# Patient Record
Sex: MALE | Race: WHITE | NOT HISPANIC OR LATINO | Employment: OTHER | URBAN - METROPOLITAN AREA
[De-identification: names, ages, dates, MRNs, and addresses within clinical notes are randomized per-mention and may not be internally consistent; named-entity substitution may affect disease eponyms.]

---

## 2017-01-11 ENCOUNTER — OFFICE VISIT (OUTPATIENT)
Dept: DIABETES SERVICES | Facility: HOSPITAL | Age: 82
End: 2017-01-11

## 2017-01-11 PROCEDURE — G0109 DIAB MANAGE TRN IND/GROUP: HCPCS | Performed by: DIETITIAN, REGISTERED

## 2019-06-06 ENCOUNTER — APPOINTMENT (OUTPATIENT)
Dept: LAB | Facility: HOSPITAL | Age: 84
End: 2019-06-06

## 2019-06-06 ENCOUNTER — TRANSCRIBE ORDERS (OUTPATIENT)
Dept: LAB | Facility: HOSPITAL | Age: 84
End: 2019-06-06

## 2019-06-06 DIAGNOSIS — L08.0 PYODERMA: Primary | ICD-10-CM

## 2019-06-06 PROCEDURE — 87070 CULTURE OTHR SPECIMN AEROBIC: CPT | Performed by: DERMATOLOGY

## 2019-06-06 PROCEDURE — 87205 SMEAR GRAM STAIN: CPT | Performed by: DERMATOLOGY

## 2019-06-08 LAB
BACTERIA SPEC AEROBE CULT: NORMAL
GRAM STN SPEC: NORMAL

## 2022-05-10 ENCOUNTER — HOSPITAL ENCOUNTER (INPATIENT)
Facility: HOSPITAL | Age: 87
LOS: 8 days | Discharge: HOME | End: 2022-05-18
Attending: EMERGENCY MEDICINE | Admitting: INTERNAL MEDICINE

## 2022-05-10 ENCOUNTER — APPOINTMENT (OUTPATIENT)
Dept: GENERAL RADIOLOGY | Facility: HOSPITAL | Age: 87
End: 2022-05-10

## 2022-05-10 DIAGNOSIS — L03.115 CELLULITIS OF RIGHT FOOT: Primary | ICD-10-CM

## 2022-05-10 DIAGNOSIS — D72.829 LEUKOCYTOSIS, UNSPECIFIED TYPE: ICD-10-CM

## 2022-05-10 DIAGNOSIS — L97.513 FOOT ULCERATION, RIGHT, WITH NECROSIS OF MUSCLE: ICD-10-CM

## 2022-05-10 DIAGNOSIS — M86.071 ACUTE HEMATOGENOUS OSTEOMYELITIS OF RIGHT FOOT: ICD-10-CM

## 2022-05-10 DIAGNOSIS — R79.89 ELEVATED SERUM CREATININE: ICD-10-CM

## 2022-05-10 DIAGNOSIS — A48.0 GAS GANGRENE OF FOOT: ICD-10-CM

## 2022-05-10 PROBLEM — N28.9 RENAL INSUFFICIENCY: Status: ACTIVE | Noted: 2022-05-10

## 2022-05-10 PROBLEM — E11.65 TYPE 2 DIABETES MELLITUS WITH HYPERGLYCEMIA (HCC): Status: ACTIVE | Noted: 2022-05-10

## 2022-05-10 LAB
ALBUMIN SERPL-MCNC: 3 G/DL (ref 3.5–5.2)
ALBUMIN/GLOB SERPL: 0.7 G/DL
ALP SERPL-CCNC: 119 U/L (ref 39–117)
ALT SERPL W P-5'-P-CCNC: 16 U/L (ref 1–41)
ANION GAP SERPL CALCULATED.3IONS-SCNC: 14 MMOL/L (ref 5–15)
AST SERPL-CCNC: 16 U/L (ref 1–40)
BASOPHILS # BLD AUTO: 0.04 10*3/MM3 (ref 0–0.2)
BASOPHILS NFR BLD AUTO: 0.2 % (ref 0–1.5)
BILIRUB SERPL-MCNC: 0.3 MG/DL (ref 0–1.2)
BUN SERPL-MCNC: 29 MG/DL (ref 8–23)
BUN/CREAT SERPL: 20.9 (ref 7–25)
CALCIUM SPEC-SCNC: 9.3 MG/DL (ref 8.6–10.5)
CHLORIDE SERPL-SCNC: 98 MMOL/L (ref 98–107)
CO2 SERPL-SCNC: 24 MMOL/L (ref 22–29)
CREAT SERPL-MCNC: 1.39 MG/DL (ref 0.76–1.27)
CRP SERPL-MCNC: 12.33 MG/DL (ref 0–0.5)
D-LACTATE SERPL-SCNC: 2 MMOL/L (ref 0.5–2)
DEPRECATED RDW RBC AUTO: 45.5 FL (ref 37–54)
EGFRCR SERPLBLD CKD-EPI 2021: 49.1 ML/MIN/1.73
EOSINOPHIL # BLD AUTO: 0.01 10*3/MM3 (ref 0–0.4)
EOSINOPHIL NFR BLD AUTO: 0.1 % (ref 0.3–6.2)
ERYTHROCYTE [DISTWIDTH] IN BLOOD BY AUTOMATED COUNT: 13.8 % (ref 12.3–15.4)
ERYTHROCYTE [SEDIMENTATION RATE] IN BLOOD: 62 MM/HR (ref 0–20)
GLOBULIN UR ELPH-MCNC: 4.2 GM/DL
GLUCOSE BLDC GLUCOMTR-MCNC: 249 MG/DL (ref 70–130)
GLUCOSE SERPL-MCNC: 286 MG/DL (ref 65–99)
HCT VFR BLD AUTO: 31.3 % (ref 37.5–51)
HGB BLD-MCNC: 10 G/DL (ref 13–17.7)
IMM GRANULOCYTES # BLD AUTO: 0.09 10*3/MM3 (ref 0–0.05)
IMM GRANULOCYTES NFR BLD AUTO: 0.5 % (ref 0–0.5)
LYMPHOCYTES # BLD AUTO: 0.81 10*3/MM3 (ref 0.7–3.1)
LYMPHOCYTES NFR BLD AUTO: 4.9 % (ref 19.6–45.3)
MCH RBC QN AUTO: 28.7 PG (ref 26.6–33)
MCHC RBC AUTO-ENTMCNC: 31.9 G/DL (ref 31.5–35.7)
MCV RBC AUTO: 89.9 FL (ref 79–97)
MONOCYTES # BLD AUTO: 0.87 10*3/MM3 (ref 0.1–0.9)
MONOCYTES NFR BLD AUTO: 5.3 % (ref 5–12)
NEUTROPHILS NFR BLD AUTO: 14.67 10*3/MM3 (ref 1.7–7)
NEUTROPHILS NFR BLD AUTO: 89 % (ref 42.7–76)
NRBC BLD AUTO-RTO: 0 /100 WBC (ref 0–0.2)
PLATELET # BLD AUTO: 512 10*3/MM3 (ref 140–450)
PMV BLD AUTO: 9.8 FL (ref 6–12)
POTASSIUM SERPL-SCNC: 4.5 MMOL/L (ref 3.5–5.2)
PROT SERPL-MCNC: 7.2 G/DL (ref 6–8.5)
RBC # BLD AUTO: 3.48 10*6/MM3 (ref 4.14–5.8)
SARS-COV-2 RDRP RESP QL NAA+PROBE: NORMAL
SODIUM SERPL-SCNC: 136 MMOL/L (ref 136–145)
WBC NRBC COR # BLD: 16.49 10*3/MM3 (ref 3.4–10.8)

## 2022-05-10 PROCEDURE — 82962 GLUCOSE BLOOD TEST: CPT

## 2022-05-10 PROCEDURE — 87205 SMEAR GRAM STAIN: CPT | Performed by: INTERNAL MEDICINE

## 2022-05-10 PROCEDURE — 87077 CULTURE AEROBIC IDENTIFY: CPT | Performed by: INTERNAL MEDICINE

## 2022-05-10 PROCEDURE — 83605 ASSAY OF LACTIC ACID: CPT | Performed by: EMERGENCY MEDICINE

## 2022-05-10 PROCEDURE — 73630 X-RAY EXAM OF FOOT: CPT

## 2022-05-10 PROCEDURE — 85025 COMPLETE CBC W/AUTO DIFF WBC: CPT | Performed by: EMERGENCY MEDICINE

## 2022-05-10 PROCEDURE — 63710000001 INSULIN LISPRO (HUMAN) PER 5 UNITS: Performed by: INTERNAL MEDICINE

## 2022-05-10 PROCEDURE — 25010000002 CEFTRIAXONE PER 250 MG: Performed by: EMERGENCY MEDICINE

## 2022-05-10 PROCEDURE — 85652 RBC SED RATE AUTOMATED: CPT | Performed by: INTERNAL MEDICINE

## 2022-05-10 PROCEDURE — 86900 BLOOD TYPING SEROLOGIC ABO: CPT

## 2022-05-10 PROCEDURE — 87040 BLOOD CULTURE FOR BACTERIA: CPT | Performed by: EMERGENCY MEDICINE

## 2022-05-10 PROCEDURE — 86140 C-REACTIVE PROTEIN: CPT | Performed by: INTERNAL MEDICINE

## 2022-05-10 PROCEDURE — 80053 COMPREHEN METABOLIC PANEL: CPT | Performed by: EMERGENCY MEDICINE

## 2022-05-10 PROCEDURE — 87635 SARS-COV-2 COVID-19 AMP PRB: CPT | Performed by: INTERNAL MEDICINE

## 2022-05-10 PROCEDURE — 87186 SC STD MICRODIL/AGAR DIL: CPT | Performed by: INTERNAL MEDICINE

## 2022-05-10 PROCEDURE — 25010000002 PIPERACILLIN SOD-TAZOBACTAM PER 1 G: Performed by: INTERNAL MEDICINE

## 2022-05-10 PROCEDURE — 25010000002 VANCOMYCIN 10 G RECONSTITUTED SOLUTION: Performed by: EMERGENCY MEDICINE

## 2022-05-10 PROCEDURE — 86901 BLOOD TYPING SEROLOGIC RH(D): CPT

## 2022-05-10 PROCEDURE — 99284 EMERGENCY DEPT VISIT MOD MDM: CPT

## 2022-05-10 PROCEDURE — 25010000002 MEROPENEM PER 100 MG: Performed by: EMERGENCY MEDICINE

## 2022-05-10 PROCEDURE — 99223 1ST HOSP IP/OBS HIGH 75: CPT | Performed by: INTERNAL MEDICINE

## 2022-05-10 PROCEDURE — 87070 CULTURE OTHR SPECIMN AEROBIC: CPT | Performed by: INTERNAL MEDICINE

## 2022-05-10 RX ORDER — SODIUM CHLORIDE 0.9 % (FLUSH) 0.9 %
10 SYRINGE (ML) INJECTION AS NEEDED
Status: DISCONTINUED | OUTPATIENT
Start: 2022-05-10 | End: 2022-05-18 | Stop reason: HOSPADM

## 2022-05-10 RX ORDER — DEXTROSE MONOHYDRATE 25 G/50ML
25 INJECTION, SOLUTION INTRAVENOUS
Status: DISCONTINUED | OUTPATIENT
Start: 2022-05-10 | End: 2022-05-18 | Stop reason: HOSPADM

## 2022-05-10 RX ORDER — MAGNESIUM SULFATE HEPTAHYDRATE 40 MG/ML
2 INJECTION, SOLUTION INTRAVENOUS AS NEEDED
Status: DISCONTINUED | OUTPATIENT
Start: 2022-05-10 | End: 2022-05-18 | Stop reason: HOSPADM

## 2022-05-10 RX ORDER — NICOTINE POLACRILEX 4 MG
15 LOZENGE BUCCAL
Status: DISCONTINUED | OUTPATIENT
Start: 2022-05-10 | End: 2022-05-18 | Stop reason: HOSPADM

## 2022-05-10 RX ORDER — ACETAMINOPHEN 160 MG/5ML
650 SOLUTION ORAL EVERY 4 HOURS PRN
Status: DISCONTINUED | OUTPATIENT
Start: 2022-05-10 | End: 2022-05-18 | Stop reason: HOSPADM

## 2022-05-10 RX ORDER — AMITRIPTYLINE HYDROCHLORIDE 25 MG/1
25 TABLET, FILM COATED ORAL NIGHTLY
COMMUNITY
End: 2022-05-18 | Stop reason: HOSPADM

## 2022-05-10 RX ORDER — AMLODIPINE BESYLATE 10 MG/1
10 TABLET ORAL DAILY
COMMUNITY
Start: 2022-01-18

## 2022-05-10 RX ORDER — MAGNESIUM SULFATE HEPTAHYDRATE 40 MG/ML
4 INJECTION, SOLUTION INTRAVENOUS AS NEEDED
Status: DISCONTINUED | OUTPATIENT
Start: 2022-05-10 | End: 2022-05-18 | Stop reason: HOSPADM

## 2022-05-10 RX ORDER — ONDANSETRON 4 MG/1
4 TABLET, FILM COATED ORAL EVERY 6 HOURS PRN
Status: DISCONTINUED | OUTPATIENT
Start: 2022-05-10 | End: 2022-05-18 | Stop reason: HOSPADM

## 2022-05-10 RX ORDER — PRAVASTATIN SODIUM 20 MG
20 TABLET ORAL NIGHTLY
COMMUNITY
Start: 2022-01-24

## 2022-05-10 RX ORDER — POTASSIUM CHLORIDE 7.45 MG/ML
10 INJECTION INTRAVENOUS
Status: DISCONTINUED | OUTPATIENT
Start: 2022-05-10 | End: 2022-05-18 | Stop reason: HOSPADM

## 2022-05-10 RX ORDER — MORPHINE SULFATE 2 MG/ML
2 INJECTION, SOLUTION INTRAMUSCULAR; INTRAVENOUS
Status: ACTIVE | OUTPATIENT
Start: 2022-05-10 | End: 2022-05-17

## 2022-05-10 RX ORDER — HYDROCODONE BITARTRATE AND ACETAMINOPHEN 5; 325 MG/1; MG/1
1 TABLET ORAL EVERY 4 HOURS PRN
Status: ACTIVE | OUTPATIENT
Start: 2022-05-10 | End: 2022-05-17

## 2022-05-10 RX ORDER — ACETAMINOPHEN 650 MG/1
650 SUPPOSITORY RECTAL EVERY 4 HOURS PRN
Status: DISCONTINUED | OUTPATIENT
Start: 2022-05-10 | End: 2022-05-18 | Stop reason: HOSPADM

## 2022-05-10 RX ORDER — PRAVASTATIN SODIUM 20 MG
20 TABLET ORAL NIGHTLY
Status: DISCONTINUED | OUTPATIENT
Start: 2022-05-10 | End: 2022-05-18 | Stop reason: HOSPADM

## 2022-05-10 RX ORDER — SODIUM CHLORIDE 9 MG/ML
100 INJECTION, SOLUTION INTRAVENOUS CONTINUOUS
Status: DISCONTINUED | OUTPATIENT
Start: 2022-05-10 | End: 2022-05-11

## 2022-05-10 RX ORDER — BISACODYL 10 MG
10 SUPPOSITORY, RECTAL RECTAL DAILY PRN
Status: DISCONTINUED | OUTPATIENT
Start: 2022-05-10 | End: 2022-05-15

## 2022-05-10 RX ORDER — POTASSIUM CHLORIDE 750 MG/1
40 CAPSULE, EXTENDED RELEASE ORAL AS NEEDED
Status: DISCONTINUED | OUTPATIENT
Start: 2022-05-10 | End: 2022-05-18 | Stop reason: HOSPADM

## 2022-05-10 RX ORDER — SODIUM CHLORIDE 0.9 % (FLUSH) 0.9 %
10 SYRINGE (ML) INJECTION EVERY 12 HOURS SCHEDULED
Status: DISCONTINUED | OUTPATIENT
Start: 2022-05-10 | End: 2022-05-18 | Stop reason: HOSPADM

## 2022-05-10 RX ORDER — INSULIN LISPRO 100 [IU]/ML
0-9 INJECTION, SOLUTION INTRAVENOUS; SUBCUTANEOUS
Status: DISCONTINUED | OUTPATIENT
Start: 2022-05-10 | End: 2022-05-18 | Stop reason: HOSPADM

## 2022-05-10 RX ORDER — NALOXONE HCL 0.4 MG/ML
0.4 VIAL (ML) INJECTION
Status: DISCONTINUED | OUTPATIENT
Start: 2022-05-10 | End: 2022-05-18 | Stop reason: HOSPADM

## 2022-05-10 RX ORDER — ONDANSETRON 2 MG/ML
4 INJECTION INTRAMUSCULAR; INTRAVENOUS EVERY 6 HOURS PRN
Status: DISCONTINUED | OUTPATIENT
Start: 2022-05-10 | End: 2022-05-18 | Stop reason: HOSPADM

## 2022-05-10 RX ORDER — CHOLECALCIFEROL (VITAMIN D3) 125 MCG
5 CAPSULE ORAL NIGHTLY PRN
Status: DISCONTINUED | OUTPATIENT
Start: 2022-05-10 | End: 2022-05-18 | Stop reason: HOSPADM

## 2022-05-10 RX ORDER — POTASSIUM CHLORIDE 1.5 G/1.77G
40 POWDER, FOR SOLUTION ORAL AS NEEDED
Status: DISCONTINUED | OUTPATIENT
Start: 2022-05-10 | End: 2022-05-18 | Stop reason: HOSPADM

## 2022-05-10 RX ORDER — METFORMIN HYDROCHLORIDE 500 MG/1
1000 TABLET, EXTENDED RELEASE ORAL 2 TIMES DAILY
COMMUNITY
Start: 2022-01-12

## 2022-05-10 RX ORDER — BISACODYL 5 MG/1
5 TABLET, DELAYED RELEASE ORAL DAILY PRN
Status: DISCONTINUED | OUTPATIENT
Start: 2022-05-10 | End: 2022-05-15

## 2022-05-10 RX ORDER — ACETAMINOPHEN 325 MG/1
650 TABLET ORAL EVERY 4 HOURS PRN
Status: DISCONTINUED | OUTPATIENT
Start: 2022-05-10 | End: 2022-05-18 | Stop reason: HOSPADM

## 2022-05-10 RX ORDER — POLYETHYLENE GLYCOL 3350 17 G/17G
17 POWDER, FOR SOLUTION ORAL DAILY PRN
Status: DISCONTINUED | OUTPATIENT
Start: 2022-05-10 | End: 2022-05-15

## 2022-05-10 RX ORDER — AMOXICILLIN 250 MG
2 CAPSULE ORAL 2 TIMES DAILY
Status: DISCONTINUED | OUTPATIENT
Start: 2022-05-10 | End: 2022-05-15

## 2022-05-10 RX ORDER — SODIUM CHLORIDE 9 MG/ML
125 INJECTION, SOLUTION INTRAVENOUS CONTINUOUS
Status: DISCONTINUED | OUTPATIENT
Start: 2022-05-10 | End: 2022-05-10

## 2022-05-10 RX ADMIN — SODIUM CHLORIDE 100 ML/HR: 9 INJECTION, SOLUTION INTRAVENOUS at 16:29

## 2022-05-10 RX ADMIN — SODIUM CHLORIDE 1 G: 900 INJECTION INTRAVENOUS at 13:50

## 2022-05-10 RX ADMIN — VANCOMYCIN HYDROCHLORIDE 1500 MG: 10 INJECTION, POWDER, LYOPHILIZED, FOR SOLUTION INTRAVENOUS at 15:41

## 2022-05-10 RX ADMIN — MEROPENEM 1 G: 1 INJECTION, POWDER, FOR SOLUTION INTRAVENOUS at 14:51

## 2022-05-10 RX ADMIN — SODIUM CHLORIDE 100 ML/HR: 9 INJECTION, SOLUTION INTRAVENOUS at 19:18

## 2022-05-10 RX ADMIN — TAZOBACTAM SODIUM AND PIPERACILLIN SODIUM 3.38 G: 375; 3 INJECTION, SOLUTION INTRAVENOUS at 23:50

## 2022-05-10 RX ADMIN — TAZOBACTAM SODIUM AND PIPERACILLIN SODIUM 3.38 G: 375; 3 INJECTION, SOLUTION INTRAVENOUS at 16:29

## 2022-05-10 RX ADMIN — INSULIN LISPRO 4 UNITS: 100 INJECTION, SOLUTION INTRAVENOUS; SUBCUTANEOUS at 16:29

## 2022-05-10 RX ADMIN — SODIUM CHLORIDE 125 ML/HR: 9 INJECTION, SOLUTION INTRAVENOUS at 13:50

## 2022-05-10 NOTE — PLAN OF CARE
Goal Outcome Evaluation:  Plan of Care Reviewed With: patient        Progress: no change  Outcome Evaluation: VSS. RA. Fall video played. Fall precautions in place. Admitted to floor. Fluids and vanc infusing. Pedal and tibal pulses dopplered. Will continue plan of care

## 2022-05-10 NOTE — CONSULTS
Cardiothoracic Surgery History & Physical           Chief complaint:     HPI:   Mr. Jesse Siu is a 86yo male with HLD, HTN who presented to the ED today for a right foot wound which has been treated for the last 4 weeks by a podiatrist. The patient states he was given a topical cream for the ulceration, but no oral antibiotics. The wound had been worsening and the podiatrist referred the patient to the ED today during the follow up visit.   Patient states that he lives with his adult daughter who helps take care of him. He normally ambulates with a cane and drives a car. He denies any other symptoms, such as fever, chills, N/V/D. He states his foot is somewhat painful. He does not recall the initial trauma to the foot. This is the first time he has experienced an ulceration such as this.         Past Medical History:   Diagnosis Date   • Hyperlipidemia    • Hypertension    • Pre-diabetes      Past Surgical History:   Procedure Laterality Date   • FOOT SURGERY Left     CANCER REMOVAL   • TONSILLECTOMY     • TURP / TRANSURETHRAL INCISION / DRAINAGE PROSTATE       History reviewed. No pertinent family history.  Social History     Tobacco Use   • Smoking status: Never Smoker   • Smokeless tobacco: Never Used   Substance Use Topics   • Alcohol use: Never   • Drug use: Never       Medications Prior to Admission   Medication Sig Dispense Refill Last Dose   • amitriptyline (ELAVIL) 25 MG tablet Take 25 mg by mouth Every Night.   5/9/2022 at Unknown time   • amLODIPine (NORVASC) 10 MG tablet Take 10 mg by mouth Daily.   5/10/2022 at Unknown time   • collagenase 250 UNIT/GM ointment Apply 1 application topically to the appropriate area as directed Daily. Apply to wound   5/10/2022 at Unknown time   • metFORMIN ER (GLUCOPHAGE-XR) 500 MG 24 hr tablet Take 1,000 mg by mouth 2 (Two) Times a Day.   5/10/2022 at Unknown time   • pravastatin (PRAVACHOL) 20 MG tablet Take 20 mg by mouth Every Night.   5/9/2022 at Unknown time      Allergies:  Patient has no known allergies.    Review of Systems:  A comprehensive review of systems was negative except for:     All other systems were reviewed and were negative.    Vital Signs:  Temp:  [98.5 °F (36.9 °C)-99.1 °F (37.3 °C)] 98.5 °F (36.9 °C)  Heart Rate:  [83-87] 83  Resp:  [18] 18  BP: (116-168)/(64-99) 168/99    Physical Exam:  Physical Exam  Vitals and nursing note reviewed.   Constitutional:       Appearance: Normal appearance.   HENT:      Head: Normocephalic and atraumatic.   Eyes:      Pupils: Pupils are equal, round, and reactive to light.   Cardiovascular:      Rate and Rhythm: Normal rate and regular rhythm.      Pulses:           Dorsalis pedis pulses are detected w/ Doppler on the right side and detected w/ Doppler on the left side.        Posterior tibial pulses are detected w/ Doppler on the right side and detected w/ Doppler on the left side.   Pulmonary:      Effort: Pulmonary effort is normal.      Breath sounds: Normal breath sounds.   Abdominal:      General: Abdomen is flat. Bowel sounds are normal.      Palpations: Abdomen is soft.   Feet:      Comments: Black ulceration measuring 4cm to base of right great toe.  Right second toe is mottled  The forefoot exhibits erythema and edema  Skin:     General: Skin is warm and dry.      Capillary Refill: Capillary refill takes less than 2 seconds.   Neurological:      Mental Status: He is alert and oriented to person, place, and time.   Psychiatric:         Mood and Affect: Mood normal.         Behavior: Behavior normal.         Labs:  Results from last 7 days   Lab Units 05/10/22  1307   WBC 10*3/mm3 16.49*   HEMOGLOBIN g/dL 10.0*   HEMATOCRIT % 31.3*   PLATELETS 10*3/mm3 512*     Results from last 7 days   Lab Units 05/10/22  1307   SODIUM mmol/L 136   POTASSIUM mmol/L 4.5   CHLORIDE mmol/L 98   CO2 mmol/L 24.0   BUN mg/dL 29*   CREATININE mg/dL 1.39*   GLUCOSE mg/dL 286*   CALCIUM mg/dL 9.3         Coagulation: No results found  for: INR, APTT  Cardiac markers:     ABGs:       Invalid input(s): PO2    Imaging:   XR FOOT 3+ VW RIGHT-     Date of Exam: 5/10/2022 1:29 PM     Indication: infection, r/o osteo; L03.115-Cellulitis of right lower  limb; 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     Comparison: 11/30/2011     Technique: Three views of the foot were obtained.     FINDINGS: There is soft tissue ulceration along the dorsal medial aspect  of the foot at the level of the first metatarsal-phalangeal joint.   There is a moderate amount of gas within the soft tissues surrounding  the first and second metatarsal phalangeal joints.  There is severe  joint space narrowing involving the first and second metatarsal  phalangeal joints.  There is some bony destruction involving the head of  the first metatarsal subchondral cystic degenerative change or bony  destruction involving the base of the proximal phalanx of the first  digit.  No definite bony destruction seen of the second digit or head of  the second metatarsal.  The other bony structures appear within normal  limits.  Vascular calcifications are seen within the soft tissues.     IMPRESSION:        1.  Moderate amount of gas within the soft tissues surrounding the first  and second metatarsal phalangeal joints.  There appears to be bony  destruction involving the head of the first metatarsal compatible with  osteomyelitis.  There is subchondral cystic degenerative change or  additional bony destruction involving the base of the proximal phalanx  of the first digit.  No definite bony destruction of the second digit or  second metatarsal.    Assessment:   Patient Active Problem List   Diagnosis   • Cellulitis of right foot   • Gas gangrene of foot (HCC)   • Type 2 diabetes mellitus with hyperglycemia (HCC)   • Renal insufficiency       Plan:   IV antibiotics per ID  Will discuss with Dr. Angela as patient will likely need toe amputation for  osteomyelitis of first metatarsal bone, timing TBD        Melissa Rizzo PA-C  05/10/22  16:43 EDT

## 2022-05-10 NOTE — H&P
[facility] Medicine Services  HISTORY AND PHYSICAL    Patient Name: Jesse Siu  : 1935  MRN: [id number]  Primary Care Physician: IRVIN Martinez MD  Date of admission: 5/10/2022      Subjective   Subjective     Chief Complaint: foot wound    HPI:  Jesse Siu is a 87 y.o. male presenting with worsening right foot wound. This has been ongoing for about 1 month and he has been seeing a podiatrist for this. She gave him a medication which he cannot remember the name of that he has been applying to his feet but his wound has continued to worsen. He was seen in follow-up by his podiatrist today who sent him to ED. Has had foul smelling drainage from foot. Denies f/c, significant leg pain.      Review of Systems   Gen- No fevers, chills  CV- No chest pain, palpitations  Resp- No cough, dyspnea  GI- No N/V/D, abd pain    All other systems reviewed and are negative.     Personal History     Past Medical History:   Diagnosis Date   • Hyperlipidemia    • Hypertension    • Pre-diabetes              Past Surgical History:   Procedure Laterality Date   • FOOT SURGERY Left     CANCER REMOVAL   • TONSILLECTOMY     • TURP / TRANSURETHRAL INCISION / DRAINAGE PROSTATE         Family History: Otherwise pertinent FHx was reviewed and unremarkable.     Social History:  reports that he has never smoked. He has never used smokeless tobacco. He reports that he does not drink alcohol and does not use drugs.  Social History     Social History Narrative   • Not on file       Medications:  Available home medication information reviewed.  (Not in a hospital admission)      No Known Allergies    Objective   Objective     Vital Signs:   Temp:  [99.1 °F (37.3 °C)] 99.1 °F (37.3 °C)  Heart Rate:  [87] 87  Resp:  [18] 18  BP: (116)/(64) 116/64       Physical Exam   Constitutional: Awake, alert  Eyes: PERRLA, sclerae anicteric, no conjunctival injection  HENT: NCAT, mucous membranes moist  Neck: Supple, no  thyromegaly, no lymphadenopathy, trachea midline  Respiratory: Clear to auscultation bilaterally, nonlabored respirations   Cardiovascular: RRR, no murmurs, rubs, or gallops, palpable pedal pulses bilaterally  Gastrointestinal: Positive bowel sounds, soft, nontender, nondistended  Musculoskeletal: See right foot below  Psychiatric: Appropriate affect, cooperative  Neurologic: Oriented x 3, strength symmetric in all extremities, Cranial Nerves grossly intact to confrontation, speech clear  Skin: No rashes        File Link        Result Review:  I have personally reviewed the results from the time of this admission to 5/10/2022 14:21 EDT and agree with these findings:  []  Laboratory  []  Microbiology  []  Radiology  []  EKG/Telemetry   []  Cardiology/Vascular   []  Pathology  []  Old records  []  Other:  Most notable findings include:       LAB RESULTS:      Lab 05/10/22  1307   WBC 16.49*   HEMOGLOBIN 10.0*   HEMATOCRIT 31.3*   PLATELETS 512*   NEUTROS ABS 14.67*   IMMATURE GRANS (ABS) 0.09*   LYMPHS ABS 0.81   MONOS ABS 0.87   EOS ABS 0.01   MCV 89.9   LACTATE 2.0         Lab 05/10/22  1307   SODIUM 136   POTASSIUM 4.5   CHLORIDE 98   CO2 24.0   ANION GAP 14.0   BUN 29*   CREATININE 1.39*   EGFR 49.1*   GLUCOSE 286*   CALCIUM 9.3         Lab 05/10/22  1307   TOTAL PROTEIN 7.2   ALBUMIN 3.00*   GLOBULIN 4.2   ALT (SGPT) 16   AST (SGOT) 16   BILIRUBIN 0.3   ALK PHOS 119*                         Microbiology Results (last 10 days)     ** No results found for the last 240 hours. **        Xray foot personally reviewed with gas in tissues. Agree with interpretation.    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  Comparison: 11/30/2011  Technique: Three views of the foot were obtained.  FINDINGS: There  is soft tissue ulceration along the dorsal medial aspect of the foot at the level of the first metatarsal-phalangeal joint. There is a moderate amount of gas within the soft tissues surrounding the first and second metatarsal phalangeal joints.  There is severe joint space narrowing involving the first and second metatarsal phalangeal joints.  There is some bony destruction involving the head of the first metatarsal subchondral cystic degenerative change or bony destruction involving the base of the proximal phalanx of the first digit.  No definite bony destruction seen of the second digit or head of the second metatarsal.  The other bony structures appear within normal limits.  Vascular calcifications are seen within the soft tissues.      Impression:   1.  Moderate amount of gas within the soft tissues surrounding the first and second metatarsal phalangeal joints.  There appears to be bony destruction involving the head of the first metatarsal compatible with osteomyelitis.  There is subchondral cystic degenerative change or additional bony destruction involving the base of the proximal phalanx of the first digit.  No definite bony destruction of the second digit or second metatarsal.  This report was finalized on 5/10/2022 1:48 PM by Olivier Chong MD.            [unfilled]  Assessment & Plan     Active Hospital Problems    Diagnosis  POA   • Cellulitis of right foot [L03.115]  Yes   • Gas gangrene of foot (HCC) [A48.0]  Yes   • Type 2 diabetes mellitus with hyperglycemia (HCC) [E11.65]  Yes   • Renal insufficiency [N28.9]  Yes     88 y/o male presenting with worsening right foot diabetic ulcer with gas gangrene and cellulitis.    Gas gangrene of right foot  DM foot ulcer w/ cellulitis  --Cultures pending. IV vancomycin/zosyn ordered.   --Consult Dr. Angela.  --Consult ID  --PT wound care.  --Pain meds, antiemetics.  --NPO until evaluated by Dr. Angela. If no surgery cc2 diet.  --NWB RLE    Renal  insufficiency  --Mild. Due to above. IVF. Recheck in am.    DM2 w/ hyperglycemia  --SSI    HTN  --Currently normotensive. Hold norvasc, resume as needed.    DVT prophylaxis:  SCDs      CODE STATUS:  Full Code  Code Status and Medical Interventions:   Ordered at: 05/10/22 1410     Code Status (Patient has no pulse and is not breathing):    CPR (Attempt to Resuscitate)     Medical Interventions (Patient has pulse or is breathing):    Full Support         Sil Navarro II, DO  05/10/22

## 2022-05-10 NOTE — ED PROVIDER NOTES
"Subjective   This patient is a very nice 87-year-old gentleman who comes in secondary to an infection on his right foot.  He was evidently sent here by his podiatrist, where he was following up today secondary to a right foot infection he first noticed approximately 4 weeks ago.  Patient tells me that approximate 4 weeks ago, he noticed a \"spot\" on the medial aspect of his right foot.  Secondary to a history of cancer that was initially noted on his left foot years ago, operated on by his podiatrist, he went to see his podiatrist.  He hands me a collagenase topical cream that he tells me he was prescribed by his podiatrist and tells me that he was asked to follow-up in exactly 4 weeks.  He tells me that over the ensuing 1 to 2 weeks, he developed redness, swelling, and ultimate ulceration of the right foot.  He did not follow-up for any of this however, choosing instead to follow-up today at his previously scheduled 4-week appointment with his podiatrist.  Upon seeing the swelling, redness and ulceration, patient was immediately sent here for evaluation.  Patient tells me he has had no shortness of breath, fevers chills or cough.  Denies any nausea vomiting or diarrhea.  He tells me that walking around has been extremely difficult secondary to the pain and swelling.  He tells me he did not seek medical care, as he thought he was going to simply follow-up with his podiatrist.  He denies recent weight loss or any other concerns.  He is pleasant and tells me he sees Dr. Soheila Castillo from a primary care standpoint.  In summary, we have an 87-year-old gentleman here for right foot infection that has been brewing for approximately 3 weeks, and cared for as an outpatient by his podiatrist.  No antibiotics have been provided from what the patient shares here.    Past medical history  Patient tells me he is prediabetic and has a history of hypertension and dyslipidemia    Family history  Patient denies any history of bone " cancer          Review of Systems   Constitutional: Negative.  Negative for chills, fatigue, fever and unexpected weight change.   HENT: Negative for dental problem, ear pain, hearing loss and sinus pressure.    Eyes: Negative for pain and visual disturbance.   Respiratory: Negative for chest tightness and shortness of breath.    Cardiovascular: Negative for chest pain, palpitations and leg swelling.   Gastrointestinal: Negative for blood in stool, diarrhea, nausea and vomiting.   Genitourinary: Negative for difficulty urinating, dysuria, frequency, hematuria and urgency.   Musculoskeletal: Positive for arthralgias, gait problem and joint swelling. Negative for myalgias, neck pain and neck stiffness.        Right foot   Neurological: Negative for seizures, syncope, speech difficulty, light-headedness and headaches.   Psychiatric/Behavioral: Negative for confusion.   All other systems reviewed and are negative.      Past Medical History:   Diagnosis Date   • Hyperlipidemia    • Hypertension    • Pre-diabetes        No Known Allergies    Past Surgical History:   Procedure Laterality Date   • FOOT SURGERY Left     CANCER REMOVAL   • TONSILLECTOMY     • TURP / TRANSURETHRAL INCISION / DRAINAGE PROSTATE         History reviewed. No pertinent family history.    Social History     Socioeconomic History   • Marital status:    Tobacco Use   • Smoking status: Never Smoker   • Smokeless tobacco: Never Used   Substance and Sexual Activity   • Alcohol use: Never   • Drug use: Never   • Sexual activity: Never           Objective   Physical Exam  Vitals and nursing note reviewed.   Constitutional:       General: He is not in acute distress.     Appearance: Normal appearance. He is normal weight. He is not ill-appearing or toxic-appearing.   HENT:      Head: Normocephalic and atraumatic.      Right Ear: External ear normal.      Left Ear: External ear normal.      Nose: Nose normal.      Mouth/Throat:      Mouth: Mucous  membranes are moist.      Pharynx: Oropharynx is clear.   Eyes:      General:         Right eye: No discharge.         Left eye: No discharge.      Extraocular Movements: Extraocular movements intact.      Conjunctiva/sclera: Conjunctivae normal.      Pupils: Pupils are equal, round, and reactive to light.   Cardiovascular:      Rate and Rhythm: Normal rate.      Comments: 1+ pulses bilateral posterior tibial artery.  2+ pulses at radial artery bilaterally.  Pulmonary:      Effort: Pulmonary effort is normal.      Breath sounds: Normal breath sounds.   Abdominal:      General: Abdomen is flat. Bowel sounds are normal. There is no distension.      Palpations: Abdomen is soft. There is no mass.      Tenderness: There is no abdominal tenderness. There is no rebound.      Hernia: No hernia is present.   Musculoskeletal:         General: Swelling, tenderness, deformity and signs of injury present.      Cervical back: Normal range of motion.      Comments: Right foot shows evidence of cellulitis and ulceration throughout.  Ulceration to the MTP joint of the right first toe.  Redness and swelling throughout the toes extending proximally to the distal shin.   Skin:     General: Skin is warm.      Capillary Refill: Capillary refill takes less than 2 seconds. Normal capillary refill in the left foot.  Unable to obtain in the right foot secondary to swelling and erythema.     Findings: Erythema present.   Neurological:      Mental Status: He is alert and oriented to person, place, and time.   Psychiatric:         Mood and Affect: Mood normal.         Behavior: Behavior normal.         Thought Content: Thought content normal.         Procedures           ED Course  ED Course as of 05/10/22 1459   Tue May 10, 2022   1307 I had a great conversation with the patient.  We talked about the need for IV antibiotics and admission for further care and ID consult.  I am concerned about the patient's current clinical exam and talked about  the potential loss of toes secondary to some of the diagnoses we discussed included but not limited to vascular compromise, and osteomyelitis.  I have ordered vancomycin and Rocephin.  We have ordered blood cultures and basic labs.  Patient, surprising enough, is handling his symptoms well and has no active pain at this time.  I told the patient we would take good care of him.  All questions were answered.  The patient's nurses in the room caring for the patient currently and obtaining labs and starting fluids. [DANN]   1309 Final impression and plan following completion of work-up. [DANN]   1353 X-ray has been completed I have reviewed images independently.  Official read has been cut/pasted below for completeness.    IMPRESSION:        1.  Moderate amount of gas within the soft tissues surrounding the first  and second metatarsal phalangeal joints.  There appears to be bony  destruction involving the head of the first metatarsal compatible with  osteomyelitis.  There is subchondral cystic degenerative change or  additional bony destruction involving the base of the proximal phalanx  of the first digit.  No definite bony destruction of the second digit or  second metatarsal.     This report was finalized on 5/10/2022 1:48 PM by Olivier Chong MD. [DANN]   2065 I discussed the case with Dr. Frost, orthopedics, at approximately 2 PM Eastern time.  He agreed with the plan to consult vascular as well but indicated he would review the images and discussed the case with the admitting team.  I let them know that I was bring the patient into the hospitalist.  Dr. Angela was paged at 2 PM as well and I will discussed the case with him.  I have ordered vancomycin and gentamicin for the patient.  Labs are pending including blood cultures.  Lactic acid is okay at 2.0 which is encouraging.  Hospitalist has been consulted and patient will be admitted to the hospitalist with orthopedics and vascular consulted.  I have already shared  with the patient my concern over potential need for evaluation by vascular surgery and the potential losing part of his appendage secondary to the progression of the infection and he is understanding of this. [DANN]   1419 On reexamination, patient is resting comfortably.  I was informed by the pharmacist that we are unable to provide gentamicin at this time.  She suggested meropenem as a replacement and placed the order for us.  I have already discussed the case with the hospitalist.  Patient is aware of the plan and the seriousness of his condition.  He is resting comfortably and has been very appreciative for care.  Current heart rate 77 with oxygen saturations of 95%.  Patient will be admitted to the hospitalist.  Orthopedics consulted.  Vascular surgery has been called.  I plan to discuss the case with Dr. Rivero when he calls back.  Impression will include osteomyelitis of the right foot, cellulitis, leukocytosis. [DANN]      ED Course User Index  [DANN] Ole Hadley MD      Recent Results (from the past 24 hour(s))   Lactic Acid, Plasma    Collection Time: 05/10/22  1:07 PM    Specimen: Blood   Result Value Ref Range    Lactate 2.0 0.5 - 2.0 mmol/L   Comprehensive Metabolic Panel    Collection Time: 05/10/22  1:07 PM    Specimen: Blood   Result Value Ref Range    Glucose 286 (H) 65 - 99 mg/dL    BUN 29 (H) 8 - 23 mg/dL    Creatinine 1.39 (H) 0.76 - 1.27 mg/dL    Sodium 136 136 - 145 mmol/L    Potassium 4.5 3.5 - 5.2 mmol/L    Chloride 98 98 - 107 mmol/L    CO2 24.0 22.0 - 29.0 mmol/L    Calcium 9.3 8.6 - 10.5 mg/dL    Total Protein 7.2 6.0 - 8.5 g/dL    Albumin 3.00 (L) 3.50 - 5.20 g/dL    ALT (SGPT) 16 1 - 41 U/L    AST (SGOT) 16 1 - 40 U/L    Alkaline Phosphatase 119 (H) 39 - 117 U/L    Total Bilirubin 0.3 0.0 - 1.2 mg/dL    Globulin 4.2 gm/dL    A/G Ratio 0.7 g/dL    BUN/Creatinine Ratio 20.9 7.0 - 25.0    Anion Gap 14.0 5.0 - 15.0 mmol/L    eGFR 49.1 (L) >60.0 mL/min/1.73   CBC Auto Differential     Collection Time: 05/10/22  1:07 PM    Specimen: Blood   Result Value Ref Range    WBC 16.49 (H) 3.40 - 10.80 10*3/mm3    RBC 3.48 (L) 4.14 - 5.80 10*6/mm3    Hemoglobin 10.0 (L) 13.0 - 17.7 g/dL    Hematocrit 31.3 (L) 37.5 - 51.0 %    MCV 89.9 79.0 - 97.0 fL    MCH 28.7 26.6 - 33.0 pg    MCHC 31.9 31.5 - 35.7 g/dL    RDW 13.8 12.3 - 15.4 %    RDW-SD 45.5 37.0 - 54.0 fl    MPV 9.8 6.0 - 12.0 fL    Platelets 512 (H) 140 - 450 10*3/mm3    Neutrophil % 89.0 (H) 42.7 - 76.0 %    Lymphocyte % 4.9 (L) 19.6 - 45.3 %    Monocyte % 5.3 5.0 - 12.0 %    Eosinophil % 0.1 (L) 0.3 - 6.2 %    Basophil % 0.2 0.0 - 1.5 %    Immature Grans % 0.5 0.0 - 0.5 %    Neutrophils, Absolute 14.67 (H) 1.70 - 7.00 10*3/mm3    Lymphocytes, Absolute 0.81 0.70 - 3.10 10*3/mm3    Monocytes, Absolute 0.87 0.10 - 0.90 10*3/mm3    Eosinophils, Absolute 0.01 0.00 - 0.40 10*3/mm3    Basophils, Absolute 0.04 0.00 - 0.20 10*3/mm3    Immature Grans, Absolute 0.09 (H) 0.00 - 0.05 10*3/mm3    nRBC 0.0 0.0 - 0.2 /100 WBC   Sedimentation Rate    Collection Time: 05/10/22  1:07 PM    Specimen: Blood   Result Value Ref Range    Sed Rate 62 (H) 0 - 20 mm/hr     Note: In addition to lab results from this visit, the labs listed above may include labs taken at another facility or during a different encounter within the last 24 hours. Please correlate lab times with ED admission and discharge times for further clarification of the services performed during this visit.    XR Foot 3+ View Right   Final Result           1.  Moderate amount of gas within the soft tissues surrounding the first   and second metatarsal phalangeal joints.  There appears to be bony   destruction involving the head of the first metatarsal compatible with   osteomyelitis.  There is subchondral cystic degenerative change or   additional bony destruction involving the base of the proximal phalanx   of the first digit.  No definite bony destruction of the second digit or   second metatarsal.      "  This report was finalized on 5/10/2022 1:48 PM by Olivier Chong MD.            Vitals:    05/10/22 1217   BP: 116/64   Pulse: 87   Resp: 18   Temp: 99.1 °F (37.3 °C)   TempSrc: Oral   SpO2: 97%   Weight: 70.3 kg (155 lb)   Height: 177.8 cm (70\")     Medications   sodium chloride 0.9 % infusion (125 mL/hr Intravenous New Bag 5/10/22 1350)   vancomycin 1500 mg/500 mL 0.9% NS IVPB ([facility]) (has no administration in time range)   meropenem (MERREM) 1 g/100 mL 0.9% NS (mbp) (1 g Intravenous New Bag 5/10/22 1451)   cefTRIAXone (ROCEPHIN) 1 g/100 mL 0.9% NS (MBP) (0 g Intravenous Stopped 5/10/22 1430)     ECG/EMG Results (last 24 hours)     ** No results found for the last 24 hours. **        No orders to display                                                MDM    Final diagnoses:   Cellulitis of right foot   Foot ulceration, right, with necrosis of muscle (HCC)   Leukocytosis, unspecified type   Acute hematogenous osteomyelitis of right foot (HCC)   Elevated serum creatinine       ED Disposition  ED Disposition     ED Disposition   Decision to Admit    Condition   --    Comment   Level of Care: Telemetry [5]   Diagnosis: Cellulitis of right foot [858004]   Admitting Physician: YVETTE AVALOS II [[id number]]   Attending Physician: YVETTE AVALOS II [[id number]]   Certification: I Certify That Inpatient Hospital Services Are Medically Necessary For Greater Than 2 Midnights               No follow-up provider specified.       Medication List      No changes were made to your prescriptions during this visit.          Ole Hadley MD  05/10/22 5350    "

## 2022-05-10 NOTE — CASE MANAGEMENT/SOCIAL WORK
Discharge Planning Assessment  [facility]     Patient Name: Jesse Siu  MRN: [id number]  Today's Date: 5/10/2022    Admit Date: 5/10/2022     Discharge Needs Assessment     Row Name 05/10/22 1443       Living Environment    People in Home child(ashli), adult    Name(s) of People in Home Torie Siu    Current Living Arrangements home    Potentially Unsafe Housing Conditions unable to assess    Primary Care Provided by self    Provides Primary Care For no one    Family Caregiver if Needed child(ashli), adult    Family Caregiver Names Torie Siu    Quality of Family Relationships unable to assess    Able to Return to Prior Arrangements yes       Resource/Environmental Concerns    Resource/Environmental Concerns none    Transportation Concerns none       Transition Planning    Patient/Family Anticipates Transition to home    Patient/Family Anticipated Services at Transition none    Transportation Anticipated family or friend will provide       Discharge Needs Assessment    Readmission Within the Last 30 Days no previous admission in last 30 days    Equipment Currently Used at Home walker, standard;cane, quad    Concerns to be Addressed no discharge needs identified    Anticipated Changes Related to Illness none    Equipment Needed After Discharge other (see comments)  TBD    Outpatient/Agency/Support Group Needs other (see comments)  TBD    Discharge Facility/Level of Care Needs other (see comments)  TBD               Discharge Plan     Row Name 05/10/22 4004       Plan    Plan IDP    Patient/Family in Agreement with Plan yes    Plan Comments MSW met with pt. t bedside. Pt. reports that he lives in [place] with his daughter Torie Siu. Pt. reports that his PCP is Soheila Martinez. Pt.’s pharmacy is [facility]. Pt.’s insurance is Medicare and [facility] SUP. Pt. reports that he is independent with all ADL’s/IADL’s.  Pt. reports that he has a walker and cane. No O2, or HH services currently. Pt. reports that he has  transportation back home when he is medically ready. Pt.’s goal is to return home. CM will continue to follow pt. throughout pt.’s stay.    Final Discharge Disposition Code 30 - still a patient              Continued Care and Services - Admitted Since 5/10/2022    Coordination has not been started for this encounter.          Demographic Summary     Row Name 05/10/22 1438       General Information    Admission Type inpatient    Arrived From home    Referral Source admission list;emergency department    Reason for Consult discharge planning    Preferred Language English               Functional Status     Row Name 05/10/22 1440       Functional Status, IADL    Medications independent    Meal Preparation independent    Housekeeping independent    Laundry independent    Shopping independent       Mental Status Summary    Recent Changes in Mental Status/Cognitive Functioning unable to assess       Employment/    Employment Status retired               Psychosocial    No documentation.                Abuse/Neglect    No documentation.                Legal    No documentation.                Substance Abuse    No documentation.                Patient Forms    No documentation.                   SIRI Ríos

## 2022-05-10 NOTE — PROGRESS NOTES
"Pharmacy Consult-Vancomycin Dosing  Jesse Siu is a  87 y.o. male receiving vancomycin therapy.     Indication:  SSTI/Bone and Joint Infection  Consulting Provider:  Hospitalist  ID Consult: Yes (pending eval)    Goal AUC: 400 - 600 mg/L*hr    Current Antimicrobial Therapy  Anti-Infectives (From admission, onward)      Ordered     Dose/Rate Route Frequency Start Stop    05/10/22 1608  piperacillin-tazobactam (ZOSYN) 3.375 g in iso-osmotic dextrose 50 ml (premix)        Ordering Provider: Sil Navarro II, DO    3.375 g  over 4 Hours Intravenous Every 8 Hours 05/10/22 2100 05/17/22 2055    05/10/22 1608  piperacillin-tazobactam (ZOSYN) 3.375 g in iso-osmotic dextrose 50 ml (premix)        Ordering Provider: Sil Navarro II, DO    3.375 g  over 30 Minutes Intravenous Once 05/10/22 1700      05/10/22 1608  Pharmacy to dose vancomycin        Ordering Provider: Sil Navarro II, DO     Does not apply Continuous PRN 05/10/22 1608 05/17/22 1607    05/10/22 1416  meropenem (MERREM) 1 g/100 mL 0.9% NS (mbp)        Ordering Provider: Ole Hadley MD    1 g  over 30 Minutes Intravenous Once 05/10/22 1418 05/10/22 1521    05/10/22 1309  cefTRIAXone (ROCEPHIN) 1 g/100 mL 0.9% NS (MBP)        Ordering Provider: Ole Hadley MD    1 g  over 30 Minutes Intravenous Once 05/10/22 1311 05/10/22 1430    05/10/22 1307  vancomycin 1500 mg/500 mL 0.9% NS IVPB ([facility])        Ordering Provider: Ole Hadley MD    20 mg/kg × 70.3 kg Intravenous Once 05/10/22 1309 05/10/22 1541            Allergies  Allergies as of 05/10/2022    (No Known Allergies)       Labs    Results from last 7 days   Lab Units 05/10/22  1307   BUN mg/dL 29*   CREATININE mg/dL 1.39*       Results from last 7 days   Lab Units 05/10/22  1307   WBC 10*3/mm3 16.49*       Evaluation of Dosing     Last Dose Received in the ED/Outside Facility: 1500mg IV x 1 given in ED  Is Patient on Dialysis or Renal Replacement: No    Ht - 177.8 cm (70\")  Wt - " 70.3 kg (155 lb)    Estimated Creatinine Clearance: 37.2 mL/min (A) (by C-G formula based on SCr of 1.39 mg/dL (H)).    Intake & Output (last 3 days)         05/07 0701  05/08 0700 05/08 0701  05/09 0700 05/09 0701  05/10 0700 05/10 0701  05/11 0700    IV Piggyback    100    Total Intake(mL/kg)    100 (1.4)    Net    +100                    Microbiology and Radiology  Microbiology Results (last 10 days)       Procedure Component Value - Date/Time    COVID PRE-OP / PRE-PROCEDURE SCREENING ORDER (NO ISOLATION) - Swab, Nasopharynx [[id number]]  (Normal) Collected: 05/10/22 1454    Lab Status: Final result Specimen: Swab from Nasopharynx Updated: 05/10/22 1532    Narrative:      The following orders were created for panel order COVID PRE-OP / PRE-PROCEDURE SCREENING ORDER (NO ISOLATION) - Swab, Nasopharynx.  Procedure                               Abnormality         Status                     ---------                               -----------         ------                     COVID-19, ABBOTT IN-HOUS...[[id number]]  Normal              Final result                 Please view results for these tests on the individual orders.    COVID-19, ABBOTT IN-HOUSE,NASAL Swab (NO TRANSPORT MEDIA) 2 HR TAT - Swab, Nasopharynx [[id number]]  (Normal) Collected: 05/10/22 1454    Lab Status: Final result Specimen: Swab from Nasopharynx Updated: 05/10/22 1532     COVID19 Presumptive Negative    Narrative:      Fact sheet for providers: https://www.fda.gov/media/630710/download     Fact sheet for patients: https://www.fda.gov/media/676056/download    Test performed by PCR.  If inconsistent with clinical signs and symptoms patient should be tested with different authorized molecular test.            Reported Vancomycin Levels                      Assessment/Plan:  Pharmacy to dose vancomycin for SSTI.  I suspect osteomyelitis with dictated history. Goal trough 15-20 mcg/mL.  With history of diabetes and advanced age, renal function  expected to be worse than calculated.  Vancomycin 1500mg IV x 1 given in ED.  Will hold further doses for now.  Random level with AM labs to assess clearance.  Continue current dose of Zosyn.  Pharmacy will continue to follow and adjust dose based on renal function, drug levels, and clinical status.    Thanks,  Bacilio Bower, PharmD, BCPS, BCCCP  05/10/22  16:18 EDT

## 2022-05-11 ENCOUNTER — APPOINTMENT (OUTPATIENT)
Dept: CARDIOLOGY | Facility: HOSPITAL | Age: 87
End: 2022-05-11

## 2022-05-11 PROBLEM — L97.513: Status: ACTIVE | Noted: 2022-05-10

## 2022-05-11 LAB
ABO GROUP BLD: NORMAL
ABO GROUP BLD: NORMAL
ANION GAP SERPL CALCULATED.3IONS-SCNC: 9 MMOL/L (ref 5–15)
BH CV GRAFT BRACHIAL PRESSURE LEFT: 144 MMHG
BH CV GRAFT BRACHIAL PRESSURE RIGHT: 151 MMHG
BH CV LEA LEFT ANT TIBIAL A DISTAL PSV: 52.2 CM/S
BH CV LEA LEFT ANT TIBIAL A MID PSV: 55 CM/S
BH CV LEA LEFT ANT TIBIAL A PROX PSV: 22 CM/S
BH CV LEA LEFT CFA DISTAL PSV: 94 CM/S
BH CV LEA LEFT CFA PROX PSV: 109 CM/S
BH CV LEA LEFT DPA PRESSURE: 218 MMHG
BH CV LEA LEFT PERONEAL  MID PSV: 24 CM/S
BH CV LEA LEFT POPITEAL A  DISTAL PSV: 30.4 CM/S
BH CV LEA LEFT POPITEAL A  MID PSV: 43 CM/S
BH CV LEA LEFT POPITEAL A  PROX PSV: 53.7 CM/S
BH CV LEA LEFT SFA DISTAL PSV: -126.4 CM/S
BH CV LEA LEFT SFA MID PSV: -73.9 CM/S
BH CV LEA LEFT SFA PROX PSV: 90.1 CM/S
BH CV LEA RIGHT ANT TIBIAL A DISTAL PSV: -42.7 CM/S
BH CV LEA RIGHT ANT TIBIAL A MID EDV: -18.9 CM/S
BH CV LEA RIGHT ANT TIBIAL A MID PSV: -62.9 CM/S
BH CV LEA RIGHT ANT TIBIAL A PROX PSV: 276.4 CM/S
BH CV LEA RIGHT CFA DISTAL PSV: 97 CM/S
BH CV LEA RIGHT CFA PROX PSV: 92 CM/S
BH CV LEA RIGHT DFA PROX PSV: 49 CM/S
BH CV LEA RIGHT DPA PRESSURE: 118 MMHG
BH CV LEA RIGHT PERONEAL  MID EDV: 11.8 CM/S
BH CV LEA RIGHT PERONEAL  MID PSV: 41.8 CM/S
BH CV LEA RIGHT POPITEAL A  DISTAL PSV: 88.2 CM/S
BH CV LEA RIGHT POPITEAL A  MID PSV: 73 CM/S
BH CV LEA RIGHT POPITEAL A  PROX PSV: 66.8 CM/S
BH CV LEA RIGHT PTA DISTAL PSV: 47.2 CM/S
BH CV LEA RIGHT PTA MID PSV: 36.2 CM/S
BH CV LEA RIGHT PTA PRESSURE: 48 MMHG
BH CV LEA RIGHT SFA DISTAL PSV: -102.2 CM/S
BH CV LEA RIGHT SFA MID PSV: -165 CM/S
BH CV LEA RIGHT SFA PROX PSV: 93.3 CM/S
BH CV LEA RIGHT TIBEOPERONEAL PSV: 37 CM/S
BH CV LOWER ARTERIAL LEFT ABI RATIO: 1.5
BH CV LOWER ARTERIAL RIGHT ABI RATIO: 0.78
BLD GP AB SCN SERPL QL: NEGATIVE
BUN SERPL-MCNC: 21 MG/DL (ref 8–23)
BUN/CREAT SERPL: 17.5 (ref 7–25)
CALCIUM SPEC-SCNC: 9 MG/DL (ref 8.6–10.5)
CHLORIDE SERPL-SCNC: 102 MMOL/L (ref 98–107)
CO2 SERPL-SCNC: 28 MMOL/L (ref 22–29)
CREAT SERPL-MCNC: 1.2 MG/DL (ref 0.76–1.27)
DEPRECATED RDW RBC AUTO: 43.8 FL (ref 37–54)
EGFRCR SERPLBLD CKD-EPI 2021: 58.5 ML/MIN/1.73
ERYTHROCYTE [DISTWIDTH] IN BLOOD BY AUTOMATED COUNT: 13.6 % (ref 12.3–15.4)
GLUCOSE BLDC GLUCOMTR-MCNC: 167 MG/DL (ref 70–130)
GLUCOSE BLDC GLUCOMTR-MCNC: 177 MG/DL (ref 70–130)
GLUCOSE BLDC GLUCOMTR-MCNC: 178 MG/DL (ref 70–130)
GLUCOSE SERPL-MCNC: 194 MG/DL (ref 65–99)
HCT VFR BLD AUTO: 31.4 % (ref 37.5–51)
HGB BLD-MCNC: 10 G/DL (ref 13–17.7)
LEFT GROIN CFA SYS: 110 CM/SEC
MAXIMAL PREDICTED HEART RATE: 133 BPM
MCH RBC QN AUTO: 27.9 PG (ref 26.6–33)
MCHC RBC AUTO-ENTMCNC: 31.8 G/DL (ref 31.5–35.7)
MCV RBC AUTO: 87.5 FL (ref 79–97)
PLATELET # BLD AUTO: 510 10*3/MM3 (ref 140–450)
PMV BLD AUTO: 9.8 FL (ref 6–12)
POTASSIUM SERPL-SCNC: 4.9 MMOL/L (ref 3.5–5.2)
RBC # BLD AUTO: 3.59 10*6/MM3 (ref 4.14–5.8)
RH BLD: NEGATIVE
RH BLD: NEGATIVE
RIGHT GROIN CFA SYS: 92.9 CM/SEC
SODIUM SERPL-SCNC: 139 MMOL/L (ref 136–145)
STRESS TARGET HR: 113 BPM
T&S EXPIRATION DATE: NORMAL
VANCOMYCIN SERPL-MCNC: 15.8 MCG/ML (ref 5–40)
WBC NRBC COR # BLD: 13.68 10*3/MM3 (ref 3.4–10.8)

## 2022-05-11 PROCEDURE — 99232 SBSQ HOSP IP/OBS MODERATE 35: CPT | Performed by: INTERNAL MEDICINE

## 2022-05-11 PROCEDURE — 63710000001 INSULIN LISPRO (HUMAN) PER 5 UNITS: Performed by: INTERNAL MEDICINE

## 2022-05-11 PROCEDURE — 93925 LOWER EXTREMITY STUDY: CPT

## 2022-05-11 PROCEDURE — 25010000002 PIPERACILLIN SOD-TAZOBACTAM PER 1 G: Performed by: INTERNAL MEDICINE

## 2022-05-11 PROCEDURE — 86901 BLOOD TYPING SEROLOGIC RH(D): CPT | Performed by: THORACIC SURGERY (CARDIOTHORACIC VASCULAR SURGERY)

## 2022-05-11 PROCEDURE — 86850 RBC ANTIBODY SCREEN: CPT | Performed by: THORACIC SURGERY (CARDIOTHORACIC VASCULAR SURGERY)

## 2022-05-11 PROCEDURE — 25010000002 VANCOMYCIN 10 G RECONSTITUTED SOLUTION

## 2022-05-11 PROCEDURE — 85027 COMPLETE CBC AUTOMATED: CPT | Performed by: INTERNAL MEDICINE

## 2022-05-11 PROCEDURE — 86900 BLOOD TYPING SEROLOGIC ABO: CPT | Performed by: THORACIC SURGERY (CARDIOTHORACIC VASCULAR SURGERY)

## 2022-05-11 PROCEDURE — 80202 ASSAY OF VANCOMYCIN: CPT

## 2022-05-11 PROCEDURE — 80048 BASIC METABOLIC PNL TOTAL CA: CPT | Performed by: INTERNAL MEDICINE

## 2022-05-11 PROCEDURE — 93925 LOWER EXTREMITY STUDY: CPT | Performed by: INTERNAL MEDICINE

## 2022-05-11 PROCEDURE — 99231 SBSQ HOSP IP/OBS SF/LOW 25: CPT | Performed by: THORACIC SURGERY (CARDIOTHORACIC VASCULAR SURGERY)

## 2022-05-11 PROCEDURE — 82962 GLUCOSE BLOOD TEST: CPT

## 2022-05-11 PROCEDURE — 86923 COMPATIBILITY TEST ELECTRIC: CPT

## 2022-05-11 RX ADMIN — VANCOMYCIN HYDROCHLORIDE 1250 MG: 10 INJECTION, POWDER, LYOPHILIZED, FOR SOLUTION INTRAVENOUS at 16:59

## 2022-05-11 RX ADMIN — TAZOBACTAM SODIUM AND PIPERACILLIN SODIUM 3.38 G: 375; 3 INJECTION, SOLUTION INTRAVENOUS at 08:37

## 2022-05-11 RX ADMIN — INSULIN LISPRO 2 UNITS: 100 INJECTION, SOLUTION INTRAVENOUS; SUBCUTANEOUS at 08:37

## 2022-05-11 RX ADMIN — Medication 10 ML: at 20:32

## 2022-05-11 RX ADMIN — Medication 10 ML: at 08:43

## 2022-05-11 RX ADMIN — TAZOBACTAM SODIUM AND PIPERACILLIN SODIUM 3.38 G: 375; 3 INJECTION, SOLUTION INTRAVENOUS at 18:46

## 2022-05-11 RX ADMIN — SENNOSIDES AND DOCUSATE SODIUM 2 TABLET: 50; 8.6 TABLET ORAL at 20:32

## 2022-05-11 RX ADMIN — INSULIN LISPRO 2 UNITS: 100 INJECTION, SOLUTION INTRAVENOUS; SUBCUTANEOUS at 16:59

## 2022-05-11 RX ADMIN — INSULIN LISPRO 2 UNITS: 100 INJECTION, SOLUTION INTRAVENOUS; SUBCUTANEOUS at 13:05

## 2022-05-11 RX ADMIN — PRAVASTATIN SODIUM 20 MG: 20 TABLET ORAL at 20:32

## 2022-05-11 NOTE — PROGRESS NOTES
Cardiothoracic Surgery Progress Note      POD #:     LOS: 1 day      Subjective: Patient is awake and alert.  He is aware of the need for the amputation of the great toe and second toe of the right foot planned for tomorrow.    Objective:  Vital Signs  Temp:  [98.2 °F (36.8 °C)-99.1 °F (37.3 °C)] 98.3 °F (36.8 °C)  Heart Rate:  [] 72  Resp:  [18] 18  BP: (116-168)/(64-99) 138/69    Physical Exam:   General Appearance: Oriented x3   Lungs:   Heart:   Skin: Gangrenous areas of the right great toe and second toe painted with Betadine and covered with a dry dressing.   Incision:     Results:  Results from last 7 days   Lab Units 05/11/22  0429   WBC 10*3/mm3 13.68*   HEMOGLOBIN g/dL 10.0*   HEMATOCRIT % 31.4*   PLATELETS 10*3/mm3 510*     Results from last 7 days   Lab Units 05/11/22  0429   SODIUM mmol/L 139   POTASSIUM mmol/L 4.9   CHLORIDE mmol/L 102   CO2 mmol/L 28.0   BUN mg/dL 21   CREATININE mg/dL 1.20   GLUCOSE mg/dL 194*   CALCIUM mg/dL 9.0         Assessment: #1.  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  Will need transmetatarsal amputation of both these toes and most likely due to the extent of the gangrene will need to leave this wound open to heal by secondary intent/wound VAC  2.  Prediabetes according to the patient.  3.  Renal insufficiency  4.  Probable peripheral vascular disease to both lower extremities.      Plan: Amputation of the right great toe right second toe with extensive soft tissue debridement planned for tomorrow.  Patient understands agrees to proceed ahead.  He is aware there is a risk of bleeding, stroke, heart attack, death, possible postop phantom pain, and possible need for amputation a higher level of future due to ischemia and/or infection.  Overall medical manage per hospitalist.  Antibiotics per infectious disease.  We will get a arterial duplex of both lower extremities today.      Lemuel Angela MD - 05/11/22 - 06:08 EDT

## 2022-05-11 NOTE — PROGRESS NOTES
Pharmacy Consult-Vancomycin Dosing  Jesse Siu is a  87 y.o. male receiving vancomycin therapy.     Indication:  SSTI/Bone and Joint Infection  Consulting Provider:  Hospitalist  ID Consult: Yes (pending eval)    Goal Trough: 15-20 mcg/mL    Current Antimicrobial Therapy  Anti-Infectives (From admission, onward)      Ordered     Dose/Rate Route Frequency Start Stop    05/10/22 1614  vancomycin (dosing per levels)        Ordering Provider: Bacilio Bower PharmD     Does not apply Daily 05/11/22 0900 05/18/22 0859    05/10/22 1608  piperacillin-tazobactam (ZOSYN) 3.375 g in iso-osmotic dextrose 50 ml (premix)        Ordering Provider: Sil Navarro II, DO    3.375 g  over 4 Hours Intravenous Every 8 Hours 05/11/22 0000 05/17/22 2359    05/10/22 1608  piperacillin-tazobactam (ZOSYN) 3.375 g in iso-osmotic dextrose 50 ml (premix)        Ordering Provider: Sil Navarro II, DO    3.375 g  over 30 Minutes Intravenous Once 05/10/22 1700 05/10/22 1659    05/10/22 1608  Pharmacy to dose vancomycin        Ordering Provider: Sil Navarro II, DO     Does not apply Continuous PRN 05/10/22 1608 05/17/22 1607    05/10/22 1416  meropenem (MERREM) 1 g/100 mL 0.9% NS (mbp)        Ordering Provider: Ole Hadley MD    1 g  over 30 Minutes Intravenous Once 05/10/22 1418 05/10/22 1521    05/10/22 1309  cefTRIAXone (ROCEPHIN) 1 g/100 mL 0.9% NS (MBP)        Ordering Provider: Ole Hadley MD    1 g  over 30 Minutes Intravenous Once 05/10/22 1311 05/10/22 1430    05/10/22 1307  vancomycin 1500 mg/500 mL 0.9% NS IVPB ([facility])        Ordering Provider: Ole Hadley MD    20 mg/kg × 70.3 kg Intravenous Once 05/10/22 1309 05/10/22 1541            Allergies  Allergies as of 05/10/2022    (No Known Allergies)       Labs    Results from last 7 days   Lab Units 05/11/22  0429 05/10/22  1307   BUN mg/dL 21 29*   CREATININE mg/dL 1.20 1.39*       Results from last 7 days   Lab Units 05/11/22  0429 05/10/22  1307  "  WBC 10*3/mm3 13.68* 16.49*       Evaluation of Dosing     Last Dose Received in the ED/Outside Facility: 1500mg IV x 1 given in ED  Is Patient on Dialysis or Renal Replacement: No    Ht - 177.8 cm (70\")  Wt - 70.3 kg (155 lb)    Estimated Creatinine Clearance: 43.1 mL/min (by C-G formula based on SCr of 1.2 mg/dL).    Intake & Output (last 3 days)         05/08 0701 05/09 0700 05/09 0701  05/10 0700 05/10 0701  05/11 0700 05/11 0701 05/12 0700    P.O.   0     IV Piggyback   100     Total Intake(mL/kg)   100 (1.4)     Urine (mL/kg/hr)   2350     Total Output   2350     Net   -2250                     Microbiology and Radiology  Microbiology Results (last 10 days)       Procedure Component Value - Date/Time    Wound Culture - Wound, Foot [[id number]] Collected: 05/10/22 1455    Lab Status: Preliminary result Specimen: Wound from Foot Updated: 05/10/22 1623     Gram Stain No WBCs seen      Many (4+) Gram positive cocci      Moderate (3+) Gram negative bacilli    COVID PRE-OP / PRE-PROCEDURE SCREENING ORDER (NO ISOLATION) - Swab, Nasopharynx [[id number]]  (Normal) Collected: 05/10/22 1454    Lab Status: Final result Specimen: Swab from Nasopharynx Updated: 05/10/22 1532    Narrative:      The following orders were created for panel order COVID PRE-OP / PRE-PROCEDURE SCREENING ORDER (NO ISOLATION) - Swab, Nasopharynx.  Procedure                               Abnormality         Status                     ---------                               -----------         ------                     COVID-19, [facility]OTT IN-HOUS...[[id number]]  Normal              Final result                 Please view results for these tests on the individual orders.    COVID-19, [facility]OTT IN-HOUSE,NASAL Swab (NO TRANSPORT MEDIA) 2 HR TAT - Swab, Nasopharynx [[id number]]  (Normal) Collected: 05/10/22 1454    Lab Status: Final result Specimen: Swab from Nasopharynx Updated: 05/10/22 1532     COVID19 Presumptive Negative    Narrative:      Fact sheet for " providers: https://www.fda.gov/media/063046/download     Fact sheet for patients: https://www.fda.gov/media/012088/download    Test performed by PCR.  If inconsistent with clinical signs and symptoms patient should be tested with different authorized molecular test.            Reported Vancomycin Levels    Results from last 7 days   Lab Units 05/11/22  0429   VANCOMYCIN RM mcg/mL 15.80                   Assessment/Plan:  Pharmacy to dose vancomycin for SSTI.  I suspect osteomyelitis with dictated history. Goal trough 15-20 mcg/mL.  With history of diabetes and advanced age, renal function expected to be worse than calculated.  Vancomycin 1500mg IV x 1 given in ED on 5/10 @ 1541.  Vancomycin random from 5/11 AM was 15.8 mcg/mL, which is therapeutic.   Give a one time dose of vancomycin 1250 mg IV (~17 mg/kg) on 5/11 @ 1500.  Vancomycin trough ordered for 5/12 @ 1400.   Continue current dose of Zosyn.  Scr is improving with more than adequate urine. WBC remain WNL. Patient remains afebrile. Blood cx and wound cx in process. Amputation of R great toe, R second toe with extensive soft tissue debridement planned tomorrow.   Pharmacy will continue to follow and adjust dose based on renal function, drug levels, and clinical status.    Thanks,    Cici Shoemaker, PharmD  Pharmacy Resident  05/11/22  07:02 EDT

## 2022-05-11 NOTE — CONSULTS
INFECTIOUS DISEASE CONSULT/INITIAL HOSPITAL VISIT    Jesse Siu  1935  [id number]    Date of Consult: 5/11/2022    Admission Date: 5/10/2022      Requesting Provider: Sil Navarro II, DO  Evaluating Physician: Cesar Serrano MD    Reason for Consultation: Right diabetic foot infection/gangrene    History of present illness:    Patient is a 87 y.o. male who is seen today for evaluation of progressively worsening right first and second toe wound infections with gangrene. He initially developed right second toe ulcerations proximally one month ago.  He has been seeing a podiatrist and was started on oral antibiotic.  He has noted substantial worsening of his foot over the last week.  He was seen by his podiatrist on 5/9 who noted gangrene with a foul odor and recommended that he come to the emergency room.  He was found to have right first and second toe gangrene.  He has been seen by Dr. Angela and is currently scheduled for amputation tomorrow.     Past Medical History:   Diagnosis Date   • Hyperlipidemia    • Hypertension    • Pre-diabetes        Past Surgical History:   Procedure Laterality Date   • FOOT SURGERY Left     CANCER REMOVAL   • TONSILLECTOMY     • TURP / TRANSURETHRAL INCISION / DRAINAGE PROSTATE         History reviewed. No pertinent family history.    Social History     Socioeconomic History   • Marital status:    Tobacco Use   • Smoking status: Never Smoker   • Smokeless tobacco: Never Used   Substance and Sexual Activity   • Alcohol use: Never   • Drug use: Never   • Sexual activity: Never       No Known Allergies      Medication:    Current Facility-Administered Medications:   •  acetaminophen (TYLENOL) tablet 650 mg, 650 mg, Oral, Q4H PRN **OR** acetaminophen (TYLENOL) 160 MG/5ML solution 650 mg, 650 mg, Oral, Q4H PRN **OR** acetaminophen (TYLENOL) suppository 650 mg, 650 mg, Rectal, Q4H PRN, Sil Navarro II, DO  •  sennosides-docusate (PERICOLACE) 8.6-50 MG per tablet 2  tablet, 2 tablet, Oral, BID **AND** polyethylene glycol (MIRALAX) packet 17 g, 17 g, Oral, Daily PRN **AND** bisacodyl (DULCOLAX) EC tablet 5 mg, 5 mg, Oral, Daily PRN **AND** bisacodyl (DULCOLAX) suppository 10 mg, 10 mg, Rectal, Daily PRN, Ramon, Sil M II, DO  •  dextrose (D50W) (25 g/50 mL) IV injection 25 g, 25 g, Intravenous, Q15 Min PRN, Ramon, Sil M II, DO  •  dextrose (GLUTOSE) oral gel 15 g, 15 g, Oral, Q15 Min PRN, Ramon, Sil M II, DO  •  glucagon (human recombinant) (GLUCAGEN DIAGNOSTIC) injection 1 mg, 1 mg, Intramuscular, Q15 Min PRN, Ramon, Sil M II, DO  •  HYDROcodone-acetaminophen (NORCO) 5-325 MG per tablet 1 tablet, 1 tablet, Oral, Q4H PRN, Ramon, Sil M II, DO  •  Insulin Lispro (humaLOG) injection 0-9 Units, 0-9 Units, Subcutaneous, TID AC, Ramon, Sil M II, DO, 4 Units at 05/10/22 1629  •  Magnesium Sulfate 2 gram Bolus, followed by 8 gram infusion (total Mg dose 10 grams)- Mg less than or equal to 1mg/dL, 2 g, Intravenous, PRN **OR** Magnesium Sulfate 2 gram / 50mL Infusion (GIVE X 3 BAGS TO EQUAL 6GM TOTAL DOSE) - Mg 1.1 - 1.5 mg/dl, 2 g, Intravenous, PRN **OR** Magnesium Sulfate 4 gram infusion- Mg 1.6-1.9 mg/dL, 4 g, Intravenous, PRN, Ramon, Sil M II, DO  •  melatonin tablet 5 mg, 5 mg, Oral, Nightly PRN, Ramon, Sil M II, DO  •  morphine injection 2 mg, 2 mg, Intravenous, Q2H PRN **AND** naloxone (NARCAN) injection 0.4 mg, 0.4 mg, Intravenous, Q5 Min PRN, Ramon, Sil M II, DO  •  ondansetron (ZOFRAN) tablet 4 mg, 4 mg, Oral, Q6H PRN **OR** ondansetron (ZOFRAN) injection 4 mg, 4 mg, Intravenous, Q6H PRN, Sil Navarro II, DO  •  Pharmacy to dose vancomycin, , Does not apply, Continuous PRN, RamonSil II, DO  •  piperacillin-tazobactam (ZOSYN) 3.375 g in iso-osmotic dextrose 50 ml (premix), 3.375 g, Intravenous, Q8H, Sil Navarro II, DO, 3.375 g at 05/10/22 5840  •  potassium chloride (MICRO-K) CR capsule 40 mEq, 40 mEq, Oral, PRN **OR** potassium  chloride (KLOR-CON) packet 40 mEq, 40 mEq, Oral, PRN **OR** potassium chloride 10 mEq in 100 mL IVPB, 10 mEq, Intravenous, Q1H PRN, Sil Navarro II, DO  •  pravastatin (PRAVACHOL) tablet 20 mg, 20 mg, Oral, Nightly, Sil Navarro II, DO  •  sodium chloride 0.9 % flush 10 mL, 10 mL, Intravenous, Q12H, Sil Navarro II, DO  •  sodium chloride 0.9 % flush 10 mL, 10 mL, Intravenous, PRN, Sil Navarro II, DO  •  sodium chloride 0.9 % infusion, 100 mL/hr, Intravenous, Continuous, Sil Navarro II, DO, Last Rate: 100 mL/hr at 05/10/22 1918, 100 mL/hr at 05/10/22 1918  •  vancomycin (dosing per levels), , Does not apply, Daily, Bacilio Bower, PharmD    Antibiotics:  Anti-Infectives (From admission, onward)    Ordered     Dose/Rate Route Frequency Start Stop    05/10/22 1614  vancomycin (dosing per levels)        Ordering Provider: Bacilio Bower PharmD     Does not apply Daily 05/11/22 0900 05/18/22 0859    05/10/22 1608  piperacillin-tazobactam (ZOSYN) 3.375 g in iso-osmotic dextrose 50 ml (premix)        Ordering Provider: Sil Navarro II, DO    3.375 g  over 4 Hours Intravenous Every 8 Hours 05/11/22 0000 05/17/22 2359    05/10/22 1608  piperacillin-tazobactam (ZOSYN) 3.375 g in iso-osmotic dextrose 50 ml (premix)        Ordering Provider: Sil Navarro II, DO    3.375 g  over 30 Minutes Intravenous Once 05/10/22 1700 05/10/22 1659    05/10/22 1608  Pharmacy to dose vancomycin        Ordering Provider: Sil Navarro II, DO     Does not apply Continuous PRN 05/10/22 1608 05/17/22 1607    05/10/22 1416  meropenem (MERREM) 1 g/100 mL 0.9% NS (mbp)        Ordering Provider: Ole Hadley MD    1 g  over 30 Minutes Intravenous Once 05/10/22 1418 05/10/22 1521    05/10/22 1309  cefTRIAXone (ROCEPHIN) 1 g/100 mL 0.9% NS (MBP)        Ordering Provider: Ole Hadley MD    1 g  over 30 Minutes Intravenous Once 05/10/22 1311 05/10/22 1430    05/10/22 1307  vancomycin 1500 mg/500 mL 0.9%  NS IVPB ([facility])        Ordering Provider: Ole Hadley MD    20 mg/kg × 70.3 kg Intravenous Once 05/10/22 1309 05/10/22 1541            Review of Systems:  Constitutional-- No Fever, chills or sweats.  He complains of malaise and fatigue.  HEENT-- No new vision, hearing or throat complaints.  No epistaxis or oral sores.  Denies odynophagia or dysphagia. No headache, photophobia or neck stiffness.  CV-- No chest pain. He denies heart murmur.  Resp-- No SOB/cough  GI- No nausea, vomiting, or diarrhea.  No hematochezia, melena, or hematemesis.  -- He denies dysuria and urinary frequency.  He has undergone a previous TURP.  Heme- No active bruising or bleeding; no Hx of DVT or PE.  MS-- no swelling or pain in the bones or joints of arms/legs.  No new back pain.  Neuro-- No acute focal weakness or numbness in the arms or legs.  No seizures.  Skin--No rashes or lesions  Endocrine-he has a history of type 2 diabetes mellitus      Physical Exam:   Vital Signs  Temp (24hrs), Av.5 °F (36.9 °C), Min:98.2 °F (36.8 °C), Max:99.1 °F (37.3 °C)    Temp  Min: 98.2 °F (36.8 °C)  Max: 99.1 °F (37.3 °C)  BP  Min: 116/64  Max: 168/99  Pulse  Min: 71  Max: 108  Resp  Min: 18  Max: 18  SpO2  Min: 93 %  Max: 97 %    GENERAL: Awake and alert, in no acute distress.   HEENT: Normocephalic, atraumatic.  PERRL. EOMI. No conjunctival injection. No icterus. Oropharynx clear without evidence of thrush or exudate.   NECK: Supple   HEART: RRR; No murmur, rubs, gallops.   LUNGS: Clear to auscultation bilaterally without wheezing, rales, rhonchi. Normal respiratory effort  ABDOMEN: Soft, nontender, nondistended. No rebound or guarding. NO mass or HSM.  EXT:  He has extensive right second toe gangrene.  The entire toe is blackish purple with a malodorous odor.  The lateral right hallux is also gangrenous.  There is some gangrenous changes of the right first webspace.  :  Without Hernandez catheter.  SKIN: Warm and dry without cutaneous  eruptions on Inspection/palpation.    NEURO: Oriented to PPT. Motor 5/5 strength bilaterally  PSYCHIATRIC: Normal insight and judgement. Cooperative with PE    Laboratory Data    Results from last 7 days   Lab Units 05/11/22  0429 05/10/22  1307   WBC 10*3/mm3 13.68* 16.49*   HEMOGLOBIN g/dL 10.0* 10.0*   HEMATOCRIT % 31.4* 31.3*   PLATELETS 10*3/mm3 510* 512*     Results from last 7 days   Lab Units 05/11/22  0429   SODIUM mmol/L 139   POTASSIUM mmol/L 4.9   CHLORIDE mmol/L 102   CO2 mmol/L 28.0   BUN mg/dL 21   CREATININE mg/dL 1.20   GLUCOSE mg/dL 194*   CALCIUM mg/dL 9.0     Results from last 7 days   Lab Units 05/10/22  1307   ALK PHOS U/L 119*   BILIRUBIN mg/dL 0.3   ALT (SGPT) U/L 16   AST (SGOT) U/L 16     Results from last 7 days   Lab Units 05/10/22  1307   SED RATE mm/hr 62*     Results from last 7 days   Lab Units 05/10/22  1307   CRP mg/dL 12.33*     Results from last 7 days   Lab Units 05/10/22  1307   LACTATE mmol/L 2.0         Results from last 7 days   Lab Units 05/11/22  0429   VANCOMYCIN RM mcg/mL 15.80     Estimated Creatinine Clearance: 43.1 mL/min (by C-G formula based on SCr of 1.2 mg/dL).      Microbiology:  No results found for: ACANTHNAEG, AFBCX, BPERTUSSISCX, BLOODCX  No results found for: BCIDPCR, CXREFLEX, CSFCX, CULTURETIS  No results found for: CULTURES, HSVCX, URCX  No results found for: EYECULTURE, GCCX, HSVCULTURE, LABHSV  No results found for: LEGIONELLA, MRSACX, MUMPSCX, MYCOPLASCX  No results found for: NOCARDIACX, STOOLCX  No results found for: THROATCX, UNSTIMCULT, URINECX, CULTURE, VZVCULTUR  No results found for: VIRALCULTU, WOUNDCX        Radiology:  Imaging Results (Last 72 Hours)     Procedure Component Value Units Date/Time    XR Foot 3+ View Right [[id number]] Collected: 05/10/22 1345     Updated: 05/10/22 1351    Narrative:      XR FOOT 3+ VW RIGHT-     Date of Exam: 5/10/2022 1:29 PM     Indication: infection, r/o osteo; L03.115-Cellulitis of right lower  limb;  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     Comparison: 11/30/2011     Technique: Three views of the foot were obtained.     FINDINGS: There is soft tissue ulceration along the dorsal medial aspect  of the foot at the level of the first metatarsal-phalangeal joint.   There is a moderate amount of gas within the soft tissues surrounding  the first and second metatarsal phalangeal joints.  There is severe  joint space narrowing involving the first and second metatarsal  phalangeal joints.  There is some bony destruction involving the head of  the first metatarsal subchondral cystic degenerative change or bony  destruction involving the base of the proximal phalanx of the first  digit.  No definite bony destruction seen of the second digit or head of  the second metatarsal.  The other bony structures appear within normal  limits.  Vascular calcifications are seen within the soft tissues.       Impression:            1.  Moderate amount of gas within the soft tissues surrounding the first  and second metatarsal phalangeal joints.  There appears to be bony  destruction involving the head of the first metatarsal compatible with  osteomyelitis.  There is subchondral cystic degenerative change or  additional bony destruction involving the base of the proximal phalanx  of the first digit.  No definite bony destruction of the second digit or  second metatarsal.     This report was finalized on 5/10/2022 1:48 PM by Olivier Chong MD.               Impression:   1.  Right first/second toe gangrene/cellulitis-his right first and second toes are not salvageable.  These will need to be amputated.  He is receiving intravenous vancomycin and Zosyn therapy.  I will plan to discontinue vancomycin due to his renal dysfunction.  Blood and wound cultures are pending.  2.  Type 2 diabetes mellitus  3.  Acute kidney injury-this appears to be fairly mild.  His creatinine has fallen from 1.39  down to 1.2 today.  4.  Peripheral vascular disease        PLAN/RECOMMENDATIONS:   Thank you for asking us to see Jesse Siu, I recommend the followin.  Right first and second toe amputationsI scheduled  2.  Continue intravenous Zosyn   3.  Discontinue intravenous vancomycin  4.  Right foot culture-pending  5.  Blood cultures-pending  6.  Vascular assessment per Dr. Julio César Serrano MD  2022  06:22 EDT

## 2022-05-11 NOTE — PROGRESS NOTES
Clinical Nutrition       Patient Name: Jesse Siu  YOB: 1935  MRN: [id number]  Date of Encounter: 22 17:41 EDT  Admission date: 5/10/2022      Reason for Visit   Identified at risk by screening criteria      EMR  Reviewed   Yes  Cellulitis Foot Wound Gangrene DM HTN HLD - For toe amputations     Ht 70 in Wt 155 lbs    Reported/Observed/Food/Nutrition Related - Comments     Pt rpt wt has been stable for long time. Eating fine. Does not want suppl.      Current Nutrition Prescription     NPO Diet NPO Type: Strict NPO  Diet Regular; Cardiac, Consistent Carbohydrate      Average Intake from [facility]% x 3 meals      Actions     Follow treatment progress, Care plan reviewed, Advise alternate selection, Menu provided    Monitor Per Protocol      Mirian Nicholson RD,   Time Spent: 25 min

## 2022-05-11 NOTE — PLAN OF CARE
No complaints of pain this shift. Pt hopeful for surgery on Thursday. Appears to have slept most of night. No requests at this time.    Goal Outcome Evaluation:  Plan of Care Reviewed With: patient        Progress: no change

## 2022-05-11 NOTE — PROGRESS NOTES
[facility]  PROGRESS NOTE    Patient Name: Jesse Siu  : 1935  MRN: [id number]    Date of Admission: 5/10/2022  Primary Care Physician: IRVIN Martinez MD    Subjective   Subjective     CC:  F/u right foot gas gangrene/diabetic foot infection    HPI:  Patient resting in bed. No issues this morning. Understands plan for surgery tomorrow.    ROS:  Gen- No fevers, chills  CV- No chest pain, palpitations  Resp- No cough, dyspnea  GI- No N/V/D, abd pain    Objective   Objective     Vital Signs:   Temp:  [98.2 °F (36.8 °C)-99.1 °F (37.3 °C)] 98.5 °F (36.9 °C)  Heart Rate:  [] 77  Resp:  [18] 18  BP: (116-168)/(64-99) 141/75     Physical Exam:  Constitutional: No acute distress, awake, alert  HENT: NCAT, mucous membranes moist  Respiratory: Clear to auscultation bilaterally, respiratory effort normal   Cardiovascular: RRR, no murmurs, rubs, or gallops  Gastrointestinal: Positive bowel sounds, soft, nontender, nondistended  Musculoskeletal: No bilateral ankle edema, RLE in bandage, fowl smelling drainage.  Psychiatric: Appropriate affect, cooperative  Neurologic: Oriented x 3, strength symmetric in all extremities, Cranial Nerves grossly intact to confrontation, speech clear  Skin: No rashes      Results Reviewed:  LAB RESULTS:      Lab 05/11/22  0429 05/10/22  1307   WBC 13.68* 16.49*   HEMOGLOBIN 10.0* 10.0*   HEMATOCRIT 31.4* 31.3*   PLATELETS 510* 512*   NEUTROS ABS  --  14.67*   IMMATURE GRANS (ABS)  --  0.09*   LYMPHS ABS  --  0.81   MONOS ABS  --  0.87   EOS ABS  --  0.01   MCV 87.5 89.9   SED RATE  --  62*   CRP  --  12.33*   LACTATE  --  2.0         Lab 22  0429 05/10/22  1307   SODIUM 139 136   POTASSIUM 4.9 4.5   CHLORIDE 102 98   CO2 28.0 24.0   ANION GAP 9.0 14.0   BUN 21 29*   CREATININE 1.20 1.39*   EGFR 58.5* 49.1*   GLUCOSE 194* 286*   CALCIUM 9.0 9.3         Lab 05/10/22  1307   TOTAL PROTEIN 7.2   ALBUMIN 3.00*   GLOBULIN 4.2   ALT (SGPT)  16   AST (SGOT) 16   BILIRUBIN 0.3   ALK PHOS 119*                     Brief Urine Lab Results     None          Microbiology Results Abnormal     Procedure Component Value - Date/Time    Wound Culture - Wound, Foot [[id number]] Collected: 05/10/22 1455    Lab Status: Preliminary result Specimen: Wound from Foot Updated: 05/10/22 1624     Gram Stain No WBCs seen      Many (4+) Gram positive cocci      Moderate (3+) Gram negative bacilli    COVID PRE-OP / PRE-PROCEDURE SCREENING ORDER (NO ISOLATION) - Swab, Nasopharynx [[id number]]  (Normal) Collected: 05/10/22 1454    Lab Status: Final result Specimen: Swab from Nasopharynx Updated: 05/10/22 1532    Narrative:      The following orders were created for panel order COVID PRE-OP / PRE-PROCEDURE SCREENING ORDER (NO ISOLATION) - Swab, Nasopharynx.  Procedure                               Abnormality         Status                     ---------                               -----------         ------                     COVID-19, ABBOTT IN-HOUS...[[id number]]  Normal              Final result                 Please view results for these tests on the individual orders.    COVID-19, ABBOTT IN-HOUSE,NASAL Swab (NO TRANSPORT MEDIA) 2 HR TAT - Swab, Nasopharynx [[id number]]  (Normal) Collected: 05/10/22 1454    Lab Status: Final result Specimen: Swab from Nasopharynx Updated: 05/10/22 1532     COVID19 Presumptive Negative    Narrative:      Fact sheet for providers: https://www.fda.gov/media/067273/download     Fact sheet for patients: https://www.fda.gov/media/343061/download    Test performed by PCR.  If inconsistent with clinical signs and symptoms patient should be tested with different authorized molecular test.          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  Comparison: 11/30/2011  Technique: Three views of the foot were obtained.  FINDINGS: There is soft tissue ulceration along the dorsal medial aspect of the foot at the level of the first metatarsal-phalangeal joint. There is a moderate amount of gas within the soft tissues surrounding the first and second metatarsal phalangeal joints.  There is severe joint space narrowing involving the first and second metatarsal phalangeal joints.  There is some bony destruction involving the head of the first metatarsal subchondral cystic degenerative change or bony destruction involving the base of the proximal phalanx of the first digit.  No definite bony destruction seen of the second digit or head of the second metatarsal.  The other bony structures appear within normal limits.  Vascular calcifications are seen within the soft tissues.      Impression:   1.  Moderate amount of gas within the soft tissues surrounding the first and second metatarsal phalangeal joints.  There appears to be bony destruction involving the head of the first metatarsal compatible with osteomyelitis.  There is subchondral cystic degenerative change or additional bony destruction involving the base of the proximal phalanx of the first digit.  No definite bony destruction of the second digit or second metatarsal.  This report was finalized on 5/10/2022 1:48 PM by Olivier Chong MD.            I have reviewed the medications:  Scheduled Meds:insulin lispro, 0-9 Units, Subcutaneous, TID AC  piperacillin-tazobactam, 3.375 g, Intravenous, Q8H  pravastatin, 20 mg, Oral, Nightly  senna-docusate sodium, 2 tablet, Oral, BID  sodium chloride, 10 mL, Intravenous, Q12H  vancomycin (dosing per levels), , Does not apply, Daily  vancomycin, 1,250 mg, Intravenous, Once      Continuous Infusions:Pharmacy to dose vancomycin,   sodium chloride, 100 mL/hr, Last Rate: 100 mL/hr (05/10/22 1918)      PRN Meds:.•  acetaminophen **OR** acetaminophen **OR**  acetaminophen  •  senna-docusate sodium **AND** polyethylene glycol **AND** bisacodyl **AND** bisacodyl  •  dextrose  •  dextrose  •  glucagon (human recombinant)  •  HYDROcodone-acetaminophen  •  magnesium sulfate **OR** magnesium sulfate **OR** magnesium sulfate  •  melatonin  •  Morphine **AND** naloxone  •  ondansetron **OR** ondansetron  •  Pharmacy to dose vancomycin  •  potassium chloride **OR** potassium chloride **OR** potassium chloride  •  sodium chloride    Assessment & Plan   Assessment & Plan     Active Hospital Problems    Diagnosis  POA   • Cellulitis of right foot [L03.115]  Yes   • 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        Brief Hospital Course to date:  Jesse Siu is a 87 y.o. male with PMHX significant for DMII, HTN who presents with worsening diabetic foot ulcer with cellulitis and gangrene,    Gas gangrene of right foot  DM foot ulcer w/ cellulitis  --Cultures pending. IV vancomycin/zosyn ordered, continue for now  --Dr. Angela following, planning for amputation of R great toe and R second toe with extensive soft tissue debridement tomorrow  --ID consulted  --PT wound care.  --Pain meds, antiemetics.  --NWB RLE     Renal insufficiency  --Mild. Due to above. Improved with IVF     DM2 w/ hyperglycemia  --SSI for now, add basal bolus regimen as needed     HTN  --Currently normotensive. Hold norvasc, resume as needed.    DVT prophylaxis:  Mechanical DVT prophylaxis orders are present.       AM-PAC 6 Clicks Score (PT): 24 (05/10/22 2228)    Disposition: I expect the patient to be discharged TBD    CODE STATUS:   Code Status and Medical Interventions:   Ordered at: 05/10/22 1410     Code Status (Patient has no pulse and is not breathing):    CPR  (Attempt to Resuscitate)     Medical Interventions (Patient has pulse or is breathing):    Full Support       Marily Capps,   05/11/22

## 2022-05-12 ENCOUNTER — ANESTHESIA EVENT (OUTPATIENT)
Dept: PERIOP | Facility: HOSPITAL | Age: 87
End: 2022-05-12

## 2022-05-12 ENCOUNTER — ANESTHESIA EVENT CONVERTED (OUTPATIENT)
Dept: ANESTHESIOLOGY | Facility: HOSPITAL | Age: 87
End: 2022-05-12

## 2022-05-12 ENCOUNTER — ANESTHESIA (OUTPATIENT)
Dept: PERIOP | Facility: HOSPITAL | Age: 87
End: 2022-05-12

## 2022-05-12 LAB
ANION GAP SERPL CALCULATED.3IONS-SCNC: 12 MMOL/L (ref 5–15)
BACTERIA SPEC AEROBE CULT: ABNORMAL
BACTERIA SPEC AEROBE CULT: ABNORMAL
BASOPHILS # BLD AUTO: 0.07 10*3/MM3 (ref 0–0.2)
BASOPHILS NFR BLD AUTO: 0.5 % (ref 0–1.5)
BUN SERPL-MCNC: 22 MG/DL (ref 8–23)
BUN/CREAT SERPL: 17.9 (ref 7–25)
CALCIUM SPEC-SCNC: 8.7 MG/DL (ref 8.6–10.5)
CHLORIDE SERPL-SCNC: 99 MMOL/L (ref 98–107)
CO2 SERPL-SCNC: 26 MMOL/L (ref 22–29)
CREAT SERPL-MCNC: 1.23 MG/DL (ref 0.76–1.27)
DEPRECATED RDW RBC AUTO: 43.1 FL (ref 37–54)
EGFRCR SERPLBLD CKD-EPI 2021: 56.8 ML/MIN/1.73
EOSINOPHIL # BLD AUTO: 0.4 10*3/MM3 (ref 0–0.4)
EOSINOPHIL NFR BLD AUTO: 2.9 % (ref 0.3–6.2)
ERYTHROCYTE [DISTWIDTH] IN BLOOD BY AUTOMATED COUNT: 13.7 % (ref 12.3–15.4)
GLUCOSE BLDC GLUCOMTR-MCNC: 155 MG/DL (ref 70–130)
GLUCOSE BLDC GLUCOMTR-MCNC: 173 MG/DL (ref 70–130)
GLUCOSE BLDC GLUCOMTR-MCNC: 215 MG/DL (ref 70–130)
GLUCOSE BLDC GLUCOMTR-MCNC: 97 MG/DL (ref 70–130)
GLUCOSE SERPL-MCNC: 199 MG/DL (ref 65–99)
GRAM STN SPEC: ABNORMAL
HCT VFR BLD AUTO: 29.9 % (ref 37.5–51)
HGB BLD-MCNC: 9.7 G/DL (ref 13–17.7)
IMM GRANULOCYTES # BLD AUTO: 0.1 10*3/MM3 (ref 0–0.05)
IMM GRANULOCYTES NFR BLD AUTO: 0.7 % (ref 0–0.5)
LYMPHOCYTES # BLD AUTO: 1.4 10*3/MM3 (ref 0.7–3.1)
LYMPHOCYTES NFR BLD AUTO: 10 % (ref 19.6–45.3)
MCH RBC QN AUTO: 28 PG (ref 26.6–33)
MCHC RBC AUTO-ENTMCNC: 32.4 G/DL (ref 31.5–35.7)
MCV RBC AUTO: 86.4 FL (ref 79–97)
MONOCYTES # BLD AUTO: 0.79 10*3/MM3 (ref 0.1–0.9)
MONOCYTES NFR BLD AUTO: 5.7 % (ref 5–12)
NEUTROPHILS NFR BLD AUTO: 11.19 10*3/MM3 (ref 1.7–7)
NEUTROPHILS NFR BLD AUTO: 80.2 % (ref 42.7–76)
NRBC BLD AUTO-RTO: 0 /100 WBC (ref 0–0.2)
PLATELET # BLD AUTO: 499 10*3/MM3 (ref 140–450)
PMV BLD AUTO: 10.2 FL (ref 6–12)
POTASSIUM SERPL-SCNC: 4.2 MMOL/L (ref 3.5–5.2)
POTASSIUM SERPL-SCNC: 4.7 MMOL/L (ref 3.5–5.2)
RBC # BLD AUTO: 3.46 10*6/MM3 (ref 4.14–5.8)
SODIUM SERPL-SCNC: 137 MMOL/L (ref 136–145)
WBC NRBC COR # BLD: 13.95 10*3/MM3 (ref 3.4–10.8)

## 2022-05-12 PROCEDURE — 25010000002 PIPERACILLIN SOD-TAZOBACTAM PER 1 G: Performed by: INTERNAL MEDICINE

## 2022-05-12 PROCEDURE — 63710000001 INSULIN LISPRO (HUMAN) PER 5 UNITS: Performed by: STUDENT IN AN ORGANIZED HEALTH CARE EDUCATION/TRAINING PROGRAM

## 2022-05-12 PROCEDURE — 3E0T3BZ INTRODUCTION OF ANESTHETIC AGENT INTO PERIPHERAL NERVES AND PLEXI, PERCUTANEOUS APPROACH: ICD-10-PCS | Performed by: THORACIC SURGERY (CARDIOTHORACIC VASCULAR SURGERY)

## 2022-05-12 PROCEDURE — 25010000002 DEXAMETHASONE SODIUM PHOSPHATE 10 MG/ML SOLUTION: Performed by: NURSE ANESTHETIST, CERTIFIED REGISTERED

## 2022-05-12 PROCEDURE — 80048 BASIC METABOLIC PNL TOTAL CA: CPT | Performed by: INTERNAL MEDICINE

## 2022-05-12 PROCEDURE — 28810 AMPUTATION TOE & METATARSAL: CPT | Performed by: THORACIC SURGERY (CARDIOTHORACIC VASCULAR SURGERY)

## 2022-05-12 PROCEDURE — 87070 CULTURE OTHR SPECIMN AEROBIC: CPT | Performed by: THORACIC SURGERY (CARDIOTHORACIC VASCULAR SURGERY)

## 2022-05-12 PROCEDURE — 87186 SC STD MICRODIL/AGAR DIL: CPT | Performed by: THORACIC SURGERY (CARDIOTHORACIC VASCULAR SURGERY)

## 2022-05-12 PROCEDURE — 82962 GLUCOSE BLOOD TEST: CPT

## 2022-05-12 PROCEDURE — 84132 ASSAY OF SERUM POTASSIUM: CPT | Performed by: ANESTHESIOLOGY

## 2022-05-12 PROCEDURE — 87205 SMEAR GRAM STAIN: CPT | Performed by: THORACIC SURGERY (CARDIOTHORACIC VASCULAR SURGERY)

## 2022-05-12 PROCEDURE — 87116 MYCOBACTERIA CULTURE: CPT | Performed by: THORACIC SURGERY (CARDIOTHORACIC VASCULAR SURGERY)

## 2022-05-12 PROCEDURE — 99232 SBSQ HOSP IP/OBS MODERATE 35: CPT | Performed by: INTERNAL MEDICINE

## 2022-05-12 PROCEDURE — 85025 COMPLETE CBC W/AUTO DIFF WBC: CPT | Performed by: INTERNAL MEDICINE

## 2022-05-12 PROCEDURE — 63710000001 INSULIN LISPRO (HUMAN) PER 5 UNITS: Performed by: INTERNAL MEDICINE

## 2022-05-12 PROCEDURE — 0Y6R0Z0 DETACHMENT AT RIGHT 2ND TOE, COMPLETE, OPEN APPROACH: ICD-10-PCS | Performed by: THORACIC SURGERY (CARDIOTHORACIC VASCULAR SURGERY)

## 2022-05-12 PROCEDURE — 87102 FUNGUS ISOLATION CULTURE: CPT | Performed by: THORACIC SURGERY (CARDIOTHORACIC VASCULAR SURGERY)

## 2022-05-12 PROCEDURE — 87077 CULTURE AEROBIC IDENTIFY: CPT | Performed by: THORACIC SURGERY (CARDIOTHORACIC VASCULAR SURGERY)

## 2022-05-12 PROCEDURE — 87075 CULTR BACTERIA EXCEPT BLOOD: CPT | Performed by: THORACIC SURGERY (CARDIOTHORACIC VASCULAR SURGERY)

## 2022-05-12 PROCEDURE — 99024 POSTOP FOLLOW-UP VISIT: CPT | Performed by: THORACIC SURGERY (CARDIOTHORACIC VASCULAR SURGERY)

## 2022-05-12 PROCEDURE — 87206 SMEAR FLUORESCENT/ACID STAI: CPT | Performed by: THORACIC SURGERY (CARDIOTHORACIC VASCULAR SURGERY)

## 2022-05-12 PROCEDURE — 0Y6P0Z0 DETACHMENT AT RIGHT 1ST TOE, COMPLETE, OPEN APPROACH: ICD-10-PCS | Performed by: THORACIC SURGERY (CARDIOTHORACIC VASCULAR SURGERY)

## 2022-05-12 PROCEDURE — 28810 AMPUTATION TOE & METATARSAL: CPT | Performed by: STUDENT IN AN ORGANIZED HEALTH CARE EDUCATION/TRAINING PROGRAM

## 2022-05-12 PROCEDURE — 25010000002 PROPOFOL 10 MG/ML EMULSION: Performed by: NURSE ANESTHETIST, CERTIFIED REGISTERED

## 2022-05-12 PROCEDURE — 0Y6T0Z0 DETACHMENT AT RIGHT 3RD TOE, COMPLETE, OPEN APPROACH: ICD-10-PCS | Performed by: THORACIC SURGERY (CARDIOTHORACIC VASCULAR SURGERY)

## 2022-05-12 PROCEDURE — 88305 TISSUE EXAM BY PATHOLOGIST: CPT | Performed by: THORACIC SURGERY (CARDIOTHORACIC VASCULAR SURGERY)

## 2022-05-12 PROCEDURE — 87176 TISSUE HOMOGENIZATION CULTR: CPT | Performed by: THORACIC SURGERY (CARDIOTHORACIC VASCULAR SURGERY)

## 2022-05-12 RX ORDER — BISACODYL 10 MG
10 SUPPOSITORY, RECTAL RECTAL DAILY PRN
Status: DISCONTINUED | OUTPATIENT
Start: 2022-05-12 | End: 2022-05-12 | Stop reason: SDUPTHER

## 2022-05-12 RX ORDER — ALBUTEROL SULFATE 2.5 MG/3ML
2.5 SOLUTION RESPIRATORY (INHALATION) ONCE AS NEEDED
Status: DISCONTINUED | OUTPATIENT
Start: 2022-05-12 | End: 2022-05-12

## 2022-05-12 RX ORDER — SODIUM CHLORIDE, SODIUM LACTATE, POTASSIUM CHLORIDE, CALCIUM CHLORIDE 600; 310; 30; 20 MG/100ML; MG/100ML; MG/100ML; MG/100ML
9 INJECTION, SOLUTION INTRAVENOUS CONTINUOUS
Status: DISCONTINUED | OUTPATIENT
Start: 2022-05-12 | End: 2022-05-16

## 2022-05-12 RX ORDER — INSULIN LISPRO 100 [IU]/ML
0-7 INJECTION, SOLUTION INTRAVENOUS; SUBCUTANEOUS
Status: DISCONTINUED | OUTPATIENT
Start: 2022-05-12 | End: 2022-05-12

## 2022-05-12 RX ORDER — BUPIVACAINE HCL/0.9 % NACL/PF 0.125 %
PLASTIC BAG, INJECTION (ML) EPIDURAL AS NEEDED
Status: DISCONTINUED | OUTPATIENT
Start: 2022-05-12 | End: 2022-05-12 | Stop reason: SURG

## 2022-05-12 RX ORDER — SODIUM CHLORIDE 0.9 % (FLUSH) 0.9 %
10 SYRINGE (ML) INJECTION EVERY 12 HOURS SCHEDULED
Status: CANCELLED | OUTPATIENT
Start: 2022-05-12

## 2022-05-12 RX ORDER — SODIUM CHLORIDE 0.9 % (FLUSH) 0.9 %
10 SYRINGE (ML) INJECTION AS NEEDED
Status: CANCELLED | OUTPATIENT
Start: 2022-05-12

## 2022-05-12 RX ORDER — HYDROMORPHONE HYDROCHLORIDE 1 MG/ML
0.5 INJECTION, SOLUTION INTRAMUSCULAR; INTRAVENOUS; SUBCUTANEOUS
Status: DISCONTINUED | OUTPATIENT
Start: 2022-05-12 | End: 2022-05-12 | Stop reason: SDUPTHER

## 2022-05-12 RX ORDER — HYDROMORPHONE HYDROCHLORIDE 1 MG/ML
0.2 INJECTION, SOLUTION INTRAMUSCULAR; INTRAVENOUS; SUBCUTANEOUS
Status: DISCONTINUED | OUTPATIENT
Start: 2022-05-12 | End: 2022-05-12

## 2022-05-12 RX ORDER — ONDANSETRON 4 MG/1
4 TABLET, FILM COATED ORAL EVERY 6 HOURS PRN
Status: DISCONTINUED | OUTPATIENT
Start: 2022-05-12 | End: 2022-05-12 | Stop reason: SDUPTHER

## 2022-05-12 RX ORDER — ONDANSETRON 2 MG/ML
4 INJECTION INTRAMUSCULAR; INTRAVENOUS EVERY 6 HOURS PRN
Status: DISCONTINUED | OUTPATIENT
Start: 2022-05-12 | End: 2022-05-12 | Stop reason: SDUPTHER

## 2022-05-12 RX ORDER — FENTANYL CITRATE 50 UG/ML
50 INJECTION, SOLUTION INTRAMUSCULAR; INTRAVENOUS
Status: DISCONTINUED | OUTPATIENT
Start: 2022-05-12 | End: 2022-05-12

## 2022-05-12 RX ORDER — DOCUSATE SODIUM 100 MG/1
100 CAPSULE, LIQUID FILLED ORAL 2 TIMES DAILY PRN
Status: DISCONTINUED | OUTPATIENT
Start: 2022-05-12 | End: 2022-05-18 | Stop reason: HOSPADM

## 2022-05-12 RX ORDER — ACETAMINOPHEN 325 MG/1
650 TABLET ORAL EVERY 4 HOURS PRN
Status: DISCONTINUED | OUTPATIENT
Start: 2022-05-12 | End: 2022-05-12

## 2022-05-12 RX ORDER — DEXAMETHASONE SODIUM PHOSPHATE 10 MG/ML
INJECTION, SOLUTION INTRAMUSCULAR; INTRAVENOUS
Status: COMPLETED | OUTPATIENT
Start: 2022-05-12 | End: 2022-05-12

## 2022-05-12 RX ORDER — FAMOTIDINE 10 MG/ML
20 INJECTION, SOLUTION INTRAVENOUS ONCE
Status: COMPLETED | OUTPATIENT
Start: 2022-05-12 | End: 2022-05-12

## 2022-05-12 RX ORDER — FENTANYL CITRATE 50 UG/ML
50 INJECTION, SOLUTION INTRAMUSCULAR; INTRAVENOUS
Status: DISCONTINUED | OUTPATIENT
Start: 2022-05-12 | End: 2022-05-12 | Stop reason: SDUPTHER

## 2022-05-12 RX ORDER — LIDOCAINE HYDROCHLORIDE 10 MG/ML
0.5 INJECTION, SOLUTION EPIDURAL; INFILTRATION; INTRACAUDAL; PERINEURAL ONCE AS NEEDED
Status: DISCONTINUED | OUTPATIENT
Start: 2022-05-12 | End: 2022-05-12 | Stop reason: HOSPADM

## 2022-05-12 RX ORDER — HEPARIN SODIUM 5000 [USP'U]/ML
5000 INJECTION, SOLUTION INTRAVENOUS; SUBCUTANEOUS EVERY 12 HOURS SCHEDULED
Status: DISCONTINUED | OUTPATIENT
Start: 2022-05-13 | End: 2022-05-18 | Stop reason: HOSPADM

## 2022-05-12 RX ORDER — LIDOCAINE HYDROCHLORIDE 10 MG/ML
INJECTION, SOLUTION EPIDURAL; INFILTRATION; INTRACAUDAL; PERINEURAL AS NEEDED
Status: DISCONTINUED | OUTPATIENT
Start: 2022-05-12 | End: 2022-05-12 | Stop reason: SURG

## 2022-05-12 RX ORDER — BUPIVACAINE HYDROCHLORIDE 2.5 MG/ML
INJECTION, SOLUTION EPIDURAL; INFILTRATION; INTRACAUDAL
Status: COMPLETED | OUTPATIENT
Start: 2022-05-12 | End: 2022-05-12

## 2022-05-12 RX ORDER — EPHEDRINE SULFATE 50 MG/ML
INJECTION, SOLUTION INTRAVENOUS AS NEEDED
Status: DISCONTINUED | OUTPATIENT
Start: 2022-05-12 | End: 2022-05-12 | Stop reason: SURG

## 2022-05-12 RX ORDER — POLYETHYLENE GLYCOL 3350 17 G/17G
17 POWDER, FOR SOLUTION ORAL DAILY
Status: DISCONTINUED | OUTPATIENT
Start: 2022-05-12 | End: 2022-05-18 | Stop reason: HOSPADM

## 2022-05-12 RX ORDER — FAMOTIDINE 20 MG/1
20 TABLET, FILM COATED ORAL ONCE
Status: CANCELLED | OUTPATIENT
Start: 2022-05-12 | End: 2022-05-12

## 2022-05-12 RX ORDER — ACETAMINOPHEN 650 MG/1
650 SUPPOSITORY RECTAL EVERY 4 HOURS PRN
Status: DISCONTINUED | OUTPATIENT
Start: 2022-05-12 | End: 2022-05-12

## 2022-05-12 RX ORDER — PROPOFOL 10 MG/ML
VIAL (ML) INTRAVENOUS AS NEEDED
Status: DISCONTINUED | OUTPATIENT
Start: 2022-05-12 | End: 2022-05-12 | Stop reason: SURG

## 2022-05-12 RX ORDER — MIDAZOLAM HYDROCHLORIDE 1 MG/ML
0.5 INJECTION INTRAMUSCULAR; INTRAVENOUS
Status: DISCONTINUED | OUTPATIENT
Start: 2022-05-12 | End: 2022-05-12 | Stop reason: HOSPADM

## 2022-05-12 RX ORDER — ONDANSETRON 2 MG/ML
4 INJECTION INTRAMUSCULAR; INTRAVENOUS ONCE AS NEEDED
Status: DISCONTINUED | OUTPATIENT
Start: 2022-05-12 | End: 2022-05-12

## 2022-05-12 RX ADMIN — TAZOBACTAM SODIUM AND PIPERACILLIN SODIUM 3.38 G: 375; 3 INJECTION, SOLUTION INTRAVENOUS at 08:04

## 2022-05-12 RX ADMIN — SENNOSIDES AND DOCUSATE SODIUM 2 TABLET: 50; 8.6 TABLET ORAL at 08:04

## 2022-05-12 RX ADMIN — DEXAMETHASONE SODIUM PHOSPHATE 2 MG: 10 INJECTION, SOLUTION INTRAMUSCULAR; INTRAVENOUS at 14:55

## 2022-05-12 RX ADMIN — PRAVASTATIN SODIUM 20 MG: 20 TABLET ORAL at 20:16

## 2022-05-12 RX ADMIN — SODIUM CHLORIDE, POTASSIUM CHLORIDE, SODIUM LACTATE AND CALCIUM CHLORIDE: 600; 310; 30; 20 INJECTION, SOLUTION INTRAVENOUS at 15:28

## 2022-05-12 RX ADMIN — TAZOBACTAM SODIUM AND PIPERACILLIN SODIUM 3.38 G: 375; 3 INJECTION, SOLUTION INTRAVENOUS at 00:30

## 2022-05-12 RX ADMIN — Medication 10 ML: at 20:16

## 2022-05-12 RX ADMIN — EPHEDRINE SULFATE 10 MG: 50 INJECTION INTRAVENOUS at 15:42

## 2022-05-12 RX ADMIN — PROPOFOL 100 MG: 10 INJECTION, EMULSION INTRAVENOUS at 14:47

## 2022-05-12 RX ADMIN — LIDOCAINE HYDROCHLORIDE 50 MG: 10 INJECTION, SOLUTION EPIDURAL; INFILTRATION; INTRACAUDAL; PERINEURAL at 14:47

## 2022-05-12 RX ADMIN — EPHEDRINE SULFATE 5 MG: 50 INJECTION INTRAVENOUS at 15:32

## 2022-05-12 RX ADMIN — FAMOTIDINE 20 MG: 10 INJECTION, SOLUTION INTRAVENOUS at 14:22

## 2022-05-12 RX ADMIN — SODIUM CHLORIDE, POTASSIUM CHLORIDE, SODIUM LACTATE AND CALCIUM CHLORIDE 9 ML/HR: 600; 310; 30; 20 INJECTION, SOLUTION INTRAVENOUS at 14:22

## 2022-05-12 RX ADMIN — BUPIVACAINE HYDROCHLORIDE 30 ML: 2.5 INJECTION, SOLUTION EPIDURAL; INFILTRATION; INTRACAUDAL; PERINEURAL at 14:55

## 2022-05-12 RX ADMIN — Medication 100 MCG: at 15:50

## 2022-05-12 RX ADMIN — TAZOBACTAM SODIUM AND PIPERACILLIN SODIUM 3.38 G: 375; 3 INJECTION, SOLUTION INTRAVENOUS at 14:58

## 2022-05-12 RX ADMIN — INSULIN LISPRO 4 UNITS: 100 INJECTION, SOLUTION INTRAVENOUS; SUBCUTANEOUS at 08:04

## 2022-05-12 RX ADMIN — INSULIN LISPRO 2 UNITS: 100 INJECTION, SOLUTION INTRAVENOUS; SUBCUTANEOUS at 17:14

## 2022-05-12 NOTE — PROGRESS NOTES
INFECTIOUS DISEASE Progress Note    Jesse Siu  1935  [id number]    Admission Date: 5/10/2022      Requesting Provider: Sil Navarro II, DO  Evaluating Physician: Cesar Serrano MD    Reason for Consultation: Right diabetic foot infection/gangrene    History of present illness:    5/11/22: Patient is a 87 y.o. male who is seen today for evaluation of progressively worsening right first and second toe wound infections with gangrene. He initially developed right second toe ulcerations proximally one month ago.  He has been seeing a podiatrist and was started on oral antibiotic.  He has noted substantial worsening of his foot over the last week.  He was seen by his podiatrist on 5/9 who noted gangrene with a foul odor and recommended that he come to the emergency room.  He was found to have right first and second toe gangrene.  He has been seen by Dr. Angela and is currently scheduled for amputation tomorrow.     5/12/22:  He has been afebrile overnight.His white blood cell count today is 14.  His right foot wound cultures growing light gram-negative bacilli in his Gram stain revealed many gram-positive cocci and moderate gram-negative bacilli. Blood cultures from 5/10 are no growth so far. Duplex lower extremity arterial assessment revealed a 50-75% stenosis in the Right anterior tibial artery, short segment occlusion in the right mid posterior tibial artery, and an HENRIQUE reduced at 0.78.X-rays of his right foot revealed soft tissue gas. Creatinine today is 1.23. He denies increased right foot pain.  He does have a history of peripheral neuropathy.  At the time of surgery today he was found to have extensive necrosis with with necrotizing fasciitis extending to the base of the first, second, and third metatarsals.    Past Medical History:   Diagnosis Date   • Hyperlipidemia    • Hypertension    • Pre-diabetes        Past Surgical History:   Procedure Laterality Date   • FOOT SURGERY Left     CANCER REMOVAL    • TONSILLECTOMY     • TURP / TRANSURETHRAL INCISION / DRAINAGE PROSTATE         History reviewed. No pertinent family history.    Social History     Socioeconomic History   • Marital status:    Tobacco Use   • Smoking status: Never Smoker   • Smokeless tobacco: Never Used   Substance and Sexual Activity   • Alcohol use: Never   • Drug use: Never   • Sexual activity: Never       No Known Allergies      Medication:    Current Facility-Administered Medications:   •  acetaminophen (TYLENOL) tablet 650 mg, 650 mg, Oral, Q4H PRN **OR** acetaminophen (TYLENOL) 160 MG/5ML solution 650 mg, 650 mg, Oral, Q4H PRN **OR** acetaminophen (TYLENOL) suppository 650 mg, 650 mg, Rectal, Q4H PRN, Sil Navarro M II, DO  •  sennosides-docusate (PERICOLACE) 8.6-50 MG per tablet 2 tablet, 2 tablet, Oral, BID, 2 tablet at 05/11/22 2032 **AND** polyethylene glycol (MIRALAX) packet 17 g, 17 g, Oral, Daily PRN **AND** bisacodyl (DULCOLAX) EC tablet 5 mg, 5 mg, Oral, Daily PRN **AND** bisacodyl (DULCOLAX) suppository 10 mg, 10 mg, Rectal, Daily PRN, Sil Navarro M II, DO  •  dextrose (D50W) (25 g/50 mL) IV injection 25 g, 25 g, Intravenous, Q15 Min PRN, Sil Navarro M II, DO  •  dextrose (GLUTOSE) oral gel 15 g, 15 g, Oral, Q15 Min PRN, Sil Navarro M II, DO  •  glucagon (human recombinant) (GLUCAGEN DIAGNOSTIC) injection 1 mg, 1 mg, Intramuscular, Q15 Min PRN, Sil Navarro M II, DO  •  HYDROcodone-acetaminophen (NORCO) 5-325 MG per tablet 1 tablet, 1 tablet, Oral, Q4H PRN, Sil Navarro M II, DO  •  Insulin Lispro (humaLOG) injection 0-9 Units, 0-9 Units, Subcutaneous, TID AC, RamonSil farrell M II, DO, 2 Units at 05/11/22 1659  •  Magnesium Sulfate 2 gram Bolus, followed by 8 gram infusion (total Mg dose 10 grams)- Mg less than or equal to 1mg/dL, 2 g, Intravenous, PRN **OR** Magnesium Sulfate 2 gram / 50mL Infusion (GIVE X 3 BAGS TO EQUAL 6GM TOTAL DOSE) - Mg 1.1 - 1.5 mg/dl, 2 g, Intravenous, PRN **OR** Magnesium Sulfate 4  gram infusion- Mg 1.6-1.9 mg/dL, 4 g, Intravenous, PRN, Ramon, Sil M II, DO  •  melatonin tablet 5 mg, 5 mg, Oral, Nightly PRN, Ramon, Sil M II, DO  •  morphine injection 2 mg, 2 mg, Intravenous, Q2H PRN **AND** naloxone (NARCAN) injection 0.4 mg, 0.4 mg, Intravenous, Q5 Min PRN, Ramon, Sil M II, DO  •  ondansetron (ZOFRAN) tablet 4 mg, 4 mg, Oral, Q6H PRN **OR** ondansetron (ZOFRAN) injection 4 mg, 4 mg, Intravenous, Q6H PRN, Ramon, Sil M II, DO  •  piperacillin-tazobactam (ZOSYN) 3.375 g in iso-osmotic dextrose 50 ml (premix), 3.375 g, Intravenous, Q8H, Ramon, Sil M II, DO, 3.375 g at 05/12/22 0030  •  potassium chloride (MICRO-K) CR capsule 40 mEq, 40 mEq, Oral, PRN **OR** potassium chloride (KLOR-CON) packet 40 mEq, 40 mEq, Oral, PRN **OR** potassium chloride 10 mEq in 100 mL IVPB, 10 mEq, Intravenous, Q1H PRN, Ramon, Sil M II, DO  •  pravastatin (PRAVACHOL) tablet 20 mg, 20 mg, Oral, Nightly, Ramon, Sil M II, DO, 20 mg at 05/11/22 2032  •  sodium chloride 0.9 % flush 10 mL, 10 mL, Intravenous, Q12H, Ramon, Sil M II, DO, 10 mL at 05/11/22 2032  •  sodium chloride 0.9 % flush 10 mL, 10 mL, Intravenous, PRN, Ramon, Sil M II, DO    Antibiotics:  Anti-Infectives (From admission, onward)    Ordered     Dose/Rate Route Frequency Start Stop    05/11/22 0718  vancomycin 1250 mg/250 mL 0.9% NS IVPB ([facility])        Ordering Provider: Cici Shoemaker, PharmD    1,250 mg Intravenous Once 05/11/22 1500 05/11/22 1659    05/10/22 1608  piperacillin-tazobactam (ZOSYN) 3.375 g in iso-osmotic dextrose 50 ml (premix)        Ordering Provider: Sil Navarro II DO    3.375 g  over 4 Hours Intravenous Every 8 Hours 05/11/22 0000 05/17/22 2359    05/10/22 1608  piperacillin-tazobactam (ZOSYN) 3.375 g in iso-osmotic dextrose 50 ml (premix)        Ordering Provider: Sil Navarro II DO    3.375 g  over 30 Minutes Intravenous Once 05/10/22 1700 05/10/22 1659    05/10/22 1416  meropenem (MERREM) 1 g/100  mL 0.9% NS ([facility])        Ordering Provider: Ole Hadley MD    1 g  over 30 Minutes Intravenous Once 05/10/22 1418 05/10/22 1521    05/10/22 1309  cefTRIAXone (ROCEPHIN) 1 g/100 mL 0.9% NS ([facility])        Ordering Provider: Ole Hadley MD    1 g  over 30 Minutes Intravenous Once 05/10/22 1311 05/10/22 1430    05/10/22 1307  vancomycin 1500 mg/500 mL 0.9% NS IVPB ([facility])        Ordering Provider: Ole Hadley MD    20 mg/kg × 70.3 kg Intravenous Once 05/10/22 1309 05/10/22 1541            Review of Systems:  See HPI      Physical Exam:   Vital Signs  Temp (24hrs), Av.1 °F (36.7 °C), Min:97.8 °F (36.6 °C), Max:98.3 °F (36.8 °C)    Temp  Min: 97.8 °F (36.6 °C)  Max: 98.3 °F (36.8 °C)  BP  Min: 123/92  Max: 164/80  Pulse  Min: 66  Max: 82  Resp  Min: 18  Max: 18  SpO2  Min: 94 %  Max: 97 %    GENERAL: Awake and alert, in no acute distress.   HEENT: Normocephalic, atraumatic.  PERRL. EOMI. No conjunctival injection. No icterus. Oropharynx clear without evidence of thrush or exudate.   NECK: Supple   HEART: RRR; No murmur, rubs, gallops.   LUNGS: Clear to auscultation bilaterally without wheezing, rales, rhonchi. Normal respiratory effort  ABDOMEN: Soft, nontender, nondistended. No rebound or guarding. NO mass or HSM.  EXT:  He has extensive right second toe gangrene.  The entire toe is blackish purple with a malodor.   The lateral right hallux is also gangrenous.  There is some gangrenous changes of the right first webspace.  :  Without Hernandez catheter.  SKIN: Warm and dry without cutaneous eruptions on Inspection/palpation.    NEURO: Oriented to PPT. Motor 5/5 strength bilaterally.  He has decreased sensation to light touch in both distal lower extremities.  PSYCHIATRIC: Normal insight and judgement. Cooperative with PE    Laboratory Data    Results from last 7 days   Lab Units 22  0503 22  0429 05/10/22  1307   WBC 10*3/mm3 13.95* 13.68* 16.49*   HEMOGLOBIN g/dL 9.7* 10.0* 10.0*    HEMATOCRIT % 29.9* 31.4* 31.3*   PLATELETS 10*3/mm3 499* 510* 512*     Results from last 7 days   Lab Units 05/12/22  0503   SODIUM mmol/L 137   POTASSIUM mmol/L 4.2   CHLORIDE mmol/L 99   CO2 mmol/L 26.0   BUN mg/dL 22   CREATININE mg/dL 1.23   GLUCOSE mg/dL 199*   CALCIUM mg/dL 8.7     Results from last 7 days   Lab Units 05/10/22  1307   ALK PHOS U/L 119*   BILIRUBIN mg/dL 0.3   ALT (SGPT) U/L 16   AST (SGOT) U/L 16     Results from last 7 days   Lab Units 05/10/22  1307   SED RATE mm/hr 62*     Results from last 7 days   Lab Units 05/10/22  1307   CRP mg/dL 12.33*     Results from last 7 days   Lab Units 05/10/22  1307   LACTATE mmol/L 2.0         Results from last 7 days   Lab Units 05/11/22  0429   VANCOMYCIN RM mcg/mL 15.80     Estimated Creatinine Clearance: 42.1 mL/min (by C-G formula based on SCr of 1.23 mg/dL).      Microbiology:  No results found for: ACANTHNAEG, AFBCX, BPERTUSSISCX, BLOODCX  No results found for: BCIDPCR, CXREFLEX, CSFCX, CULTURETIS  No results found for: CULTURES, HSVCX, URCX  No results found for: EYECULTURE, GCCX, HSVCULTURE, LABHSV  No results found for: LEGIONELLA, MRSACX, MUMPSCX, MYCOPLASCX  No results found for: NOCARDIACX, STOOLCX  No results found for: THROATCX, UNSTIMCULT, URINECX, CULTURE, VZVCULTUR  No results found for: VIRALCULTU, WOUNDCX        Radiology:  Imaging Results (Last 72 Hours)     Procedure Component Value Units Date/Time    XR Foot 3+ View Right [[id number]] Collected: 05/10/22 1345     Updated: 05/10/22 1351    Narrative:      XR FOOT 3+ VW RIGHT-     Date of Exam: 5/10/2022 1:29 PM     Indication: infection, r/o osteo; L03.115-Cellulitis of right lower  limb; 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     Comparison: 11/30/2011     Technique: Three views of the foot were obtained.     FINDINGS: There is soft tissue ulceration along the dorsal medial aspect  of the foot at the level of the first  metatarsal-phalangeal joint.   There is a moderate amount of gas within the soft tissues surrounding  the first and second metatarsal phalangeal joints.  There is severe  joint space narrowing involving the first and second metatarsal  phalangeal joints.  There is some bony destruction involving the head of  the first metatarsal subchondral cystic degenerative change or bony  destruction involving the base of the proximal phalanx of the first  digit.  No definite bony destruction seen of the second digit or head of  the second metatarsal.  The other bony structures appear within normal  limits.  Vascular calcifications are seen within the soft tissues.       Impression:            1.  Moderate amount of gas within the soft tissues surrounding the first  and second metatarsal phalangeal joints.  There appears to be bony  destruction involving the head of the first metatarsal compatible with  osteomyelitis.  There is subchondral cystic degenerative change or  additional bony destruction involving the base of the proximal phalanx  of the first digit.  No definite bony destruction of the second digit or  second metatarsal.     This report was finalized on 5/10/2022 1:48 PM by Olivier Chong MD.               Impression:   1.  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 His foot wound cultures are growing Citrobacter sensitive to Zosyn.  2.  Type 2 diabetes mellitus  3.  Acute kidney injury-Improved  4.  Peripheral vascular disease        PLAN/RECOMMENDATIONS:   1.  Right first, second, and third metatarsal amputations-performed  2.  Continue intravenous Zosyn   3.  Assessment of PVD per Dr. Angela  4.  Right foot culture-pending  5.  Blood cultures-pending    I discussed his situation with Dr. Angela today.       Cesar Serrano MD  5/12/2022  06:56 EDT

## 2022-05-12 NOTE — PROGRESS NOTES
Cardiothoracic Surgery Progress Note      POD #:     LOS: 2 days      Subjective: Awake and alert waiting for his surgery today for amputation of the right great toe and second toe risk of the surgery explained to the patient today: Bleeding, stroke, heart attack, death, possible postop phantom pain, possible need for amputation higher level in the future due to his ischemia and/or infection.  He understands and agrees to proceed ahead    Objective:  Vital Signs  Temp:  [97.8 °F (36.6 °C)-98.3 °F (36.8 °C)] 98 °F (36.7 °C)  Heart Rate:  [66-82] 67  Resp:  [18] 18  BP: (123-164)/(74-92) 123/92    Physical Exam:   General Appearance:    Lungs:   Heart:   Skin:   Incision:     Results:  Results from last 7 days   Lab Units 05/12/22  0503   WBC 10*3/mm3 13.95*   HEMOGLOBIN g/dL 9.7*   HEMATOCRIT % 29.9*   PLATELETS 10*3/mm3 499*     Results from last 7 days   Lab Units 05/12/22  0503   SODIUM mmol/L 137   POTASSIUM mmol/L 4.2   CHLORIDE mmol/L 99   CO2 mmol/L 26.0   BUN mg/dL 22   CREATININE mg/dL 1.23   GLUCOSE mg/dL 199*   CALCIUM mg/dL 8.7         Assessment: #1.  Wet gangrene of the right great toe and right second toe  2.  Renal insufficiency  3 peripheral vascular disease of both lower extremities by examination  4.  Prediabetic according to patient      Plan: Transmetatarsal amputation of right great toe and right second toe.  We will need to leave this wound open to heal by secondary intent/wound VAC therapy due to the extent of the gangrenous process.  He understands.  Overall medical manage per hospitalist.  Antibiotics per infectious disease.      Lemuel Angela MD - 05/12/22 - 06:55 EDT

## 2022-05-12 NOTE — PROGRESS NOTES
[facility] Medicine Services  PROGRESS NOTE    Patient Name: Jesse Siu  : 1935  MRN: [id number]    Date of Admission: 5/10/2022  Primary Care Physician: IRVIN Martinez MD    Subjective   Subjective     CC:  F/u right foot gas gangrene/diabetic foot infection    HPI:  Patient resting in bed. Surgery planned for today. We discussed wound vac and non-weight bearing. He says he lives at home with his daughter who may be able to help him at discharge. He currently has no pain.    ROS:  Gen- No fevers, chills  CV- No chest pain, palpitations  Resp- No cough, dyspnea  GI- No N/V/D, abd pain    Objective   Objective     Vital Signs:   Temp:  [97.8 °F (36.6 °C)-98.3 °F (36.8 °C)] 98 °F (36.7 °C)  Heart Rate:  [66-82] 68  Resp:  [18] 18  BP: (123-164)/(74-92) 147/74     Physical Exam:  Constitutional: No acute distress, awake, alert  HENT: NCAT, mucous membranes moist  Respiratory: Clear to auscultation bilaterally, respiratory effort normal   Cardiovascular: RRR, no murmurs, rubs, or gallops  Gastrointestinal: Positive bowel sounds, soft, nontender, nondistended  Musculoskeletal: No bilateral ankle edema, RLE in bandage, fowl smelling drainage, second toe dark discoloration  Psychiatric: Appropriate affect, cooperative  Neurologic: Oriented x 3, strength symmetric in all extremities, Cranial Nerves grossly intact to confrontation, speech clear  Skin: No rashes      Results Reviewed:  LAB RESULTS:      Lab 22  0503 22  0429 05/10/22  1307   WBC 13.95* 13.68* 16.49*   HEMOGLOBIN 9.7* 10.0* 10.0*   HEMATOCRIT 29.9* 31.4* 31.3*   PLATELETS 499* 510* 512*   NEUTROS ABS 11.19*  --  14.67*   IMMATURE GRANS (ABS) 0.10*  --  0.09*   LYMPHS ABS 1.40  --  0.81   MONOS ABS 0.79  --  0.87   EOS ABS 0.40  --  0.01   MCV 86.4 87.5 89.9   SED RATE  --   --  62*   CRP  --   --  12.33*   LACTATE  --   --  2.0         Lab 22  0503 22  0429 05/10/22  1307   SODIUM 137 139 136    POTASSIUM 4.2 4.9 4.5   CHLORIDE 99 102 98   CO2 26.0 28.0 24.0   ANION GAP 12.0 9.0 14.0   BUN 22 21 29*   CREATININE 1.23 1.20 1.39*   EGFR 56.8* 58.5* 49.1*   GLUCOSE 199* 194* 286*   CALCIUM 8.7 9.0 9.3         Lab 05/10/22  1307   TOTAL PROTEIN 7.2   ALBUMIN 3.00*   GLOBULIN 4.2   ALT (SGPT) 16   AST (SGOT) 16   BILIRUBIN 0.3   ALK PHOS 119*                 Lab 05/11/22  0920   ABO TYPING O   RH TYPING Negative   ANTIBODY SCREEN Negative         Brief Urine Lab Results     None          Microbiology Results Abnormal     Procedure Component Value - Date/Time    Blood Culture - Blood, Arm, Right [[id number]]  (Normal) Collected: 05/10/22 1320    Lab Status: Preliminary result Specimen: Blood from Arm, Right Updated: 05/11/22 1404     Blood Culture No growth at 24 hours    Blood Culture - Blood, Blood, Central Line [[id number]]  (Normal) Collected: 05/10/22 1300    Lab Status: Preliminary result Specimen: Blood, Central Line Updated: 05/11/22 1404     Blood Culture No growth at 24 hours    COVID PRE-OP / PRE-PROCEDURE SCREENING ORDER (NO ISOLATION) - Swab, Nasopharynx [[id number]]  (Normal) Collected: 05/10/22 1454    Lab Status: Final result Specimen: Swab from Nasopharynx Updated: 05/10/22 1532    Narrative:      The following orders were created for panel order COVID PRE-OP / PRE-PROCEDURE SCREENING ORDER (NO ISOLATION) - Swab, Nasopharynx.  Procedure                               Abnormality         Status                     ---------                               -----------         ------                     COVID-19, ABBOTT IN-HOUS...[[id number]]  Normal              Final result                 Please view results for these tests on the individual orders.    COVID-19, ABBOTT IN-HOUSE,NASAL Swab (NO TRANSPORT MEDIA) 2 HR TAT - Swab, Nasopharynx [[id number]]  (Normal) Collected: 05/10/22 1454    Lab Status: Final result Specimen: Swab from Nasopharynx Updated: 05/10/22 1532     COVID19 Presumptive Negative     Narrative:      Fact sheet for providers: https://www.fda.gov/media/525130/download     Fact sheet for patients: https://www.fda.gov/media/026048/download    Test performed by PCR.  If inconsistent with clinical signs and symptoms patient should be tested with different authorized molecular test.          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  Comparison: 11/30/2011  Technique: Three views of the foot were obtained.  FINDINGS: There is soft tissue ulceration along the dorsal medial aspect of the foot at the level of the first metatarsal-phalangeal joint. There is a moderate amount of gas within the soft tissues surrounding the first and second metatarsal phalangeal joints.  There is severe joint space narrowing involving the first and second metatarsal phalangeal joints.  There is some bony destruction involving the head of the first metatarsal subchondral cystic degenerative change or bony destruction involving the base of the proximal phalanx of the first digit.  No definite bony destruction seen of the second digit or head of the second metatarsal.  The other bony structures appear within normal limits.  Vascular calcifications are seen within the soft tissues.      Impression:   1.  Moderate amount of gas within the soft tissues surrounding the first and second metatarsal phalangeal joints.  There appears to be bony destruction involving the head of the first metatarsal compatible with osteomyelitis.  There is subchondral cystic degenerative change or additional bony destruction involving the base of the proximal phalanx of the first digit.  No definite bony destruction of the second digit or second metatarsal.  This report was finalized on 5/10/2022 1:48 PM by Olivier Chong MD.       Duplex Lower Extremity Art / Grafts - Bilateral CAR    Result Date: 5/11/2022  · Monophasic waveforms in the distal right SFA and popliteal arteries. · 50 to 75% stenosis in the right anterior tibial artery · Suspected short segment occlusion in the right mid posterior tibial artery with collateral flow demonstrated. · Collateral filling seen at the level of the right dorsalis pedis artery also. · Right HENRIQUE is moderately reduced at 0.78. · Biphasic flow in the left, SFA, popliteal, anterior tibial artery. · Monophasic flow in the left peroneal artery, no flow detected in the left posterior tibial artery. · Flow was demonstrated in the left dorsalis pedis artery · Left HENRIQUE calculated at 1.5 this suggest significant vessel calcification.            I have reviewed the medications:  Scheduled Meds:insulin lispro, 0-9 Units, Subcutaneous, TID AC  piperacillin-tazobactam, 3.375 g, Intravenous, Q8H  pravastatin, 20 mg, Oral, Nightly  senna-docusate sodium, 2 tablet, Oral, BID  sodium chloride, 10 mL, Intravenous, Q12H      Continuous Infusions:   PRN Meds:.•  acetaminophen **OR** acetaminophen **OR** acetaminophen  •  senna-docusate sodium **AND** polyethylene glycol **AND** bisacodyl **AND** bisacodyl  •  dextrose  •  dextrose  •  glucagon (human recombinant)  •  HYDROcodone-acetaminophen  •  magnesium sulfate **OR** magnesium sulfate **OR** magnesium sulfate  •  melatonin  •  Morphine **AND** naloxone  •  ondansetron **OR** ondansetron  •  potassium chloride **OR** potassium chloride **OR** potassium chloride  •  sodium chloride    Assessment & Plan   Assessment & Plan     Active Hospital Problems    Diagnosis  POA   • Cellulitis of right foot [L03.115]  Yes   • 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        Brief Hospital Course to date:  Jesse Siu is a 87 y.o. male with PMHX significant for DMII, HTN who presents with worsening diabetic foot ulcer with cellulitis and gangrene,    Gas gangrene of right foot  DM foot ulcer w/ cellulitis  --Cultures with GNRs  --Dr. Angela following, planning for amputation of R great toe and R second toe with extensive soft tissue debridement tomorrow  --ID following, currently on Zosyn  --PT wound care to see after surgery, will need wound vac  --Pain meds, antiemetics.  --NWB RLE     Renal insufficiency  --Mild. Due to above. Improved with IVF     DM2 w/ hyperglycemia  --SSI for now, add basal bolus regimen as needed     HTN  --Currently normotensive. Hold norvasc, resume as needed.    DVT prophylaxis:  Mechanical DVT prophylaxis orders are present.       AM-PAC 6 Clicks Score (PT): 21 (05/12/22 0800)    Disposition: I expect the patient to be discharged TBD    CODE STATUS:   Code Status and Medical Interventions:   Ordered at: 05/10/22 1410     Code Status (Patient has no pulse and is not breathing):    CPR (Attempt to Resuscitate)     Medical Interventions (Patient has pulse or is breathing):    Full Support       Marily Capps, DO  05/12/22

## 2022-05-12 NOTE — ANESTHESIA POSTPROCEDURE EVALUATION
Patient: Jesse Siu    Procedure Summary     Date: 05/12/22 Room / Location:  [facility]  /  [facility]    Anesthesia Start: 1441 Anesthesia Stop:     Procedure: RIGHT GREAT AND SECOND TOE AMPUTATION (Right Fingers) Diagnosis:       Cellulitis of right foot      Foot ulceration, right, with necrosis of muscle (HCC)      (Cellulitis of right foot [L03.115])      (Foot ulceration, right, with necrosis of muscle (HCC) [L97.513])    Surgeons: Lemuel Angela MD Provider: Timothy Zaragoza Jr., MD    Anesthesia Type: general with block ASA Status: 3          Anesthesia Type: general with block    Vitals  Vitals Value Taken Time   /77 05/12/22 1615   Temp 98.1 °F (36.7 °C) 05/12/22 1615   Pulse 65 05/12/22 1618   Resp 18 05/12/22 1615   SpO2 93 % 05/12/22 1618   Vitals shown include unvalidated device data.        Post Anesthesia Care and Evaluation    Patient location during evaluation: PACU  Patient participation: complete - patient participated  Level of consciousness: awake  Pain score: 0  Pain management: adequate  Airway patency: patent  Anesthetic complications: No anesthetic complications  PONV Status: none  Cardiovascular status: acceptable and stable  Respiratory status: nasal cannula, unassisted, acceptable and spontaneous ventilation  Hydration status: acceptable

## 2022-05-12 NOTE — OP NOTE
OP NOTE    Patient Name:  Jesse Siu    Date of Surgery: May 12, 2022      Pre-op Diagnosis: Wet gangrene of right great toe and right second toe    Post-op Diagnosis:   Same with involvement of right third toe soft tissues    Surgeon(s):  Lemuel Angela MD    Assistant(s):   Assistant: Hiral Borges PA-C was responsible for performing the following activities: Retraction, Suction, Irrigation and Placing Dressing and their skilled assistance was necessary for the success of this case.    Anesthesia: Right popliteal single shot nerve block performed by anesthesia in the preop area under ultrasonic guidance followed by intravenous induction of general LMA anesthesia    Indications: Patient was seen approximate 1 month ago by podiatrist for some ulceration of his right great toe.  He was prescribed a topical collagenase ointment/medication which had been using since that time.  However, the ulceration worsened over this past month with foul-smelling drainage underneath his right foot over the great toe area as well as drainage between the great toe and second toe.  He was seen 2 days ago by his podiatrist who told him to come to the [facility] emergency department.  He was seen in the emergency department ..  Plain x-ray of the foot revealed destruction of the great toe metatarsal head with some soft tissue gas.  He was admitted and placed on appropriate antibiotic therapy and I was consulted.  I saw the patient that evening of his admission.  He had a malodorous drainage between the great toe and the second toe with a large ulceration of the metatarsal head on the medial surface of the right great toe as well as a penetrating callus/ulceration on the plantar surface between the great toe and the second toe metatarsal head area.  He is prepared for surgical debridement and amputation of at least the great toe and second toe.  I told him there would not be able to close this incision due to the amount of  soft tissue involvement we had to leave it open to heal by secondary intent.  According the risk of bleeding, stroke, heart attack, death, possible postop phantom pain and also possible need for amputation at a higher level in in the future due to the amount of infection and/or ischemia of this amputation site.    Procedure(s): Right great toe second toe and third toe complete  Metatarsal amputation and extensive soft tissue debridement      The patient underwent a single shot right popliteal nerve block under ultrasonic guidance the preop area and then was taken the operating room and underwent intravenous induction of general LMA anesthesia.  Timeout was called to verify the procedure to be done: Right great toe and second toe amputation with excisional debridement.  The right foot and right lower extremity up to the knee was then prepped circumferentially with ChloraPrep solution after line 3-minute drying time was then sterilely draped.  Incision was carried out over the dorsum of the foot between the great toe and second toe metatarsal bones and extended around the ulceration of the great toe metatarsal head medially as well as onto the posterior surface callus ulceration surrounding the metatarsal ulceration between the great toe and the second toe..  This then carried out around between the second and third toe.  This incision then extended deeply were extensive soft tissue necrosis involving the flexor and extensor tendons of the great toe and second toe and even involvement of the third toe..  This necrotizing fasciitis extended up to the base of the metatarsal bones of the great toe second toe and the third toe.  All 3 of these metatarsal bones were disarticulated at the interface between the tarsal bone and the metatarsal bones of these 3 toes.  The flexor and extensor tendons of these 3 toes were dissected back to the most proximal portion of this incision and divided sharply with the Bovie cautery  The  tract and depth of wound.  The articulating cartilaginous surface of the medial middle and lateral cuboid bones were then rongeured back to bleeding cancellous bone.  Hemostasis during this time was controlled with Bovie cautery.  It should be mentioned that all of the digital arteries to these 3 toes resected bled readily.  After assurance of hemostasis the open surgical wound was irrigated with 450 mL of Irrisept orthopedic antibiotic solution.  This large open surgical wound was then packed with Irrisept soaked 4 x 4's followed by ABDs Kerlix wrap and #8 Spandage tube net dressing to hold the Kerlix in place..  A sampling of the deep necrotic tissue was sent for aerobic anaerobic culture and gram stain.  Separately each metatarsal bone was sent for pathological review.  The third toe was sent separately for pathologic review and the combined great toe and second toe was sent for pathological review.    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  The great toe metatarsal head was also necrotic and moth-eaten.  The digital arteries to these 3 toes that were resected were patent and bled readily during the dissection and were controlled with the Bovie cautery.    Complications: No immediate complications occurred      Lemuel Angela MD     Date: 5/12/2022 Time: 16:24 EDT

## 2022-05-12 NOTE — PLAN OF CARE
Goal Outcome Evaluation:  Plan of Care Reviewed With: patient        Progress: improving  Outcome Evaluation: Patient doing well after surgery. Alert and oriented. No pain at this time.

## 2022-05-12 NOTE — ANESTHESIA PREPROCEDURE EVALUATION
Anesthesia Evaluation     Patient summary reviewed and Nursing notes reviewed   no history of anesthetic complications:  NPO Solid Status: > 8 hours  NPO Liquid Status: > 2 hours           Airway   Mallampati: II  TM distance: >3 FB  Neck ROM: full  No difficulty expected  Dental - normal exam     Pulmonary    (-) not a smoker  Cardiovascular     (+) hypertension, hyperlipidemia,   (-) murmur      Neuro/Psych- negative ROS  GI/Hepatic/Renal/Endo    (+)   renal disease, diabetes mellitus,     Musculoskeletal     Abdominal    Substance History - negative use     OB/GYN          Other        ROS/Med Hx Other: hgb 9.7 k 4.2  plt 499                Anesthesia Plan    ASA 3     general with block       Anesthetic plan, all risks, benefits, and alternatives have been provided, discussed and informed consent has been obtained with: patient.        CODE STATUS:    Code Status (Patient has no pulse and is not breathing): CPR (Attempt to Resuscitate)  Medical Interventions (Patient has pulse or is breathing): Full Support

## 2022-05-12 NOTE — ANESTHESIA PROCEDURE NOTES
Airway  Urgency: elective    Date/Time: 5/12/2022 2:48 PM  Airway not difficult    General Information and Staff    Patient location during procedure: OR  CRNA/CAA: Higinio Burris CRNA    Indications and Patient Condition  Indications for airway management: airway protection    Preoxygenated: yes  Mask difficulty assessment: 1 - vent by mask    Final Airway Details  Final airway type: supraglottic airway      Successful airway: I-gel  Size 4    Number of attempts at approach: 1  Assessment: lips, teeth, and gum same as pre-op    Additional Comments  LMA placed without difficulty, ventilation with assist, equal breath sounds and symmetric chest rise and fall

## 2022-05-12 NOTE — ANESTHESIA PROCEDURE NOTES
Peripheral Block      Patient reassessed immediately prior to procedure    Patient location during procedure: pre-op  Reason for block: at surgeon's request and post-op pain management  Performed by  Anesthesiologist: Timothy Zaragoza Jr., MD  Preanesthetic Checklist  Completed: patient identified, IV checked, site marked, risks and benefits discussed, surgical consent, monitors and equipment checked, pre-op evaluation and timeout performed  Prep:  Sterile barriers:cap, gloves, mask, sterile barriers and washed/disinfected hands  Prep: ChloraPrep  Patient monitoring: blood pressure monitoring, continuous pulse oximetry and EKG  Procedure    Sedation: yes  Performed under: general  Guidance:ultrasound guided  Images:still images obtained, printed/placed on chart    Laterality:right  Block Type:popliteal  Injection Technique:single-shot  Needle Type:echogenic  Needle Gauge:20 G  Resistance on Injection: none    Medications Used: dexamethasone sodium phosphate injection, 2 mg  bupivacaine PF (MARCAINE) 0.25 % injection, 30 mL  Med administered at 5/12/2022 2:55 PM      Post Assessment  Injection Assessment: negative aspiration for heme, no paresthesia on injection and incremental injection  Patient Tolerance:comfortable throughout block  Complications:no  Additional Notes  Procedure:                                                         The pt was placed in  lateral position.  The Insertion site was  prepped and Draped in sterile fashion.  The pt was anesthetized with  IV Sedation( see meds).  Skin and cutaneous tissue was infiltrated and anesthetized with 1% Lidocaine 3 mls via a 25g needle.  A BBraun 4 inch 18g echogenic needle was then  inserted approximately 3 cm proximal to the popliteal fossa at the lateral mid biceps femoris and advanced In-plane with Ultrasound guidance.  Normal Saline PSF was utilized for hydrodissection of tissue.  The popliteal artery was visualized and the common peroneal and tibial  bifurcation was located.  LA injection spread was visualized, injection was incremental 1-5ml, injection pressure was normal or little, no intraneural injection, no vascular injection.  .  A BBraun 20g wire stylet catheter was placed via the needle with ultrasound visualization and confirmation with NS fluid bolus. The labeled catheter was then secured at insertion site with exofin tissue adhesive, benzoin, steristrips  and a CHG transparent dressing was placed over. Thank you

## 2022-05-13 LAB
ANION GAP SERPL CALCULATED.3IONS-SCNC: 11 MMOL/L (ref 5–15)
BASOPHILS # BLD AUTO: 0.02 10*3/MM3 (ref 0–0.2)
BASOPHILS NFR BLD AUTO: 0.2 % (ref 0–1.5)
BUN SERPL-MCNC: 22 MG/DL (ref 8–23)
BUN/CREAT SERPL: 17.1 (ref 7–25)
CALCIUM SPEC-SCNC: 8.3 MG/DL (ref 8.6–10.5)
CHLORIDE SERPL-SCNC: 100 MMOL/L (ref 98–107)
CO2 SERPL-SCNC: 25 MMOL/L (ref 22–29)
CREAT SERPL-MCNC: 1.29 MG/DL (ref 0.76–1.27)
DEPRECATED RDW RBC AUTO: 43.6 FL (ref 37–54)
EGFRCR SERPLBLD CKD-EPI 2021: 53.7 ML/MIN/1.73
EOSINOPHIL # BLD AUTO: 0 10*3/MM3 (ref 0–0.4)
EOSINOPHIL NFR BLD AUTO: 0 % (ref 0.3–6.2)
ERYTHROCYTE [DISTWIDTH] IN BLOOD BY AUTOMATED COUNT: 13.8 % (ref 12.3–15.4)
GLUCOSE BLDC GLUCOMTR-MCNC: 147 MG/DL (ref 70–130)
GLUCOSE BLDC GLUCOMTR-MCNC: 202 MG/DL (ref 70–130)
GLUCOSE BLDC GLUCOMTR-MCNC: 300 MG/DL (ref 70–130)
GLUCOSE BLDC GLUCOMTR-MCNC: 434 MG/DL (ref 70–130)
GLUCOSE BLDC GLUCOMTR-MCNC: 446 MG/DL (ref 70–130)
GLUCOSE SERPL-MCNC: 397 MG/DL (ref 65–99)
HBA1C MFR BLD: 8.9 % (ref 4.8–5.6)
HCT VFR BLD AUTO: 28.7 % (ref 37.5–51)
HGB BLD-MCNC: 9.2 G/DL (ref 13–17.7)
IMM GRANULOCYTES # BLD AUTO: 0.08 10*3/MM3 (ref 0–0.05)
IMM GRANULOCYTES NFR BLD AUTO: 0.7 % (ref 0–0.5)
LYMPHOCYTES # BLD AUTO: 0.77 10*3/MM3 (ref 0.7–3.1)
LYMPHOCYTES NFR BLD AUTO: 6.9 % (ref 19.6–45.3)
MCH RBC QN AUTO: 27.6 PG (ref 26.6–33)
MCHC RBC AUTO-ENTMCNC: 32.1 G/DL (ref 31.5–35.7)
MCV RBC AUTO: 86.2 FL (ref 79–97)
MONOCYTES # BLD AUTO: 0.41 10*3/MM3 (ref 0.1–0.9)
MONOCYTES NFR BLD AUTO: 3.7 % (ref 5–12)
NEUTROPHILS NFR BLD AUTO: 88.5 % (ref 42.7–76)
NEUTROPHILS NFR BLD AUTO: 9.93 10*3/MM3 (ref 1.7–7)
NRBC BLD AUTO-RTO: 0 /100 WBC (ref 0–0.2)
PLATELET # BLD AUTO: 499 10*3/MM3 (ref 140–450)
PMV BLD AUTO: 10.1 FL (ref 6–12)
POTASSIUM SERPL-SCNC: 4.2 MMOL/L (ref 3.5–5.2)
RBC # BLD AUTO: 3.33 10*6/MM3 (ref 4.14–5.8)
SODIUM SERPL-SCNC: 136 MMOL/L (ref 136–145)
WBC NRBC COR # BLD: 11.21 10*3/MM3 (ref 3.4–10.8)

## 2022-05-13 PROCEDURE — 25010000002 HEPARIN (PORCINE) PER 1000 UNITS: Performed by: STUDENT IN AN ORGANIZED HEALTH CARE EDUCATION/TRAINING PROGRAM

## 2022-05-13 PROCEDURE — 25010000002 PIPERACILLIN SOD-TAZOBACTAM PER 1 G: Performed by: STUDENT IN AN ORGANIZED HEALTH CARE EDUCATION/TRAINING PROGRAM

## 2022-05-13 PROCEDURE — 82962 GLUCOSE BLOOD TEST: CPT

## 2022-05-13 PROCEDURE — 99232 SBSQ HOSP IP/OBS MODERATE 35: CPT | Performed by: INTERNAL MEDICINE

## 2022-05-13 PROCEDURE — 80048 BASIC METABOLIC PNL TOTAL CA: CPT | Performed by: STUDENT IN AN ORGANIZED HEALTH CARE EDUCATION/TRAINING PROGRAM

## 2022-05-13 PROCEDURE — 83036 HEMOGLOBIN GLYCOSYLATED A1C: CPT | Performed by: INTERNAL MEDICINE

## 2022-05-13 PROCEDURE — 97605 NEG PRS WND THER DME<=50SQCM: CPT

## 2022-05-13 PROCEDURE — 97162 PT EVAL MOD COMPLEX 30 MIN: CPT

## 2022-05-13 PROCEDURE — 97530 THERAPEUTIC ACTIVITIES: CPT

## 2022-05-13 PROCEDURE — 99024 POSTOP FOLLOW-UP VISIT: CPT | Performed by: THORACIC SURGERY (CARDIOTHORACIC VASCULAR SURGERY)

## 2022-05-13 PROCEDURE — 97116 GAIT TRAINING THERAPY: CPT

## 2022-05-13 PROCEDURE — 85025 COMPLETE CBC W/AUTO DIFF WBC: CPT | Performed by: STUDENT IN AN ORGANIZED HEALTH CARE EDUCATION/TRAINING PROGRAM

## 2022-05-13 PROCEDURE — 63710000001 INSULIN LISPRO (HUMAN) PER 5 UNITS: Performed by: STUDENT IN AN ORGANIZED HEALTH CARE EDUCATION/TRAINING PROGRAM

## 2022-05-13 PROCEDURE — 63710000001 INSULIN DETEMIR PER 5 UNITS: Performed by: INTERNAL MEDICINE

## 2022-05-13 PROCEDURE — G0108 DIAB MANAGE TRN  PER INDIV: HCPCS

## 2022-05-13 RX ADMIN — INSULIN DETEMIR 10 UNITS: 100 INJECTION, SOLUTION SUBCUTANEOUS at 10:18

## 2022-05-13 RX ADMIN — TAZOBACTAM SODIUM AND PIPERACILLIN SODIUM 3.38 G: 375; 3 INJECTION, SOLUTION INTRAVENOUS at 00:47

## 2022-05-13 RX ADMIN — HEPARIN SODIUM 5000 UNITS: 5000 INJECTION, SOLUTION INTRAVENOUS; SUBCUTANEOUS at 08:57

## 2022-05-13 RX ADMIN — PRAVASTATIN SODIUM 20 MG: 20 TABLET ORAL at 20:39

## 2022-05-13 RX ADMIN — INSULIN DETEMIR 10 UNITS: 100 INJECTION, SOLUTION SUBCUTANEOUS at 20:47

## 2022-05-13 RX ADMIN — TAZOBACTAM SODIUM AND PIPERACILLIN SODIUM 3.38 G: 375; 3 INJECTION, SOLUTION INTRAVENOUS at 15:47

## 2022-05-13 RX ADMIN — POLYETHYLENE GLYCOL 3350 17 G: 17 POWDER, FOR SOLUTION ORAL at 08:57

## 2022-05-13 RX ADMIN — SENNOSIDES AND DOCUSATE SODIUM 2 TABLET: 50; 8.6 TABLET ORAL at 08:57

## 2022-05-13 RX ADMIN — INSULIN LISPRO 7 UNITS: 100 INJECTION, SOLUTION INTRAVENOUS; SUBCUTANEOUS at 11:35

## 2022-05-13 RX ADMIN — SENNOSIDES AND DOCUSATE SODIUM 2 TABLET: 50; 8.6 TABLET ORAL at 20:40

## 2022-05-13 RX ADMIN — HEPARIN SODIUM 5000 UNITS: 5000 INJECTION, SOLUTION INTRAVENOUS; SUBCUTANEOUS at 20:40

## 2022-05-13 RX ADMIN — TAZOBACTAM SODIUM AND PIPERACILLIN SODIUM 3.38 G: 375; 3 INJECTION, SOLUTION INTRAVENOUS at 07:39

## 2022-05-13 RX ADMIN — INSULIN LISPRO 9 UNITS: 100 INJECTION, SOLUTION INTRAVENOUS; SUBCUTANEOUS at 07:39

## 2022-05-13 RX ADMIN — Medication 10 ML: at 20:41

## 2022-05-13 NOTE — CASE MANAGEMENT/SOCIAL WORK
Continued Stay Note  [facility]     Patient Name: Jesse Siu  MRN: [id number]  Today's Date: 5/13/2022    Admit Date: 5/10/2022     Discharge Plan     Row Name 05/13/22 1241       Plan    Plan ongoing    Patient/Family in Agreement with Plan yes    Plan Comments Mr. Siu is to have wound vac placed today post amputation of toes on right foot.  ID following for IV antibiotics.  CM following for discharge needs.    Final Discharge Disposition Code 30 - still a patient               Discharge Codes    No documentation.               Expected Discharge Date and Time     Expected Discharge Date Expected Discharge Time    May 19, 2022             Ratna Gonzalez RN

## 2022-05-13 NOTE — CONSULTS
"Diabetes Education    Patient Name:  Jesse Siu  YOB: 1935  MRN: [id number]  Admit Date:  5/10/2022        Saw pt at bedside for diabetes education consult; pt gave permission for visit. Pt confirms he takes metformin at home. Pt states he has working glucose meter and adequate testing supplies, typically tests 1-2 times per week, results range 125-140's per pt report, fasting.   Reviewed current A1c 8.9, target goal A1c per ADA guidelines would be less than 8 given his age. Discussed how acute illness and infection can affect blood sugars and discussed importance of glucose control for proper healing and treatment of infection. Recommended pt test blood sugar regularly after discharge and discussed recommended times to test. Provided pt with  kosta \"blood glucose goals\" as well as Op diabetes ed contact info.    Reviewed diabetes self-management. Signs, symptoms, and treatment of hyperglycemia and hypoglycemia were discussed.   Thank you for the consult.     Electronically signed by:  Leyla Lantigua RN, [facility]  05/13/22 14:25 EDT  "

## 2022-05-13 NOTE — PLAN OF CARE
Goal Outcome Evaluation:  Plan of Care Reviewed With: patient, daughter        Progress: improving  Outcome Evaluation: VSS, room air, NRS, A&O x4, when daughter at bedside, supportive of pt, no complaints of pain toniught, resting well, fall precautions in place

## 2022-05-13 NOTE — THERAPY WOUND CARE TREATMENT
Acute Care - Wound/Debridement Initial Evaluation  [facility]     Patient Name: Jesse Siu  : 1935  MRN: [id number]  Today's Date: 2022                Admit Date: 5/10/2022    Visit Dx:    ICD-10-CM ICD-9-CM   1. Cellulitis of right foot  L03.115 682.7   2. Foot ulceration, right, with necrosis of muscle ([facility])  L97.513 707.15     728.89   3. Leukocytosis, unspecified type  D72.829 288.60   4. Acute hematogenous osteomyelitis of right foot ([facility])  M86.071 730.07   5. Elevated serum creatinine  R79.89 790.99       Patient Active Problem List   Diagnosis   • Cellulitis of right foot   • Gas gangrene of foot (HCC)   • Type 2 diabetes mellitus with hyperglycemia (HCC)   • Renal insufficiency   • Foot ulceration, right, with necrosis of muscle ([facility])        Past Medical History:   Diagnosis Date   • Hyperlipidemia    • Hypertension    • Pre-diabetes         Past Surgical History:   Procedure Laterality Date   • FOOT SURGERY Left     CANCER REMOVAL   • TONSILLECTOMY     • TURP / TRANSURETHRAL INCISION / DRAINAGE PROSTATE             Wound 05/10/22 1600 Right foot (Active)   Dressing Appearance dry;intact 22   Closure ISAIAS 22   Base moist;pink;red;subcutaneous;yellow;exposed structure 22   Periwound intact;dry 22   Periwound Temperature warm 22   Periwound Skin Turgor firm 22   Edges irregular 22   Wound Length (cm) 3.5 cm 22   Wound Width (cm) 11 cm 22   Wound Depth (cm) 4.5 cm 22   Wound Surface Area (cm^2) 38.5 cm^2 2230   Wound Volume (cm^3) 173.25 cm^3 22   Drainage Characteristics/Odor serosanguineous 22   Drainage Amount small 22   Care, Wound irrigated with;sterile normal saline;negative pressure wound therapy 22   Dressing Care dressing changed 22   Wound Output (mL) 0 2230       Wound 22 Right anterior foot  Incision (Active)   Dressing Appearance dry;intact 05/13/22 0857   Closure ISAIAS 05/13/22 1012   Dressing Care gauze;multi-layer wrap 05/13/22 1012       NPWT (Negative Pressure Wound Therapy) 05/13/22 0930 R foot wound (Active)   Therapy Setting continuous therapy 05/13/22 0930   Dressing foam, black;foam, white 05/13/22 0930   Pressure Setting 150 mmHg 05/13/22 0930   Sponges Inserted 2 05/13/22 0930   Sponges Removed other (see comments) 05/13/22 0930   Finger sweep complete Yes 05/13/22 0930         WOUND DEBRIDEMENT                     PT Assessment (last 12 hours)     PT Evaluation and Treatment     Row Name 05/13/22 0930          Physical Therapy Time and Intention    Subjective Information no complaints  -     Document Type evaluation;therapy note (daily note);wound care  -     Mode of Treatment individual therapy;physical therapy  -     Row Name 05/13/22 0930          General Information    Patient Profile Reviewed yes  -     Patient Observations alert;agree to therapy;cooperative  -     Pertinent History of Current Functional Problem Pt with open wound to R foot s/p amputation of toes 1-3  -     Risks Reviewed patient:;increased discomfort  -     Benefits Reviewed patient:;improve skin integrity  -     Barriers to Rehab medically complex;previous functional deficit;physical barrier  -     Row Name 05/13/22 0930          Pain    Pretreatment Pain Rating 0/10 - no pain  -     Posttreatment Pain Rating 0/10 - no pain  -     Row Name 05/13/22 0930          Health Promotion    Additional Documentation NPWT (Negative Pressure Wound Therapy) (LDA)  -     Row Name 05/13/22 0930          Wound 05/10/22 1600 Right foot    Wound - Properties Group Placement Date: 05/10/22  -AM Placement Time: 1600  -AM Side: Right  -AM Location: foot  -AM     Dressing Appearance dry;intact  -MF     Base moist;pink;red;subcutaneous;yellow;exposed structure  bone and fascia  -     Periwound intact;dry  -MF      Periwound Temperature warm  -MF     Periwound Skin Turgor firm  -MF     Edges irregular  -MF     Wound Length (cm) 3.5 cm  -MF     Wound Width (cm) 11 cm  -MF     Wound Depth (cm) 4.5 cm  -MF     Wound Surface Area (cm^2) 38.5 cm^2  -MF     Wound Volume (cm^3) 173.25 cm^3  -MF     Drainage Characteristics/Odor serosanguineous  -MF     Drainage Amount small  -MF     Care, Wound irrigated with;sterile normal saline;negative pressure wound therapy  -MF     Dressing Care dressing changed  -MF     Wound Output (mL) 0  -MF     Retired Wound - Properties Group Placement Date: 05/10/22  -AM Placement Time: 1600  -AM Side: Right  -AM Location: foot  -AM     Retired Wound - Properties Group Date first assessed: 05/10/22  -AM Time first assessed: 1600  -AM Side: Right  -AM Location: foot  -AM     Row Name             Wound 05/12/22 Right anterior foot Incision    Wound - Properties Group Placement Date: 05/12/22  -MP Present on Hospital Admission: N  -MP Side: Right  -MP Orientation: anterior  -MP Location: foot  -MP Primary Wound Type: Incision  -MP     Retired Wound - Properties Group Placement Date: 05/12/22  -MP Present on Hospital Admission: N  -MP Side: Right  -MP Orientation: anterior  -MP Location: foot  -MP Primary Wound Type: Incision  -MP     Retired Wound - Properties Group Date first assessed: 05/12/22  - Present on Hospital Admission: N  -MP Side: Right  -MP Location: foot  -MP Primary Wound Type: Incision  -MP     Row Name 05/13/22 0930          NPWT (Negative Pressure Wound Therapy) 05/13/22 0930 R foot wound    NPWT (Negative Pressure Wound Therapy) - Properties Group Placement Date: 05/13/22  - Placement Time: 0930  -MF Location: R foot wound  -MF     Therapy Setting continuous therapy  -MF     Dressing foam, black;foam, white  -MF     Pressure Setting 150 mmHg  -MF     Sponges Inserted 2  1 white 1 black  -MF     Sponges Removed other (see comments)  gauze packing  -MF     Finger sweep complete Yes  -MF      Retired NPWT (Negative Pressure Wound Therapy) - Properties Group Placement Date: 05/13/22  - Placement Time: 0930  - Location: R foot wound  -MF     Retired NPWT (Negative Pressure Wound Therapy) - Properties Group Placement Date: 05/13/22  - Placement Time: 0930  - Location: R foot wound  -MF     Row Name 05/13/22 0930          Coping    Observed Emotional State calm;cooperative  -     Verbalized Emotional State acceptance  -     Trust Relationship/Rapport care explained  -     Row Name 05/13/22 0930          Plan of Care Review    Plan of Care Reviewed With patient  -     Outcome Evaluation Pt presents with open wound to R foot s/p amputation of toes 1-3. bone and fascia noted to wound base, but otherwise very healthy. PT placed wound vac to help manage exudate, increase granulation, and improve skin integrity and healing potential. PT will f/u with dressing change in 2-3 days.  -     Row Name 05/13/22 0930          Positioning and Restraints    Pre-Treatment Position in bed  -     Post Treatment Position bed  -     In Bed supine;call light within reach  -     Row Name 05/13/22 0930          Therapy Assessment/Plan (PT)    Patient/Family Therapy Goals Statement (PT) wound healing  -     PT Diagnosis (PT) R foot wound s/p amputation  -     Rehab Potential (PT) fair, will monitor progress closely  -     Criteria for Skilled Interventions Met (PT) yes;meets criteria;skilled treatment is necessary  -     Row Name 05/13/22 0930          PT Evaluation Complexity    History, PT Evaluation Complexity 3 or more personal factors and/or comorbidities  -     Examination of Body Systems (PT Eval Complexity) total of 3 or more elements  -     Clinical Presentation (PT Evaluation Complexity) evolving  -     Clinical Decision Making (PT Evaluation Complexity) moderate complexity  -     Overall Complexity (PT Evaluation Complexity) moderate complexity  -     Row Name 05/13/22 0930           Therapy Plan Review/Discharge Plan (PT)    Therapy Plan Review (PT) care plan/treatment goals reviewed;evaluation/treatment results reviewed;risks/benefits reviewed;current/potential barriers reviewed;participants voiced agreement with care plan;participants included;patient  -     Row Name 05/13/22 0930          Physical Therapy Goals    Wound Care Goal Selection (PT) wound care, PT goal 1  -     Row Name 05/13/22 0930          Wound Care Goal 1 (PT)    Wound Care Goal 1 (PT) decrease wound size by 25% as evidence of wound healing.  -     Time Frame (Wound Care Goal 1, PT) 10 days  -           User Key  (r) = Recorded By, (t) = Taken By, (c) = Cosigned By    Initials Name Provider Type    Wesley Vallejo, PT Physical Therapist    Yael Nam RN Registered Nurse    Konstantin Browning RN Registered Nurse                  Recommendation and Plan  Planned Therapy Interventions (PT): wound care  Plan of Care Reviewed With: patient           Outcome Evaluation: Pt presents with open wound to R foot s/p amputation of toes 1-3. bone and fascia noted to wound base, but otherwise very healthy. PT placed wound vac to help manage exudate, increase granulation, and improve skin integrity and healing potential. PT will f/u with dressing change in 2-3 days.  Plan of Care Reviewed With: patient            Time Calculation   PT Charges     Row Name 05/13/22 0930             Time Calculation    Start Time 0930  -MF      PT Goal Re-Cert Due Date 05/23/22  -MF              Untimed Charges    PT Eval/Re-eval Minutes 50  -MF      Wound Care 36244 Neg Press (DME) wound TO 50 sqcm  -MF      [id number]-[id number] Pressure wound to 50 sqcm 25  -MF              Total Minutes    Untimed Charges Total Minutes 75  -MF       Total Minutes 75  -MF            User Key  (r) = Recorded By, (t) = Taken By, (c) = Cosigned By    Initials Name Provider Type    Weslye Vallejo, PT Physical Therapist                  Therapy Charges for  Today     Code Description Service Date Service Provider Modifiers Qty    34353711303 HC PT NEG PRESS WOUND TO 50SQCM DME2 5/13/2022 Wesley Morgan, PT GP 1    02560759974 HC PT EVAL MOD COMPLEXITY 4 5/13/2022 Wesley Morgan, PT GP 1            PT G-Codes  AM-PAC 6 Clicks Score (PT): 21       Wesley Morgan, PT  5/13/2022

## 2022-05-13 NOTE — PROGRESS NOTES
Cardiothoracic Surgery Progress Note      POD #: 1-transmetatarsal amputation of the right great toe second toe and third toe and extensive sharp soft tissue debridement     LOS: 3 days      Subjective: Awake and alert    Objective:  Vital Signs vital signs below noted T-max past 24 hours 98.2 °F  Temp:  [97.4 °F (36.3 °C)-98.2 °F (36.8 °C)] 97.4 °F (36.3 °C)  Heart Rate:  [63-85] 63  Resp:  [16-18] 16  BP: (125-162)/(70-82) 137/75    Physical Exam:   General Appearance: Oriented x3   Lungs:   Heart:   Skin:   Incision: Amputation site has dressing applied.  No drainage noted.     Results: Laboratory results below are noted  Results from last 7 days   Lab Units 05/12/22  0503   WBC 10*3/mm3 13.95*   HEMOGLOBIN g/dL 9.7*   HEMATOCRIT % 29.9*   PLATELETS 10*3/mm3 499*     Results from last 7 days   Lab Units 05/12/22  1028 05/12/22  0503   SODIUM mmol/L  --  137   POTASSIUM mmol/L 4.7 4.2   CHLORIDE mmol/L  --  99   CO2 mmol/L  --  26.0   BUN mg/dL  --  22   CREATININE mg/dL  --  1.23   GLUCOSE mg/dL  --  199*   CALCIUM mg/dL  --  8.7         Assessment: #1.  Postop day #1 right great toe second toe and third toe transmetatarsal amputation with extensive soft tissue debridement wound left open to heal by secondary intent/wound VAC therapy.  #2 mild renal insufficiency  3.  Prediabetes    Plan: PT wound care to evaluate for wound VAC to this open surgical amputation/soft tissue debridement site.  Overall medical manage per hospitalist.  Antibiotics per infectious disease.      Lemuel Angela MD - 05/13/22 - 06:32 EDT

## 2022-05-13 NOTE — PLAN OF CARE
Goal Outcome Evaluation:  Plan of Care Reviewed With: patient        Progress: improving  Outcome Evaluation: PT eval for mobility was completed. patient presents S/P right great toe and 2nd and 3rd toe amputation. patient demonstrates impaired transfers and gait he has difficulty maintaining NWB status on the right during transfers and when using a rolling walker. patient went 40 ft with knee scooter with min assist to help with turning and 10 ft with min assist using rolling walker he was only able to take a few steps maintaining NWB status. we will continue to work with both rolling walker and knee scooter to see what works best for patient anticipate patient to need IP rehab at D/C patient wants to go home. patient will also need rolling walker and W/C vs knee scooter pending progress with mobility.

## 2022-05-13 NOTE — PLAN OF CARE
Goal Outcome Evaluation:  Plan of Care Reviewed With: patient           Outcome Evaluation: Pt presents with open wound to R foot s/p amputation of toes 1-3. bone and fascia noted to wound base, but otherwise very healthy. PT placed wound vac to help manage exudate, increase granulation, and improve skin integrity and healing potential. PT will f/u with dressing change in 2-3 days.

## 2022-05-13 NOTE — PROGRESS NOTES
INFECTIOUS DISEASE Progress Note    Jesse Siu  1935  [id number]    Admission Date: 5/10/2022      Requesting Provider: Sil Navarro II, DO  Evaluating Physician: Cesar Serrano MD    Reason for Consultation: Right diabetic foot infection/gangrene    History of present illness:    5/11/22: Patient is a 87 y.o. male who is seen today for evaluation of progressively worsening right first and second toe wound infections with gangrene. He initially developed right second toe ulcerations proximally one month ago.  He has been seeing a podiatrist and was started on oral antibiotic.  He has noted substantial worsening of his foot over the last week.  He was seen by his podiatrist on 5/9 who noted gangrene with a foul odor and recommended that he come to the emergency room.  He was found to have right first and second toe gangrene.  He has been seen by Dr. Angela and is currently scheduled for amputation tomorrow.     5/12/22:  He has been afebrile overnight.His white blood cell count today is 14.  His right foot wound cultures growing light gram-negative bacilli in his Gram stain revealed many gram-positive cocci and moderate gram-negative bacilli. Blood cultures from 5/10 are no growth so far. Duplex lower extremity arterial assessment revealed a 50-75% stenosis in the Right anterior tibial artery, short segment occlusion in the right mid posterior tibial artery, and an HENRIQUE reduced at 0.78.X-rays of his right foot revealed soft tissue gas. Creatinine today is 1.23. He denies increased right foot pain.  He does have a history of peripheral neuropathy.  At the time of surgery today he was found to have extensive necrosis with with necrotizing fasciitis extending to the base of the first, second, and third metatarsals.    5/13/22:  He remains afebrile. Right foot cultures from 5/10 grew Citrobacter which was sensitive to tested antibiotics.  Right foot tissue culture from 5/12 is pending but the Gram stain  revealed many white blood cells, many gram-positive cocci in pairs and clusters, and moderate gram-positive bacilli. Creatinine today is 1.29.  White blood cell count is 11.2.  He denies increased foot pain.  He denies nausea, vomiting, and diarrhea.    Past Medical History:   Diagnosis Date   • Hyperlipidemia    • Hypertension    • Pre-diabetes        Past Surgical History:   Procedure Laterality Date   • FOOT SURGERY Left     CANCER REMOVAL   • TONSILLECTOMY     • TURP / TRANSURETHRAL INCISION / DRAINAGE PROSTATE         History reviewed. No pertinent family history.    Social History     Socioeconomic History   • Marital status:    Tobacco Use   • Smoking status: Never Smoker   • Smokeless tobacco: Never Used   Substance and Sexual Activity   • Alcohol use: Never   • Drug use: Never   • Sexual activity: Never       No Known Allergies      Medication:    Current Facility-Administered Medications:   •  acetaminophen (TYLENOL) tablet 650 mg, 650 mg, Oral, Q4H PRN **OR** acetaminophen (TYLENOL) 160 MG/5ML solution 650 mg, 650 mg, Oral, Q4H PRN **OR** acetaminophen (TYLENOL) suppository 650 mg, 650 mg, Rectal, Q4H PRN, Hiral Borges PA-C  •  sennosides-docusate (PERICOLACE) 8.6-50 MG per tablet 2 tablet, 2 tablet, Oral, BID, 2 tablet at 05/12/22 0804 **AND** polyethylene glycol (MIRALAX) packet 17 g, 17 g, Oral, Daily PRN **AND** bisacodyl (DULCOLAX) EC tablet 5 mg, 5 mg, Oral, Daily PRN **AND** bisacodyl (DULCOLAX) suppository 10 mg, 10 mg, Rectal, Daily PRN, Hiral Borges PA-C  •  dextrose (D50W) (25 g/50 mL) IV injection 25 g, 25 g, Intravenous, Q15 Min PRN, Hiral Borges PA-C  •  dextrose (GLUTOSE) oral gel 15 g, 15 g, Oral, Q15 Min PRN, Hiral Borges PA-C  •  docusate sodium (COLACE) capsule 100 mg, 100 mg, Oral, BID PRN, Hiral Borges PA-C  •  glucagon (human recombinant) (GLUCAGEN DIAGNOSTIC) injection 1 mg, 1 mg, Intramuscular, Q15 Min PRN, Hiral Borges PA-C  •   heparin (porcine) 5000 UNIT/ML injection 5,000 Units, 5,000 Units, Subcutaneous, Q12H, Hiral Borges PA-C  •  HYDROcodone-acetaminophen (NORCO) 5-325 MG per tablet 1 tablet, 1 tablet, Oral, Q4H PRN, Hiral Borges PA-C  •  Insulin Lispro (humaLOG) injection 0-9 Units, 0-9 Units, Subcutaneous, TID AC, Hiral Borges PA-C, 9 Units at 05/13/22 0739  •  lactated ringers infusion, 9 mL/hr, Intravenous, Continuous, Hiral Borges PA-C, Last Rate: 9 mL/hr at 05/12/22 1441, New Bag at 05/12/22 1528  •  Magnesium Sulfate 2 gram Bolus, followed by 8 gram infusion (total Mg dose 10 grams)- Mg less than or equal to 1mg/dL, 2 g, Intravenous, PRN **OR** Magnesium Sulfate 2 gram / 50mL Infusion (GIVE X 3 BAGS TO EQUAL 6GM TOTAL DOSE) - Mg 1.1 - 1.5 mg/dl, 2 g, Intravenous, PRN **OR** Magnesium Sulfate 4 gram infusion- Mg 1.6-1.9 mg/dL, 4 g, Intravenous, PRN, Hiral Borges PA-C  •  melatonin tablet 5 mg, 5 mg, Oral, Nightly PRN, Hiral Borges PA-C  •  morphine injection 2 mg, 2 mg, Intravenous, Q2H PRN **AND** naloxone (NARCAN) injection 0.4 mg, 0.4 mg, Intravenous, Q5 Min PRN, Hiral Borges PA-C  •  ondansetron (ZOFRAN) tablet 4 mg, 4 mg, Oral, Q6H PRN **OR** ondansetron (ZOFRAN) injection 4 mg, 4 mg, Intravenous, Q6H PRN, Hiral Borges PA-C  •  piperacillin-tazobactam (ZOSYN) 3.375 g in iso-osmotic dextrose 50 ml (premix), 3.375 g, Intravenous, Q8H, Orange, Hiral, PA-C, 3.375 g at 05/13/22 0739  •  polyethylene glycol (MIRALAX) packet 17 g, 17 g, Oral, Daily, Hiral Borges PA-C  •  potassium chloride (MICRO-K) CR capsule 40 mEq, 40 mEq, Oral, PRN **OR** potassium chloride (KLOR-CON) packet 40 mEq, 40 mEq, Oral, PRN **OR** potassium chloride 10 mEq in 100 mL IVPB, 10 mEq, Intravenous, Q1H PRN, Hiral Borges PA-C  •  pravastatin (PRAVACHOL) tablet 20 mg, 20 mg, Oral, Nightly, Hiral Borges PA-C, 20 mg at 05/12/22 2016  •  sodium chloride 0.9 % flush 10 mL, 10 mL,  Intravenous, Q12H, Hiral Borges PA-C, 10 mL at 22 2016  •  sodium chloride 0.9 % flush 10 mL, 10 mL, Intravenous, PRN, Hiral Borges PA-C    Antibiotics:  Anti-Infectives (From admission, onward)    Ordered     Dose/Rate Route Frequency Start Stop    22 0718  vancomycin 1250 mg/250 mL 0.9% NS IVPB ([facility])        Ordering Provider: Cici Shoemaker PharmD    1,250 mg Intravenous Once 22 1500 22 1659    05/10/22 1608  piperacillin-tazobactam (ZOSYN) 3.375 g in iso-osmotic dextrose 50 ml (premix)        Ordering Provider: Hiral Borges PA-C    3.375 g  over 4 Hours Intravenous Every 8 Hours 22 0000 22 2359    05/10/22 1608  piperacillin-tazobactam (ZOSYN) 3.375 g in iso-osmotic dextrose 50 ml (premix)        Ordering Provider: Sil Navarro II, DO    3.375 g  over 30 Minutes Intravenous Once 05/10/22 1700 05/10/22 1659    05/10/22 1416  meropenem (MERREM) 1 g/100 mL 0.9% NS ([facility])        Ordering Provider: Ole Hadley MD    1 g  over 30 Minutes Intravenous Once 05/10/22 1418 05/10/22 1521    05/10/22 1309  cefTRIAXone (ROCEPHIN) 1 g/100 mL 0.9% NS ([facility])        Ordering Provider: Ole Hadley MD    1 g  over 30 Minutes Intravenous Once 05/10/22 1311 05/10/22 1430    05/10/22 1307  vancomycin 1500 mg/500 mL 0.9% NS IVPB ([facility])        Ordering Provider: Ole Hadley MD    20 mg/kg × 70.3 kg Intravenous Once 05/10/22 1309 05/10/22 1541            Review of Systems:  See HPI      Physical Exam:   Vital Signs  Temp (24hrs), Av.8 °F (36.6 °C), Min:97.4 °F (36.3 °C), Max:98.2 °F (36.8 °C)    Temp  Min: 97.4 °F (36.3 °C)  Max: 98.2 °F (36.8 °C)  BP  Min: 125/70  Max: 162/80  Pulse  Min: 63  Max: 85  Resp  Min: 16  Max: 18  SpO2  Min: 91 %  Max: 98 %    GENERAL: Awake and alert, in no acute distress.   HEENT: Normocephalic, atraumatic.  PERRL. EOMI. No conjunctival injection. No icterus. Oropharynx clear without evidence of thrush or exudate.   NECK: Supple    HEART: RRR; No murmur, rubs, gallops.   LUNGS: Clear to auscultation bilaterally without wheezing, rales, rhonchi. Normal respiratory effort  ABDOMEN: Soft, nontender, nondistended. No rebound or guarding.   EXT: A wound VAC is present on his right foot.  :  Without Hernandez catheter.  SKIN: Warm and dry without cutaneous eruptions on Inspection/palpation.    NEURO: Oriented to PPT. Motor 5/5 strength bilaterally.  He has decreased sensation to light touch in both distal lower extremities.  PSYCHIATRIC: Normal insight and judgement. Cooperative with PE    Laboratory Data    Results from last 7 days   Lab Units 05/13/22  0626 05/12/22  0503 05/11/22  0429   WBC 10*3/mm3 11.21* 13.95* 13.68*   HEMOGLOBIN g/dL 9.2* 9.7* 10.0*   HEMATOCRIT % 28.7* 29.9* 31.4*   PLATELETS 10*3/mm3 499* 499* 510*     Results from last 7 days   Lab Units 05/12/22  1028 05/12/22  0503   SODIUM mmol/L  --  137   POTASSIUM mmol/L 4.7 4.2   CHLORIDE mmol/L  --  99   CO2 mmol/L  --  26.0   BUN mg/dL  --  22   CREATININE mg/dL  --  1.23   GLUCOSE mg/dL  --  199*   CALCIUM mg/dL  --  8.7     Results from last 7 days   Lab Units 05/10/22  1307   ALK PHOS U/L 119*   BILIRUBIN mg/dL 0.3   ALT (SGPT) U/L 16   AST (SGOT) U/L 16     Results from last 7 days   Lab Units 05/10/22  1307   SED RATE mm/hr 62*     Results from last 7 days   Lab Units 05/10/22  1307   CRP mg/dL 12.33*     Results from last 7 days   Lab Units 05/10/22  1307   LACTATE mmol/L 2.0         Results from last 7 days   Lab Units 05/11/22  0429   VANCOMYCIN RM mcg/mL 15.80     Estimated Creatinine Clearance: 42.1 mL/min (by C-G formula based on SCr of 1.23 mg/dL).      Microbiology:  No results found for: ACANTHNAEG, AFBCX, BPERTUSSISCX, BLOODCX  No results found for: BCIDPCR, CXREFLEX, CSFCX, CULTURETIS  No results found for: CULTURES, HSVCX, URCX  No results found for: EYECULTURE, GCCX, HSVCULTURE, LABHSV  No results found for: LEGIONELLA, MRSACX, MUMPSCX, MYCOPLASCX  No results  found for: NOCARDIACX, STOOLCX  No results found for: THROATCX, UNSTIMCULT, URINECX, CULTURE, VZVCULTUR  No results found for: VIRALCULTU, WOUNDCX        Radiology:  Imaging Results (Last 72 Hours)     Procedure Component Value Units Date/Time    XR Foot 3+ View Right [[id number]] Collected: 05/10/22 1345     Updated: 05/10/22 1351    Narrative:      XR FOOT 3+ VW RIGHT-     Date of Exam: 5/10/2022 1:29 PM     Indication: infection, r/o osteo; L03.115-Cellulitis of right lower  limb; 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     Comparison: 11/30/2011     Technique: Three views of the foot were obtained.     FINDINGS: There is soft tissue ulceration along the dorsal medial aspect  of the foot at the level of the first metatarsal-phalangeal joint.   There is a moderate amount of gas within the soft tissues surrounding  the first and second metatarsal phalangeal joints.  There is severe  joint space narrowing involving the first and second metatarsal  phalangeal joints.  There is some bony destruction involving the head of  the first metatarsal subchondral cystic degenerative change or bony  destruction involving the base of the proximal phalanx of the first  digit.  No definite bony destruction seen of the second digit or head of  the second metatarsal.  The other bony structures appear within normal  limits.  Vascular calcifications are seen within the soft tissues.       Impression:            1.  Moderate amount of gas within the soft tissues surrounding the first  and second metatarsal phalangeal joints.  There appears to be bony  destruction involving the head of the first metatarsal compatible with  osteomyelitis.  There is subchondral cystic degenerative change or  additional bony destruction involving the base of the proximal phalanx  of the first digit.  No definite bony destruction of the second digit or  second metatarsal.     This report was finalized on  5/10/2022 1:48 PM by Olivier Chong MD.               Impression:   1.  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 His foot wound cultures are growing Citrobacter sensitive to Zosyn.  2.  Type 2 diabetes mellitus  3.  Acute kidney injury-Improved  4.  Peripheral vascular disease    PLAN/RECOMMENDATIONS:   1.  Right first, second, and third metatarsal amputations-performed  2.  Continue intravenous Zosyn   3.  Right foot wound VAC    He will likely require a prolonged course of intravenous antibiotic therapy and prolonged wound care.  He remains at risk for loss of his foot and may require a right BKA.  I discussed his complex situation in detail with Dr. Angela today.       Cesar Serrano MD  5/13/2022  07:40 EDT

## 2022-05-13 NOTE — PROGRESS NOTES
[facility]  PROGRESS NOTE    Patient Name: Jesse Siu  : 1935  MRN: [id number]    Date of Admission: 5/10/2022  Primary Care Physician: IRVIN Martinez MD    Subjective   Subjective     CC:  F/u right foot gas gangrene/diabetic foot infection    HPI:  Patient resting in bed. No pain. Doing well post-op.    ROS:  Gen- No fevers, chills  CV- No chest pain, palpitations  Resp- No cough, dyspnea  GI- No N/V/D, abd pain    Objective   Objective     Vital Signs:   Temp:  [97.4 °F (36.3 °C)-98.2 °F (36.8 °C)] 97.4 °F (36.3 °C)  Heart Rate:  [62-85] 62  Resp:  [16-18] 16  BP: (125-162)/(68-82) 139/68     Physical Exam:  Constitutional: No acute distress, awake, alert  HENT: NCAT, mucous membranes moist  Respiratory: Clear to auscultation bilaterally, respiratory effort normal   Cardiovascular: RRR, no murmurs, rubs, or gallops  Gastrointestinal: Positive bowel sounds, soft, nontender, nondistended  Musculoskeletal: No bilateral ankle edema, RLE in post-op bandage  Psychiatric: Appropriate affect, cooperative  Neurologic: Oriented x 3, strength symmetric in all extremities, Cranial Nerves grossly intact to confrontation, speech clear  Skin: No rashes      Results Reviewed:  LAB RESULTS:      Lab 22  0626 22  0503 22  0429 05/10/22  1307   WBC 11.21* 13.95* 13.68* 16.49*   HEMOGLOBIN 9.2* 9.7* 10.0* 10.0*   HEMATOCRIT 28.7* 29.9* 31.4* 31.3*   PLATELETS 499* 499* 510* 512*   NEUTROS ABS 9.93* 11.19*  --  14.67*   IMMATURE GRANS (ABS) 0.08* 0.10*  --  0.09*   LYMPHS ABS 0.77 1.40  --  0.81   MONOS ABS 0.41 0.79  --  0.87   EOS ABS 0.00 0.40  --  0.01   MCV 86.2 86.4 87.5 89.9   SED RATE  --   --   --  62*   CRP  --   --   --  12.33*   LACTATE  --   --   --  2.0         Lab 22  0626 22  1028 22  0503 22  0429 05/10/22  1307   SODIUM 136  --  137 139 136   POTASSIUM 4.2 4.7 4.2 4.9 4.5   CHLORIDE 100  --  99 102 98   CO2 25.0  --   26.0 28.0 24.0   ANION GAP 11.0  --  12.0 9.0 14.0   BUN 22 -- 22 21 29*   CREATININE 1.29*  --  1.23 1.20 1.39*   EGFR 53.7*  --  56.8* 58.5* 49.1*   GLUCOSE 397*  --  199* 194* 286*   CALCIUM 8.3*  --  8.7 9.0 9.3         Lab 05/10/22  1307   TOTAL PROTEIN 7.2   ALBUMIN 3.00*   GLOBULIN 4.2   ALT (SGPT) 16   AST (SGOT) 16   BILIRUBIN 0.3   ALK PHOS 119*                 Lab 05/11/22  0920   ABO TYPING O   RH TYPING Negative   ANTIBODY SCREEN Negative         Brief Urine Lab Results     None          Microbiology Results Abnormal     Procedure Component Value - Date/Time    Tissue / Bone Culture - Tissue, Foot, Right [[id number]] Collected: 05/12/22 1618    Lab Status: Preliminary result Specimen: Tissue from Foot, Right Updated: 05/12/22 2221     Gram Stain Many (4+) WBCs seen      Many (4+) Gram positive cocci in pairs and clusters      Moderate (3+) Gram positive bacilli    Blood Culture - Blood, Arm, Right [[id number]]  (Normal) Collected: 05/10/22 1320    Lab Status: Preliminary result Specimen: Blood from Arm, Right Updated: 05/12/22 1402     Blood Culture No growth at 2 days    Blood Culture - Blood, Blood, Central Line [[id number]]  (Normal) Collected: 05/10/22 1300    Lab Status: Preliminary result Specimen: Blood, Central Line Updated: 05/12/22 1402     Blood Culture No growth at 2 days    COVID PRE-OP / PRE-PROCEDURE SCREENING ORDER (NO ISOLATION) - Swab, Nasopharynx [[id number]]  (Normal) Collected: 05/10/22 1454    Lab Status: Final result Specimen: Swab from Nasopharynx Updated: 05/10/22 1532    Narrative:      The following orders were created for panel order COVID PRE-OP / PRE-PROCEDURE SCREENING ORDER (NO ISOLATION) - Swab, Nasopharynx.  Procedure                               Abnormality         Status                     ---------                               -----------         ------                     COVID-19, ABBOTT IN-HOUS...[[id number]]  Normal              Final result                  Please view results for these tests on the individual orders.    COVID-19, ABBOTT IN-HOUSE,NASAL Swab (NO TRANSPORT MEDIA) 2 HR TAT - Swab, Nasopharynx [[id number]]  (Normal) Collected: 05/10/22 1454    Lab Status: Final result Specimen: Swab from Nasopharynx Updated: 05/10/22 1532     COVID19 Presumptive Negative    Narrative:      Fact sheet for providers: https://www.fda.gov/media/220011/download     Fact sheet for patients: https://www.fda.gov/media/989339/download    Test performed by PCR.  If inconsistent with clinical signs and symptoms patient should be tested with different authorized molecular test.          Duplex Lower Extremity Art / Grafts - Bilateral CAR    Result Date: 5/11/2022  · Monophasic waveforms in the distal right SFA and popliteal arteries. · 50 to 75% stenosis in the right anterior tibial artery · Suspected short segment occlusion in the right mid posterior tibial artery with collateral flow demonstrated. · Collateral filling seen at the level of the right dorsalis pedis artery also. · Right HENRIQUE is moderately reduced at 0.78. · Biphasic flow in the left, SFA, popliteal, anterior tibial artery. · Monophasic flow in the left peroneal artery, no flow detected in the left posterior tibial artery. · Flow was demonstrated in the left dorsalis pedis artery · Left HENRIQUE calculated at 1.5 this suggest significant vessel calcification.      Peripheral Block    Result Date: 5/12/2022  Higinio Burris CRNA     5/12/2022  3:04 PM Peripheral Block Patient reassessed immediately prior to procedure Patient location during procedure: pre-op Reason for block: at surgeon's request and post-op pain management Performed by Anesthesiologist: Timothy Zaragoza Jr., MD Preanesthetic Checklist Completed: patient identified, IV checked, site marked, risks and benefits discussed, surgical consent, monitors and equipment checked, pre-op evaluation and timeout performed Prep: Sterile barriers:cap, gloves, mask, sterile  barriers and washed/disinfected hands Prep: ChloraPrep Patient monitoring: blood pressure monitoring, continuous pulse oximetry and EKG Procedure Sedation: yes Performed under: general Guidance:ultrasound guided Images:still images obtained, printed/placed on chart Laterality:right Block Type:popliteal Injection Technique:single-shot Needle Type:echogenic Needle Gauge:20 G Resistance on Injection: none Medications Used: dexamethasone sodium phosphate injection, 2 mg bupivacaine PF (MARCAINE) 0.25 % injection, 30 mL Med administered at 5/12/2022 2:55 PM Post Assessment Injection Assessment: negative aspiration for heme, no paresthesia on injection and incremental injection Patient Tolerance:comfortable throughout block Complications:no Additional Notes Procedure:                                                     The pt was placed in  lateral position.  The Insertion site was  prepped and Draped in sterile fashion.  The pt was anesthetized with  IV Sedation( see meds).  Skin and cutaneous tissue was infiltrated and anesthetized with 1% Lidocaine 3 mls via a 25g needle.  A BBraun 4 inch 18g echogenic needle was then  inserted approximately 3 cm proximal to the popliteal fossa at the lateral mid biceps femoris and advanced In-plane with Ultrasound guidance.  Normal Saline PSF was utilized for hydrodissection of tissue.  The popliteal artery was visualized and the common peroneal and tibial bifurcation was located.  LA injection spread was visualized, injection was incremental 1-5ml, injection pressure was normal or little, no intraneural injection, no vascular injection.  .  A BBraun 20g wire stylet catheter was placed via the needle with ultrasound visualization and confirmation with NS fluid bolus. The labeled catheter was then secured at insertion site with exofin tissue adhesive, benzoin, steristrips  and a CHG transparent dressing was placed over. Thank you           I have reviewed the medications:  Scheduled  Meds:heparin (porcine), 5,000 Units, Subcutaneous, Q12H  insulin lispro, 0-9 Units, Subcutaneous, TID AC  piperacillin-tazobactam, 3.375 g, Intravenous, Q8H  polyethylene glycol, 17 g, Oral, Daily  pravastatin, 20 mg, Oral, Nightly  senna-docusate sodium, 2 tablet, Oral, BID  sodium chloride, 10 mL, Intravenous, Q12H      Continuous Infusions:lactated ringers, 9 mL/hr, Last Rate: 9 mL/hr (05/12/22 1441)      PRN Meds:.•  acetaminophen **OR** acetaminophen **OR** acetaminophen  •  senna-docusate sodium **AND** polyethylene glycol **AND** bisacodyl **AND** bisacodyl  •  dextrose  •  dextrose  •  docusate sodium  •  glucagon (human recombinant)  •  HYDROcodone-acetaminophen  •  magnesium sulfate **OR** magnesium sulfate **OR** magnesium sulfate  •  melatonin  •  Morphine **AND** naloxone  •  ondansetron **OR** ondansetron  •  potassium chloride **OR** potassium chloride **OR** potassium chloride  •  sodium chloride    Assessment & Plan   Assessment & Plan     Active Hospital Problems    Diagnosis  POA   • Cellulitis of right foot [L03.115]  Yes   • 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        Brief Hospital Course to date:  Jesse Siu is a 87 y.o. male with PMHX significant for DMII, HTN who presents with worsening diabetic foot ulcer with cellulitis and gangrene,    Gas gangrene of right foot  DM foot ulcer w/ cellulitis  --Cultures with Citrobacter koseri  --Dr. Angela following, s/p amputation of R great toe and R second toe with extensive soft tissue debridement 5/12  --ID following, currently on Zosyn  --PT wound care to place wound vac  --Pain meds, antiemetics.  --NWB RLE     Renal insufficiency  --Mild. Due to above. Improved with IVF     DM2 w/  hyperglycemia  --uncontrolled now that patient eating, start levemir 10units BID   --continue mod SSI   --diabetic diet  --check A1c     HTN  --Currently normotensive. Hold norvasc, resume as needed.    DVT prophylaxis:  Medical and mechanical DVT prophylaxis orders are present.       AM-PAC 6 Clicks Score (PT): 21 (05/13/22 6957)    Disposition: I expect the patient to be discharged TBD    CODE STATUS:   Code Status and Medical Interventions:   Ordered at: 05/10/22 1410     Code Status (Patient has no pulse and is not breathing):    CPR (Attempt to Resuscitate)     Medical Interventions (Patient has pulse or is breathing):    Full Support       Marily Capps, DO  05/13/22

## 2022-05-13 NOTE — PLAN OF CARE
Goal Outcome Evaluation:  Plan of Care Reviewed With: patient        Progress: improving  Outcome Evaluation: Alert, oriented x 4, and pleasant. VSS. Wound vac to right foot. No c/o pain. Voiding well per urinal. Will continue to monitor.

## 2022-05-13 NOTE — THERAPY EVALUATION
Patient Name: Jesse Siu  : 1935    MRN: [id number]                              Today's Date: 2022       Admit Date: 5/10/2022    Visit Dx:     ICD-10-CM ICD-9-CM   1. Cellulitis of right foot  L03.115 682.7   2. Foot ulceration, right, with necrosis of muscle (HCC)  L97.513 707.15     728.89   3. Leukocytosis, unspecified type  D72.829 288.60   4. Acute hematogenous osteomyelitis of right foot (HCC)  M86.071 730.07   5. Elevated serum creatinine  R79.89 790.99     Patient Active Problem List   Diagnosis   • Cellulitis of right foot   • Gas gangrene of foot (HCC)   • Type 2 diabetes mellitus with hyperglycemia (HCC)   • Renal insufficiency   • Foot ulceration, right, with necrosis of muscle (HCC)     Past Medical History:   Diagnosis Date   • Hyperlipidemia    • Hypertension    • Pre-diabetes      Past Surgical History:   Procedure Laterality Date   • AMPUTATION DIGIT Right 2022    Procedure: GREAT AND SECOND TOE AMPUTATION RIGHT;  Surgeon: Lemuel Angela MD;  Location: [facility];  Service: Vascular;  Laterality: Right;   • FOOT SURGERY Left     CANCER REMOVAL   • TONSILLECTOMY     • TURP / TRANSURETHRAL INCISION / DRAINAGE PROSTATE        General Information     Row Name 22 145          Physical Therapy Time and Intention    Document Type evaluation  mobility eval  -SUSANA     Mode of Treatment physical therapy  -SUSANA     Row Name 22 145          General Information    Patient Profile Reviewed yes  -SUSANA     Prior Level of Function independent:;bed mobility;ADL's;gait;transfer  -SUSANA     Existing Precautions/Restrictions fall;right;non-weight bearing  -SUSANA     Barriers to Rehab medically complex  -SUSANA     Row Name 22 1451          Living Environment    People in Home child(ashli), adult  -SUSANA     Row Name 22 145          Cognition    Orientation Status (Cognition) oriented x 4  -SUSANA     Row Name 22 145          Safety Issues, Functional Mobility    Safety Issues Affecting  Function (Mobility) safety precautions follow-through/compliance;insight into deficits/self-awareness;awareness of need for assistance;safety precaution awareness  -SUSANA     Impairments Affecting Function (Mobility) balance;endurance/activity tolerance;strength  -SUSANA     Comment, Safety Issues/Impairments (Mobility) patient has difficulty maintaining NWB status  -SUSANA           User Key  (r) = Recorded By, (t) = Taken By, (c) = Cosigned By    Initials Name Provider Type    SUSANA Saumya Huertas PT Physical Therapist               Mobility     Row Name 05/13/22 1452          Bed Mobility    Bed Mobility rolling left;rolling right;scooting/bridging;supine-sit  -SUSANA     Rolling Left [place] (Bed Mobility) modified independence  -SUSANA     Rolling Right [place] (Bed Mobility) modified independence  -SUSANA     Scooting/Bridging [place] (Bed Mobility) modified independence  -SUSANA     Supine-Sit [place] (Bed Mobility) modified independence  -SUSANA     Assistive Device (Bed Mobility) bed rails;head of bed elevated  -SUSANA     Comment, (Bed Mobility) patient takes increased time and effort  -     Row Name 05/13/22 1452          Transfers    Comment, (Transfers) patient transfers on and off the commode. patient has difficulty maintaining NWB on the right foot he requires lots of cues. patient worked both with knee walker and rolling walker  -     Row Name 05/13/22 1452          Bed-Chair Transfer    Bed-Chair [place] (Transfers) minimum assist (75% patient effort)  -     Assistive Device (Bed-Chair Transfers) walker, knee scooter;walker, front-wheeled  -     Row Name 05/13/22 1452          Sit-Stand Transfer    Sit-Stand [place] (Transfers) minimum assist (75% patient effort)  -     Assistive Device (Sit-Stand Transfers) walker, front-wheeled;walker, knee scooter  -     Row Name 05/13/22 1452          Gait/Stairs (Locomotion)    [place] Level (Gait) minimum assist (75% patient effort);2 person  assist  -SUSANA     Assistive Device (Gait) walker, front-wheeled;walker, knee scooter  -SUSANA     Distance in Feet (Gait) 60  -SUSANA     Bilateral Gait Deviations forward flexed posture  -SUSANA     Comment, (Gait/Stairs) 1st attempted knee scooter. patient went 50 ft with min assist to assist with turning patient had fairly good balance but fatigued easily. 2nd tried rolling walker patient was only able to take a few hops maintaining NWB status patient went 10 ft, chair brought up behind.  -SUSANA           User Key  (r) = Recorded By, (t) = Taken By, (c) = Cosigned By    Initials Name Provider Type    Saumya Jasmine PT Physical Therapist               Obj/Interventions     Row Name 05/13/22 1456          Range of Motion Comprehensive    General Range of Motion no range of motion deficits identified  -SUSANA     Row Name 05/13/22 1453          Strength Comprehensive (MMT)    Comment, General Manual Muscle Testing (MMT) Assessment generalized weakness 4/5 patient fatigues easily with NWB status  -SUSANA     Row Name 05/13/22 1456          Balance    Balance Assessment sitting static balance;sitting dynamic balance;standing static balance;standing dynamic balance  -SUSANA     Static Sitting Balance independent  -SUASNA     Dynamic Sitting Balance independent  -SUSANA     Position, Sitting Balance unsupported;sitting edge of bed  -SUSANA     Static Standing Balance minimal assist  -SUSANA     Dynamic Standing Balance minimal assist  -SUSANA     Position/Device Used, Standing Balance supported;walker, front-wheeled;other (see comments)  knee walker  -SUSANA           User Key  (r) = Recorded By, (t) = Taken By, (c) = Cosigned By    Initials Name Provider Type    Saumya Jasmine PT Physical Therapist               Goals/Plan     Row Name 05/13/22 1245          Bed Mobility Goal 1 (PT)    Activity/Assistive Device (Bed Mobility Goal 1, PT) bed mobility activities, all  -SUSANA     [place] Level/Cues Needed (Bed Mobility Goal 1, PT) independent  -SUSANA     Time  Frame (Bed Mobility Goal 1, PT) long term goal (LTG);10 days  -SUSANA     Progress/Outcomes (Bed Mobility Goal 1, PT) goal ongoing  -SUSANA     Row Name 05/13/22 1503          Transfer Goal 1 (PT)    Activity/Assistive Device (Transfer Goal 1, PT) sit-to-stand/stand-to-sit  -SUSANA     [place] Level/Cues Needed (Transfer Goal 1, PT) independent  -SUSANA     Time Frame (Transfer Goal 1, PT) long term goal (LTG);10 days  -SUSANA     Strategies/Barriers (Transfers Goal 1, PT) maintain NWB status  -SUSANA     Progress/Outcome (Transfer Goal 1, PT) goal ongoing  -SUSANA     Row Name 05/13/22 1503          Gait Training Goal 1 (PT)    Activity/Assistive Device (Gait Training Goal 1, PT) gait (walking locomotion)  -SUSANA     [place] Level (Gait Training Goal 1, PT) contact guard required  -SUSANA     Distance (Gait Training Goal 1, PT) 150  -SUSANA     Time Frame (Gait Training Goal 1, PT) long term goal (LTG);10 days  -SUSANA     Strategies/Barriers (Gait Training Goal 1, PT) maintain NWB status  -SUSANA     Progress/Outcome (Gait Training Goal 1, PT) goal ongoing  -SUSANA     Row Name 05/13/22 1508          Therapy Assessment/Plan (PT)    Planned Therapy Interventions (PT) balance training;bed mobility training;gait training;strengthening;transfer training;home exercise program  -SUSANA           User Key  (r) = Recorded By, (t) = Taken By, (c) = Cosigned By    Initials Name Provider Type    Saumya Jasmine, PT Physical Therapist               Clinical Impression     Row Name 05/13/22 1450          Pain    Pretreatment Pain Rating 0/10 - no pain  -SUSANA     Posttreatment Pain Rating 0/10 - no pain  -SUSANA     Row Name 05/13/22 145          Plan of Care Review    Plan of Care Reviewed With patient  -SUSANA     Progress improving  -SUSANA     Outcome Evaluation PT eval for mobility was completed. patient presents S/P right great toe and 2nd and 3rd toe amputation. patient demonstrates impaired transfers and gait he has difficulty maintaining NWB status on the right during  transfers and when using a rolling walker. patient went 40 ft with knee scooter with min assist to help with turning and 10 ft with min assist using rolling walker he was only able to take a few steps maintaining NWB status. we will continue to work with both rolling walker and knee scooter to see what works best for patient anticipate patient to need IP rehab at D/C patient wants to go home. patient will also need rolling walker and W/C vs knee scooter pending progress with mobility.  -SUSANA     Row Name 05/13/22 1457          Therapy Assessment/Plan (PT)    Patient/Family Therapy Goals Statement (PT) go home  -SUSANA     Rehab Potential (PT) fair, will monitor progress closely  -SUSANA     Criteria for Skilled Interventions Met (PT) yes;skilled treatment is necessary  -SUSANA     Therapy Frequency (PT) daily  -SUSANA     Row Name 05/13/22 1457          Positioning and Restraints    Pre-Treatment Position in bed  -SUSANA     Post Treatment Position bathroom  -SUSANA     Bathroom notified nsg;sitting;call light within reach;encouraged to call for assist  -SUSANA           User Key  (r) = Recorded By, (t) = Taken By, (c) = Cosigned By    Initials Name Provider Type    SUSANA Saumya Huertas, PT Physical Therapist               Outcome Measures     Row Name 05/13/22 1504 05/13/22 0857       How much help from another person do you currently need...    Turning from your back to your side while in flat bed without using bedrails? 4  -SUSANA 4  -SC    Moving from lying on back to sitting on the side of a flat bed without bedrails? 4  -SUSANA 4  -SC    Moving to and from a bed to a chair (including a wheelchair)? 3  -SUSANA 3  -SC    Standing up from a chair using your arms (e.g., wheelchair, bedside chair)? 3  -SUSANA 4  -SC    Climbing 3-5 steps with a railing? 1  -SUSANA 3  -SC    To walk in hospital room? 3  -SUSANA 3  -SC    AM-PAC 6 Clicks Score (PT) 18  -SUSANA 21  -SC    Highest level of mobility 6 --> Walked 10 steps or more  -SUSANA 6 --> Walked 10 steps or more  -SC           User Key  (r) = Recorded By, (t) = Taken By, (c) = Cosigned By    Initials Name Provider Type    Saumya Jasmine, PT Physical Therapist    Romina Wolfe RN Registered Nurse                             Physical Therapy Education                 Title: PT OT SLP Therapies (In Progress)     Topic: Physical Therapy (In Progress)     Point: Mobility training (In Progress)     Learning Progress Summary           Patient Acceptance, E, NR by  at 5/13/2022 1330    Comment: patient needs continued education on NWB status                   Point: Home exercise program (In Progress)     Learning Progress Summary           Patient Acceptance, E, NR by SUSANA at 5/13/2022 1330    Comment: patient needs continued education on NWB status                   Point: Body mechanics (In Progress)     Learning Progress Summary           Patient Acceptance, E, NR by  at 5/13/2022 1330    Comment: patient needs continued education on NWB status                   Point: Precautions (In Progress)     Learning Progress Summary           Patient Acceptance, E, NR by SUSANA at 5/13/2022 1330    Comment: patient needs continued education on NWB status                               User Key     Initials Effective Dates Name Provider Type Discipline    SUSANA 06/16/21 -  Saumya Huertas PT Physical Therapist PT              PT Recommendation and Plan  Planned Therapy Interventions (PT): balance training, bed mobility training, gait training, strengthening, transfer training, home exercise program  Plan of Care Reviewed With: patient  Progress: improving  Outcome Evaluation: PT eval for mobility was completed. patient presents S/P right great toe and 2nd and 3rd toe amputation. patient demonstrates impaired transfers and gait he has difficulty maintaining NWB status on the right during transfers and when using a rolling walker. patient went 40 ft with knee scooter with min assist to help with turning and 10 ft with min assist using  rolling walker he was only able to take a few steps maintaining NWB status. we will continue to work with both rolling walker and knee scooter to see what works best for patient anticipate patient to need IP rehab at D/C patient wants to go home. patient will also need rolling walker and W/C vs knee scooter pending progress with mobility.     Time Calculation:    PT Charges     Row Name 05/13/22 1506 05/13/22 0930          Time Calculation    Start Time 1330  -SUSANA 0930  -MF     PT Received On 05/13/22  -SUSANA --     PT Goal Re-Cert Due Date 05/23/22  -SUSANA 05/23/22  -            Time Calculation- PT    Total Timed Code Minutes- PT 48 minute(s)  -SUSANA --            Timed Charges    61439 - Gait Training Minutes  30  -SUSANA --     66302 - PT Therapeutic Activity Minutes 18  -SUSANA --            Untimed Charges    PT Eval/Re-eval Minutes -- 50  -MF     Wound Care -- 56493 Neg Press (DME) wound TO 50 sqcm  -MF     84050-Zjp Pressure wound to 50 sqcm -- 25  -MF            Total Minutes    Timed Charges Total Minutes 48  -SUSANA --     Untimed Charges Total Minutes -- 75  -MF      Total Minutes 48  -SUSANA 75  -MF           User Key  (r) = Recorded By, (t) = Taken By, (c) = Cosigned By    Initials Name Provider Type    Saumya Jasmine, PT Physical Therapist    MF Wesley Morgan, PT Physical Therapist              Therapy Charges for Today     Code Description Service Date Service Provider Modifiers Qty    50032026628 HC GAIT TRAINING EA 15 MIN 5/13/2022 Saumya Huertas, PT GP 2    90513237671 HC PT THERAPEUTIC ACT EA 15 MIN 5/13/2022 Saumya Huertas, PT GP 1    85608776905 HC PT THER SUPP EA 15 MIN 5/13/2022 Saumya Huertas, PT GP 3          PT G-Codes  AM-PAC 6 Clicks Score (PT): 18    Saumya Huertas, PT  5/13/2022

## 2022-05-14 LAB
ANION GAP SERPL CALCULATED.3IONS-SCNC: 7 MMOL/L (ref 5–15)
BACTERIA SPEC AEROBE CULT: ABNORMAL
BACTERIA SPEC AEROBE CULT: ABNORMAL
BASOPHILS # BLD AUTO: 0.06 10*3/MM3 (ref 0–0.2)
BASOPHILS NFR BLD AUTO: 0.5 % (ref 0–1.5)
BUN SERPL-MCNC: 22 MG/DL (ref 8–23)
BUN/CREAT SERPL: 18.8 (ref 7–25)
CALCIUM SPEC-SCNC: 8.6 MG/DL (ref 8.6–10.5)
CHLORIDE SERPL-SCNC: 105 MMOL/L (ref 98–107)
CO2 SERPL-SCNC: 29 MMOL/L (ref 22–29)
CREAT SERPL-MCNC: 1.17 MG/DL (ref 0.76–1.27)
DEPRECATED RDW RBC AUTO: 44.3 FL (ref 37–54)
EGFRCR SERPLBLD CKD-EPI 2021: 60.3 ML/MIN/1.73
EOSINOPHIL # BLD AUTO: 0.31 10*3/MM3 (ref 0–0.4)
EOSINOPHIL NFR BLD AUTO: 2.5 % (ref 0.3–6.2)
ERYTHROCYTE [DISTWIDTH] IN BLOOD BY AUTOMATED COUNT: 13.6 % (ref 12.3–15.4)
GLUCOSE BLDC GLUCOMTR-MCNC: 154 MG/DL (ref 70–130)
GLUCOSE BLDC GLUCOMTR-MCNC: 186 MG/DL (ref 70–130)
GLUCOSE BLDC GLUCOMTR-MCNC: 199 MG/DL (ref 70–130)
GLUCOSE BLDC GLUCOMTR-MCNC: 91 MG/DL (ref 70–130)
GLUCOSE SERPL-MCNC: 166 MG/DL (ref 65–99)
GRAM STN SPEC: ABNORMAL
HCT VFR BLD AUTO: 29.1 % (ref 37.5–51)
HGB BLD-MCNC: 9.5 G/DL (ref 13–17.7)
IMM GRANULOCYTES # BLD AUTO: 0.14 10*3/MM3 (ref 0–0.05)
IMM GRANULOCYTES NFR BLD AUTO: 1.1 % (ref 0–0.5)
LYMPHOCYTES # BLD AUTO: 1.43 10*3/MM3 (ref 0.7–3.1)
LYMPHOCYTES NFR BLD AUTO: 11.6 % (ref 19.6–45.3)
MCH RBC QN AUTO: 28.9 PG (ref 26.6–33)
MCHC RBC AUTO-ENTMCNC: 32.6 G/DL (ref 31.5–35.7)
MCV RBC AUTO: 88.4 FL (ref 79–97)
MONOCYTES # BLD AUTO: 0.58 10*3/MM3 (ref 0.1–0.9)
MONOCYTES NFR BLD AUTO: 4.7 % (ref 5–12)
NEUTROPHILS NFR BLD AUTO: 79.6 % (ref 42.7–76)
NEUTROPHILS NFR BLD AUTO: 9.84 10*3/MM3 (ref 1.7–7)
NRBC BLD AUTO-RTO: 0 /100 WBC (ref 0–0.2)
PLATELET # BLD AUTO: 472 10*3/MM3 (ref 140–450)
PMV BLD AUTO: 9.6 FL (ref 6–12)
POTASSIUM SERPL-SCNC: 4.3 MMOL/L (ref 3.5–5.2)
RBC # BLD AUTO: 3.29 10*6/MM3 (ref 4.14–5.8)
SODIUM SERPL-SCNC: 141 MMOL/L (ref 136–145)
WBC NRBC COR # BLD: 12.36 10*3/MM3 (ref 3.4–10.8)

## 2022-05-14 PROCEDURE — 63710000001 INSULIN DETEMIR PER 5 UNITS: Performed by: INTERNAL MEDICINE

## 2022-05-14 PROCEDURE — 97605 NEG PRS WND THER DME<=50SQCM: CPT

## 2022-05-14 PROCEDURE — 63710000001 INSULIN LISPRO (HUMAN) PER 5 UNITS: Performed by: STUDENT IN AN ORGANIZED HEALTH CARE EDUCATION/TRAINING PROGRAM

## 2022-05-14 PROCEDURE — 99024 POSTOP FOLLOW-UP VISIT: CPT | Performed by: THORACIC SURGERY (CARDIOTHORACIC VASCULAR SURGERY)

## 2022-05-14 PROCEDURE — 25010000002 PIPERACILLIN SOD-TAZOBACTAM PER 1 G: Performed by: STUDENT IN AN ORGANIZED HEALTH CARE EDUCATION/TRAINING PROGRAM

## 2022-05-14 PROCEDURE — 85025 COMPLETE CBC W/AUTO DIFF WBC: CPT | Performed by: INTERNAL MEDICINE

## 2022-05-14 PROCEDURE — 80048 BASIC METABOLIC PNL TOTAL CA: CPT | Performed by: INTERNAL MEDICINE

## 2022-05-14 PROCEDURE — 99232 SBSQ HOSP IP/OBS MODERATE 35: CPT | Performed by: INTERNAL MEDICINE

## 2022-05-14 PROCEDURE — 25010000002 HEPARIN (PORCINE) PER 1000 UNITS: Performed by: STUDENT IN AN ORGANIZED HEALTH CARE EDUCATION/TRAINING PROGRAM

## 2022-05-14 PROCEDURE — 82962 GLUCOSE BLOOD TEST: CPT

## 2022-05-14 RX ORDER — AMLODIPINE BESYLATE 10 MG/1
10 TABLET ORAL
Status: DISCONTINUED | OUTPATIENT
Start: 2022-05-14 | End: 2022-05-18 | Stop reason: HOSPADM

## 2022-05-14 RX ADMIN — Medication 10 ML: at 08:45

## 2022-05-14 RX ADMIN — SENNOSIDES AND DOCUSATE SODIUM 2 TABLET: 50; 8.6 TABLET ORAL at 20:58

## 2022-05-14 RX ADMIN — INSULIN LISPRO 2 UNITS: 100 INJECTION, SOLUTION INTRAVENOUS; SUBCUTANEOUS at 17:13

## 2022-05-14 RX ADMIN — AMLODIPINE BESYLATE 10 MG: 10 TABLET ORAL at 18:36

## 2022-05-14 RX ADMIN — INSULIN DETEMIR 10 UNITS: 100 INJECTION, SOLUTION SUBCUTANEOUS at 08:47

## 2022-05-14 RX ADMIN — INSULIN DETEMIR 5 UNITS: 100 INJECTION, SOLUTION SUBCUTANEOUS at 21:01

## 2022-05-14 RX ADMIN — TAZOBACTAM SODIUM AND PIPERACILLIN SODIUM 3.38 G: 375; 3 INJECTION, SOLUTION INTRAVENOUS at 08:47

## 2022-05-14 RX ADMIN — HEPARIN SODIUM 5000 UNITS: 5000 INJECTION, SOLUTION INTRAVENOUS; SUBCUTANEOUS at 20:58

## 2022-05-14 RX ADMIN — ACETAMINOPHEN 650 MG: 325 TABLET ORAL at 06:28

## 2022-05-14 RX ADMIN — PRAVASTATIN SODIUM 20 MG: 20 TABLET ORAL at 20:58

## 2022-05-14 RX ADMIN — TAZOBACTAM SODIUM AND PIPERACILLIN SODIUM 3.38 G: 375; 3 INJECTION, SOLUTION INTRAVENOUS at 00:20

## 2022-05-14 RX ADMIN — HEPARIN SODIUM 5000 UNITS: 5000 INJECTION, SOLUTION INTRAVENOUS; SUBCUTANEOUS at 08:48

## 2022-05-14 RX ADMIN — ACETAMINOPHEN 650 MG: 325 TABLET ORAL at 20:58

## 2022-05-14 RX ADMIN — Medication 10 ML: at 20:59

## 2022-05-14 RX ADMIN — TAZOBACTAM SODIUM AND PIPERACILLIN SODIUM 3.38 G: 375; 3 INJECTION, SOLUTION INTRAVENOUS at 17:13

## 2022-05-14 RX ADMIN — TAZOBACTAM SODIUM AND PIPERACILLIN SODIUM 3.38 G: 375; 3 INJECTION, SOLUTION INTRAVENOUS at 23:29

## 2022-05-14 NOTE — PROGRESS NOTES
INFECTIOUS DISEASE Progress Note    Jesse Siu  1935  [id number]    Admission Date: 5/10/2022      Requesting Provider: Sil Navarro II, DO  Evaluating Physician: JOHN Person    Reason for Consultation: Right diabetic foot infection/gangrene    History of present illness:    5/11/22: Patient is a 87 y.o. male who is seen today for evaluation of progressively worsening right first and second toe wound infections with gangrene. He initially developed right second toe ulcerations proximally one month ago.  He has been seeing a podiatrist and was started on oral antibiotic.  He has noted substantial worsening of his foot over the last week.  He was seen by his podiatrist on 5/9 who noted gangrene with a foul odor and recommended that he come to the emergency room.  He was found to have right first and second toe gangrene.  He has been seen by Dr. Angela and is currently scheduled for amputation tomorrow.     5/12/22:  He has been afebrile overnight.His white blood cell count today is 14.  His right foot wound cultures growing light gram-negative bacilli in his Gram stain revealed many gram-positive cocci and moderate gram-negative bacilli. Blood cultures from 5/10 are no growth so far. Duplex lower extremity arterial assessment revealed a 50-75% stenosis in the Right anterior tibial artery, short segment occlusion in the right mid posterior tibial artery, and an HENRIQUE reduced at 0.78.X-rays of his right foot revealed soft tissue gas. Creatinine today is 1.23. He denies increased right foot pain.  He does have a history of peripheral neuropathy.  At the time of surgery today he was found to have extensive necrosis with with necrotizing fasciitis extending to the base of the first, second, and third metatarsals.    5/13/22:  He remains afebrile. Right foot cultures from 5/10 grew Citrobacter which was sensitive to tested antibiotics.  Right foot tissue culture from 5/12 is pending but the Gram stain  revealed many white blood cells, many gram-positive cocci in pairs and clusters, and moderate gram-positive bacilli. Creatinine today is 1.29.  White blood cell count is 11.2.  He denies increased foot pain.  He denies nausea, vomiting, and diarrhea.    5/14/22:   Wound vac in place, photo of 5/10 noted.  He remains afebrile. Wound culture of 5/12 also with Citrobacter. He denies any fever, chills, n/v/d.     Past Medical History:   Diagnosis Date   • Hyperlipidemia    • Hypertension    • Pre-diabetes        Past Surgical History:   Procedure Laterality Date   • AMPUTATION DIGIT Right 5/12/2022    Procedure: GREAT AND SECOND TOE AMPUTATION RIGHT;  Surgeon: Lemuel Angela MD;  Location: [facility];  Service: Vascular;  Laterality: Right;   • FOOT SURGERY Left     CANCER REMOVAL   • TONSILLECTOMY     • TURP / TRANSURETHRAL INCISION / DRAINAGE PROSTATE         History reviewed. No pertinent family history.    Social History     Socioeconomic History   • Marital status:    Tobacco Use   • Smoking status: Never Smoker   • Smokeless tobacco: Never Used   Substance and Sexual Activity   • Alcohol use: Never   • Drug use: Never   • Sexual activity: Never       No Known Allergies      Medication:    Current Facility-Administered Medications:   •  acetaminophen (TYLENOL) tablet 650 mg, 650 mg, Oral, Q4H PRN, 650 mg at 05/14/22 0628 **OR** acetaminophen (TYLENOL) 160 MG/5ML solution 650 mg, 650 mg, Oral, Q4H PRN **OR** acetaminophen (TYLENOL) suppository 650 mg, 650 mg, Rectal, Q4H PRN, Hiral Borges PA-C  •  sennosides-docusate (PERICOLACE) 8.6-50 MG per tablet 2 tablet, 2 tablet, Oral, BID, 2 tablet at 05/13/22 2040 **AND** polyethylene glycol (MIRALAX) packet 17 g, 17 g, Oral, Daily PRN **AND** bisacodyl (DULCOLAX) EC tablet 5 mg, 5 mg, Oral, Daily PRN **AND** bisacodyl (DULCOLAX) suppository 10 mg, 10 mg, Rectal, Daily PRN, Hiral Borges PA-C  •  dextrose (D50W) (25 g/50 mL) IV injection 25 g, 25 g,  Intravenous, Q15 Min PRN, Hiral Borges PA-C  •  dextrose (GLUTOSE) oral gel 15 g, 15 g, Oral, Q15 Min PRN, Hiral Borges PA-C  •  docusate sodium (COLACE) capsule 100 mg, 100 mg, Oral, BID PRN, Hiral Borges PA-C  •  glucagon (human recombinant) (GLUCAGEN DIAGNOSTIC) injection 1 mg, 1 mg, Intramuscular, Q15 Min PRN, Hiral Borges PA-C  •  heparin (porcine) 5000 UNIT/ML injection 5,000 Units, 5,000 Units, Subcutaneous, Q12H, Hiral Borges PA-C, 5,000 Units at 05/13/22 2040  •  HYDROcodone-acetaminophen (NORCO) 5-325 MG per tablet 1 tablet, 1 tablet, Oral, Q4H PRN, Hiral Borges PA-C  •  insulin detemir (LEVEMIR) injection 10 Units, 10 Units, Subcutaneous, Q12H, Marily Capps, , 10 Units at 05/13/22 2047  •  Insulin Lispro (humaLOG) injection 0-9 Units, 0-9 Units, Subcutaneous, TID AC, Hrial Borges PA-C, 7 Units at 05/13/22 1135  •  lactated ringers infusion, 9 mL/hr, Intravenous, Continuous, Hiral Borges PA-C, Last Rate: 9 mL/hr at 05/12/22 1441, New Bag at 05/12/22 1528  •  Magnesium Sulfate 2 gram Bolus, followed by 8 gram infusion (total Mg dose 10 grams)- Mg less than or equal to 1mg/dL, 2 g, Intravenous, PRN **OR** Magnesium Sulfate 2 gram / 50mL Infusion (GIVE X 3 BAGS TO EQUAL 6GM TOTAL DOSE) - Mg 1.1 - 1.5 mg/dl, 2 g, Intravenous, PRN **OR** Magnesium Sulfate 4 gram infusion- Mg 1.6-1.9 mg/dL, 4 g, Intravenous, PRN, Hiral Borges PA-C  •  melatonin tablet 5 mg, 5 mg, Oral, Nightly PRN, Hiral Borges PA-C  •  morphine injection 2 mg, 2 mg, Intravenous, Q2H PRN **AND** naloxone (NARCAN) injection 0.4 mg, 0.4 mg, Intravenous, Q5 Min PRN, Hiral Borges PA-C  •  ondansetron (ZOFRAN) tablet 4 mg, 4 mg, Oral, Q6H PRN **OR** ondansetron (ZOFRAN) injection 4 mg, 4 mg, Intravenous, Q6H PRN, Hiral Borges PA-C  •  piperacillin-tazobactam (ZOSYN) 3.375 g in iso-osmotic dextrose 50 ml (premix), 3.375 g, Intravenous, Q8H, Orange, Hiral,  PA-C, 3.375 g at 05/14/22 0020  •  polyethylene glycol (MIRALAX) packet 17 g, 17 g, Oral, Daily, Hiral BorgesREYNA, 17 g at 05/13/22 0857  •  potassium chloride (MICRO-K) CR capsule 40 mEq, 40 mEq, Oral, PRN **OR** potassium chloride (KLOR-CON) packet 40 mEq, 40 mEq, Oral, PRN **OR** potassium chloride 10 mEq in 100 mL IVPB, 10 mEq, Intravenous, Q1H PRN, JonyHiral PA-C  •  pravastatin (PRAVACHOL) tablet 20 mg, 20 mg, Oral, Nightly, OrangeHiral PA-C, 20 mg at 05/13/22 2039  •  sodium chloride 0.9 % flush 10 mL, 10 mL, Intravenous, Q12H, Hiral Borges PA-C, 10 mL at 05/13/22 2041  •  sodium chloride 0.9 % flush 10 mL, 10 mL, Intravenous, PRN, JonyHiral PA-C    Antibiotics:  Anti-Infectives (From admission, onward)    Ordered     Dose/Rate Route Frequency Start Stop    05/11/22 0718  vancomycin 1250 mg/250 mL 0.9% NS IVPB ([facility])        Ordering Provider: Cici Shoemaker PharmD    1,250 mg Intravenous Once 05/11/22 1500 05/11/22 1659    05/10/22 1608  piperacillin-tazobactam (ZOSYN) 3.375 g in iso-osmotic dextrose 50 ml (premix)        Ordering Provider: Hiral Borges PA-C    3.375 g  over 4 Hours Intravenous Every 8 Hours 05/11/22 0000 05/17/22 2359    05/10/22 1608  piperacillin-tazobactam (ZOSYN) 3.375 g in iso-osmotic dextrose 50 ml (premix)        Ordering Provider: Sil Navarro II, DO    3.375 g  over 30 Minutes Intravenous Once 05/10/22 1700 05/10/22 1659    05/10/22 1416  meropenem (MERREM) 1 g/100 mL 0.9% NS ([facility])        Ordering Provider: Ole Hadley MD    1 g  over 30 Minutes Intravenous Once 05/10/22 1418 05/10/22 1521    05/10/22 1309  cefTRIAXone (ROCEPHIN) 1 g/100 mL 0.9% NS ([facility])        Ordering Provider: Ole Hadley MD    1 g  over 30 Minutes Intravenous Once 05/10/22 1311 05/10/22 1430    05/10/22 1307  vancomycin 1500 mg/500 mL 0.9% NS IVPB ([facility])        Ordering Provider: Ole Hadley MD    20 mg/kg × 70.3 kg Intravenous Once 05/10/22  1309 05/10/22 1541            Review of Systems:  See HPI      Physical Exam:   Vital Signs  Temp (24hrs), Av.8 °F (36.6 °C), Min:97.6 °F (36.4 °C), Max:97.9 °F (36.6 °C)    Temp  Min: 97.6 °F (36.4 °C)  Max: 97.9 °F (36.6 °C)  BP  Min: 131/65  Max: 178/98  Pulse  Min: 66  Max: 89  Resp  Min: 16  Max: 16  SpO2  Min: 93 %  Max: 98 %    GENERAL: Awake and alert, in no acute distress.   HEENT: Normocephalic, atraumatic.  PERRL. EOMI. No conjunctival injection. No icterus. Oropharynx clear without evidence of thrush or exudate.   NECK: Supple   HEART: RRR; No murmur, rubs, gallops.   LUNGS: Clear to auscultation bilaterally without wheezing, rales, rhonchi. Normal respiratory effort  ABDOMEN: Soft, nontender, nondistended. No rebound or guarding.   EXT: A wound VAC is present on his right foot.  :  Without Hernandez catheter.  SKIN: Warm and dry without cutaneous eruptions on Inspection/palpation.    NEURO: Oriented to PPT. Motor 5/5 strength bilaterally.  He has decreased sensation to light touch in both distal lower extremities.  PSYCHIATRIC: Normal insight and judgement. Cooperative with PE    Laboratory Data    Results from last 7 days   Lab Units 22  0626 22  0503 22  0429   WBC 10*3/mm3 11.21* 13.95* 13.68*   HEMOGLOBIN g/dL 9.2* 9.7* 10.0*   HEMATOCRIT % 28.7* 29.9* 31.4*   PLATELETS 10*3/mm3 499* 499* 510*     Results from last 7 days   Lab Units 22  0626   SODIUM mmol/L 136   POTASSIUM mmol/L 4.2   CHLORIDE mmol/L 100   CO2 mmol/L 25.0   BUN mg/dL 22   CREATININE mg/dL 1.29*   GLUCOSE mg/dL 397*   CALCIUM mg/dL 8.3*     Results from last 7 days   Lab Units 05/10/22  1307   ALK PHOS U/L 119*   BILIRUBIN mg/dL 0.3   ALT (SGPT) U/L 16   AST (SGOT) U/L 16     Results from last 7 days   Lab Units 05/10/22  1307   SED RATE mm/hr 62*     Results from last 7 days   Lab Units 05/10/22  1307   CRP mg/dL 12.33*     Results from last 7 days   Lab Units 05/10/22  1307   LACTATE mmol/L 2.0          Results from last 7 days   Lab Units 05/11/22  0429   VANCOMYCIN RM mcg/mL 15.80     Estimated Creatinine Clearance: 40.1 mL/min (A) (by C-G formula based on SCr of 1.29 mg/dL (H)).      Microbiology:  No results found for: ACANTHNAEG, AFBCX, BPERTUSSISCX, BLOODCX  No results found for: BCIDPCR, CXREFLEX, CSFCX, CULTURETIS  No results found for: CULTURES, HSVCX, URCX  No results found for: EYECULTURE, GCCX, HSVCULTURE, LABHSV  No results found for: LEGIONELLA, MRSACX, MUMPSCX, MYCOPLASCX  No results found for: NOCARDIACX, STOOLCX  No results found for: THROATCX, UNSTIMCULT, URINECX, CULTURE, VZVCULTUR  No results found for: VIRALCULTU, WOUNDCX        Radiology:  Imaging Results (Last 72 Hours)     ** No results found for the last 72 hours. **            Impression:   1.  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 His foot wound cultures are growing Citrobacter sensitive to Zosyn.  2.  Type 2 diabetes mellitus  3.  Acute kidney injury-Improved  4.  Peripheral vascular disease    PLAN/RECOMMENDATIONS:   1.  Right first, second, and third metatarsal amputations-performed  2.  Continue intravenous Zosyn   3.  Right foot wound VAC    He will likely require a prolonged course of intravenous antibiotic therapy and prolonged wound care.  He remains at risk for loss of his foot and may require a right BKA.         Larissa Ruff, JOHN  5/14/2022  08:04 EDT

## 2022-05-14 NOTE — PROGRESS NOTES
Cardiothoracic Surgery Progress Note      POD #: 2-transmetatarsal amputation right great toe second toe and third toe     LOS: 4 days      Subjective: Alert and awake.    Objective:  Vital Signs vital signs below noted T-max past 24 hours 97.9 °F  Temp:  [97.4 °F (36.3 °C)-97.9 °F (36.6 °C)] 97.6 °F (36.4 °C)  Heart Rate:  [62-89] 78  Resp:  [16] 16  BP: (131-178)/(65-98) 131/65    Physical Exam:   General Appearance: Oriented x3   Lungs:   Heart:   Skin:   Incision: Amputation site has wound VAC in place and dry dressing.     Results:  Results from last 7 days   Lab Units 05/13/22  0626   WBC 10*3/mm3 11.21*   HEMOGLOBIN g/dL 9.2*   HEMATOCRIT % 28.7*   PLATELETS 10*3/mm3 499*     Results from last 7 days   Lab Units 05/13/22  0626   SODIUM mmol/L 136   POTASSIUM mmol/L 4.2   CHLORIDE mmol/L 100   CO2 mmol/L 25.0   BUN mg/dL 22   CREATININE mg/dL 1.29*   GLUCOSE mg/dL 397*   CALCIUM mg/dL 8.3*         Assessment: #1.  Postop day 2 right great toe second toe and third toe total metatarsal amputation and extensive soft tissue debridement wound VAC now in place  2.  Mild renal insufficiency  3 prediabetes.      Plan: Overall medical manage per hospitalist.  Antibiotics per infectious disease.  Wound VAC management per PT wound care.      Lemuel Angela MD - 05/14/22 - 06:40 EDT

## 2022-05-14 NOTE — PLAN OF CARE
Goal Outcome Evaluation:  Plan of Care Reviewed With: patient        Progress: improving   Pt is alert and oriented x4. Vss on room air. Sinus Arrhythmia on tele. Pt has slept off and on throughout the shift. No c/o of pain. Walking boot and wound vac to right foot. Up with an assist x1 and walker. Pt repositions independently in bed. Waffle mattress in place. Urinal provided and voiding spontaneously. Large BM this shift. Will continue to monitor.

## 2022-05-14 NOTE — PLAN OF CARE
Goal Outcome Evaluation:  Plan of Care Reviewed With: patient        Progress: no change  Outcome Evaluation: Wound vac intact with no complaints or changes from the pt. Anticipate next change in 1-2 days.

## 2022-05-14 NOTE — PROGRESS NOTES
[facility] Medicine Services  PROGRESS NOTE    Patient Name: Jesse Siu  : 1935  MRN: [id number]    Date of Admission: 5/10/2022  Primary Care Physician: IRVIN Martinez MD    Subjective   Subjective     CC:  Cellulitis of left foot    HPI:  Patient is doing well this morning. Pain is controlled with tylenol.     ROS:  General: denies fevers or chills  CV: denies chest pain  Resp: denies shortness of breath  Abd: denies abd pain, nausea      Objective   Objective     Vital Signs:   Temp:  [97.6 °F (36.4 °C)-97.9 °F (36.6 °C)] 97.6 °F (36.4 °C)  Heart Rate:  [66-89] 72  Resp:  [16] 16  BP: (131-178)/(65-98) 153/95     Physical Exam:  Constitutional: No acute distress, awake, alert  Respiratory: Clear to auscultation bilaterally, respiratory effort normal   Cardiovascular: RRR, no murmurs, rubs, or gallops  Gastrointestinal: Positive bowel sounds, soft, nontender, nondistended  Musculoskeletal: right foot in dressings  Psychiatric: Appropriate affect, cooperative  Neurologic: Oriented x 3, no focal deficits      Results Reviewed:  LAB RESULTS:      Lab 22  0626 22  0503 22  0429 05/10/22  1307   WBC 11.21* 13.95* 13.68* 16.49*   HEMOGLOBIN 9.2* 9.7* 10.0* 10.0*   HEMATOCRIT 28.7* 29.9* 31.4* 31.3*   PLATELETS 499* 499* 510* 512*   NEUTROS ABS 9.93* 11.19*  --  14.67*   IMMATURE GRANS (ABS) 0.08* 0.10*  --  0.09*   LYMPHS ABS 0.77 1.40  --  0.81   MONOS ABS 0.41 0.79  --  0.87   EOS ABS 0.00 0.40  --  0.01   MCV 86.2 86.4 87.5 89.9   SED RATE  --   --   --  62*   CRP  --   --   --  12.33*   LACTATE  --   --   --  2.0         Lab 22  0626 22  1028 22  0503 22  0429 05/10/22  1307   SODIUM 136  --  137 139 136   POTASSIUM 4.2 4.7 4.2 4.9 4.5   CHLORIDE 100  --  99 102 98   CO2 25.0  --  26.0 28.0 24.0   ANION GAP 11.0  --  12.0 9.0 14.0   BUN 22  --  22 21 29*   CREATININE 1.29*  --  1.23 1.20 1.39*   EGFR 53.7*  --  56.8* 58.5* 49.1*    GLUCOSE 397*  --  199* 194* 286*   CALCIUM 8.3*  --  8.7 9.0 9.3   HEMOGLOBIN A1C 8.90*  --   --   --   --          Lab 05/10/22  1307   TOTAL PROTEIN 7.2   ALBUMIN 3.00*   GLOBULIN 4.2   ALT (SGPT) 16   AST (SGOT) 16   BILIRUBIN 0.3   ALK PHOS 119*                 Lab 05/11/22  0920   ABO TYPING O   RH TYPING Negative   ANTIBODY SCREEN Negative         Brief Urine Lab Results     None          Microbiology Results Abnormal     Procedure Component Value - Date/Time    Blood Culture - Blood, Arm, Right [[id number]]  (Normal) Collected: 05/10/22 1320    Lab Status: Preliminary result Specimen: Blood from Arm, Right Updated: 05/13/22 1403     Blood Culture No growth at 3 days    Blood Culture - Blood, Blood, Central Line [[id number]]  (Normal) Collected: 05/10/22 1300    Lab Status: Preliminary result Specimen: Blood, Central Line Updated: 05/13/22 1403     Blood Culture No growth at 3 days    AFB Culture - Tissue, Foot, Right [[id number]] Collected: 05/12/22 1618    Lab Status: Preliminary result Specimen: Tissue from Foot, Right Updated: 05/13/22 1212     AFB Stain No acid fast bacilli seen on concentrated smear    COVID PRE-OP / PRE-PROCEDURE SCREENING ORDER (NO ISOLATION) - Swab, Nasopharynx [[id number]]  (Normal) Collected: 05/10/22 1454    Lab Status: Final result Specimen: Swab from Nasopharynx Updated: 05/10/22 1532    Narrative:      The following orders were created for panel order COVID PRE-OP / PRE-PROCEDURE SCREENING ORDER (NO ISOLATION) - Swab, Nasopharynx.  Procedure                               Abnormality         Status                     ---------                               -----------         ------                     COVID-19, ABBOTT IN-HOUS...[[id number]]  Normal              Final result                 Please view results for these tests on the individual orders.    COVID-19, ABBOTT IN-HOUSE,NASAL Swab (NO TRANSPORT MEDIA) 2 HR TAT - Swab, Nasopharynx [[id number]]  (Normal) Collected: 05/10/22  1454    Lab Status: Final result Specimen: Swab from Nasopharynx Updated: 05/10/22 1532     COVID19 Presumptive Negative    Narrative:      Fact sheet for providers: https://www.fda.gov/media/749188/download     Fact sheet for patients: https://www.fda.gov/media/871597/download    Test performed by PCR.  If inconsistent with clinical signs and symptoms patient should be tested with different authorized molecular test.          Peripheral Block    Result Date: 5/12/2022  Higinio Burris CRNA     5/12/2022  3:04 PM Peripheral Block Patient reassessed immediately prior to procedure Patient location during procedure: pre-op Reason for block: at surgeon's request and post-op pain management Performed by Anesthesiologist: Timothy Zaragoza Jr., MD Preanesthetic Checklist Completed: patient identified, IV checked, site marked, risks and benefits discussed, surgical consent, monitors and equipment checked, pre-op evaluation and timeout performed Prep: Sterile barriers:cap, gloves, mask, sterile barriers and washed/disinfected hands Prep: ChloraPrep Patient monitoring: blood pressure monitoring, continuous pulse oximetry and EKG Procedure Sedation: yes Performed under: general Guidance:ultrasound guided Images:still images obtained, printed/placed on chart Laterality:right Block Type:popliteal Injection Technique:single-shot Needle Type:echogenic Needle Gauge:20 G Resistance on Injection: none Medications Used: dexamethasone sodium phosphate injection, 2 mg bupivacaine PF (MARCAINE) 0.25 % injection, 30 mL Med administered at 5/12/2022 2:55 PM Post Assessment Injection Assessment: negative aspiration for heme, no paresthesia on injection and incremental injection Patient Tolerance:comfortable throughout block Complications:no Additional Notes Procedure:                                                     The pt was placed in  lateral position.  The Insertion site was  prepped and Draped in sterile fashion.  The pt was  anesthetized with  IV Sedation( see meds).  Skin and cutaneous tissue was infiltrated and anesthetized with 1% Lidocaine 3 mls via a 25g needle.  A BBraun 4 inch 18g echogenic needle was then  inserted approximately 3 cm proximal to the popliteal fossa at the lateral mid biceps femoris and advanced In-plane with Ultrasound guidance.  Normal Saline PSF was utilized for hydrodissection of tissue.  The popliteal artery was visualized and the common peroneal and tibial bifurcation was located.  LA injection spread was visualized, injection was incremental 1-5ml, injection pressure was normal or little, no intraneural injection, no vascular injection.  .  A BBraun 20g wire stylet catheter was placed via the needle with ultrasound visualization and confirmation with NS fluid bolus. The labeled catheter was then secured at insertion site with exofin tissue adhesive, benzoin, steristrips  and a CHG transparent dressing was placed over. Thank you           I have reviewed the medications:  Scheduled Meds:heparin (porcine), 5,000 Units, Subcutaneous, Q12H  insulin detemir, 10 Units, Subcutaneous, Q12H  insulin lispro, 0-9 Units, Subcutaneous, TID AC  piperacillin-tazobactam, 3.375 g, Intravenous, Q8H  polyethylene glycol, 17 g, Oral, Daily  pravastatin, 20 mg, Oral, Nightly  senna-docusate sodium, 2 tablet, Oral, BID  sodium chloride, 10 mL, Intravenous, Q12H      Continuous Infusions:lactated ringers, 9 mL/hr, Last Rate: 9 mL/hr (05/12/22 1441)      PRN Meds:.•  acetaminophen **OR** acetaminophen **OR** acetaminophen  •  senna-docusate sodium **AND** polyethylene glycol **AND** bisacodyl **AND** bisacodyl  •  dextrose  •  dextrose  •  docusate sodium  •  glucagon (human recombinant)  •  HYDROcodone-acetaminophen  •  magnesium sulfate **OR** magnesium sulfate **OR** magnesium sulfate  •  melatonin  •  Morphine **AND** naloxone  •  ondansetron **OR** ondansetron  •  potassium chloride **OR** potassium chloride **OR** potassium  chloride  •  sodium chloride    Assessment & Plan   Assessment & Plan     Active Hospital Problems    Diagnosis  POA   • Cellulitis of right foot [L03.115]  Yes   • 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        Brief Hospital Course to date:  Jesse Siu is a 87 y.o. 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 Angela following, s/p amputation of R great toe and R second toe with extensive soft tissue debridement 5/12  -Continue wound VAC  -Continue Zosyn per ID  -The following  --NWB RLE     Renal insufficiency  -Improved with IVF  -Monitor     DM2 w/ hyperglycemia  -Continue sliding scale insulin  -Continue Levemir 5 units twice daily at lower dose as sugars this morning lower than I would like for hospitalization  -Continue diabetic diet     HTN  --Currently normotensive. Hold norvasc, resume as needed.       DVT prophylaxis:  Medical and mechanical DVT prophylaxis orders are present.       AM-PAC 6 Clicks Score (PT): 18 (05/13/22 8765)    Disposition: I expect the patient to be discharged TBD.  PT OT recommended inpatient rehab, patient wishes to go home.    CODE STATUS:   Code Status and Medical Interventions:   Ordered at: 05/10/22 1410     Code Status (Patient has no pulse and is not breathing):    CPR (Attempt to Resuscitate)     Medical Interventions (Patient has pulse or is breathing):    Full Support     I have prepared this progress note with copied portions of the prior day's progress note of my own authorship to preserve accuracy and  maintain consistency of documentation. I have reviewed these portions and edited them for correctness. I verify that the above documentation accurately and truly represents the evaluation and management performed on today's date.       Linda Leslie MD  05/14/22

## 2022-05-14 NOTE — THERAPY WOUND CARE TREATMENT
Acute Care - Wound/Debridement Treatment Note  [facility]     Patient Name: Jesse Siu  : 1935  MRN: [id number]  Today's Date: 2022                Admit Date: 5/10/2022    Visit Dx:    ICD-10-CM ICD-9-CM   1. Cellulitis of right foot  L03.115 682.7   2. Foot ulceration, right, with necrosis of muscle (HCC)  L97.513 707.15     728.89   3. Leukocytosis, unspecified type  D72.829 288.60   4. Acute hematogenous osteomyelitis of right foot (HCC)  M86.071 730.07   5. Elevated serum creatinine  R79.89 790.99       Patient Active Problem List   Diagnosis   • Cellulitis of right foot   • Gas gangrene of foot (HCC)   • Type 2 diabetes mellitus with hyperglycemia (HCC)   • Renal insufficiency   • Foot ulceration, right, with necrosis of muscle (HCC)        Past Medical History:   Diagnosis Date   • Hyperlipidemia    • Hypertension    • Pre-diabetes         Past Surgical History:   Procedure Laterality Date   • AMPUTATION DIGIT Right 2022    Procedure: GREAT AND SECOND TOE AMPUTATION RIGHT;  Surgeon: Lemuel Angela MD;  Location: [facility];  Service: Vascular;  Laterality: Right;   • FOOT SURGERY Left     CANCER REMOVAL   • TONSILLECTOMY     • TURP / TRANSURETHRAL INCISION / DRAINAGE PROSTATE             Wound 05/10/22 1600 Right foot (Active)   Dressing Appearance dry;intact 22   Closure ISAIAS 22   Base dressing in place, unable to visualize 22 0935   Care, Wound negative pressure wound therapy 22   Wound Output (mL) 50 22 0935       Wound 22 Right anterior foot Incision (Active)   Dressing Appearance dry;intact;no drainage 22   Closure ISAIAS 22   Base dressing in place, unable to visualize 22   Dressing Care gauze;multi-layer wrap 22       NPWT (Negative Pressure Wound Therapy) 22 0930 R foot wound (Active)   Therapy Setting continuous therapy 22 0935   Dressing foam, black 22 0628   Pressure  Setting 150 mmHg 05/14/22 0935         WOUND DEBRIDEMENT                     PT Assessment (last 12 hours)     PT Evaluation and Treatment     Row Name 05/14/22 0935          Physical Therapy Time and Intention    Subjective Information no complaints  -     Document Type wound care;therapy note (daily note)  -     Mode of Treatment physical therapy;individual therapy  -     Row Name 05/14/22 0935          General Information    Patient Observations alert;agree to therapy  sleepy  -     Row Name 05/14/22 0935          Pain    Pretreatment Pain Rating 0/10 - no pain  -     Posttreatment Pain Rating 0/10 - no pain  -     Row Name 05/14/22 0935          Cognition    Orientation Status (Cognition) oriented x 4  -     Row Name 05/14/22 0935          Wound 05/10/22 1600 Right foot    Wound - Properties Group Placement Date: 05/10/22  -AM Placement Time: 1600  -AM Side: Right  -AM Location: foot  -AM     Dressing Appearance dry;intact  -     Base dressing in place, unable to visualize  -     Wound Output (mL) 50  -MC     Retired Wound - Properties Group Placement Date: 05/10/22  -AM Placement Time: 1600  -AM Side: Right  -AM Location: foot  -AM     Retired Wound - Properties Group Date first assessed: 05/10/22  -AM Time first assessed: 1600  -AM Side: Right  -AM Location: foot  -AM     Row Name             Wound 05/12/22 Right anterior foot Incision    Wound - Properties Group Placement Date: 05/12/22  -MP Present on Hospital Admission: N  -MP Side: Right  -MP Orientation: anterior  -MP Location: foot  -MP Primary Wound Type: Incision  -MP     Retired Wound - Properties Group Placement Date: 05/12/22  -MP Present on Hospital Admission: N  -MP Side: Right  -MP Orientation: anterior  -MP Location: foot  -MP Primary Wound Type: Incision  -MP     Retired Wound - Properties Group Date first assessed: 05/12/22  -MP Present on Hospital Admission: N  -MP Side: Right  -MP Location: foot  -MP Primary Wound Type:  Incision  -     Row Name 05/14/22 0935          NPWT (Negative Pressure Wound Therapy) 05/13/22 0930 R foot wound    NPWT (Negative Pressure Wound Therapy) - Properties Group Placement Date: 05/13/22 - Placement Time: 0930 -MF Location: R foot wound  -MF     Therapy Setting continuous therapy  -     Pressure Setting 150 mmHg  -     Retired NPWT (Negative Pressure Wound Therapy) - Properties Group Placement Date: 05/13/22  - Placement Time: 0930 -MF Location: R foot wound  -MF     Retired NPWT (Negative Pressure Wound Therapy) - [facility] Group Placement Date: 05/13/22  - Placement Time: 0930 -MF Location: R foot wound  -MF     Row Name 05/14/22 0935          Coping    Observed Emotional State calm;cooperative  -     Verbalized Emotional State acceptance  -     Row Name 05/14/22 0935          Plan of Care Review    Plan of Care Reviewed With patient  -     Progress no change  -     Outcome Evaluation Wound vac intact with no complaints or changes from the pt. Anticipate next change in 1-2 days.  -     Row Name 05/14/22 0935          Positioning and Restraints    Pre-Treatment Position in bed  -     Post Treatment Position bed  -     In Bed supine;call light within reach;encouraged to call for assist  -           User Key  (r) = Recorded By, (t) = Taken By, (c) = Cosigned By    Initials Name Provider Type    Wesley Vallejo, PT Physical Therapist    Sarah Beth Jordan, PT Physical Therapist    Yael Nam, RN Registered Nurse    Konstantin Browning RN Registered Nurse              Physical Therapy Education                 Title: PT OT SLP Therapies (In Progress)     Topic: Physical Therapy (In Progress)     Point: Mobility training (In Progress)     Learning Progress Summary           Patient Acceptance, E, NR by SUSANA at 5/13/2022 1330    Comment: patient needs continued education on NWB status                   Point: Home exercise program (In Progress)     Learning  Progress Summary           Patient Acceptance, E, NR by SUSANA at 5/13/2022 1330    Comment: patient needs continued education on NWB status                   Point: Body mechanics (In Progress)     Learning Progress Summary           Patient Acceptance, E, NR by SUSANA at 5/13/2022 1330    Comment: patient needs continued education on NWB status                   Point: Precautions (In Progress)     Learning Progress Summary           Patient Acceptance, E, NR by SUSANA at 5/13/2022 1330    Comment: patient needs continued education on NWB status                               User Key     Initials Effective Dates Name Provider Type Discipline     06/16/21 -  Saumya Huertas PT Physical Therapist PT                Recommendation and Plan  Anticipated Equipment Needs at Discharge (PT): front wheeled walker, rolling knee walker, wheelchair (pending patient preference knee scooter vs rolling walker and W/C)  Anticipated Discharge Disposition (PT): inpatient rehabilitation facility  Planned Therapy Interventions (PT): balance training, bed mobility training, gait training, strengthening, transfer training, home exercise program  Therapy Frequency (PT): daily  Plan of Care Reviewed With: patient   Progress: no change       Progress: no change  Outcome Evaluation: Wound vac intact with no complaints or changes from the pt. Anticipate next change in 1-2 days.  Plan of Care Reviewed With: patient            Time Calculation   PT Charges     Row Name 05/14/22 1007             Time Calculation    Start Time 0935  -MC      PT Goal Re-Cert Due Date 05/23/22  -              Untimed Charges    71001-Pxd Pressure wound to 50 sqcm 10  -MC              Total Minutes    Untimed Charges Total Minutes 10  -MC       Total Minutes 10  -MC            User Key  (r) = Recorded By, (t) = Taken By, (c) = Cosigned By    Initials Name Provider Type    Sarah Beth Jordan, PT Physical Therapist                  Therapy Charges for Today     Code  Description Service Date Service Provider Modifiers Qty    09162436696 HC PT NEG PRESS WOUND TO 50SQCM DME1 5/14/2022 Sarah Beth Bailey, PT  1            PT G-Codes  AM-PAC 6 Clicks Score (PT): 18       Sarah Beth Bailey, PT  5/14/2022

## 2022-05-15 LAB
BACTERIA SPEC AEROBE CULT: NORMAL
BACTERIA SPEC AEROBE CULT: NORMAL
BH BB BLOOD EXPIRATION DATE: NORMAL
BH BB BLOOD TYPE BARCODE: 9500
BH BB DISPENSE STATUS: NORMAL
BH BB PRODUCT CODE: NORMAL
BH BB UNIT NUMBER: NORMAL
CROSSMATCH INTERPRETATION: NORMAL
GLUCOSE BLDC GLUCOMTR-MCNC: 196 MG/DL (ref 70–130)
GLUCOSE BLDC GLUCOMTR-MCNC: 203 MG/DL (ref 70–130)
GLUCOSE BLDC GLUCOMTR-MCNC: 95 MG/DL (ref 70–130)
UNIT  ABO: NORMAL
UNIT  RH: NORMAL

## 2022-05-15 PROCEDURE — 63710000001 INSULIN DETEMIR PER 5 UNITS: Performed by: INTERNAL MEDICINE

## 2022-05-15 PROCEDURE — 97605 NEG PRS WND THER DME<=50SQCM: CPT

## 2022-05-15 PROCEDURE — 63710000001 INSULIN LISPRO (HUMAN) PER 5 UNITS: Performed by: STUDENT IN AN ORGANIZED HEALTH CARE EDUCATION/TRAINING PROGRAM

## 2022-05-15 PROCEDURE — 25010000002 HEPARIN (PORCINE) PER 1000 UNITS: Performed by: STUDENT IN AN ORGANIZED HEALTH CARE EDUCATION/TRAINING PROGRAM

## 2022-05-15 PROCEDURE — 82962 GLUCOSE BLOOD TEST: CPT

## 2022-05-15 PROCEDURE — 25010000002 PIPERACILLIN SOD-TAZOBACTAM PER 1 G: Performed by: STUDENT IN AN ORGANIZED HEALTH CARE EDUCATION/TRAINING PROGRAM

## 2022-05-15 PROCEDURE — 99232 SBSQ HOSP IP/OBS MODERATE 35: CPT | Performed by: INTERNAL MEDICINE

## 2022-05-15 PROCEDURE — 99024 POSTOP FOLLOW-UP VISIT: CPT | Performed by: THORACIC SURGERY (CARDIOTHORACIC VASCULAR SURGERY)

## 2022-05-15 RX ORDER — BISACODYL 5 MG/1
5 TABLET, DELAYED RELEASE ORAL DAILY PRN
Status: DISCONTINUED | OUTPATIENT
Start: 2022-05-15 | End: 2022-05-18 | Stop reason: HOSPADM

## 2022-05-15 RX ORDER — BISACODYL 10 MG
10 SUPPOSITORY, RECTAL RECTAL DAILY PRN
Status: DISCONTINUED | OUTPATIENT
Start: 2022-05-15 | End: 2022-05-18 | Stop reason: HOSPADM

## 2022-05-15 RX ORDER — POLYETHYLENE GLYCOL 3350 17 G/17G
17 POWDER, FOR SOLUTION ORAL DAILY PRN
Status: DISCONTINUED | OUTPATIENT
Start: 2022-05-15 | End: 2022-05-18 | Stop reason: HOSPADM

## 2022-05-15 RX ORDER — LOPERAMIDE HYDROCHLORIDE 2 MG/1
2 CAPSULE ORAL 4 TIMES DAILY PRN
Status: DISCONTINUED | OUTPATIENT
Start: 2022-05-15 | End: 2022-05-18 | Stop reason: HOSPADM

## 2022-05-15 RX ORDER — AMOXICILLIN 250 MG
2 CAPSULE ORAL 2 TIMES DAILY PRN
Status: DISCONTINUED | OUTPATIENT
Start: 2022-05-15 | End: 2022-05-18 | Stop reason: HOSPADM

## 2022-05-15 RX ADMIN — PRAVASTATIN SODIUM 20 MG: 20 TABLET ORAL at 20:54

## 2022-05-15 RX ADMIN — Medication 10 ML: at 09:28

## 2022-05-15 RX ADMIN — TAZOBACTAM SODIUM AND PIPERACILLIN SODIUM 3.38 G: 375; 3 INJECTION, SOLUTION INTRAVENOUS at 07:57

## 2022-05-15 RX ADMIN — INSULIN LISPRO 2 UNITS: 100 INJECTION, SOLUTION INTRAVENOUS; SUBCUTANEOUS at 11:29

## 2022-05-15 RX ADMIN — TAZOBACTAM SODIUM AND PIPERACILLIN SODIUM 3.38 G: 375; 3 INJECTION, SOLUTION INTRAVENOUS at 16:28

## 2022-05-15 RX ADMIN — HEPARIN SODIUM 5000 UNITS: 5000 INJECTION, SOLUTION INTRAVENOUS; SUBCUTANEOUS at 20:54

## 2022-05-15 RX ADMIN — INSULIN DETEMIR 5 UNITS: 100 INJECTION, SOLUTION SUBCUTANEOUS at 08:02

## 2022-05-15 RX ADMIN — LOPERAMIDE HYDROCHLORIDE 2 MG: 2 CAPSULE ORAL at 17:56

## 2022-05-15 RX ADMIN — Medication 10 ML: at 20:54

## 2022-05-15 RX ADMIN — HEPARIN SODIUM 5000 UNITS: 5000 INJECTION, SOLUTION INTRAVENOUS; SUBCUTANEOUS at 08:03

## 2022-05-15 RX ADMIN — AMLODIPINE BESYLATE 10 MG: 10 TABLET ORAL at 08:03

## 2022-05-15 RX ADMIN — INSULIN LISPRO 4 UNITS: 100 INJECTION, SOLUTION INTRAVENOUS; SUBCUTANEOUS at 16:29

## 2022-05-15 RX ADMIN — INSULIN DETEMIR 5 UNITS: 100 INJECTION, SOLUTION SUBCUTANEOUS at 20:54

## 2022-05-15 NOTE — PROGRESS NOTES
INFECTIOUS DISEASE Progress Note    Jesse Siu  1935  [id number]    Admission Date: 5/10/2022      Requesting Provider: Sil Navarro II, DO  Evaluating Physician: JOHN Person    Reason for Consultation: Right diabetic foot infection/gangrene    History of present illness:    5/11/22: Patient is a 87 y.o. male who is seen today for evaluation of progressively worsening right first and second toe wound infections with gangrene. He initially developed right second toe ulcerations proximally one month ago.  He has been seeing a podiatrist and was started on oral antibiotic.  He has noted substantial worsening of his foot over the last week.  He was seen by his podiatrist on 5/9 who noted gangrene with a foul odor and recommended that he come to the emergency room.  He was found to have right first and second toe gangrene.  He has been seen by Dr. Angela and is currently scheduled for amputation tomorrow.     5/12/22:  He has been afebrile overnight.His white blood cell count today is 14.  His right foot wound cultures growing light gram-negative bacilli in his Gram stain revealed many gram-positive cocci and moderate gram-negative bacilli. Blood cultures from 5/10 are no growth so far. Duplex lower extremity arterial assessment revealed a 50-75% stenosis in the Right anterior tibial artery, short segment occlusion in the right mid posterior tibial artery, and an HENRIQUE reduced at 0.78.X-rays of his right foot revealed soft tissue gas. Creatinine today is 1.23. He denies increased right foot pain.  He does have a history of peripheral neuropathy.  At the time of surgery today he was found to have extensive necrosis with with necrotizing fasciitis extending to the base of the first, second, and third metatarsals.    5/13/22:  He remains afebrile. Right foot cultures from 5/10 grew Citrobacter which was sensitive to tested antibiotics.  Right foot tissue culture from 5/12 is pending but the Gram stain  revealed many white blood cells, many gram-positive cocci in pairs and clusters, and moderate gram-positive bacilli. Creatinine today is 1.29.  White blood cell count is 11.2.  He denies increased foot pain.  He denies nausea, vomiting, and diarrhea.    5/14/22:   Wound vac in place, photo of 5/10 noted.  He remains afebrile. Wound culture of 5/12 also with Citrobacter. He denies any fever, chills, n/v/d.     515/22:  He remains afebrile.  Wound vac in place.  No n/v/d.      Past Medical History:   Diagnosis Date   • Hyperlipidemia    • Hypertension    • Pre-diabetes        Past Surgical History:   Procedure Laterality Date   • AMPUTATION DIGIT Right 5/12/2022    Procedure: GREAT AND SECOND TOE AMPUTATION RIGHT;  Surgeon: Lemuel Angela MD;  Location: [facility];  Service: Vascular;  Laterality: Right;   • FOOT SURGERY Left     CANCER REMOVAL   • TONSILLECTOMY     • TURP / TRANSURETHRAL INCISION / DRAINAGE PROSTATE         History reviewed. No pertinent family history.    Social History     Socioeconomic History   • Marital status:    Tobacco Use   • Smoking status: Never Smoker   • Smokeless tobacco: Never Used   Substance and Sexual Activity   • Alcohol use: Never   • Drug use: Never   • Sexual activity: Never       No Known Allergies      Medication:    Current Facility-Administered Medications:   •  acetaminophen (TYLENOL) tablet 650 mg, 650 mg, Oral, Q4H PRN, 650 mg at 05/14/22 2058 **OR** acetaminophen (TYLENOL) 160 MG/5ML solution 650 mg, 650 mg, Oral, Q4H PRN **OR** acetaminophen (TYLENOL) suppository 650 mg, 650 mg, Rectal, Q4H PRN, Hiral Borges PA-C  •  amLODIPine (NORVASC) tablet 10 mg, 10 mg, Oral, Q24H, Linda Leslie MD, 10 mg at 05/14/22 1836  •  sennosides-docusate (PERICOLACE) 8.6-50 MG per tablet 2 tablet, 2 tablet, Oral, BID, 2 tablet at 05/14/22 2058 **AND** polyethylene glycol (MIRALAX) packet 17 g, 17 g, Oral, Daily PRN **AND** bisacodyl (DULCOLAX) EC tablet 5 mg, 5 mg, Oral,  Daily PRN **AND** bisacodyl (DULCOLAX) suppository 10 mg, 10 mg, Rectal, Daily PRN, Hiral Borges PA-C  •  dextrose (D50W) (25 g/50 mL) IV injection 25 g, 25 g, Intravenous, Q15 Min PRN, Hiral Borges PA-C  •  dextrose (GLUTOSE) oral gel 15 g, 15 g, Oral, Q15 Min PRN, Hiral Borges PA-C  •  docusate sodium (COLACE) capsule 100 mg, 100 mg, Oral, BID PRN, Hiral Borges PA-C  •  glucagon (human recombinant) (GLUCAGEN DIAGNOSTIC) injection 1 mg, 1 mg, Intramuscular, Q15 Min PRN, Hiral Borges PA-C  •  heparin (porcine) 5000 UNIT/ML injection 5,000 Units, 5,000 Units, Subcutaneous, Q12H, Hiral Borges PA-C, 5,000 Units at 05/14/22 2058  •  HYDROcodone-acetaminophen (NORCO) 5-325 MG per tablet 1 tablet, 1 tablet, Oral, Q4H PRN, Hiral Borges PA-C  •  insulin detemir (LEVEMIR) injection 5 Units, 5 Units, Subcutaneous, Q12H, Linda Leslie MD, 5 Units at 05/14/22 2101  •  Insulin Lispro (humaLOG) injection 0-9 Units, 0-9 Units, Subcutaneous, TID AC, Hiral Borges PA-C, 2 Units at 05/14/22 1713  •  lactated ringers infusion, 9 mL/hr, Intravenous, Continuous, Hiral Borges PA-C, Last Rate: 9 mL/hr at 05/12/22 1441, New Bag at 05/12/22 1528  •  Magnesium Sulfate 2 gram Bolus, followed by 8 gram infusion (total Mg dose 10 grams)- Mg less than or equal to 1mg/dL, 2 g, Intravenous, PRN **OR** Magnesium Sulfate 2 gram / 50mL Infusion (GIVE X 3 BAGS TO EQUAL 6GM TOTAL DOSE) - Mg 1.1 - 1.5 mg/dl, 2 g, Intravenous, PRN **OR** Magnesium Sulfate 4 gram infusion- Mg 1.6-1.9 mg/dL, 4 g, Intravenous, PRN, Hiral Borges PA-C  •  melatonin tablet 5 mg, 5 mg, Oral, Nightly PRN, Hiral Borges PA-C  •  morphine injection 2 mg, 2 mg, Intravenous, Q2H PRN **AND** naloxone (NARCAN) injection 0.4 mg, 0.4 mg, Intravenous, Q5 Min PRN, Hiral Borges PA-C  •  ondansetron (ZOFRAN) tablet 4 mg, 4 mg, Oral, Q6H PRN **OR** ondansetron (ZOFRAN) injection 4 mg, 4 mg, Intravenous,  Q6H PRN, Hiral Borges PA-C  •  piperacillin-tazobactam (ZOSYN) 3.375 g in iso-osmotic dextrose 50 ml (premix), 3.375 g, Intravenous, Q8H, OrangeHiral PA-C, 3.375 g at 05/14/22 2329  •  polyethylene glycol (MIRALAX) packet 17 g, 17 g, Oral, Daily, Hiral Borges PA-C, 17 g at 05/13/22 0857  •  potassium chloride (MICRO-K) CR capsule 40 mEq, 40 mEq, Oral, PRN **OR** potassium chloride (KLOR-CON) packet 40 mEq, 40 mEq, Oral, PRN **OR** potassium chloride 10 mEq in 100 mL IVPB, 10 mEq, Intravenous, Q1H PRN, Hiral Borges PA-C  •  pravastatin (PRAVACHOL) tablet 20 mg, 20 mg, Oral, Nightly, Hiral Borges PA-C, 20 mg at 05/14/22 2058  •  sodium chloride 0.9 % flush 10 mL, 10 mL, Intravenous, Q12H, Hiral Borges PA-C, 10 mL at 05/14/22 2059  •  sodium chloride 0.9 % flush 10 mL, 10 mL, Intravenous, PRN, Hiral Borges PA-C    Antibiotics:  Anti-Infectives (From admission, onward)    Ordered     Dose/Rate Route Frequency Start Stop    05/11/22 0718  vancomycin 1250 mg/250 mL 0.9% NS IVPB ([facility])        Ordering Provider: Cici Shoemaker, PharmD    1,250 mg Intravenous Once 05/11/22 1500 05/11/22 1659    05/10/22 1608  piperacillin-tazobactam (ZOSYN) 3.375 g in iso-osmotic dextrose 50 ml (premix)        Ordering Provider: Hiral Borges PA-C    3.375 g  over 4 Hours Intravenous Every 8 Hours 05/11/22 0000 05/17/22 2359    05/10/22 1608  piperacillin-tazobactam (ZOSYN) 3.375 g in iso-osmotic dextrose 50 ml (premix)        Ordering Provider: Ramon, Sil M II, DO    3.375 g  over 30 Minutes Intravenous Once 05/10/22 1700 05/10/22 1659    05/10/22 1416  meropenem (MERREM) 1 g/100 mL 0.9% NS ([facility]p)        Ordering Provider: Ole Hadley MD    1 g  over 30 Minutes Intravenous Once 05/10/22 1418 05/10/22 1521    05/10/22 1309  cefTRIAXone (ROCEPHIN) 1 g/100 mL 0.9% NS ([facility])        Ordering Provider: Ole Hadley MD    1 g  over 30 Minutes Intravenous Once 05/10/22 1311 05/10/22  1430    05/10/22 1307  vancomycin 1500 mg/500 mL 0.9% NS IVPB ([facility])        Ordering Provider: Ole Hadley MD    20 mg/kg × 70.3 kg Intravenous Once 05/10/22 1309 05/10/22 1541            Review of Systems:  See HPI      Physical Exam:   Vital Signs  Temp (24hrs), Av.7 °F (36.5 °C), Min:97.6 °F (36.4 °C), Max:97.8 °F (36.6 °C)    Temp  Min: 97.6 °F (36.4 °C)  Max: 97.8 °F (36.6 °C)  BP  Min: 152/73  Max: 186/89  Pulse  Min: 67  Max: 73  Resp  Min: 16  Max: 16  SpO2  Min: 97 %  Max: 99 %    GENERAL: Awake and alert, in no acute distress.   HEENT: Normocephalic, atraumatic.  PERRL. EOMI. No conjunctival injection. No icterus. Oropharynx clear without evidence of thrush or exudate.   NECK: Supple   HEART: RRR; No murmur, rubs, gallops.   LUNGS: Clear to auscultation bilaterally without wheezing, rales, rhonchi. Normal respiratory effort  ABDOMEN: Soft, nontender, nondistended. No rebound or guarding.   EXT: A wound VAC is present on his right foot.  :  Without Hernandez catheter.  SKIN: Warm and dry without cutaneous eruptions on Inspection/palpation.    NEURO: Oriented to PPT. Motor 5/5 strength bilaterally.  He has decreased sensation to light touch in both distal lower extremities.  PSYCHIATRIC: Normal insight and judgement. Cooperative with PE    Laboratory Data    Results from last 7 days   Lab Units 22  1056 22  0626 22  0503   WBC 10*3/mm3 12.36* 11.21* 13.95*   HEMOGLOBIN g/dL 9.5* 9.2* 9.7*   HEMATOCRIT % 29.1* 28.7* 29.9*   PLATELETS 10*3/mm3 472* 499* 499*     Results from last 7 days   Lab Units 22  1057   SODIUM mmol/L 141   POTASSIUM mmol/L 4.3   CHLORIDE mmol/L 105   CO2 mmol/L 29.0   BUN mg/dL 22   CREATININE mg/dL 1.17   GLUCOSE mg/dL 166*   CALCIUM mg/dL 8.6     Results from last 7 days   Lab Units 05/10/22  1307   ALK PHOS U/L 119*   BILIRUBIN mg/dL 0.3   ALT (SGPT) U/L 16   AST (SGOT) U/L 16     Results from last 7 days   Lab Units 05/10/22  1307   SED RATE mm/hr 62*      Results from last 7 days   Lab Units 05/10/22  1307   CRP mg/dL 12.33*     Results from last 7 days   Lab Units 05/10/22  1307   LACTATE mmol/L 2.0         Results from last 7 days   Lab Units 05/11/22  0429   VANCOMYCIN RM mcg/mL 15.80     Estimated Creatinine Clearance: 44.2 mL/min (by C-G formula based on SCr of 1.17 mg/dL).      Microbiology:  No results found for: ACANTHNAEG, AFBCX, BPERTUSSISCX, BLOODCX  No results found for: BCIDPCR, CXREFLEX, CSFCX, CULTURETIS  No results found for: CULTURES, HSVCX, URCX  No results found for: EYECULTURE, GCCX, HSVCULTURE, LABHSV  No results found for: LEGIONELLA, MRSACX, MUMPSCX, MYCOPLASCX  No results found for: NOCARDIACX, STOOLCX  No results found for: THROATCX, UNSTIMCULT, URINECX, CULTURE, VZVCULTUR  No results found for: VIRALCULTU, WOUNDCX        Radiology:  Imaging Results (Last 72 Hours)     ** No results found for the last 72 hours. **            Impression:   1.  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 His foot wound cultures are growing Citrobacter sensitive to Zosyn.  2.  Type 2 diabetes mellitus  3.  Acute kidney injury-Improved  4.  Peripheral vascular disease    PLAN/RECOMMENDATIONS:   1.  Right first, second, and third metatarsal amputations-performed  2.  Continue intravenous Zosyn   3.  Right foot wound VAC    He will likely require a prolonged course of intravenous antibiotic therapy and prolonged wound care.  He remains at risk for loss of his foot and may require a right BKA.     Dr. Serrano to resume care in am  Larissa Ruff, APRN  5/15/2022  06:34 EDT

## 2022-05-15 NOTE — PROGRESS NOTES
[facility] Medicine Services  PROGRESS NOTE    Patient Name: Jesse Siu  : 1935  MRN: [id number]    Date of Admission: 5/10/2022  Primary Care Physician: IRVIN Martinez MD    Subjective   Subjective     CC:  Gangrene of right foot    HPI:  Patient is doing well this morning. Denies any pain. Ate breakfast. Having BMs    ROS:  General: denies fevers or chills  CV: denies chest pain  Resp: denies shortness of breath  Abd: denies abd pain, nausea      Objective   Objective     Vital Signs:   Temp:  [97.6 °F (36.4 °C)-97.8 °F (36.6 °C)] 97.6 °F (36.4 °C)  Heart Rate:  [67-73] 69  Resp:  [16] 16  BP: (152-186)/() 152/73     Physical Exam:  Constitutional: No acute distress, sleeping and easily awoken.   Respiratory: Clear to auscultation bilaterally, respiratory effort normal on RA  Cardiovascular: RRR, no murmurs, rubs, or gallops  Gastrointestinal: Positive bowel sounds, soft, nontender, nondistended  Musculoskeletal: right foot in dressings.  Psychiatric: Appropriate affect, cooperative  Neurologic: Oriented x 3, no focal deficits      Results Reviewed:  LAB RESULTS:      Lab 22  1056 22  0626 22  0503 22  0429 05/10/22  1307   WBC 12.36* 11.21* 13.95* 13.68* 16.49*   HEMOGLOBIN 9.5* 9.2* 9.7* 10.0* 10.0*   HEMATOCRIT 29.1* 28.7* 29.9* 31.4* 31.3*   PLATELETS 472* 499* 499* 510* 512*   NEUTROS ABS 9.84* 9.93* 11.19*  --  14.67*   IMMATURE GRANS (ABS) 0.14* 0.08* 0.10*  --  0.09*   LYMPHS ABS 1.43 0.77 1.40  --  0.81   MONOS ABS 0.58 0.41 0.79  --  0.87   EOS ABS 0.31 0.00 0.40  --  0.01   MCV 88.4 86.2 86.4 87.5 89.9   SED RATE  --   --   --   --  62*   CRP  --   --   --   --  12.33*   LACTATE  --   --   --   --  2.0         Lab 22  1057 22  0626 22  1028 22  0503 22  0429 05/10/22  1307   SODIUM 141 136  --  137 139 136   POTASSIUM 4.3 4.2 4.7 4.2 4.9 4.5   CHLORIDE 105 100  --  99 102 98   CO2 29.0 25.0  --  26.0 28.0  24.0   ANION GAP 7.0 11.0  --  12.0 9.0 14.0   BUN 22 22 -- 22 21 29*   CREATININE 1.17 1.29*  --  1.23 1.20 1.39*   EGFR 60.3 53.7*  --  56.8* 58.5* 49.1*   GLUCOSE 166* 397*  --  199* 194* 286*   CALCIUM 8.6 8.3*  --  8.7 9.0 9.3   HEMOGLOBIN A1C  --  8.90*  --   --   --   --          Lab 05/10/22  1307   TOTAL PROTEIN 7.2   ALBUMIN 3.00*   GLOBULIN 4.2   ALT (SGPT) 16   AST (SGOT) 16   BILIRUBIN 0.3   ALK PHOS 119*                 Lab 05/11/22  0920   ABO TYPING O   RH TYPING Negative   ANTIBODY SCREEN Negative         Brief Urine Lab Results     None          Microbiology Results Abnormal     Procedure Component Value - Date/Time    Blood Culture - Blood, Arm, Right [[id number]]  (Normal) Collected: 05/10/22 1320    Lab Status: Preliminary result Specimen: Blood from Arm, Right Updated: 05/14/22 1403     Blood Culture No growth at 4 days    Blood Culture - Blood, Blood, Central Line [[id number]]  (Normal) Collected: 05/10/22 1300    Lab Status: Preliminary result Specimen: Blood, Central Line Updated: 05/14/22 1403     Blood Culture No growth at 4 days    AFB Culture - Tissue, Foot, Right [[id number]] Collected: 05/12/22 1618    Lab Status: Preliminary result Specimen: Tissue from Foot, Right Updated: 05/13/22 1212     AFB Stain No acid fast bacilli seen on concentrated smear    COVID PRE-OP / PRE-PROCEDURE SCREENING ORDER (NO ISOLATION) - Swab, Nasopharynx [[id number]]  (Normal) Collected: 05/10/22 1454    Lab Status: Final result Specimen: Swab from Nasopharynx Updated: 05/10/22 1532    Narrative:      The following orders were created for panel order COVID PRE-OP / PRE-PROCEDURE SCREENING ORDER (NO ISOLATION) - Swab, Nasopharynx.  Procedure                               Abnormality         Status                     ---------                               -----------         ------                     COVID-19, ABBOTT IN-HOUS...[[id number]]  Normal              Final result                 Please view results  for these tests on the individual orders.    COVID-19, ABBOTT IN-HOUSE,NASAL Swab (NO TRANSPORT MEDIA) 2 HR TAT - Swab, Nasopharynx [[id number]]  (Normal) Collected: 05/10/22 1454    Lab Status: Final result Specimen: Swab from Nasopharynx Updated: 05/10/22 1532     COVID19 Presumptive Negative    Narrative:      Fact sheet for providers: https://www.fda.gov/media/695036/download     Fact sheet for patients: https://www.fda.gov/media/644921/download    Test performed by PCR.  If inconsistent with clinical signs and symptoms patient should be tested with different authorized molecular test.          No radiology results from the last 24 hrs        I have reviewed the medications:  Scheduled Meds:amLODIPine, 10 mg, Oral, Q24H  heparin (porcine), 5,000 Units, Subcutaneous, Q12H  insulin detemir, 5 Units, Subcutaneous, Q12H  insulin lispro, 0-9 Units, Subcutaneous, TID AC  piperacillin-tazobactam, 3.375 g, Intravenous, Q8H  polyethylene glycol, 17 g, Oral, Daily  pravastatin, 20 mg, Oral, Nightly  senna-docusate sodium, 2 tablet, Oral, BID  sodium chloride, 10 mL, Intravenous, Q12H      Continuous Infusions:lactated ringers, 9 mL/hr, Last Rate: 9 mL/hr (05/12/22 1441)      PRN Meds:.•  acetaminophen **OR** acetaminophen **OR** acetaminophen  •  senna-docusate sodium **AND** polyethylene glycol **AND** bisacodyl **AND** bisacodyl  •  dextrose  •  dextrose  •  docusate sodium  •  glucagon (human recombinant)  •  HYDROcodone-acetaminophen  •  magnesium sulfate **OR** magnesium sulfate **OR** magnesium sulfate  •  melatonin  •  Morphine **AND** naloxone  •  ondansetron **OR** ondansetron  •  potassium chloride **OR** potassium chloride **OR** potassium chloride  •  sodium chloride    Assessment & Plan   Assessment & Plan     Active Hospital Problems    Diagnosis  POA   • Cellulitis of right foot [L03.115]  Yes   • 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        Jesse Siu is a 87 y.o. 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 Angela following, s/p amputation of R great toe and R second toe with extensive soft tissue debridement 5/12  -Continue wound VAC  -Continue Zosyn per ID  -The following  --NWB RLE  - no significant changes to plan.     Renal insufficiency  -Improved with IVF  -Monitor     DM2 w/ hyperglycemia  -Continue sliding scale insulin  -Continue Levemir 5 units twice daily at lower dose as sugars this morning lower than I would like for hospitalization  -Continue diabetic diet     HTN  --Hypertensive last night  - continue norvasic          DVT prophylaxis:  Medical and mechanical DVT prophylaxis orders are present.       AM-PAC 6 Clicks Score (PT): 18 (05/14/22 0846)    Disposition: I expect the patient to be discharged to rehab next week when okay with CTS .    CODE STATUS:   Code Status and Medical Interventions:   Ordered at: 05/10/22 1410     Code Status (Patient has no pulse and is not breathing):    CPR (Attempt to Resuscitate)     Medical Interventions (Patient has pulse or is breathing):    Full Support     This patient's problems and plans were partially entered by my partner and updated as appropriate by me 05/15/22. Today is my first day evaluating this patient's active medical problems. I Personally reviewed chart and adjusted note to reflect daily changes in management/clinical condition      Linda Leslie MD  05/15/22

## 2022-05-15 NOTE — PLAN OF CARE
Goal Outcome Evaluation:  Plan of Care Reviewed With: patient        Progress: improving   Pt aox4, vss, dressing CDI, up x 1 assist to br, nwb on right leg, adequate I/O, no c/o pain, will continue to monitor..

## 2022-05-15 NOTE — PROGRESS NOTES
Cardiothoracic Surgery Progress Note      POD #: 3-transmetatarsal amputation right great toe right second toe and right third toe with extensive soft tissue debridement wound left open to heal by wound VAC therapy     LOS: 5 days      Subjective: Awake and alert    Objective:  Vital Signs vital signs below noted vital signs below noted T-max past 24 hours 97.8 °F  Temp:  [97.6 °F (36.4 °C)-97.8 °F (36.6 °C)] 97.6 °F (36.4 °C)  Heart Rate:  [67-73] 69  Resp:  [16] 16  BP: (152-186)/() 152/73    Physical Exam:   General Appearance: Oriented x3   Lungs:   Heart:   Skin:   Incision: Amputation/debrided site covered with wound VAC and dry dressing.  No drainage noted.     Results:  Results from last 7 days   Lab Units 05/14/22  1056   WBC 10*3/mm3 12.36*   HEMOGLOBIN g/dL 9.5*   HEMATOCRIT % 29.1*   PLATELETS 10*3/mm3 472*     Results from last 7 days   Lab Units 05/14/22  1057   SODIUM mmol/L 141   POTASSIUM mmol/L 4.3   CHLORIDE mmol/L 105   CO2 mmol/L 29.0   BUN mg/dL 22   CREATININE mg/dL 1.17   GLUCOSE mg/dL 166*   CALCIUM mg/dL 8.6         Assessment: #1.  Postop day 3 right great toe right second toe right third toe metatarsal amputation with extensive soft tissue debridement wound VAC in place  2.   renal insufficiency  3.  Prediabetic      Plan: Continue wound VAC management PT wound care.  Antibiotics per infectious disease.  Overall medical manage per hospitalist.      Lemuel Angela MD - 05/15/22 - 06:09 EDT

## 2022-05-15 NOTE — PLAN OF CARE
Patient alert and oriented x4. VSS. Wound vac CDI. He has had No complaint of pain. Transfers to [facility] with assist x1, GB and Walker NWB. He was able to sleep between care rounds.

## 2022-05-15 NOTE — PLAN OF CARE
Goal Outcome Evaluation:  Plan of Care Reviewed With: patient        Progress: no change  Outcome Evaluation: Wound vac intact with no apparent issues or complaints from the patient. Anticipate change tomorrow.

## 2022-05-15 NOTE — THERAPY WOUND CARE TREATMENT
Acute Care - Wound/Debridement Treatment Note  [facility]     Patient Name: Jesse Siu  : 1935  MRN: [id number]  Today's Date: 5/15/2022                Admit Date: 5/10/2022    Visit Dx:    ICD-10-CM ICD-9-CM   1. Cellulitis of right foot  L03.115 682.7   2. Foot ulceration, right, with necrosis of muscle (HCC)  L97.513 707.15     728.89   3. Leukocytosis, unspecified type  D72.829 288.60   4. Acute hematogenous osteomyelitis of right foot (HCC)  M86.071 730.07   5. Elevated serum creatinine  R79.89 790.99       Patient Active Problem List   Diagnosis   • Cellulitis of right foot   • Gas gangrene of foot (HCC)   • Type 2 diabetes mellitus with hyperglycemia (HCC)   • Renal insufficiency   • Foot ulceration, right, with necrosis of muscle (HCC)        Past Medical History:   Diagnosis Date   • Hyperlipidemia    • Hypertension    • Pre-diabetes         Past Surgical History:   Procedure Laterality Date   • AMPUTATION DIGIT Right 2022    Procedure: GREAT AND SECOND TOE AMPUTATION RIGHT;  Surgeon: Lemuel Angela MD;  Location: [facility];  Service: Vascular;  Laterality: Right;   • FOOT SURGERY Left     CANCER REMOVAL   • TONSILLECTOMY     • TURP / TRANSURETHRAL INCISION / DRAINAGE PROSTATE             Wound 05/10/22 1600 Right foot (Active)   Dressing Appearance dry;intact 05/15/22 1152   Closure ISAIAS 05/15/22 0800   Base dressing in place, unable to visualize 05/15/22 1152   Drainage Amount none 22 1940   Care, Wound negative pressure wound therapy 05/15/22 0800   Wound Output (mL) 75 05/15/22 1152       Wound 22 Right anterior foot Incision (Active)   Dressing Appearance dry;intact 05/15/22 0800   Closure ISAIAS 05/15/22 0800   Base dressing in place, unable to visualize 05/15/22 0800   Drainage Amount none 05/15/22 0800   Dressing Care multi-layer wrap 22 1400       NPWT (Negative Pressure Wound Therapy) 22 0930 R foot wound (Active)   Therapy Setting continuous therapy 05/15/22  1152   Dressing foam, black 05/15/22 0228   Pressure Setting 150 mmHg 05/15/22 1152         WOUND DEBRIDEMENT                     PT Assessment (last 12 hours)     PT Evaluation and Treatment     Row Name 05/15/22 1152          Physical Therapy Time and Intention    Subjective Information no complaints  -     Document Type wound care;therapy note (daily note)  -     Mode of Treatment physical therapy;individual therapy  -     Row Name 05/15/22 1152          General Information    Patient Observations cooperative;agree to therapy  sleepy again today  -     Row Name 05/15/22 1152          Pain    Pretreatment Pain Rating 0/10 - no pain  -     Posttreatment Pain Rating 0/10 - no pain  -     Row Name 05/15/22 1152          Cognition    Orientation Status (Cognition) oriented x 3  -     Row Name 05/15/22 1152          Wound 05/10/22 1600 Right foot    Wound - Properties Group Placement Date: 05/10/22  -AM Placement Time: 1600  -AM Side: Right  -AM Location: foot  -AM     Dressing Appearance dry;intact  -MC     Base dressing in place, unable to visualize  -     Wound Output (mL) 75  -MC     Retired Wound - Properties Group Placement Date: 05/10/22  -AM Placement Time: 1600  -AM Side: Right  -AM Location: foot  -AM     Retired Wound - Properties Group Date first assessed: 05/10/22  -AM Time first assessed: 1600  -AM Side: Right  -AM Location: foot  -AM     Row Name             Wound 05/12/22 Right anterior foot Incision    Wound - Properties Group Placement Date: 05/12/22  -MP Present on Hospital Admission: N  -MP Side: Right  -MP Orientation: anterior  -MP Location: foot  -MP Primary Wound Type: Incision  -MP     Retired Wound - Properties Group Placement Date: 05/12/22  -MP Present on Hospital Admission: N  -MP Side: Right  -MP Orientation: anterior  -MP Location: foot  -MP Primary Wound Type: Incision  -MP     Retired Wound - Properties Group Date first assessed: 05/12/22  -MP Present on Hospital  Admission: N  -MP Side: Right  -MP Location: foot  -MP Primary Wound Type: Incision  -MP     Row Name 05/15/22 1152          NPWT (Negative Pressure Wound Therapy) 05/13/22 0930 R foot wound    NPWT (Negative Pressure Wound Therapy) - Properties Group Placement Date: 05/13/22  - Placement Time: 0930 -MF Location: R foot wound  -MF     Therapy Setting continuous therapy  -     Pressure Setting 150 mmHg  -     Retired NPWT (Negative Pressure Wound Therapy) - Properties Group Placement Date: 05/13/22  - Placement Time: 0930 -MF Location: R foot wound  -MF     Retired NPWT (Negative Pressure Wound Therapy) - Properties Group Placement Date: 05/13/22  - Placement Time: 0930 -MF Location: R foot wound  -MF     Row Name 05/15/22 1152          Coping    Observed Emotional State calm;cooperative  -     Verbalized Emotional State acceptance  -     Row Name 05/15/22 1152          Plan of Care Review    Plan of Care Reviewed With patient  -     Progress no change  -     Outcome Evaluation Wound vac intact with no apparent issues or complaints from the patient. Anticipate change tomorrow.  -     Row Name 05/15/22 1152          Positioning and Restraints    Pre-Treatment Position in bed  -     Post Treatment Position bed  -     In Bed supine;call light within reach;encouraged to call for assist  -           User Key  (r) = Recorded By, (t) = Taken By, (c) = Cosigned By    Initials Name Provider Type    Wesley Vallejo, PT Physical Therapist    Sarah Beth Jordan, PT Physical Therapist    Yael Nam RN Registered Nurse    Konstantin Browning RN Registered Nurse              Physical Therapy Education                 Title: PT OT SLP Therapies (In Progress)     Topic: Physical Therapy (In Progress)     Point: Mobility training (In Progress)     Learning Progress Summary           Patient Acceptance, E, NR by SUSANA at 5/13/2022 1330    Comment: patient needs continued education on NWB status                    Point: Home exercise program (In Progress)     Learning Progress Summary           Patient Acceptance, E, NR by SUSAAN at 5/13/2022 1330    Comment: patient needs continued education on NWB status                   Point: Body mechanics (In Progress)     Learning Progress Summary           Patient Acceptance, E, NR by SUSANA at 5/13/2022 1330    Comment: patient needs continued education on NWB status                   Point: Precautions (In Progress)     Learning Progress Summary           Patient Acceptance, E, NR by SUSANA at 5/13/2022 1330    Comment: patient needs continued education on NWB status                               User Key     Initials Effective Dates Name Provider Type Discipline     06/16/21 -  Saumya Huertas, PT Physical Therapist PT                Recommendation and Plan  Anticipated Equipment Needs at Discharge (PT): front wheeled walker, rolling knee walker, wheelchair (pending patient preference knee scooter vs rolling walker and W/C)  Anticipated Discharge Disposition (PT): inpatient rehabilitation facility  Planned Therapy Interventions (PT): balance training, bed mobility training, gait training, strengthening, transfer training, home exercise program  Therapy Frequency (PT): daily  Plan of Care Reviewed With: patient   Progress: no change       Progress: no change  Outcome Evaluation: Wound vac intact with no apparent issues or complaints from the patient. Anticipate change tomorrow.  Plan of Care Reviewed With: patient            Time Calculation   PT Charges     Row Name 05/15/22 1156             Time Calculation    Start Time 1105  -MC      PT Goal Re-Cert Due Date 05/23/22  -MC              Untimed Charges    82934-Hyg Pressure wound to 50 sqcm 10  -MC              Total Minutes    Untimed Charges Total Minutes 10  -MC       Total Minutes 10  -MC            User Key  (r) = Recorded By, (t) = Taken By, (c) = Cosigned By    Initials Name Provider Type    Sarah Beth Jordan  NORRIS, PT Physical Therapist                  Therapy Charges for Today     Code Description Service Date Service Provider Modifiers Qty    40956788040 HC PT NEG PRESS WOUND TO 50SQCM DME1 5/14/2022 Sarah Beth Bailey, PT  1    [id number] HC PT NEG PRESS WOUND TO 50SQCM DME1 5/15/2022 Sarah Beth Bailey, PT  1            PT G-Codes  AM-PAC 6 Clicks Score (PT): 18       Sarah Beth Bailey, PT  5/15/2022

## 2022-05-16 LAB
CYTO UR: NORMAL
GLUCOSE BLDC GLUCOMTR-MCNC: 130 MG/DL (ref 70–130)
GLUCOSE BLDC GLUCOMTR-MCNC: 214 MG/DL (ref 70–130)
GLUCOSE BLDC GLUCOMTR-MCNC: 249 MG/DL (ref 70–130)
LAB AP CASE REPORT: NORMAL
LAB AP CLINICAL INFORMATION: NORMAL
LAB AP DIAGNOSIS COMMENT: NORMAL
PATH REPORT.FINAL DX SPEC: NORMAL
PATH REPORT.GROSS SPEC: NORMAL

## 2022-05-16 PROCEDURE — 25010000002 PIPERACILLIN SOD-TAZOBACTAM PER 1 G: Performed by: STUDENT IN AN ORGANIZED HEALTH CARE EDUCATION/TRAINING PROGRAM

## 2022-05-16 PROCEDURE — 97530 THERAPEUTIC ACTIVITIES: CPT

## 2022-05-16 PROCEDURE — 99231 SBSQ HOSP IP/OBS SF/LOW 25: CPT | Performed by: INTERNAL MEDICINE

## 2022-05-16 PROCEDURE — 63710000001 INSULIN LISPRO (HUMAN) PER 5 UNITS: Performed by: STUDENT IN AN ORGANIZED HEALTH CARE EDUCATION/TRAINING PROGRAM

## 2022-05-16 PROCEDURE — 97605 NEG PRS WND THER DME<=50SQCM: CPT

## 2022-05-16 PROCEDURE — 25010000002 ERTAPENEM PER 500 MG: Performed by: INTERNAL MEDICINE

## 2022-05-16 PROCEDURE — 63710000001 INSULIN DETEMIR PER 5 UNITS: Performed by: INTERNAL MEDICINE

## 2022-05-16 PROCEDURE — 97597 DBRDMT OPN WND 1ST 20 CM/<: CPT

## 2022-05-16 PROCEDURE — 82962 GLUCOSE BLOOD TEST: CPT

## 2022-05-16 PROCEDURE — 25010000002 HEPARIN (PORCINE) PER 1000 UNITS: Performed by: STUDENT IN AN ORGANIZED HEALTH CARE EDUCATION/TRAINING PROGRAM

## 2022-05-16 PROCEDURE — 25010000002 PIPERACILLIN SOD-TAZOBACTAM PER 1 G: Performed by: NURSE PRACTITIONER

## 2022-05-16 PROCEDURE — 99024 POSTOP FOLLOW-UP VISIT: CPT | Performed by: THORACIC SURGERY (CARDIOTHORACIC VASCULAR SURGERY)

## 2022-05-16 RX ORDER — CLONIDINE HYDROCHLORIDE 0.1 MG/1
0.1 TABLET ORAL EVERY 6 HOURS PRN
Status: DISCONTINUED | OUTPATIENT
Start: 2022-05-16 | End: 2022-05-18 | Stop reason: HOSPADM

## 2022-05-16 RX ADMIN — INSULIN LISPRO 4 UNITS: 100 INJECTION, SOLUTION INTRAVENOUS; SUBCUTANEOUS at 16:31

## 2022-05-16 RX ADMIN — HEPARIN SODIUM 5000 UNITS: 5000 INJECTION, SOLUTION INTRAVENOUS; SUBCUTANEOUS at 08:43

## 2022-05-16 RX ADMIN — Medication 10 ML: at 20:44

## 2022-05-16 RX ADMIN — INSULIN LISPRO 4 UNITS: 100 INJECTION, SOLUTION INTRAVENOUS; SUBCUTANEOUS at 12:25

## 2022-05-16 RX ADMIN — TAZOBACTAM SODIUM AND PIPERACILLIN SODIUM 3.38 G: 375; 3 INJECTION, SOLUTION INTRAVENOUS at 08:59

## 2022-05-16 RX ADMIN — INSULIN DETEMIR 5 UNITS: 100 INJECTION, SOLUTION SUBCUTANEOUS at 08:44

## 2022-05-16 RX ADMIN — INSULIN DETEMIR 5 UNITS: 100 INJECTION, SOLUTION SUBCUTANEOUS at 20:45

## 2022-05-16 RX ADMIN — PRAVASTATIN SODIUM 20 MG: 20 TABLET ORAL at 20:44

## 2022-05-16 RX ADMIN — TAZOBACTAM SODIUM AND PIPERACILLIN SODIUM 3.38 G: 375; 3 INJECTION, SOLUTION INTRAVENOUS at 00:20

## 2022-05-16 RX ADMIN — Medication 10 ML: at 08:55

## 2022-05-16 RX ADMIN — HEPARIN SODIUM 5000 UNITS: 5000 INJECTION, SOLUTION INTRAVENOUS; SUBCUTANEOUS at 20:44

## 2022-05-16 RX ADMIN — AMLODIPINE BESYLATE 10 MG: 10 TABLET ORAL at 08:43

## 2022-05-16 RX ADMIN — TAZOBACTAM SODIUM AND PIPERACILLIN SODIUM 3.38 G: 375; 3 INJECTION, SOLUTION INTRAVENOUS at 15:25

## 2022-05-16 RX ADMIN — ERTAPENEM 1 G: 1 INJECTION, POWDER, LYOPHILIZED, FOR SOLUTION INTRAMUSCULAR; INTRAVENOUS at 17:19

## 2022-05-16 NOTE — PLAN OF CARE
Goal Outcome Evaluation:  Plan of Care Reviewed With: patient           Outcome Evaluation: R toe wound vac changed today with mild ischemia noted to wound edges to proximal portion of wound.  PT was able to lightly debride several areas to help decrease bioburden and improve healing potential.  Overall, wound base appears healthy with no issues noted. PT to complete home wound vac paperwork for potential d/c home.

## 2022-05-16 NOTE — PROGRESS NOTES
[facility] Medicine Services  PROGRESS NOTE    Patient Name: Jesse Siu  : 1935  MRN: [id number]    Date of Admission: 5/10/2022  Primary Care Physician: IRVIN Martinez MD    Subjective   Subjective     CC:  gangrene    HPI:  No acute events overnight.    ROS:  Unable to obtain as patient sleeping    Objective   Objective     Vital Signs:   Temp:  [97.8 °F (36.6 °C)-98 °F (36.7 °C)] 97.8 °F (36.6 °C)  Heart Rate:  [63-89] 74  Resp:  [16-18] 16  BP: (129-190)/(77-99) 158/82     Physical Exam:  Constitutional: No acute distress, awake, alert  Respiratory: Clear to auscultation bilaterally, respiratory effort normal   Cardiovascular: RRR, no murmurs, rubs, or gallops  Gastrointestinal: Positive bowel sounds, soft, nontender, nondistended  Musculoskeletal: right foot in dressings  Skin: No rashes    Results Reviewed:  LAB RESULTS:      Lab 22  1056 22  0626 22  0503 22  0429 05/10/22  1307   WBC 12.36* 11.21* 13.95* 13.68* 16.49*   HEMOGLOBIN 9.5* 9.2* 9.7* 10.0* 10.0*   HEMATOCRIT 29.1* 28.7* 29.9* 31.4* 31.3*   PLATELETS 472* 499* 499* 510* 512*   NEUTROS ABS 9.84* 9.93* 11.19*  --  14.67*   IMMATURE GRANS (ABS) 0.14* 0.08* 0.10*  --  0.09*   LYMPHS ABS 1.43 0.77 1.40  --  0.81   MONOS ABS 0.58 0.41 0.79  --  0.87   EOS ABS 0.31 0.00 0.40  --  0.01   MCV 88.4 86.2 86.4 87.5 89.9   SED RATE  --   --   --   --  62*   CRP  --   --   --   --  12.33*   LACTATE  --   --   --   --  2.0         Lab 22  1057 22  0626 22  1028 22  0503 22  0429 05/10/22  1307   SODIUM 141 136  --  137 139 136   POTASSIUM 4.3 4.2 4.7 4.2 4.9 4.5   CHLORIDE 105 100  --  99 102 98   CO2 29.0 25.0  --  26.0 28.0 24.0   ANION GAP 7.0 11.0  --  12.0 9.0 14.0   BUN 22 22  --  22 21 29*   CREATININE 1.17 1.29*  --  1.23 1.20 1.39*   EGFR 60.3 53.7*  --  56.8* 58.5* 49.1*   GLUCOSE 166* 397*  --  199* 194* 286*   CALCIUM 8.6 8.3*  --  8.7 9.0 9.3   HEMOGLOBIN  A1C  --  8.90*  --   --   --   --          Lab 05/10/22  1307   TOTAL PROTEIN 7.2   ALBUMIN 3.00*   GLOBULIN 4.2   ALT (SGPT) 16   AST (SGOT) 16   BILIRUBIN 0.3   ALK PHOS 119*                 Lab 05/11/22  0920   ABO TYPING O   RH TYPING Negative   ANTIBODY SCREEN Negative         Brief Urine Lab Results     None          Microbiology Results Abnormal     Procedure Component Value - Date/Time    Blood Culture - Blood, Blood, Central Line [[id number]]  (Normal) Collected: 05/10/22 1300    Lab Status: Final result Specimen: Blood, Central Line Updated: 05/15/22 1403     Blood Culture No growth at 5 days    Blood Culture - Blood, Arm, Right [[id number]]  (Normal) Collected: 05/10/22 1320    Lab Status: Final result Specimen: Blood from Arm, Right Updated: 05/15/22 1403     Blood Culture No growth at 5 days    Anaerobic Culture - Tissue, Foot, Right [[id number]]  (Normal) Collected: 05/12/22 1618    Lab Status: Preliminary result Specimen: Tissue from Foot, Right Updated: 05/15/22 0939     Anaerobic Culture No anaerobes isolated at 3 days    AFB Culture - Tissue, Foot, Right [[id number]] Collected: 05/12/22 1618    Lab Status: Preliminary result Specimen: Tissue from Foot, Right Updated: 05/13/22 1212     AFB Stain No acid fast bacilli seen on concentrated smear    COVID PRE-OP / PRE-PROCEDURE SCREENING ORDER (NO ISOLATION) - Swab, Nasopharynx [[id number]]  (Normal) Collected: 05/10/22 1454    Lab Status: Final result Specimen: Swab from Nasopharynx Updated: 05/10/22 1532    Narrative:      The following orders were created for panel order COVID PRE-OP / PRE-PROCEDURE SCREENING ORDER (NO ISOLATION) - Swab, Nasopharynx.  Procedure                               Abnormality         Status                     ---------                               -----------         ------                     COVID-19, ABBOTT IN-HOUS...[[id number]]  Normal              Final result                 Please view results for these tests on the  individual orders.    COVID-19, ABBOTT IN-HOUSE,NASAL Swab (NO TRANSPORT MEDIA) 2 HR TAT - Swab, Nasopharynx [[id number]]  (Normal) Collected: 05/10/22 1454    Lab Status: Final result Specimen: Swab from Nasopharynx Updated: 05/10/22 1532     COVID19 Presumptive Negative    Narrative:      Fact sheet for providers: https://www.fda.gov/media/725631/download     Fact sheet for patients: https://www.fda.gov/media/185578/download    Test performed by PCR.  If inconsistent with clinical signs and symptoms patient should be tested with different authorized molecular test.          No radiology results from the last 24 hrs        I have reviewed the medications:  Scheduled Meds:amLODIPine, 10 mg, Oral, Q24H  heparin (porcine), 5,000 Units, Subcutaneous, Q12H  insulin detemir, 5 Units, Subcutaneous, Q12H  insulin lispro, 0-9 Units, Subcutaneous, TID AC  piperacillin-tazobactam, 3.375 g, Intravenous, Q8H  polyethylene glycol, 17 g, Oral, Daily  pravastatin, 20 mg, Oral, Nightly  sodium chloride, 10 mL, Intravenous, Q12H      Continuous Infusions:   PRN Meds:.•  acetaminophen **OR** acetaminophen **OR** acetaminophen  •  senna-docusate sodium **AND** polyethylene glycol **AND** bisacodyl **AND** bisacodyl  •  cloNIDine  •  dextrose  •  dextrose  •  docusate sodium  •  glucagon (human recombinant)  •  HYDROcodone-acetaminophen  •  loperamide  •  magnesium sulfate **OR** magnesium sulfate **OR** magnesium sulfate  •  melatonin  •  Morphine **AND** naloxone  •  ondansetron **OR** ondansetron  •  potassium chloride **OR** potassium chloride **OR** potassium chloride  •  sodium chloride    Assessment & Plan   Assessment & Plan     Active Hospital Problems    Diagnosis  POA   • Cellulitis of right foot [L03.115]  Yes   • 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        Jesse Siu is a 87 y.o. 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 Angela following, s/p amputation of R great toe and R second toe with extensive soft tissue debridement 5/12  -Continue wound VAC  -Continue Zosyn per ID  -Dr. Rivero following.  -NWB RLE  -no significant changes to plan.     Renal insufficiency  -Improved with IVF  -Monitor     DM2 w/ hyperglycemia  -Continue sliding scale insulin  -Continue Levemir 5 units twice daily at lower dose as sugars this morning lower than I would like for hospitalization  -Continue diabetic diet     HTN  --Hypertensive last night  - continue norvasic       DVT prophylaxis:  Medical and mechanical DVT prophylaxis orders are present.       AM-PAC 6 Clicks Score (PT): 24 (05/16/22 0800)    Disposition: I expect the patient to be discharged to rehab when bed available, medically ready.     CODE STATUS:   Code Status and Medical Interventions:   Ordered at: 05/10/22 1410     Code Status (Patient has no pulse and is not breathing):    CPR (Attempt to Resuscitate)     Medical Interventions (Patient has pulse or is breathing):    Full Support     I have prepared this progress note with copied portions of the prior day's progress note of my own authorship to preserve accuracy and maintain consistency of documentation. I have reviewed these portions and edited them for correctness. I verify that the above documentation accurately and truly represents the evaluation and management performed on today's date.       Linda Leslie MD  05/16/22

## 2022-05-16 NOTE — PROGRESS NOTES
INFECTIOUS DISEASE Progress Note    Jesse Siu  1935  [id number]    Admission Date: 5/10/2022      Requesting Provider: Sil Navarro II, DO  Evaluating Physician: Cesar Serrano MD    Reason for Consultation: Right diabetic foot infection/gangrene    History of present illness:    5/11/22: Patient is a 87 y.o. male who is seen today for evaluation of progressively worsening right first and second toe wound infections with gangrene. He initially developed right second toe ulcerations proximally one month ago.  He has been seeing a podiatrist and was started on oral antibiotic.  He has noted substantial worsening of his foot over the last week.  He was seen by his podiatrist on 5/9 who noted gangrene with a foul odor and recommended that he come to the emergency room.  He was found to have right first and second toe gangrene.  He has been seen by Dr. Angela and is currently scheduled for amputation tomorrow.     5/12/22:  He has been afebrile overnight.His white blood cell count today is 14.  His right foot wound cultures growing light gram-negative bacilli in his Gram stain revealed many gram-positive cocci and moderate gram-negative bacilli. Blood cultures from 5/10 are no growth so far. Duplex lower extremity arterial assessment revealed a 50-75% stenosis in the Right anterior tibial artery, short segment occlusion in the right mid posterior tibial artery, and an HENRIQUE reduced at 0.78.X-rays of his right foot revealed soft tissue gas. Creatinine today is 1.23. He denies increased right foot pain.  He does have a history of peripheral neuropathy.  At the time of surgery today he was found to have extensive necrosis with with necrotizing fasciitis extending to the base of the first, second, and third metatarsals.    5/13/22:  He remains afebrile. Right foot cultures from 5/10 grew Citrobacter which was sensitive to tested antibiotics.  Right foot tissue culture from 5/12 is pending but the Gram stain  revealed many white blood cells, many gram-positive cocci in pairs and clusters, and moderate gram-positive bacilli. Creatinine today is 1.29.  White blood cell count is 11.2.  He denies increased foot pain.  He denies nausea, vomiting, and diarrhea.    5/14/22:   Wound vac in place, photo of 5/10 noted.  He remains afebrile. Wound culture of 5/12 also with Citrobacter. He denies any fever, chills, n/v/d.     5/15/22:  He remains afebrile.  Wound vac in place.  No n/v/d.      5/16/22: He has remained afebrile. Right foot culture grew Citrobacter sensitive to all tested antibiotics.  He denies nausea, vomiting, and diarrhea.  His wound VAC is scheduled to be changed later today.  He does not want to go to a subacute nursing facility for rehab, wound care, and intravenous antibiotic therapy.    Past Medical History:   Diagnosis Date   • Hyperlipidemia    • Hypertension    • Pre-diabetes        Past Surgical History:   Procedure Laterality Date   • AMPUTATION DIGIT Right 5/12/2022    Procedure: GREAT AND SECOND TOE AMPUTATION RIGHT;  Surgeon: Lemuel Angela MD;  Location: [facility];  Service: Vascular;  Laterality: Right;   • FOOT SURGERY Left     CANCER REMOVAL   • TONSILLECTOMY     • TURP / TRANSURETHRAL INCISION / DRAINAGE PROSTATE         History reviewed. No pertinent family history.    Social History     Socioeconomic History   • Marital status:    Tobacco Use   • Smoking status: Never Smoker   • Smokeless tobacco: Never Used   Substance and Sexual Activity   • Alcohol use: Never   • Drug use: Never   • Sexual activity: Never       No Known Allergies      Medication:    Current Facility-Administered Medications:   •  acetaminophen (TYLENOL) tablet 650 mg, 650 mg, Oral, Q4H PRN, 650 mg at 05/14/22 2058 **OR** acetaminophen (TYLENOL) 160 MG/5ML solution 650 mg, 650 mg, Oral, Q4H PRN **OR** acetaminophen (TYLENOL) suppository 650 mg, 650 mg, Rectal, Q4H PRN, Hiral Borges PA-C  •  amLODIPine (NORVASC)  tablet 10 mg, 10 mg, Oral, Q24H, Linda Leslie MD, 10 mg at 05/15/22 0803  •  sennosides-docusate (PERICOLACE) 8.6-50 MG per tablet 2 tablet, 2 tablet, Oral, BID PRN **AND** polyethylene glycol (MIRALAX) packet 17 g, 17 g, Oral, Daily PRN **AND** bisacodyl (DULCOLAX) EC tablet 5 mg, 5 mg, Oral, Daily PRN **AND** bisacodyl (DULCOLAX) suppository 10 mg, 10 mg, Rectal, Daily PRN, Linda Leslie MD  •  dextrose (D50W) (25 g/50 mL) IV injection 25 g, 25 g, Intravenous, Q15 Min PRN, Hiral Borges PA-C  •  dextrose (GLUTOSE) oral gel 15 g, 15 g, Oral, Q15 Min PRN, Hiral Borges PA-C  •  docusate sodium (COLACE) capsule 100 mg, 100 mg, Oral, BID PRN, Hiral Borges PA-C  •  glucagon (human recombinant) (GLUCAGEN DIAGNOSTIC) injection 1 mg, 1 mg, Intramuscular, Q15 Min PRN, Hiral Borges PA-C  •  heparin (porcine) 5000 UNIT/ML injection 5,000 Units, 5,000 Units, Subcutaneous, Q12H, Hiral Borges PA-C, 5,000 Units at 05/15/22 2054  •  HYDROcodone-acetaminophen (NORCO) 5-325 MG per tablet 1 tablet, 1 tablet, Oral, Q4H PRN, Hiral Borges PA-C  •  insulin detemir (LEVEMIR) injection 5 Units, 5 Units, Subcutaneous, Q12H, Linda Leslie MD, 5 Units at 05/15/22 2054  •  Insulin Lispro (humaLOG) injection 0-9 Units, 0-9 Units, Subcutaneous, TID AC, Hiral Borges PA-C, 4 Units at 05/15/22 1629  •  loperamide (IMODIUM) capsule 2 mg, 2 mg, Oral, 4x Daily PRN, Linda Leslie MD, 2 mg at 05/15/22 1756  •  Magnesium Sulfate 2 gram Bolus, followed by 8 gram infusion (total Mg dose 10 grams)- Mg less than or equal to 1mg/dL, 2 g, Intravenous, PRN **OR** Magnesium Sulfate 2 gram / 50mL Infusion (GIVE X 3 BAGS TO EQUAL 6GM TOTAL DOSE) - Mg 1.1 - 1.5 mg/dl, 2 g, Intravenous, PRN **OR** Magnesium Sulfate 4 gram infusion- Mg 1.6-1.9 mg/dL, 4 g, Intravenous, PRN, Hiral Borges PA-C  •  melatonin tablet 5 mg, 5 mg, Oral, Nightly PRN, Hiral Borges PA-C  •  morphine injection 2  mg, 2 mg, Intravenous, Q2H PRN **AND** naloxone (NARCAN) injection 0.4 mg, 0.4 mg, Intravenous, Q5 Min PRN, Hiral Borges PA-C  •  ondansetron (ZOFRAN) tablet 4 mg, 4 mg, Oral, Q6H PRN **OR** ondansetron (ZOFRAN) injection 4 mg, 4 mg, Intravenous, Q6H PRN, Hiral Borges PA-C  •  piperacillin-tazobactam (ZOSYN) 3.375 g in iso-osmotic dextrose 50 ml (premix), 3.375 g, Intravenous, Q8H, Larissa Ruff APRN, 3.375 g at 05/16/22 0020  •  polyethylene glycol (MIRALAX) packet 17 g, 17 g, Oral, Daily, Hiral Borges PA-C, 17 g at 05/13/22 0857  •  potassium chloride (MICRO-K) CR capsule 40 mEq, 40 mEq, Oral, PRN **OR** potassium chloride (KLOR-CON) packet 40 mEq, 40 mEq, Oral, PRN **OR** potassium chloride 10 mEq in 100 mL IVPB, 10 mEq, Intravenous, Q1H PRN, Hiral Borges PA-C  •  pravastatin (PRAVACHOL) tablet 20 mg, 20 mg, Oral, Nightly, Hiral Borges PA-C, 20 mg at 05/15/22 2054  •  sodium chloride 0.9 % flush 10 mL, 10 mL, Intravenous, Q12H, Hiral Borges PA-C, 10 mL at 05/15/22 2054  •  sodium chloride 0.9 % flush 10 mL, 10 mL, Intravenous, PRN, Hiral Borges PA-C    Antibiotics:  Anti-Infectives (From admission, onward)    Ordered     Dose/Rate Route Frequency Start Stop    05/11/22 0718  vancomycin 1250 mg/250 mL 0.9% NS IVPB ([facility])        Ordering Provider: Cici Shoemaker, PharmD    1,250 mg Intravenous Once 05/11/22 1500 05/11/22 1659    05/10/22 1608  piperacillin-tazobactam (ZOSYN) 3.375 g in iso-osmotic dextrose 50 ml (premix)        Ordering Provider: Larissa Ruff APRN    3.375 g  over 4 Hours Intravenous Every 8 Hours 05/11/22 0000 05/24/22 2359    05/10/22 1608  piperacillin-tazobactam (ZOSYN) 3.375 g in iso-osmotic dextrose 50 ml (premix)        Ordering Provider: Sil Navarro II,     3.375 g  over 30 Minutes Intravenous Once 05/10/22 1700 05/10/22 1659    05/10/22 1416  meropenem (MERREM) 1 g/100 mL 0.9% NS ([facility]p)        Ordering Provider: Ole Hadley  MD ERI    1 g  over 30 Minutes Intravenous Once 05/10/22 1418 05/10/22 1521    05/10/22 1309  cefTRIAXone (ROCEPHIN) 1 g/100 mL 0.9% NS ([facility])        Ordering Provider: Ole Hadley MD    1 g  over 30 Minutes Intravenous Once 05/10/22 1311 05/10/22 1430    05/10/22 1307  vancomycin 1500 mg/500 mL 0.9% NS IVPB ([facility])        Ordering Provider: Ole Hadley MD    20 mg/kg × 70.3 kg Intravenous Once 05/10/22 1309 05/10/22 1541            Review of Systems:  See HPI      Physical Exam:   Vital Signs  Temp (24hrs), Av °F (36.7 °C), Min:97.8 °F (36.6 °C), Max:98.1 °F (36.7 °C)    Temp  Min: 97.8 °F (36.6 °C)  Max: 98.1 °F (36.7 °C)  BP  Min: 129/77  Max: 165/82  Pulse  Min: 63  Max: 89  Resp  Min: 16  Max: 18  SpO2  Min: 96 %  Max: 98 %    GENERAL: Awake and alert, in no acute distress.   HEENT: Normocephalic, atraumatic.  PERRL. EOMI. No conjunctival injection. No icterus. Oropharynx clear without evidence of thrush or exudate.   NECK: Supple   HEART: RRR; No murmur, rubs, gallops.   LUNGS: Clear to auscultation bilaterally without wheezing, rales, rhonchi. Normal respiratory effort  ABDOMEN: Soft, nontender, nondistended. No rebound or guarding.   EXT: The right foot wound photos reveal a 14 x 4 cm x 3 cm deep wound with decreased necrotic tissue and increased granulation tissue.  There is no purulence.  :  Without Hernandez catheter.  SKIN: Warm and dry without cutaneous eruptions on Inspection/palpation.    NEURO: Oriented to PPT. Motor 5/5 strength bilaterally.  He has decreased sensation to light touch in both distal lower extremities.  PSYCHIATRIC: Normal insight and judgement. Cooperative with PE    Laboratory Data    Results from last 7 days   Lab Units 22  1056 22  0626 22  0503   WBC 10*3/mm3 12.36* 11.21* 13.95*   HEMOGLOBIN g/dL 9.5* 9.2* 9.7*   HEMATOCRIT % 29.1* 28.7* 29.9*   PLATELETS 10*3/mm3 472* 499* 499*     Results from last 7 days   Lab Units 22  1057   SODIUM mmol/L  141   POTASSIUM mmol/L 4.3   CHLORIDE mmol/L 105   CO2 mmol/L 29.0   BUN mg/dL 22   CREATININE mg/dL 1.17   GLUCOSE mg/dL 166*   CALCIUM mg/dL 8.6     Results from last 7 days   Lab Units 05/10/22  1307   ALK PHOS U/L 119*   BILIRUBIN mg/dL 0.3   ALT (SGPT) U/L 16   AST (SGOT) U/L 16     Results from last 7 days   Lab Units 05/10/22  1307   SED RATE mm/hr 62*     Results from last 7 days   Lab Units 05/10/22  1307   CRP mg/dL 12.33*     Results from last 7 days   Lab Units 05/10/22  1307   LACTATE mmol/L 2.0         Results from last 7 days   Lab Units 05/11/22  0429   VANCOMYCIN RM mcg/mL 15.80     Estimated Creatinine Clearance: 44.2 mL/min (by C-G formula based on SCr of 1.17 mg/dL).      Microbiology:  No results found for: ACANTHNAEG, AFBCX, BPERTUSSISCX, BLOODCX  No results found for: BCIDPCR, CXREFLEX, CSFCX, CULTURETIS  No results found for: CULTURES, HSVCX, URCX  No results found for: EYECULTURE, GCCX, HSVCULTURE, LABHSV  No results found for: LEGIONELLA, MRSACX, MUMPSCX, MYCOPLASCX  No results found for: NOCARDIACX, STOOLCX  No results found for: THROATCX, UNSTIMCULT, URINECX, CULTURE, VZVCULTUR  No results found for: VIRALCULTU, WOUNDCX        Radiology:  Imaging Results (Last 72 Hours)     ** No results found for the last 72 hours. **            Impression:   1.  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  His foot wound is growing Citrobacter but this may have primarily been an anaerobic infection.  I will plan to switch his intravenous antibiotic therapy from Zosyn to Invanz.  2.  Type 2 diabetes mellitus  3.  Acute kidney injury-Improved  4.  Peripheral vascular disease    PLAN/RECOMMENDATIONS:   1.  Right first, second, and third metatarsal amputations-performed  2.  Discontinue Zosyn  3.  Invanz 1 g IV daily  4.  Continue wound care with a wound VAC    He will likely require a prolonged course of  intravenous antibiotic therapy and prolonged wound care.  He remains at risk for loss of his foot and may require a right BKA.  I discussed his disposition with case management and the patient himself.  We will tentatively plan for him to have his wound VAC changed 3 times weekly at [facility] PT wound care and infuse daily in our office with Invanz.  I discussed his situation with Dr. Angela today.  I discussed his situation with Wesley Prado in PT wound care today.  I spent over 35 minutes on his care today.       Cesar Serrano MD  5/16/2022  08:07 EDT

## 2022-05-16 NOTE — PLAN OF CARE
Goal Outcome Evaluation:  Plan of Care Reviewed With: patient        Progress: improving        Pt VSS. RA. A&O x4. No c/o of pain. Sinus arrhythmia on tele. Wound vac in place and dressing to right foot is cdi. Up with one assist to bsc and had 2-3 soft loose bm. Voiding spontaneously without difficulty and urinal within reach.

## 2022-05-16 NOTE — CASE MANAGEMENT/SOCIAL WORK
"Discharge Planning Assessment  [facility]     Patient Name: Jesse Siu  MRN: [id number]  Today's Date: 5/16/2022    Admit Date: 5/10/2022     Discharge Needs Assessment    No documentation.                Discharge Plan     Row Name 05/16/22 1502       Plan    Plan Home with family/ friend's assistance    Patient/Family in Agreement with Plan yes    Plan Comments Followed up with Mr. Siu at the bedside, for discharge planning.    Mr. Siu has been evaluated by PT today and \"PATIENT HAS 3 OPTION FOR MOBILITY: WALKER, KNEE SCOOTER AND W/C. HE DEMONSTRATED SAFE MOBILITY WITH W/C TODAY. I WOULD NOT RECOMMEND ROLLING WALKER AS PRIMARY MOBILITY. KNEE SCOOTER STILL AN OPTION IF AGREEABLE TO PURCHASE ONE.\"     Mr. Siu will be on a prolonged course of IV abx.  Discussed DC planning and Mr. Siu states that he would prefer to go home with his daughter's assistance and go to the [facility] office for daily infusions.  He states that he can get rides to the [facility] office from his family and friends.  CM has arranged for a wheelchair to be delivered to the hospital room tomorrow.  C  M will continue to follow.    Final Discharge Disposition Code 01 - home or self-care              Continued Care and Services - Admitted Since 5/10/2022     Durable Medical Equipment Coordination complete.    Service Provider Request Status Selected Services Address Phone Fax Patient Preferred    [facility]   Selected Durable Medical Equipment [address] [place] [address] [id number] [phone] --          Dialysis/Infusion     Service Provider Request Status Selected Services Address Phone Fax Patient Preferred    [facility]  Pending - No Request Sent N/A [address], [facility] [address] [phone] --              Expected Discharge Date and Time     Expected Discharge Date Expected Discharge Time    May 19, 2022                 Veronique Godfrey RN    "

## 2022-05-16 NOTE — THERAPY TREATMENT NOTE
Patient Name: Jesse Siu  : 1935    MRN: [id number]                              Today's Date: 2022       Admit Date: 5/10/2022    Visit Dx:     ICD-10-CM ICD-9-CM   1. Cellulitis of right foot  L03.115 682.7   2. Foot ulceration, right, with necrosis of muscle (HCC)  L97.513 707.15     728.89   3. Leukocytosis, unspecified type  D72.829 288.60   4. Acute hematogenous osteomyelitis of right foot (HCC)  M86.071 730.07   5. Elevated serum creatinine  R79.89 790.99     Patient Active Problem List   Diagnosis   • Cellulitis of right foot   • Gas gangrene of foot (HCC)   • Type 2 diabetes mellitus with hyperglycemia (HCC)   • Renal insufficiency   • Foot ulceration, right, with necrosis of muscle (HCC)     Past Medical History:   Diagnosis Date   • Hyperlipidemia    • Hypertension    • Pre-diabetes      Past Surgical History:   Procedure Laterality Date   • AMPUTATION DIGIT Right 2022    Procedure: GREAT AND SECOND TOE AMPUTATION RIGHT;  Surgeon: Lemuel Angela MD;  [facility]  Service: Vascular;  Laterality: Right;   • FOOT SURGERY Left     CANCER REMOVAL   • TONSILLECTOMY     • TURP / TRANSURETHRAL INCISION / DRAINAGE PROSTATE        General Information     Row Name 22 1155          Physical Therapy Time and Intention    Document Type therapy note (daily note)  -SC     Mode of Treatment physical therapy;individual therapy  -SC     Row Name 22 1155          General Information    Patient Profile Reviewed yes  -SC     Existing Precautions/Restrictions fall;right;non-weight bearing  -SC     Row Name 22 1155          Cognition    Orientation Status (Cognition) oriented x 3  -SC     Row Name 22 1155          Safety Issues, Functional Mobility    Impairments Affecting Function (Mobility) balance;endurance/activity tolerance;strength  -SC     Comment, Safety Issues/Impairments (Mobility) alert, following commands  -SC           User Key  (r) = Recorded By, (t) = Taken By,  (c) = Cosigned By    Initials Name Provider Type    SC Destini Hooper, PT Physical Therapist               Mobility     Row Name 05/16/22 1155          Bed Mobility    Bed Mobility scooting/bridging;supine-sit;sit-supine  -SC     Scooting/Bridging [place] (Bed Mobility) independent  -SC     Supine-Sit [place] (Bed Mobility) modified independence  -SC     Sit-Supine [place] (Bed Mobility) modified independence  -SC     Assistive Device (Bed Mobility) bed rails  -SC     Row Name 05/16/22 1155          Transfers    Comment, (Transfers) Worked on w/c transfrers and transfers up from w/c with walker  -SC     Row Name 05/16/22 1155          Bed-Chair Transfer    Bed-Chair [place] (Transfers) minimum assist (75% patient effort)  -SC     Assistive Device (Bed-Chair Transfers) wheelchair  -SC     Comment, (Bed-Chair Transfer) Taught to lock brakes prior to sit pivoting to w/c. Cues to keep wt off leg. Demonstrated good technique  -SC     Row Name 05/16/22 1155          Sit-Stand Transfer    Sit-Stand [place] (Transfers) minimum assist (75% patient effort);verbal cues  -SC     Assistive Device (Sit-Stand Transfers) walker, front-wheeled;walker, knee scooter  -SC     Comment, (Sit-Stand Transfer) STS from w/c with walker. Cues for hand placement. Multiple cues to keep leg off floor.  -SC     Row Name 05/16/22 1157          Gait/Stairs (Locomotion)    [place] Level (Gait) 1 person assist;moderate assist (50% patient effort)  -SC     Assistive Device (Gait) walker, front-wheeled;walker, knee scooter  -SC     Distance in Feet (Gait) 5  -SC     Bilateral Gait Deviations forward flexed posture  -SC     Comment, (Gait/Stairs) Worked on walking with rolling walker . Required assist to stabilizing walker. Cues for upright posture and to keep leg offf floor. Patient unsteady with rolling walker. ALSO WORKED ON W/C PROPULSION X15O FEET . CUES FOR SEQUENCING UE ON WHEELS. WORKED ON TURNS AND LOCKING BREAKS.  DEMONSTRATED GOOD TECHNIQUE  -SC           User Key  (r) = Recorded By, (t) = Taken By, (c) = Cosigned By    Initials Name Provider Type    SC Destini Hooper PT Physical Therapist               Obj/Interventions     Row Name 05/16/22 1159          Balance    Dynamic Standing Balance 2-person assist;moderate assist  -SC     Position/Device Used, Standing Balance supported  -SC           User Key  (r) = Recorded By, (t) = Taken By, (c) = Cosigned By    Initials Name Provider Type    SC Destini Hooper, PT Physical Therapist               Goals/Plan    No documentation.                Clinical Impression     Row Name 05/16/22 1159          Pain    Additional Documentation Pain Scale: FACES Pre/Post-Treatment (Group)  -[facility] Name 05/16/22 1159          Pain Scale: FACES Pre/Post-Treatment    Pain: FACES Scale, Pretreatment 2-->hurts little bit  -SC     Posttreatment Pain Rating 2-->hurts little bit  -SC     Pain Location - Side/Orientation Right  -SC     Pain Location lower  -SC     Pain Location - extremity  -SC     Row Name 05/16/22 1152          Plan of Care Review    Plan of Care Reviewed With patient  -SC     Progress improving  -SC     Outcome Evaluation PATIENT HAS 3 OPTION FOR MOBILITY: WALKER, KNEE SCOOTER AND W/C. HE DEMONSTRATED SAFE MOBILITY WITH W/C TODAY. I WOULD NOT RECOMMEND ROLLING WALKER AS PRIMARY MOBILITY. KNEE SCOOTER STILL AN OPTION IF AGREEABLE TO PURCHASE ONE.  -[facility] Name 05/16/22 1157          Therapy Assessment/Plan (PT)    Patient/Family Therapy Goals Statement (PT) GO HOME  -SC     Rehab Potential (PT) fair, will monitor progress closely  -SC     Criteria for Skilled Interventions Met (PT) yes;skilled treatment is necessary  -SC     Therapy Frequency (PT) daily  -SC     Row Name 05/16/22 1155          Positioning and Restraints    Pre-Treatment Position in bed  -SC     Post Treatment Position bed  -SC     In Bed notified nsg;supine;side lying left;encouraged to call for  assist;exit alarm on;with family/caregiver  -SC           User Key  (r) = Recorded By, (t) = Taken By, (c) = Cosigned By    Initials Name Provider Type    Destini Gutierrez PT Physical Therapist               Outcome Measures     Row Name 05/16/22 1202 05/16/22 0800       How much help from another person do you currently need...    Turning from your back to your side while in flat bed without using bedrails? 4  -SC 4  -SR    Moving from lying on back to sitting on the side of a flat bed without bedrails? 4  -SC 4  -SR    Moving to and from a bed to a chair (including a wheelchair)? 3  -SC 3  -SR    Standing up from a chair using your arms (e.g., wheelchair, bedside chair)? 3  -SC 3  -SR    Climbing 3-5 steps with a railing? 1  -SC 1  -SR    To walk in hospital room? 2  -SC 2  -SR    AM-PAC 6 Clicks Score (PT) 17  -SC 17  -SR    Highest level of mobility 5 --> Static standing  -SC 5 --> Static standing  -SR    Row Name 05/16/22 1202          Functional Assessment    Outcome Measure Options AM-PAC 6 Clicks Basic Mobility (PT)  -SC           User Key  (r) = Recorded By, (t) = Taken By, (c) = Cosigned By    Initials Name Provider Type    Destini Gutierrez PT Physical Therapist    Adamaris Whitley RN Registered Nurse                             Physical Therapy Education                 Title: PT OT SLP Therapies (Done)     Topic: Physical Therapy (Done)     Point: Mobility training (Done)     Learning Progress Summary           Patient ASAEL Gandhi D,RAUL, CAMACHO MARIE,NR by SC at 5/16/2022 1204    Comment: REVIEWED W/C PROPULSION  REVIEWED NWB    Acceptance, E, NR by SUSANA at 5/13/2022 1330    Comment: patient needs continued education on NWB status                   Point: Home exercise program (Done)     Learning Progress Summary           Patient ASAEL Gandhi D,RAUL, FRANK,CAMACHO,NR by SC at 5/16/2022 1204    Comment: REVIEWED W/C PROPULSION  REVIEWED NWB    Acceptance, E, NR by SUSANA at 5/13/2022 1330    Comment: patient needs continued  education on NWB status                   Point: Body mechanics (Done)     Learning Progress Summary           Patient Eager, E,D,TB, VU,DU,NR by SC at 5/16/2022 1204    Comment: REVIEWED W/C PROPULSION  REVIEWED NWB    Acceptance, E, NR by SUSANA at 5/13/2022 1330    Comment: patient needs continued education on NWB status                   Point: Precautions (Done)     Learning Progress Summary           Patient Eager, E,D,TB, VU,DU,NR by SC at 5/16/2022 1204    Comment: REVIEWED W/C PROPULSION  REVIEWED NWB    Acceptance, E, NR by SUSANA at 5/13/2022 1330    Comment: patient needs continued education on NWB status                               User Key     Initials Effective Dates Name Provider Type Discipline    SC 06/16/21 -  Destini Hooper, PT Physical Therapist PT    SUSANA 06/16/21 -  Saumya Huertas PT Physical Therapist PT              PT Recommendation and Plan     Plan of Care Reviewed With: patient  Progress: improving  Outcome Evaluation: PATIENT HAS 3 OPTION FOR MOBILITY: WALKER, KNEE SCOOTER AND W/C. HE DEMONSTRATED SAFE MOBILITY WITH W/C TODAY. I WOULD NOT RECOMMEND ROLLING WALKER AS PRIMARY MOBILITY. KNEE SCOOTER STILL AN OPTION IF AGREEABLE TO PURCHASE ONE.     Time Calculation:    PT Charges     Row Name 05/16/22 1120             Time Calculation    Start Time 1120  -SC      PT Received On 05/16/22  -SC      PT Goal Re-Cert Due Date 05/23/22  -SC              Time Calculation- PT    Total Timed Code Minutes-  minute(s)  -SC              Timed Charges    03546 - Gait Training Minutes  3  -SC      29964 - PT Therapeutic Activity Minutes 20  -SC              Total Minutes    Timed Charges Total Minutes 23  -SC       Total Minutes 23  -SC            User Key  (r) = Recorded By, (t) = Taken By, (c) = Cosigned By    Initials Name Provider Type    SC Destini Hooper, PT Physical Therapist              Therapy Charges for Today     Code Description Service Date Service Provider Modifiers Qty     27297623313  PT THER SUPP EA 15 MIN 5/16/2022 Destini Hooper, PT GP 2    41594446737 HC PT THERAPEUTIC ACT EA 15 MIN 5/16/2022 Destini Hooper, PT GP 2          PT G-Codes  Outcome Measure Options: AM-PAC 6 Clicks Basic Mobility (PT)  AM-PAC 6 Clicks Score (PT): 17    Destini Hooper, PT  5/16/2022

## 2022-05-16 NOTE — PLAN OF CARE
Goal Outcome Evaluation:  Plan of Care Reviewed With: patient        Progress: improving   Pt is alert and oriented x4. Vss on room air. Sinus Arrhythmia on tele. Pt has slept off and on throughout the shift. No c/o of pain. Dressing to right foot is CDI. Wound vac in place. Up with an assist x1 to bedside commode. Small BM this shift. Urinal provided and voiding spontaneously. Waffle mattress in place and repositions independently. Will continue to monitor.

## 2022-05-16 NOTE — THERAPY WOUND CARE TREATMENT
Acute Care - Wound/Debridement Treatment Note  [facility]     Patient Name: Jesse Siu  : 1935  MRN: [id number]  Today's Date: 2022                Admit Date: 5/10/2022    Visit Dx:    ICD-10-CM ICD-9-CM   1. Cellulitis of right foot  L03.115 682.7   2. Foot ulceration, right, with necrosis of muscle (HCC)  L97.513 707.15     728.89   3. Leukocytosis, unspecified type  D72.829 288.60   4. Acute hematogenous osteomyelitis of right foot (HCC)  M86.071 730.07   5. Elevated serum creatinine  R79.89 790.99       Patient Active Problem List   Diagnosis   • Cellulitis of right foot   • Gas gangrene of foot (HCC)   • Type 2 diabetes mellitus with hyperglycemia (HCC)   • Renal insufficiency   • Foot ulceration, right, with necrosis of muscle (HCC)        Past Medical History:   Diagnosis Date   • Hyperlipidemia    • Hypertension    • Pre-diabetes         Past Surgical History:   Procedure Laterality Date   • AMPUTATION DIGIT Right 2022    Procedure: GREAT AND SECOND TOE AMPUTATION RIGHT;  Surgeon: Lemuel Angela MD;  Location: [facility];  Service: Vascular;  Laterality: Right;   • FOOT SURGERY Left     CANCER REMOVAL   • TONSILLECTOMY     • TURP / TRANSURETHRAL INCISION / DRAINAGE PROSTATE             Wound 05/10/22 1600 Right foot (Active)   Dressing Appearance dry;intact 22 1400   Closure ISAIAS 22 0800   Base moist;pink;red;slough;yellow;subcutaneous;exposed structure 22 1400   Periwound intact;dry 22 1400   Periwound Temperature warm 22 1400   Periwound Skin Turgor firm 22 1400   Edges irregular 22 1400   Drainage Characteristics/Odor serous;serosanguineous 22 1400   Drainage Amount small 22 1400   Care, Wound irrigated with;sterile normal saline;debrided;negative pressure wound therapy 22 1400   Dressing Care dressing changed 22 1400       Wound 22 Right anterior foot Incision (Active)   Dressing Appearance dry;intact;no drainage  05/16/22 1200   Closure ISAIAS 05/16/22 0800   Base dressing in place, unable to visualize 05/16/22 0800   Drainage Amount none 05/15/22 2054   Dressing Care gauze;other (see comments) 05/15/22 2054       NPWT (Negative Pressure Wound Therapy) 05/13/22 0930 R foot wound (Active)   Therapy Setting continuous therapy 05/16/22 1400   Dressing foam, black;foam, white 05/16/22 1400   Pressure Setting 150 mmHg 05/16/22 1400   Sponges Inserted 2 05/16/22 1400   Sponges Removed 2 05/16/22 1400   Finger sweep complete Yes 05/16/22 1400         WOUND DEBRIDEMENT  Total area of Debridement: ~10cm2  Debridement Site 1  Location- Site 1: R foot and periwound area  Selective Debridement- Site 1: Wound Surface <20cmsq  Instruments- Site 1: tweezers, scissors  Excised Tissue Description- Site 1: moderate, slough, other (comment) (nonviable periwound skin)  Bleeding- Site 1: none               PT Assessment (last 12 hours)     PT Evaluation and Treatment     Row Name 05/16/22 1400          Physical Therapy Time and Intention    Subjective Information complains of;weakness;fatigue  -MF     Document Type therapy note (daily note);wound care  -MF     Mode of Treatment individual therapy;physical therapy  -     Row Name 05/16/22 1400          Pain Scale: FACES Pre/Post-Treatment    Pain: FACES Scale, Pretreatment 2-->hurts little bit  -MF     Posttreatment Pain Rating 0-->no hurt  -MF     Pain Location - Side/Orientation Right  -MF     Pain Location lower  -MF     Pain Location - extremity  -MF     Row Name 05/16/22 1400          Wound 05/10/22 1600 Right foot    Wound - Properties Group Placement Date: 05/10/22  -AM Placement Time: 1600  -AM Side: Right  -AM Location: foot  -AM     Dressing Appearance dry;intact  -MF     Base moist;pink;red;slough;yellow;subcutaneous;exposed structure  bone and fascia to wound base.  -MF     Periwound intact;dry  -MF     Periwound Temperature warm  -MF     Periwound Skin Turgor firm  -MF     Edges  irregular  -MF     Drainage Characteristics/Odor serous;serosanguineous  -MF     Drainage Amount small  -MF     Care, Wound irrigated with;sterile normal saline;debrided;negative pressure wound therapy  -MF     Dressing Care dressing changed  -MF     Retired Wound - Properties Group Placement Date: 05/10/22  -AM Placement Time: 1600  -AM Side: Right  -AM Location: foot  -AM     Retired Wound - Properties Group Date first assessed: 05/10/22  -AM Time first assessed: 1600  -AM Side: Right  -AM Location: foot  -AM     Row Name             Wound 05/12/22 Right anterior foot Incision    Wound - Properties Group Placement Date: 05/12/22  -MP Present on Hospital Admission: N  -MP Side: Right  -MP Orientation: anterior  -MP Location: foot  -MP Primary Wound Type: Incision  -MP     Retired Wound - Properties Group Placement Date: 05/12/22  -MP Present on Hospital Admission: N  -MP Side: Right  -MP Orientation: anterior  -MP Location: foot  -MP Primary Wound Type: Incision  -MP     Retired Wound - Properties Group Date first assessed: 05/12/22  -MP Present on Hospital Admission: N  -MP Side: Right  -MP Location: foot  -MP Primary Wound Type: Incision  -MP     Row Name 05/16/22 1400          NPWT (Negative Pressure Wound Therapy) 05/13/22 0930 R foot wound    NPWT (Negative Pressure Wound Therapy) - Properties Group Placement Date: 05/13/22  -MF Placement Time: 0930 -MF Location: R foot wound  -MF     Therapy Setting continuous therapy  -MF     Dressing foam, black;foam, white  -MF     Pressure Setting 150 mmHg  -MF     Sponges Inserted 2  1 white 1 black  -MF     Sponges Removed 2  1 black 1 white  -MF     Finger sweep complete Yes  -MF     Retired NPWT (Negative Pressure Wound Therapy) - Properties Group Placement Date: 05/13/22  -MF Placement Time: 0930  -MF Location: R foot wound  -MF     Retired NPWT (Negative Pressure Wound Therapy) - Properties Group Placement Date: 05/13/22  -MF Placement Time: 0930  -MF Location: R  foot wound  -     Row Name 05/16/22 1400          Coping    Observed Emotional State calm;cooperative  -     Verbalized Emotional State acceptance  -MF     Trust Relationship/Rapport care explained  -     Row Name 05/16/22 1400          Plan of Care Review    Plan of Care Reviewed With patient  -MF     Outcome Evaluation R toe wound vac changed today with mild ischemia noted to wound edges to proximal portion of wound.  PT was able to lightly debride several areas to help decrease bioburden and improve healing potential.  Overall, wound base appears healthy with no issues noted. PT to complete home wound vac paperwork for potential d/c home.  -     Row Name 05/16/22 1400          Positioning and Restraints    Pre-Treatment Position in bed  -     Post Treatment Position bed  -MF     In Bed supine;call light within reach  -           User Key  (r) = Recorded By, (t) = Taken By, (c) = Cosigned By    Initials Name Provider Type    MF Wesley Morgan, PT Physical Therapist    Yael Nam RN Registered Nurse    Konstantin Browning RN Registered Nurse              Physical Therapy Education                 Title: PT OT SLP Therapies (Done)     Topic: Physical Therapy (Done)     Point: Mobility training (Done)     Learning Progress Summary           Patient Eager, E,D,TB, VU,DU,NR by SC at 5/16/2022 1204    Comment: REVIEWED W/C PROPULSION  REVIEWED NWB    Acceptance, E, NR by  at 5/13/2022 1330    Comment: patient needs continued education on NWB status                   Point: Home exercise program (Done)     Learning Progress Summary           Patient Eager, E,D,TB, VU,DU,NR by SC at 5/16/2022 1204    Comment: REVIEWED W/C PROPULSION  REVIEWED NWB    Acceptance, E, NR by SUSANA at 5/13/2022 1330    Comment: patient needs continued education on NWB status                   Point: Body mechanics (Done)     Learning Progress Summary           Patient Eager, E,D,TB, VU,DU,NR by SC at 5/16/2022 1204     Comment: REVIEWED W/C PROPULSION  REVIEWED NWB    Acceptance, E, NR by  at 5/13/2022 1330    Comment: patient needs continued education on NWB status                   Point: Precautions (Done)     Learning Progress Summary           Patient Eager, E,D,TB, VU,DU,NR by SC at 5/16/2022 1204    Comment: REVIEWED W/C PROPULSION  REVIEWED NWB    Acceptance, E, NR by  at 5/13/2022 1330    Comment: patient needs continued education on NWB status                               User Key     Initials Effective Dates Name Provider Type Discipline    SC 06/16/21 -  Destini Hooper, PT Physical Therapist PT     06/16/21 -  Saumya Huertas PT Physical Therapist PT                Recommendation and Plan  Anticipated Equipment Needs at Discharge (PT): front wheeled walker, rolling knee walker, wheelchair  Anticipated Discharge Disposition (PT): inpatient rehabilitation facility  Planned Therapy Interventions (PT): balance training, bed mobility training, gait training, strengthening, transfer training, home exercise program  Therapy Frequency (PT): daily  Plan of Care Reviewed With: patient           Outcome Evaluation: R toe wound vac changed today with mild ischemia noted to wound edges to proximal portion of wound.  PT was able to lightly debride several areas to help decrease bioburden and improve healing potential.  Overall, wound base appears healthy with no issues noted. PT to complete home wound vac paperwork for potential d/c home.  Plan of Care Reviewed With: patient            Time Calculation   PT Charges     Row Name 05/16/22 1400 05/16/22 1120          Time Calculation    Start Time 1400  - 1120  -SC     PT Received On -- 05/16/22  -SC     PT Goal Re-Cert Due Date 05/23/22  - 05/23/22  -SC            Time Calculation- PT    Total Timed Code Minutes- PT -- 223 minute(s)  -SC            Timed Charges    06528 - Gait Training Minutes  -- 3  -SC     62824 - PT Therapeutic Activity Minutes -- 20  -SC             Untimed Charges    55999-Iqc Pressure wound to 50 sqcm 40  -MF --            Total Minutes    Timed Charges Total Minutes -- 23  -SC     Untimed Charges Total Minutes 40  -MF --      Total Minutes 40  -MF 23  -SC           User Key  (r) = Recorded By, (t) = Taken By, (c) = Cosigned By    Initials Name Provider Type    SC Destini Hooper, PT Physical Therapist    Wesley Vallejo, PT Physical Therapist                  Therapy Charges for Today     Code Description Service Date Service Provider Modifiers Qty    70736858113 HC JOSE DEBRIDE OPEN WOUND UP TO 20CM 5/16/2022 Wesley Morgan, PT GP 1    [id number] HC PT NEG PRESS WOUND TO 50SQCM DME3 5/16/2022 Wesley Morgan, PT GP 1            PT G-Codes  Outcome Measure Options: AM-PAC 6 Clicks Basic Mobility (PT)  AM-PAC 6 Clicks Score (PT): 17       Wesley Morgan, PT  5/16/2022

## 2022-05-16 NOTE — PROGRESS NOTES
Cardiothoracic Surgery Progress Note      POD #: 4-transmetatarsal amputation of the right second toe third toe and great toe.  With extensive soft tissue debridement.  Wound VAC in place.     LOS: 6 days      Subjective: Asleep this morning resting company did not awaken him vital signs normal on monitor    Objective:  Vital Signs vital signs below noted T-max past 24 hours 98.8 °F  Temp:  [97.8 °F (36.6 °C)-98.8 °F (37.1 °C)] 97.8 °F (36.6 °C)  Heart Rate:  [63-89] 63  Resp:  [16-18] 16  BP: (129-165)/(77-99) 129/77    Physical Exam:   General Appearance: Asleep this morning resting quietly   Lungs:   Heart:   Skin:   Incision: The amputation site/extensive soft tissue debridement size is covered with wound VAC and dry dressing.  No drainage noted     Results:  Results from last 7 days   Lab Units 05/14/22  1056   WBC 10*3/mm3 12.36*   HEMOGLOBIN g/dL 9.5*   HEMATOCRIT % 29.1*   PLATELETS 10*3/mm3 472*     Results from last 7 days   Lab Units 05/14/22  1057   SODIUM mmol/L 141   POTASSIUM mmol/L 4.3   CHLORIDE mmol/L 105   CO2 mmol/L 29.0   BUN mg/dL 22   CREATININE mg/dL 1.17   GLUCOSE mg/dL 166*   CALCIUM mg/dL 8.6         Assessment: #1.  Postop day 4 right great toe right second toe right third toe metatarsal amputation extensive soft tissue debridement-wound VAC in place  2.  Renal insufficiency mild  3 prediabetic      Plan: Continue wound VAC per PT wound care.  Antibiotics infectious disease.  Overall medical manage per hospitalist.      Lemuel Angela MD - 05/16/22 - 06:00 EDT

## 2022-05-16 NOTE — PLAN OF CARE
Goal Outcome Evaluation:  Plan of Care Reviewed With: patient        Progress: improving  Outcome Evaluation: PATIENT HAS 3 OPTION FOR MOBILITY: WALKER, KNEE SCOOTER AND W/C. HE DEMONSTRATED SAFE MOBILITY WITH W/C TODAY. I WOULD NOT RECOMMEND ROLLING WALKER AS PRIMARY MOBILITY. KNEE SCOOTER STILL AN OPTION IF AGREEABLE TO PURCHASE ONE.

## 2022-05-17 LAB
BACTERIA SPEC ANAEROBE CULT: NORMAL
GLUCOSE BLDC GLUCOMTR-MCNC: 116 MG/DL (ref 70–130)
GLUCOSE BLDC GLUCOMTR-MCNC: 127 MG/DL (ref 70–130)
GLUCOSE BLDC GLUCOMTR-MCNC: 273 MG/DL (ref 70–130)

## 2022-05-17 PROCEDURE — 63710000001 INSULIN DETEMIR PER 5 UNITS: Performed by: NURSE PRACTITIONER

## 2022-05-17 PROCEDURE — 97530 THERAPEUTIC ACTIVITIES: CPT

## 2022-05-17 PROCEDURE — 25010000002 ERTAPENEM PER 500 MG: Performed by: INTERNAL MEDICINE

## 2022-05-17 PROCEDURE — 97116 GAIT TRAINING THERAPY: CPT

## 2022-05-17 PROCEDURE — C1751 CATH, INF, PER/CENT/MIDLINE: HCPCS

## 2022-05-17 PROCEDURE — 25010000002 HEPARIN (PORCINE) PER 1000 UNITS: Performed by: STUDENT IN AN ORGANIZED HEALTH CARE EDUCATION/TRAINING PROGRAM

## 2022-05-17 PROCEDURE — 82962 GLUCOSE BLOOD TEST: CPT

## 2022-05-17 PROCEDURE — 02HV33Z INSERTION OF INFUSION DEVICE INTO SUPERIOR VENA CAVA, PERCUTANEOUS APPROACH: ICD-10-PCS | Performed by: INTERNAL MEDICINE

## 2022-05-17 PROCEDURE — 99232 SBSQ HOSP IP/OBS MODERATE 35: CPT | Performed by: NURSE PRACTITIONER

## 2022-05-17 PROCEDURE — C1894 INTRO/SHEATH, NON-LASER: HCPCS

## 2022-05-17 PROCEDURE — 63710000001 INSULIN LISPRO (HUMAN) PER 5 UNITS: Performed by: STUDENT IN AN ORGANIZED HEALTH CARE EDUCATION/TRAINING PROGRAM

## 2022-05-17 PROCEDURE — 63710000001 INSULIN DETEMIR PER 5 UNITS: Performed by: INTERNAL MEDICINE

## 2022-05-17 PROCEDURE — 99024 POSTOP FOLLOW-UP VISIT: CPT | Performed by: THORACIC SURGERY (CARDIOTHORACIC VASCULAR SURGERY)

## 2022-05-17 PROCEDURE — 97605 NEG PRS WND THER DME<=50SQCM: CPT

## 2022-05-17 RX ORDER — SODIUM CHLORIDE 0.9 % (FLUSH) 0.9 %
10 SYRINGE (ML) INJECTION AS NEEDED
Status: DISCONTINUED | OUTPATIENT
Start: 2022-05-17 | End: 2022-05-18 | Stop reason: HOSPADM

## 2022-05-17 RX ORDER — SODIUM CHLORIDE 0.9 % (FLUSH) 0.9 %
10 SYRINGE (ML) INJECTION EVERY 12 HOURS SCHEDULED
Status: DISCONTINUED | OUTPATIENT
Start: 2022-05-17 | End: 2022-05-18 | Stop reason: HOSPADM

## 2022-05-17 RX ADMIN — HEPARIN SODIUM 5000 UNITS: 5000 INJECTION, SOLUTION INTRAVENOUS; SUBCUTANEOUS at 08:38

## 2022-05-17 RX ADMIN — INSULIN DETEMIR 5 UNITS: 100 INJECTION, SOLUTION SUBCUTANEOUS at 12:09

## 2022-05-17 RX ADMIN — INSULIN DETEMIR 5 UNITS: 100 INJECTION, SOLUTION SUBCUTANEOUS at 08:37

## 2022-05-17 RX ADMIN — ERTAPENEM 1 G: 1 INJECTION, POWDER, LYOPHILIZED, FOR SOLUTION INTRAMUSCULAR; INTRAVENOUS at 18:38

## 2022-05-17 RX ADMIN — Medication 10 ML: at 18:42

## 2022-05-17 RX ADMIN — Medication 10 ML: at 21:24

## 2022-05-17 RX ADMIN — INSULIN LISPRO 6 UNITS: 100 INJECTION, SOLUTION INTRAVENOUS; SUBCUTANEOUS at 12:09

## 2022-05-17 RX ADMIN — Medication 10 ML: at 08:38

## 2022-05-17 RX ADMIN — AMLODIPINE BESYLATE 10 MG: 10 TABLET ORAL at 08:38

## 2022-05-17 RX ADMIN — PRAVASTATIN SODIUM 20 MG: 20 TABLET ORAL at 21:24

## 2022-05-17 RX ADMIN — HEPARIN SODIUM 5000 UNITS: 5000 INJECTION, SOLUTION INTRAVENOUS; SUBCUTANEOUS at 21:24

## 2022-05-17 RX ADMIN — CLONIDINE HYDROCHLORIDE 0.1 MG: 0.1 TABLET ORAL at 21:28

## 2022-05-17 NOTE — PLAN OF CARE
Goal Outcome Evaluation:  Plan of Care Reviewed With: patient           Outcome Evaluation: wound vac intact with no issues noted. PT to change dressing in 1-2 days.

## 2022-05-17 NOTE — CASE MANAGEMENT/SOCIAL WORK
Discharge Planning Assessment  [facility]     Patient Name: Jesse Siu  MRN: [id number]  Today's Date: 5/17/2022    Admit Date: 5/10/2022     Discharge Needs Assessment    No documentation.                Discharge Plan     Row Name 05/17/22 1515       Plan    Plan Home with family/ friend's assistance, Daily IV infusions at [facility] office and Wound care at [facility], DME from [employer]    Patient/Family in Agreement with Plan yes    Plan Comments Met with Mr. Siu and his daughter, Torie, at the bedside and his [facility], [facility] for [facility] planning.    Mr. Siu's DC plan is to return home.  He will be going to the [facility] office for daily IV infusions and to [facility] for wound care.  A PICC line has been inserted.   First Wound care appointment is this coming Friday, 5/20, and is noted on AVS.  Spoke with Wesley LÓPEZ and he will be changing over the wound vac tomorrow.  Wesley states he will get the signature for the home wound vac from from Dr. Serrano tomorrow.    [employer] delivered a wheelchair, rolling walker and bedside commode to the patient 's room today.  Torie worked with [facility] today on transferring her Father to the wheelchair.  Mr. Siu will not qualify for home health as he is not homebound.    Mr. Siu, Torie and Geena are all agreeable to DC plan. Mr. Siu will have transportation from his family and from friends to his appointments.    CM will continue to follow.    Final Discharge Disposition Code 01 - home or self-care              Continued Care and Services - Admitted Since 5/10/2022     Durable Medical Equipment Coordination complete.    Service Provider Request Status Selected Services Address Phone Fax Patient Preferred    [facility]   Selected Durable Medical Equipment [address] [address] [phone] --          Dialysis/Infusion Coordination complete.    Service Provider Request Status Selected  Services Address Phone Fax Patient Preferred    [facility]   Selected Infusion and IV Therapy [address], [facility] [address] [phone] --              Expected Discharge Date and Time     Expected Discharge Date Expected Discharge Time    May 19, 2022                           Veronique Godfrey RN

## 2022-05-17 NOTE — PLAN OF CARE
Goal Outcome Evaluation:  Plan of Care Reviewed With: patient        Progress: improving  Outcome Evaluation: Pt performed bed mobility with supervision, transferred bed <> wheelchair with RW and min A. Required reminders to maintain NWB. Propelled w/c 150 feet with supervision and spent time educating on wheelchair setup and takedown and safety at home.

## 2022-05-17 NOTE — PROGRESS NOTES
[facility]  PROGRESS NOTE    Patient Name: Jesse Siu  : 1935  MRN: [id number]    Date of Admission: 5/10/2022  Primary Care Physician: IRVIN Martinez MD    Subjective   Subjective     CC:  F/u gangrene    HPI:  Patient resting in bed. He says his pain is well-controlled with Tylenol. He is feeling better and has no complaints. We talked about the importance of good BG control in setting of infection.    ROS:  Gen- No fevers, chills  CV- No chest pain, palpitations  Resp- No cough, dyspnea  GI- No N/V/D, abd pain    Objective   Objective     Vital Signs:   Temp:  [97.7 °F (36.5 °C)-98.1 °F (36.7 °C)] 98 °F (36.7 °C)  Heart Rate:  [67-75] 68  Resp:  [16-18] 18  BP: (153-186)/(78-92) 153/89     Physical Exam:  Constitutional: No acute distress, awake, alert  HENT: NCAT, mucous membranes moist  Respiratory: Clear to auscultation bilaterally, respiratory effort normal, room air  Cardiovascular: RRR, no murmurs, rubs, or gallops  Gastrointestinal: Positive bowel sounds, soft, nontender, nondistended  Musculoskeletal: No LLE edema, + LLE pedal pulse, RLE wrapped, wound vac in place, + popliteal pulse  Psychiatric: Appropriate affect, cooperative  Neurologic: Oriented x 3, strength symmetric in all extremities, Cranial Nerves grossly intact to confrontation, speech clear  Skin: No rashes    Results Reviewed:  LAB RESULTS:      Lab 22  1056 22  0626 22  0503 22  0429 05/10/22  1307   WBC 12.36* 11.21* 13.95* 13.68* 16.49*   HEMOGLOBIN 9.5* 9.2* 9.7* 10.0* 10.0*   HEMATOCRIT 29.1* 28.7* 29.9* 31.4* 31.3*   PLATELETS 472* 499* 499* 510* 512*   NEUTROS ABS 9.84* 9.93* 11.19*  --  14.67*   IMMATURE GRANS (ABS) 0.14* 0.08* 0.10*  --  0.09*   LYMPHS ABS 1.43 0.77 1.40  --  0.81   MONOS ABS 0.58 0.41 0.79  --  0.87   EOS ABS 0.31 0.00 0.40  --  0.01   MCV 88.4 86.2 86.4 87.5 89.9   SED RATE  --   --   --   --  62*   CRP  --   --   --   --  12.33*    LACTATE  --   --   --   --  2.0         Lab 05/14/22  1057 05/13/22  0626 05/12/22  1028 05/12/22  0503 05/11/22  0429 05/10/22  1307   SODIUM 141 136  --  137 139 136   POTASSIUM 4.3 4.2 4.7 4.2 4.9 4.5   CHLORIDE 105 100  --  99 102 98   CO2 29.0 25.0  --  26.0 28.0 24.0   ANION GAP 7.0 11.0  --  12.0 9.0 14.0   BUN 22 22  --  22 21 29*   CREATININE 1.17 1.29*  --  1.23 1.20 1.39*   EGFR 60.3 53.7*  --  56.8* 58.5* 49.1*   GLUCOSE 166* 397*  --  199* 194* 286*   CALCIUM 8.6 8.3*  --  8.7 9.0 9.3   HEMOGLOBIN A1C  --  8.90*  --   --   --   --          Lab 05/10/22  1307   TOTAL PROTEIN 7.2   ALBUMIN 3.00*   GLOBULIN 4.2   ALT (SGPT) 16   AST (SGOT) 16   BILIRUBIN 0.3   ALK PHOS 119*                 Lab 05/11/22  0920   ABO TYPING O   RH TYPING Negative   ANTIBODY SCREEN Negative         Brief Urine Lab Results     None          Microbiology Results Abnormal     Procedure Component Value - Date/Time    Anaerobic Culture - Tissue, Foot, Right [[id number]] Collected: 05/12/22 1618    Lab Status: Final result Specimen: Tissue from Foot, Right Updated: 05/17/22 0823     Anaerobic Culture No anaerobes isolated at 5 days    Blood Culture - Blood, Blood, Central Line [[id number]]  (Normal) Collected: 05/10/22 1300    Lab Status: Final result Specimen: Blood, Central Line Updated: 05/15/22 1403     Blood Culture No growth at 5 days    Blood Culture - Blood, Arm, Right [[id number]]  (Normal) Collected: 05/10/22 1320    Lab Status: Final result Specimen: Blood from Arm, Right Updated: 05/15/22 1403     Blood Culture No growth at 5 days    AFB Culture - Tissue, Foot, Right [[id number]] Collected: 05/12/22 1618    Lab Status: Preliminary result Specimen: Tissue from Foot, Right Updated: 05/13/22 1212     AFB Stain No acid fast bacilli seen on concentrated smear    COVID PRE-OP / PRE-PROCEDURE SCREENING ORDER (NO ISOLATION) - Swab, Nasopharynx [[id number]]  (Normal) Collected: 05/10/22 3116    Lab Status: Final result Specimen:  Swab from Nasopharynx Updated: 05/10/22 1532    Narrative:      The following orders were created for panel order COVID PRE-OP / PRE-PROCEDURE SCREENING ORDER (NO ISOLATION) - Swab, Nasopharynx.  Procedure                               Abnormality         Status                     ---------                               -----------         ------                     COVID-19, ABBOTT IN-HOUS...[[id number]]  Normal              Final result                 Please view results for these tests on the individual orders.    COVID-19, ABBOTT IN-HOUSE,NASAL Swab (NO TRANSPORT MEDIA) 2 HR TAT - Swab, Nasopharynx [[id number]]  (Normal) Collected: 05/10/22 0987    Lab Status: Final result Specimen: Swab from Nasopharynx Updated: 05/10/22 1532     COVID19 Presumptive Negative    Narrative:      Fact sheet for providers: https://www.fda.gov/media/102540/download     Fact sheet for patients: https://www.fda.gov/media/140890/download    Test performed by PCR.  If inconsistent with clinical signs and symptoms patient should be tested with different authorized molecular test.          No radiology results from the last 24 hrs    I have reviewed the medications:  Scheduled Meds:amLODIPine, 10 mg, Oral, Q24H  ertapenem, 1 g, Intravenous, Q24H  heparin (porcine), 5,000 Units, Subcutaneous, Q12H  insulin detemir, 5 Units, Subcutaneous, Q12H  insulin lispro, 0-9 Units, Subcutaneous, TID AC  polyethylene glycol, 17 g, Oral, Daily  pravastatin, 20 mg, Oral, Nightly  sodium chloride, 10 mL, Intravenous, Q12H      Continuous Infusions:   PRN Meds:.•  acetaminophen **OR** acetaminophen **OR** acetaminophen  •  senna-docusate sodium **AND** polyethylene glycol **AND** bisacodyl **AND** bisacodyl  •  cloNIDine  •  dextrose  •  dextrose  •  docusate sodium  •  glucagon (human recombinant)  •  HYDROcodone-acetaminophen  •  loperamide  •  magnesium sulfate **OR** magnesium sulfate **OR** magnesium sulfate  •  melatonin  •  Morphine **AND**  naloxone  •  ondansetron **OR** ondansetron  •  potassium chloride **OR** potassium chloride **OR** potassium chloride  •  sodium chloride    Assessment & Plan   Assessment & Plan     Active Hospital Problems    Diagnosis  POA   • Cellulitis of right foot [L03.115]  Yes   • 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        Brief Hospital Course to date:  Jesse Siu is a 87 y.o. male with PMHX significant for DMII, HTN who presents with worsening diabetic foot ulcer with cellulitis and gangrene,     This patient's problems and plans were partially entered by my partner and updated as appropriate by me 05/17/22.    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 Angela following, s/p amputation of R great toe and R second toe with extensive soft tissue debridement 5/12  -Continue wound VAC. Will do 3x/week wound vac changes at [facility] as outpatient  -Zosyn changed to IV Invanz per ID. DC plan is daily infusions of Invanz at [facility] office. Will likely require prolonged course.  -NWB RLE. PT rec is WC or knee scooter for mobility while recovering  -AM CBC     Renal insufficiency  -Improved with IVF  -Monitor  -AM BMP     DM2 w/ hyperglycemia  -A1c 8.9 on 5/13/22  -Continue sliding scale insulin  -Change Levemir to 10 units daily, titrate up as needed  -Continue diabetic diet     HTN  --Hypertensive last night  - continue norvasic    DVT prophylaxis:  Medical and mechanical DVT prophylaxis orders are present.       AM-PAC 6 Clicks Score (PT): 17 (05/16/22 1800)    Disposition: I expect the patient to be discharged 1-2 days, pending okay with ID, Dr. Angela.    CODE STATUS:   Code Status  and Medical Interventions:   Ordered at: 05/10/22 1410     Code Status (Patient has no pulse and is not breathing):    CPR (Attempt to Resuscitate)     Medical Interventions (Patient has pulse or is breathing):    Full Support       Sarah Beth Caban, APRN  05/17/22

## 2022-05-17 NOTE — THERAPY WOUND CARE TREATMENT
Acute Care - Wound/Debridement Treatment Note  [facility]     Patient Name: Jesse Siu  : 1935  MRN: [id number]  Today's Date: 2022                Admit Date: 5/10/2022    Visit Dx:    ICD-10-CM ICD-9-CM   1. Cellulitis of right foot  L03.115 682.7   2. Foot ulceration, right, with necrosis of muscle (HCC)  L97.513 707.15     728.89   3. Leukocytosis, unspecified type  D72.829 288.60   4. Acute hematogenous osteomyelitis of right foot (HCC)  M86.071 730.07   5. Elevated serum creatinine  R79.89 790.99       Patient Active Problem List   Diagnosis   • Cellulitis of right foot   • Gas gangrene of foot (HCC)   • Type 2 diabetes mellitus with hyperglycemia (HCC)   • Renal insufficiency   • Foot ulceration, right, with necrosis of muscle (HCC)        Past Medical History:   Diagnosis Date   • Hyperlipidemia    • Hypertension    • Pre-diabetes         Past Surgical History:   Procedure Laterality Date   • AMPUTATION DIGIT Right 2022    Procedure: GREAT AND SECOND TOE AMPUTATION RIGHT;  Surgeon: Lemuel Angela MD;  Location: [facility];  Service: Vascular;  Laterality: Right;   • FOOT SURGERY Left     CANCER REMOVAL   • TONSILLECTOMY     • TURP / TRANSURETHRAL INCISION / DRAINAGE PROSTATE             Wound 05/10/22 1600 Right foot (Active)   Dressing Appearance intact;moist drainage 22 0900   Closure ISAIAS 22   Base dressing in place, unable to visualize 22 0900   Periwound intact;dry 22 1400   Periwound Temperature warm 22 1400   Periwound Skin Turgor firm 22 1400   Edges irregular 22 1400   Drainage Characteristics/Odor serous;serosanguineous 22 1400   Drainage Amount none 22   Care, Wound negative pressure wound therapy 22   Dressing Care dressing changed 22 1400   Wound Output (mL) 75 22 0900       Wound 22 Right anterior foot Incision (Active)   Dressing Appearance dry;intact;no drainage 22    Closure ISAIAS 05/16/22 2045   Base dressing in place, unable to visualize 05/16/22 2045   Drainage Amount none 05/16/22 2045   Dressing Care gauze 05/16/22 2045       NPWT (Negative Pressure Wound Therapy) 05/13/22 0930 R foot wound (Active)   Therapy Setting continuous therapy 05/17/22 0900   Dressing foam, black 05/17/22 0630   Pressure Setting 150 mmHg 05/17/22 0900   Sponges Inserted 2 05/16/22 1400   Sponges Removed 2 05/16/22 1400   Finger sweep complete Yes 05/16/22 1400         WOUND DEBRIDEMENT  Total area of Debridement: ~10cm2  Debridement Site 1  Location- Site 1: R foot and periwound area  Selective Debridement- Site 1: Wound Surface <20cmsq  Instruments- Site 1: tweezers, scissors  Excised Tissue Description- Site 1: moderate, slough, other (comment) (nonviable periwound skin)  Bleeding- Site 1: none               PT Assessment (last 12 hours)     PT Evaluation and Treatment     Row Name 05/17/22 0900          Physical Therapy Time and Intention    Subjective Information complains of;weakness;fatigue  -MF     Document Type therapy note (daily note);wound care  -MF     Mode of Treatment individual therapy;physical therapy  -MF     Row Name 05/17/22 0900          Wound 05/10/22 1600 Right foot    Wound - Properties Group Placement Date: 05/10/22  -AM Placement Time: 1600  -AM Side: Right  -AM Location: foot  -AM     Dressing Appearance intact;moist drainage  -MF     Base dressing in place, unable to visualize  -MF     Wound Output (mL) 75  -MF     Retired Wound - Properties Group Placement Date: 05/10/22  -AM Placement Time: 1600  -AM Side: Right  -AM Location: foot  -AM     Retired Wound - Properties Group Date first assessed: 05/10/22  -AM Time first assessed: 1600  -AM Side: Right  -AM Location: foot  -AM     Row Name             Wound 05/12/22 Right anterior foot Incision    Wound - Properties Group Placement Date: 05/12/22  -MP Present on Hospital Admission: N  -MP Side: Right  -MP Orientation:  anterior  -MP Location: foot  -MP Primary Wound Type: Incision  -MP     Retired Wound - Properties Group Placement Date: 05/12/22  -MP Present on Hospital Admission: N  -MP Side: Right  -MP Orientation: anterior  -MP Location: foot  -MP Primary Wound Type: Incision  -MP     Retired Wound - Properties Group Date first assessed: 05/12/22  -MP Present on Hospital Admission: N  -MP Side: Right  -MP Location: foot  -MP Primary Wound Type: Incision  -MP     Row Name 05/17/22 0900          NPWT (Negative Pressure Wound Therapy) 05/13/22 0930 R foot wound    NPWT (Negative Pressure Wound Therapy) - Properties Group Placement Date: 05/13/22  - Placement Time: 0930 -MF Location: R foot wound  -MF     Therapy Setting continuous therapy  -     Pressure Setting 150 mmHg  -MF     Retired NPWT (Negative Pressure Wound Therapy) - Properties Group Placement Date: 05/13/22  - Placement Time: 0930  -MF Location: R foot wound  -MF     Retired NPWT (Negative Pressure Wound Therapy) - Properties Group Placement Date: 05/13/22  - Placement Time: 0930  -MF Location: R foot wound  -MF     Row Name 05/17/22 0900          Coping    Observed Emotional State calm;cooperative  -     Verbalized Emotional State acceptance  -     Trust Relationship/Rapport care explained  -MF     Row Name 05/17/22 0900          Plan of Care Review    Plan of Care Reviewed With patient  -MF     Outcome Evaluation wound vac intact with no issues noted. PT to change dressing in 1-2 days.  -MF     Row Name 05/17/22 0900          Positioning and Restraints    Pre-Treatment Position in bed  -     Post Treatment Position bed  -MF     In Bed supine;call light within reach  -           User Key  (r) = Recorded By, (t) = Taken By, (c) = Cosigned By    Initials Name Provider Type    Wesley Vallejo, PT Physical Therapist    Yael Nam RN Registered Nurse    Konstantin Browning RN Registered Nurse              Physical Therapy Education                  Title: PT OT SLP Therapies (Done)     Topic: Physical Therapy (Done)     Point: Mobility training (Done)     Learning Progress Summary           Patient Eager, E,D,TB, VU,DU,NR by SC at 5/16/2022 1204    Comment: REVIEWED W/C PROPULSION  REVIEWED NWB    Acceptance, E, NR by  at 5/13/2022 1330    Comment: patient needs continued education on NWB status                   Point: Home exercise program (Done)     Learning Progress Summary           Patient Eager, E,D,TB, VU,DU,NR by SC at 5/16/2022 1204    Comment: REVIEWED W/C PROPULSION  REVIEWED NWB    Acceptance, E, NR by SUSANA at 5/13/2022 1330    Comment: patient needs continued education on NWB status                   Point: Body mechanics (Done)     Learning Progress Summary           Patient Eager, E,D,TB, VU,DU,NR by SC at 5/16/2022 1204    Comment: REVIEWED W/C PROPULSION  REVIEWED NWB    Acceptance, E, NR by SUSANA at 5/13/2022 1330    Comment: patient needs continued education on NWB status                   Point: Precautions (Done)     Learning Progress Summary           Patient Eager, E,D,TB, VU,DU,NR by SC at 5/16/2022 1204    Comment: REVIEWED W/C PROPULSION  REVIEWED NWB    Acceptance, E, NR by  at 5/13/2022 1330    Comment: patient needs continued education on NWB status                               User Key     Initials Effective Dates Name Provider Type Discipline    SC 06/16/21 -  Destini Hooper, PT Physical Therapist PT     06/16/21 -  Saumya Huertas, PT Physical Therapist PT                Recommendation and Plan  Anticipated Equipment Needs at Discharge (PT): front wheeled walker, rolling knee walker, wheelchair  Anticipated Discharge Disposition (PT): inpatient rehabilitation facility  Planned Therapy Interventions (PT): balance training, bed mobility training, gait training, strengthening, transfer training, home exercise program  Therapy Frequency (PT): daily  Plan of Care Reviewed With: patient           Outcome Evaluation: wound  vac intact with no issues noted. PT to change dressing in 1-2 days.  Plan of Care Reviewed With: patient            Time Calculation   PT Charges     Row Name 05/17/22 0900             Time Calculation    Start Time 0900  -MF      PT Goal Re-Cert Due Date 05/23/22  -MF              Untimed Charges    [id number] Pressure wound to 50 sqcm 10  -MF              Total Minutes    Untimed Charges Total Minutes 10  -MF       Total Minutes 10  -MF            User Key  (r) = Recorded By, (t) = Taken By, (c) = Cosigned By    Initials Name Provider Type     Wesley Morgan, PT Physical Therapist                  Therapy Charges for Today     Code Description Service Date Service Provider Modifiers Qty    43711063743 HC JOSE DEBRIDE OPEN WOUND UP TO 20CM 5/16/2022 Wesley Morgan, PT GP 1    [id number]  PT NEG PRESS WOUND TO 50SQCM DME3 5/16/2022 Wesley Morgan, PT GP 1    [id number]  PT THER SUPP EA 15 MIN 5/16/2022 Wesley Morgan, PT GP 2    [id number]  PT NEG PRESS WOUND TO 50SQCM DME1 5/17/2022 Wesley Morgan, PT  1            PT G-Codes  Outcome Measure Options: AM-PAC 6 Clicks Basic Mobility (PT)  AM-PAC 6 Clicks Score (PT): 17       Wesley Morgan, PT  5/17/2022

## 2022-05-17 NOTE — THERAPY TREATMENT NOTE
Patient Name: Jesse Siu  : 1935    MRN: [id number]                              Today's Date: 2022       Admit Date: 5/10/2022    Visit Dx:     ICD-10-CM ICD-9-CM   1. Cellulitis of right foot  L03.115 682.7   2. Foot ulceration, right, with necrosis of muscle ([facility])  L97.513 707.15     728.89   3. Leukocytosis, unspecified type  D72.829 288.60   4. Acute hematogenous osteomyelitis of right foot ([facility])  M86.071 730.07   5. Elevated serum creatinine  R79.89 790.99   6. Gas gangrene of foot ([facility])  A48.0 040.0     Patient Active Problem List   Diagnosis   • Cellulitis of right foot   • Gas gangrene of foot (HCC)   • Type 2 diabetes mellitus with hyperglycemia (HCC)   • Renal insufficiency   • Foot ulceration, right, with necrosis of muscle (HCC)     Past Medical History:   Diagnosis Date   • Hyperlipidemia    • Hypertension    • Pre-diabetes      Past Surgical History:   Procedure Laterality Date   • AMPUTATION DIGIT Right 2022    Procedure: GREAT AND SECOND TOE AMPUTATION RIGHT;  Surgeon: Lemuel Angela MD;  [facility];  Service: Vascular;  Laterality: Right;   • FOOT SURGERY Left     CANCER REMOVAL   • TONSILLECTOMY     • TURP / TRANSURETHRAL INCISION / DRAINAGE PROSTATE        General Information     [facility] Name 22 1539          Physical Therapy Time and Intention    Document Type therapy note (daily note)  -     Mode of Treatment physical therapy  -     Row Name 22 1539          General Information    Patient Profile Reviewed yes  -     Existing Precautions/Restrictions fall;right;non-weight bearing  -     Row Name 22 1539          Cognition    Orientation Status (Cognition) oriented x 3  -     Row Name 22 1539          Safety Issues, Functional Mobility    Safety Issues Affecting Function (Mobility) safety precaution awareness;safety precautions follow-through/compliance;sequencing abilities  -     Impairments Affecting Function (Mobility)  balance;endurance/activity tolerance;strength  -           User Key  (r) = Recorded By, (t) = Taken By, (c) = Cosigned By    Initials Name Provider Type    Ryan Mercado, RENE Physical Therapist               Mobility     Row Name 05/17/22 1539          Bed Mobility    Bed Mobility scooting/bridging;supine-sit;sit-supine  -     Scooting/Bridging [place] (Bed Mobility) supervision;verbal cues  -     Supine-Sit [place] (Bed Mobility) supervision;verbal cues  -     Sit-Supine [place] (Bed Mobility) supervision;verbal cues  -     Assistive Device (Bed Mobility) bed rails  -     Comment, (Bed Mobility) cues for safe sequencing  -[facility] Name 05/17/22 1539          Transfers    Comment, (Transfers) Education on w/c setup for transfers and proper safety transferring with RW with cues for sequencing  -JH     Row Name 05/17/22 1539          Bed-Chair Transfer    Bed-Chair [place] (Transfers) minimum assist (75% patient effort);verbal cues;nonverbal cues (demo/gesture)  -     Assistive Device (Bed-Chair Transfers) wheelchair;walker, front-wheeled  -     Comment, (Bed-Chair Transfer) reinforced education on locking brakes for transfers  -[facility] Name 05/17/22 1539          Sit-Stand Transfer    Sit-Stand [place] (Transfers) minimum assist (75% patient effort);verbal cues  -     Assistive Device (Sit-Stand Transfers) walker, front-wheeled  -JH     Row Name 05/17/22 1539          Gait/Stairs (Locomotion)    Comment, (Gait/Stairs) Pt propelled wheelchair 150 feet with supervision with cues on sequencing, arm propulsion, and turning. supervision and cues.  -[facility] Name 05/17/22 1539          Mobility    Extremity Weight-bearing Status right lower extremity  -     Right Lower Extremity (Weight-bearing Status) non weight-bearing (NWB)  -           User Key  (r) = Recorded By, (t) = Taken By, (c) = Cosigned By    Initials Name Provider Type    Ryan Mercado, RENE Physical  Therapist               Obj/Interventions     [facility] Name 05/17/22 1542          Balance    Balance Assessment standing static balance;standing dynamic balance  -     Static Standing Balance minimal assist;verbal cues  -     Dynamic Standing Balance minimal assist;verbal cues  -     Position/Device Used, Standing Balance supported;walker, rolling  -     Balance Interventions sitting;standing;sit to stand;supported;static;dynamic;minimal challenge  -           User Key  (r) = Recorded By, (t) = Taken By, (c) = Cosigned By    Initials Name Provider Type     Ryan Lock, PT Physical Therapist               Goals/Plan    No documentation.                Clinical Impression     [facility] Name 05/17/22 1543          Pain    Pretreatment Pain Rating 0/10 - no pain  -     Posttreatment Pain Rating 0/10 - no pain  -[facility] Name 05/17/22 1543          Plan of Care Review    Plan of Care Reviewed With patient  -     Progress improving  -     Outcome Evaluation Pt performed bed mobility with supervision, transferred bed <> wheelchair with RW and min A. Required reminders to maintain NWB. Propelled w/c 150 feet with supervision and spent time educating on wheelchair setup and takedown and safety at home.  -[facility] Name 05/17/22 1543          Therapy Assessment/Plan (PT)    Rehab Potential (PT) fair, will monitor progress closely  -     Criteria for Skilled Interventions Met (PT) yes;skilled treatment is necessary  -     Therapy Frequency (PT) daily  -[facility] Name 05/17/22 1543          Vital Signs    Pre Patient Position Supine  -     Intra Patient Position Standing  -     Post Patient Position Supine  -[facility] Name 05/17/22 1543          Positioning and Restraints    Pre-Treatment Position in bed  -     Post Treatment Position bed  -     In Bed notified nsg;supine;call light within reach;encouraged to call for assist;exit alarm on;with family/caregiver  -           User Key  (r) = Recorded  By, (t) = Taken By, (c) = Cosigned By    Initials Name Provider Type    Ryan Mercado, PT Physical Therapist               Outcome Measures     Row Name 05/17/22 1545 05/17/22 0840       How much help from another person do you currently need...    Turning from your back to your side while in flat bed without using bedrails? 4  -JH 4  -MW    Moving from lying on back to sitting on the side of a flat bed without bedrails? 4  - 4  -MW    Moving to and from a bed to a chair (including a wheelchair)? 3  - 3  -MW    Standing up from a chair using your arms (e.g., wheelchair, bedside chair)? 3  - 3  -MW    Climbing 3-5 steps with a railing? 1  - 1  -MW    To walk in hospital room? 2  - 2  -MW    AM-PAC 6 Clicks Score (PT) 17  - 17  -MW    Highest level of mobility 5 --> Static standing  - 5 --> Static standing  -MW    Row Name 05/17/22 1545          Functional Assessment    Outcome Measure Options AM-PAC 6 Clicks Basic Mobility (PT)  -           User Key  (r) = Recorded By, (t) = Taken By, (c) = Cosigned By    Initials Name Provider Type    Fanny Mc, RN Registered Nurse    Ryan Mercado, PT Physical Therapist                             Physical Therapy Education                 Title: PT OT SLP Therapies (Done)     Topic: Physical Therapy (Done)     Point: Mobility training (Done)     Learning Progress Summary           Patient Acceptance, E,TB, VU by  at 5/17/2022 1542    Comment: reviewed safety with mobility, safety within hoime, wheelchair use, wheelchair setup and takedown    Eager, E,D,TB, VU,DU,NR by SC at 5/16/2022 1204    Comment: REVIEWED W/C PROPULSION  REVIEWED NWB    Acceptance, E, NR by SUSANA at 5/13/2022 1330    Comment: patient needs continued education on NWB status   Family Acceptance, E,TB, VU by  at 5/17/2022 1542    Comment: reviewed safety with mobility, safety within hoime, wheelchair use, wheelchair setup and takedown                   Point: Home exercise program  (Done)     Learning Progress Summary           Patient Acceptance, E,TB, VU by  at 5/17/2022 1542    Comment: reviewed safety with mobility, safety within hoime, wheelchair use, wheelchair setup and takedown    Eager, E,D,TB, VU,DU,NR by SC at 5/16/2022 1204    Comment: REVIEWED W/C PROPULSION  REVIEWED NWB    Acceptance, E, NR by  at 5/13/2022 1330    Comment: patient needs continued education on NWB status   Family Acceptance, E,TB, VU by  at 5/17/2022 1542    Comment: reviewed safety with mobility, safety within hoime, wheelchair use, wheelchair setup and takedown                   Point: Body mechanics (Done)     Learning Progress Summary           Patient Acceptance, E,TB, VU by  at 5/17/2022 1542    Comment: reviewed safety with mobility, safety within hoime, wheelchair use, wheelchair setup and takedown    Eager, E,D,TB, VU,DU,NR by SC at 5/16/2022 1204    Comment: REVIEWED W/C PROPULSION  REVIEWED NWB    Acceptance, E, NR by SUSANA at 5/13/2022 1330    Comment: patient needs continued education on NWB status   Family Acceptance, E,TB, VU by  at 5/17/2022 1542    Comment: reviewed safety with mobility, safety within hoime, wheelchair use, wheelchair setup and takedown                   Point: Precautions (Done)     Learning Progress Summary           Patient Acceptance, E,TB, VU by  at 5/17/2022 1542    Comment: reviewed safety with mobility, safety within hoime, wheelchair use, wheelchair setup and takedown    Eager, E,D,TB, VU,DU,NR by SC at 5/16/2022 1204    Comment: REVIEWED W/C PROPULSION  REVIEWED NWB    Acceptance, E, NR by  at 5/13/2022 1330    Comment: patient needs continued education on NWB status   Family Acceptance, E,TB, VU by  at 5/17/2022 1542    Comment: reviewed safety with mobility, safety within hoime, wheelchair use, wheelchair setup and takedown                               User Key     Initials Effective Dates Name Provider Type Discipline    SC 06/16/21 -  Destini Hooper  PT Physical Therapist PT     06/16/21 -  Saumya Huertas PT Physical Therapist PT     09/22/20 -  Ryan Lock PT Physical Therapist PT              PT Recommendation and Plan     Plan of Care Reviewed With: patient  Progress: improving  Outcome Evaluation: Pt performed bed mobility with supervision, transferred bed <> wheelchair with RW and min A. Required reminders to maintain NWB. Propelled w/c 150 feet with supervision and spent time educating on wheelchair setup and takedown and safety at home.     Time Calculation:    PT Charges     Row Name 05/17/22 1545 05/17/22 0900          Time Calculation    Start Time 1451  - 0900  -     PT Received On 05/17/22  - --     PT Goal Re-Cert Due Date 05/23/22  - 05/23/22  -            Time Calculation- PT    Total Timed Code Minutes- PT 40 minute(s)  - --            Timed Charges    34884 - Gait Training Minutes  15  -JH --     89528 - PT Therapeutic Activity Minutes 25  -JH --            Untimed Charges    83047-Zyh Pressure wound to 50 sqcm -- 10  -MF            Total Minutes    Timed Charges Total Minutes 40  -JH --     Untimed Charges Total Minutes -- 10  -MF      Total Minutes 40  -JH 10  -MF           User Key  (r) = Recorded By, (t) = Taken By, (c) = Cosigned By    Initials Name Provider Type    Wesley Vallejo, PT Physical Therapist     Ryan Lock, PT Physical Therapist              Therapy Charges for Today     Code Description Service Date Service Provider Modifiers Qty    72904285541 HC GAIT TRAINING EA 15 MIN 5/17/2022 Ryan Lock, PT GP 1    56088506504 HC PT THERAPEUTIC ACT EA 15 MIN 5/17/2022 Ryan Lock, PT GP 2          PT G-Codes  Outcome Measure Options: AM-PAC 6 Clicks Basic Mobility (PT)  AM-PAC 6 Clicks Score (PT): 17    Ryan Lock PT  5/17/2022

## 2022-05-17 NOTE — PLAN OF CARE
Goal Outcome Evaluation:  Plan of Care Reviewed With: patient        Progress: improving   Pt is alert and oriented x4. Vss on room air. Sinus Arrhythmia on tele. Pt has slept the majority of the shift. No c/o of pain. Dressing to right foot is CDI. Wound vac in place. Up with an assist x1 to bedside commode. Small BM this shift. Urinal provided and voiding spontaneously. Will continue to monitor.

## 2022-05-17 NOTE — PROGRESS NOTES
Cardiothoracic Surgery Progress Note      POD #: 5-transmetatarsal amputation right second toe third toe great toe along with extensive soft tissue debridement-wound VAC in place for closure by secondary intent     LOS: 7 days      Subjective: Asleep this morning I did not awaken him.  Photographs were taken by PT of the wound yesterday  Objective:  Vital Signs vital signs below noted T-max past 24-hour 90.1 °F  Temp:  [97.7 °F (36.5 °C)-98.1 °F (36.7 °C)] 98 °F (36.7 °C)  Heart Rate:  [67-75] 71  Resp:  [16-18] 18  BP: (153-190)/(78-92) 153/89    Physical Exam:   General Appearance: Asleep resting comfortably   Lungs:   Heart:   Skin:   Incision: Amputation site has wound VAC in place and dry dressing with no drainage noted.  Margins of the wound around the wound VAC appear to be viable.  Photograph done yesterday: Wound edges appear to be viable almost all the necrotic tissue is now been removed.  Granulation tissue appears to be starting.     Results:  Results from last 7 days   Lab Units 05/14/22  1056   WBC 10*3/mm3 12.36*   HEMOGLOBIN g/dL 9.5*   HEMATOCRIT % 29.1*   PLATELETS 10*3/mm3 472*     Results from last 7 days   Lab Units 05/14/22  1057   SODIUM mmol/L 141   POTASSIUM mmol/L 4.3   CHLORIDE mmol/L 105   CO2 mmol/L 29.0   BUN mg/dL 22   CREATININE mg/dL 1.17   GLUCOSE mg/dL 166*   CALCIUM mg/dL 8.6     Wound culture and preop culture shows Citrobacter    Assessment: #1.  Postop day 5 right great toe right second and right third toe metatarsal amputations with extensive soft tissue debridement wound VAC in place.  Photograph shows excellent granulation tissue beginning and almost all the wound edges around this open amputation site are viable  #2.  Mild renal insufficiency  3 prediabetes    Plan:  Overall medical manage per hospitalist.  Antibiotics per infectious disease.  Wound VAC management PT wound care.  Lemuel Angela MD - 05/17/22 - 08:00 EDT

## 2022-05-17 NOTE — PROGRESS NOTES
INFECTIOUS DISEASE Progress Note    Jesse Siu  1935  [id number]    Admission Date: 5/10/2022      Requesting Provider: Sil Navarro II, DO  Evaluating Physician: Cesar Serrano MD    Reason for Consultation: Right diabetic foot infection/gangrene    History of present illness:    5/11/22: Patient is a 87 y.o. male who is seen today for evaluation of progressively worsening right first and second toe wound infections with gangrene. He initially developed right second toe ulcerations proximally one month ago.  He has been seeing a podiatrist and was started on oral antibiotic.  He has noted substantial worsening of his foot over the last week.  He was seen by his podiatrist on 5/9 who noted gangrene with a foul odor and recommended that he come to the emergency room.  He was found to have right first and second toe gangrene.  He has been seen by Dr. Angela and is currently scheduled for amputation tomorrow.     5/12/22:  He has been afebrile overnight.His white blood cell count today is 14.  His right foot wound cultures growing light gram-negative bacilli in his Gram stain revealed many gram-positive cocci and moderate gram-negative bacilli. Blood cultures from 5/10 are no growth so far. Duplex lower extremity arterial assessment revealed a 50-75% stenosis in the Right anterior tibial artery, short segment occlusion in the right mid posterior tibial artery, and an HENRIQUE reduced at 0.78.X-rays of his right foot revealed soft tissue gas. Creatinine today is 1.23. He denies increased right foot pain.  He does have a history of peripheral neuropathy.  At the time of surgery today he was found to have extensive necrosis with with necrotizing fasciitis extending to the base of the first, second, and third metatarsals.    5/13/22:  He remains afebrile. Right foot cultures from 5/10 grew Citrobacter which was sensitive to tested antibiotics.  Right foot tissue culture from 5/12 is pending but the Gram stain  revealed many white blood cells, many gram-positive cocci in pairs and clusters, and moderate gram-positive bacilli. Creatinine today is 1.29.  White blood cell count is 11.2.  He denies increased foot pain.  He denies nausea, vomiting, and diarrhea.    5/14/22:   Wound vac in place, photo of 5/10 noted.  He remains afebrile. Wound culture of 5/12 also with Citrobacter. He denies any fever, chills, n/v/d.     5/15/22:  He remains afebrile.  Wound vac in place.  No n/v/d.      5/16/22: He has remained afebrile. Right foot culture grew Citrobacter sensitive to all tested antibiotics.  He denies nausea, vomiting, and diarrhea.  His wound VAC is scheduled to be changed later today.  He does not want to go to a subacute nursing facility for rehab, wound care, and intravenous antibiotic therapy.    5/17/22: He has remained afebrile.  He denies increased right foot pain.  He denies nausea, vomiting, and diarrhea.    Past Medical History:   Diagnosis Date   • Hyperlipidemia    • Hypertension    • Pre-diabetes        Past Surgical History:   Procedure Laterality Date   • AMPUTATION DIGIT Right 5/12/2022    Procedure: GREAT AND SECOND TOE AMPUTATION RIGHT;  Surgeon: Lemuel Angela MD;  Location: [facility];  Service: Vascular;  Laterality: Right;   • FOOT SURGERY Left     CANCER REMOVAL   • TONSILLECTOMY     • TURP / TRANSURETHRAL INCISION / DRAINAGE PROSTATE         History reviewed. No pertinent family history.    Social History     Socioeconomic History   • Marital status:    Tobacco Use   • Smoking status: Never Smoker   • Smokeless tobacco: Never Used   Substance and Sexual Activity   • Alcohol use: Never   • Drug use: Never   • Sexual activity: Never       No Known Allergies      Medication:    Current Facility-Administered Medications:   •  acetaminophen (TYLENOL) tablet 650 mg, 650 mg, Oral, Q4H PRN, 650 mg at 05/14/22 2058 **OR** acetaminophen (TYLENOL) 160 MG/5ML solution 650 mg, 650 mg, Oral, Q4H PRN **OR**  acetaminophen (TYLENOL) suppository 650 mg, 650 mg, Rectal, Q4H PRN, Hiral Borges PA-C  •  amLODIPine (NORVASC) tablet 10 mg, 10 mg, Oral, Q24H, Linda Leslie MD, 10 mg at 05/16/22 0843  •  sennosides-docusate (PERICOLACE) 8.6-50 MG per tablet 2 tablet, 2 tablet, Oral, BID PRN **AND** polyethylene glycol (MIRALAX) packet 17 g, 17 g, Oral, Daily PRN **AND** bisacodyl (DULCOLAX) EC tablet 5 mg, 5 mg, Oral, Daily PRN **AND** bisacodyl (DULCOLAX) suppository 10 mg, 10 mg, Rectal, Daily PRN, Linda Leslie MD  •  cloNIDine (CATAPRES) tablet 0.1 mg, 0.1 mg, Oral, Q6H PRN, Linda Leslie MD  •  dextrose (D50W) (25 g/50 mL) IV injection 25 g, 25 g, Intravenous, Q15 Min PRN, Hiral Borges PA-C  •  dextrose (GLUTOSE) oral gel 15 g, 15 g, Oral, Q15 Min PRN, Hiral Borges PA-C  •  docusate sodium (COLACE) capsule 100 mg, 100 mg, Oral, BID PRN, Hiral Borges PA-C  •  ertapenem (INVanz) 1 g/100 mL 0.9% NS VTB (mbp), 1 g, Intravenous, Q24H, Cesar Serrano MD, 1 g at 05/16/22 1719  •  glucagon (human recombinant) (GLUCAGEN DIAGNOSTIC) injection 1 mg, 1 mg, Intramuscular, Q15 Min PRN, Hiral Borges PA-C  •  heparin (porcine) 5000 UNIT/ML injection 5,000 Units, 5,000 Units, Subcutaneous, Q12H, Hiral Borges PA-C, 5,000 Units at 05/16/22 2044  •  HYDROcodone-acetaminophen (NORCO) 5-325 MG per tablet 1 tablet, 1 tablet, Oral, Q4H PRN, Hiral Borges PA-C  •  insulin detemir (LEVEMIR) injection 5 Units, 5 Units, Subcutaneous, Q12H, Linda Leslie MD, 5 Units at 05/16/22 2045  •  Insulin Lispro (humaLOG) injection 0-9 Units, 0-9 Units, Subcutaneous, TID AC, Hiral Borges PA-C, 4 Units at 05/16/22 1631  •  loperamide (IMODIUM) capsule 2 mg, 2 mg, Oral, 4x Daily PRN, Linda Leslie MD, 2 mg at 05/15/22 1756  •  Magnesium Sulfate 2 gram Bolus, followed by 8 gram infusion (total Mg dose 10 grams)- Mg less than or equal to 1mg/dL, 2 g, Intravenous, PRN **OR** Magnesium  Sulfate 2 gram / 50mL Infusion (GIVE X 3 BAGS TO EQUAL 6GM TOTAL DOSE) - Mg 1.1 - 1.5 mg/dl, 2 g, Intravenous, PRN **OR** Magnesium Sulfate 4 gram infusion- Mg 1.6-1.9 mg/dL, 4 g, Intravenous, PRN, Hiral Borges PA-C  •  melatonin tablet 5 mg, 5 mg, Oral, Nightly PRN, Hiral Borges PA-C  •  morphine injection 2 mg, 2 mg, Intravenous, Q2H PRN **AND** naloxone (NARCAN) injection 0.4 mg, 0.4 mg, Intravenous, Q5 Min PRN, Hiral Borges PA-C  •  ondansetron (ZOFRAN) tablet 4 mg, 4 mg, Oral, Q6H PRN **OR** ondansetron (ZOFRAN) injection 4 mg, 4 mg, Intravenous, Q6H PRN, Hiral Borges PA-C  •  polyethylene glycol (MIRALAX) packet 17 g, 17 g, Oral, Daily, Hiral Borges PA-C, 17 g at 05/13/22 0857  •  potassium chloride (MICRO-K) CR capsule 40 mEq, 40 mEq, Oral, PRN **OR** potassium chloride (KLOR-CON) packet 40 mEq, 40 mEq, Oral, PRN **OR** potassium chloride 10 mEq in 100 mL IVPB, 10 mEq, Intravenous, Q1H PRN, Hiral Borges PA-C  •  pravastatin (PRAVACHOL) tablet 20 mg, 20 mg, Oral, Nightly, Hiral Borges PA-C, 20 mg at 05/16/22 2044  •  sodium chloride 0.9 % flush 10 mL, 10 mL, Intravenous, Q12H, Hiral Borges PA-C, 10 mL at 05/16/22 2044  •  sodium chloride 0.9 % flush 10 mL, 10 mL, Intravenous, PRN, Hiral Borges PA-C    Antibiotics:  Anti-Infectives (From admission, onward)    Ordered     Dose/Rate Route Frequency Start Stop    05/16/22 1618  ertapenem (INVanz) 1 g/100 mL 0.9% NS VTB (mbp)        Ordering Provider: Cesar Serrano MD    1 g  over 30 Minutes Intravenous Every 24 Hours 05/16/22 1800 06/21/22 1759    05/11/22 0718  vancomycin 1250 mg/250 mL 0.9% NS IVPB ([facility])        Ordering Provider: Cici Shoemaker, KaranD    1,250 mg Intravenous Once 05/11/22 1500 05/11/22 1659    05/10/22 1608  piperacillin-tazobactam (ZOSYN) 3.375 g in iso-osmotic dextrose 50 ml (premix)        Ordering Provider: Sil Navarro II, DO    3.375 g  over 30 Minutes Intravenous Once  05/10/22 1700 05/10/22 1659    05/10/22 1416  meropenem (MERREM) 1 g/100 mL 0.9% NS (mbp)        Ordering Provider: Ole Hadley MD    1 g  over 30 Minutes Intravenous Once 05/10/22 1418 05/10/22 1521    05/10/22 1309  cefTRIAXone (ROCEPHIN) 1 g/100 mL 0.9% NS ([facility])        Ordering Provider: Ole Hadley MD    1 g  over 30 Minutes Intravenous Once 05/10/22 1311 05/10/22 1430    05/10/22 1307  vancomycin 1500 mg/500 mL 0.9% NS IVPB ([facility])        Ordering Provider: Ole Hadlye MD    20 mg/kg × 70.3 kg Intravenous Once 05/10/22 1309 05/10/22 1541            Review of Systems:  See HPI      Physical Exam:   Vital Signs  Temp (24hrs), Av °F (36.7 °C), Min:97.7 °F (36.5 °C), Max:98.1 °F (36.7 °C)    Temp  Min: 97.7 °F (36.5 °C)  Max: 98.1 °F (36.7 °C)  BP  Min: 153/89  Max: 190/88  Pulse  Min: 67  Max: 75  Resp  Min: 16  Max: 18  SpO2  Min: 91 %  Max: 97 %    GENERAL: Awake and alert, in no acute distress.   HEENT: Normocephalic, atraumatic.  PERRL. EOMI. No conjunctival injection. No icterus. Oropharynx clear without evidence of thrush or exudate.   NECK: Supple   HEART: RRR; No murmur, rubs, gallops.   LUNGS: Clear to auscultation bilaterally without wheezing, rales, rhonchi. Normal respiratory effort  ABDOMEN: Soft, nontender, nondistended. No rebound or guarding.   EXT: The right foot wound photos reveal a 14 x 4 cm x 3 cm deep wound with decreased necrotic tissue and increased granulation tissue.  There is no purulence.  :  Without Hernandez catheter.  SKIN: Warm and dry without cutaneous eruptions on Inspection/palpation.    NEURO: Oriented to PPT. Motor 5/5 strength bilaterally.  He has decreased sensation to light touch in both distal lower extremities.  PSYCHIATRIC: Normal insight and judgement. Cooperative with PE    Laboratory Data    Results from last 7 days   Lab Units 22  1056 22  0626 22  0503   WBC 10*3/mm3 12.36* 11.21* 13.95*   HEMOGLOBIN g/dL 9.5* 9.2* 9.7*    HEMATOCRIT % 29.1* 28.7* 29.9*   PLATELETS 10*3/mm3 472* 499* 499*     Results from last 7 days   Lab Units 05/14/22  1057   SODIUM mmol/L 141   POTASSIUM mmol/L 4.3   CHLORIDE mmol/L 105   CO2 mmol/L 29.0   BUN mg/dL 22   CREATININE mg/dL 1.17   GLUCOSE mg/dL 166*   CALCIUM mg/dL 8.6     Results from last 7 days   Lab Units 05/10/22  1307   ALK PHOS U/L 119*   BILIRUBIN mg/dL 0.3   ALT (SGPT) U/L 16   AST (SGOT) U/L 16     Results from last 7 days   Lab Units 05/10/22  1307   SED RATE mm/hr 62*     Results from last 7 days   Lab Units 05/10/22  1307   CRP mg/dL 12.33*     Results from last 7 days   Lab Units 05/10/22  1307   LACTATE mmol/L 2.0         Results from last 7 days   Lab Units 05/11/22  0429   VANCOMYCIN RM mcg/mL 15.80     Estimated Creatinine Clearance: 44.2 mL/min (by C-G formula based on SCr of 1.17 mg/dL).      Microbiology:  No results found for: ACANTHNAEG, AFBCX, BPERTUSSISCX, BLOODCX  No results found for: BCIDPCR, CXREFLEX, CSFCX, CULTURETIS  No results found for: CULTURES, HSVCX, URCX  No results found for: EYECULTURE, GCCX, HSVCULTURE, LABHSV  No results found for: LEGIONELLA, MRSACX, MUMPSCX, MYCOPLASCX  No results found for: NOCARDIACX, STOOLCX  No results found for: THROATCX, UNSTIMCULT, URINECX, CULTURE, VZVCULTUR  No results found for: VIRALCULTU, WOUNDCX        Radiology:  Imaging Results (Last 72 Hours)     ** No results found for the last 72 hours. **            Impression:   1.  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  His foot wound is growing Citrobacter but this may have primarily been an anaerobic infection.  His antibiotic therapy is now switched to Invanz.  2.  Type 2 diabetes mellitus  3.  Acute kidney injury-Improved  4.  Peripheral vascular disease    PLAN/RECOMMENDATIONS:   1.  Continue wound care with a wound VAC  2.  Invanz 1 g IV daily  3.  I will examine his wound tomorrow  when his wound VAC is removed and he may be able to discharge tomorrow with outpatient intravenous Invanz daily in our office and 3 times weekly wound VAC changes at [facility] PT wound care.  The duration of his antibiotic therapy will depend on his progress and the appearance of his wound.    I communicated with case management regarding his situation again today.       Cesar Serrano MD  5/17/2022  07:32 EDT

## 2022-05-18 ENCOUNTER — READMISSION MANAGEMENT (OUTPATIENT)
Dept: CALL CENTER | Facility: HOSPITAL | Age: 87
End: 2022-05-18

## 2022-05-18 VITALS
HEIGHT: 70 IN | WEIGHT: 155 LBS | RESPIRATION RATE: 18 BRPM | SYSTOLIC BLOOD PRESSURE: 138 MMHG | HEART RATE: 73 BPM | TEMPERATURE: 97.7 F | OXYGEN SATURATION: 96 % | DIASTOLIC BLOOD PRESSURE: 89 MMHG | BODY MASS INDEX: 22.19 KG/M2

## 2022-05-18 PROBLEM — A48.0 GAS GANGRENE OF FOOT (HCC): Status: RESOLVED | Noted: 2022-05-10 | Resolved: 2022-05-18

## 2022-05-18 LAB
ANION GAP SERPL CALCULATED.3IONS-SCNC: 11 MMOL/L (ref 5–15)
BUN SERPL-MCNC: 26 MG/DL (ref 8–23)
BUN/CREAT SERPL: 25 (ref 7–25)
CALCIUM SPEC-SCNC: 8.7 MG/DL (ref 8.6–10.5)
CHLORIDE SERPL-SCNC: 104 MMOL/L (ref 98–107)
CO2 SERPL-SCNC: 25 MMOL/L (ref 22–29)
CREAT SERPL-MCNC: 1.04 MG/DL (ref 0.76–1.27)
DEPRECATED RDW RBC AUTO: 45.3 FL (ref 37–54)
EGFRCR SERPLBLD CKD-EPI 2021: 69.5 ML/MIN/1.73
ERYTHROCYTE [DISTWIDTH] IN BLOOD BY AUTOMATED COUNT: 14.5 % (ref 12.3–15.4)
GLUCOSE BLDC GLUCOMTR-MCNC: 160 MG/DL (ref 70–130)
GLUCOSE BLDC GLUCOMTR-MCNC: 245 MG/DL (ref 70–130)
GLUCOSE SERPL-MCNC: 221 MG/DL (ref 65–99)
HCT VFR BLD AUTO: 34.1 % (ref 37.5–51)
HGB BLD-MCNC: 10.8 G/DL (ref 13–17.7)
MCH RBC QN AUTO: 27.8 PG (ref 26.6–33)
MCHC RBC AUTO-ENTMCNC: 31.7 G/DL (ref 31.5–35.7)
MCV RBC AUTO: 87.9 FL (ref 79–97)
PLATELET # BLD AUTO: 559 10*3/MM3 (ref 140–450)
PMV BLD AUTO: 9.8 FL (ref 6–12)
POTASSIUM SERPL-SCNC: 4.8 MMOL/L (ref 3.5–5.2)
RBC # BLD AUTO: 3.88 10*6/MM3 (ref 4.14–5.8)
SODIUM SERPL-SCNC: 140 MMOL/L (ref 136–145)
WBC NRBC COR # BLD: 10.38 10*3/MM3 (ref 3.4–10.8)

## 2022-05-18 PROCEDURE — 85027 COMPLETE CBC AUTOMATED: CPT | Performed by: NURSE PRACTITIONER

## 2022-05-18 PROCEDURE — 25010000002 HEPARIN (PORCINE) PER 1000 UNITS: Performed by: STUDENT IN AN ORGANIZED HEALTH CARE EDUCATION/TRAINING PROGRAM

## 2022-05-18 PROCEDURE — 80048 BASIC METABOLIC PNL TOTAL CA: CPT | Performed by: NURSE PRACTITIONER

## 2022-05-18 PROCEDURE — 63710000001 INSULIN LISPRO (HUMAN) PER 5 UNITS: Performed by: STUDENT IN AN ORGANIZED HEALTH CARE EDUCATION/TRAINING PROGRAM

## 2022-05-18 PROCEDURE — 63710000001 INSULIN DETEMIR PER 5 UNITS: Performed by: NURSE PRACTITIONER

## 2022-05-18 PROCEDURE — 99024 POSTOP FOLLOW-UP VISIT: CPT | Performed by: THORACIC SURGERY (CARDIOTHORACIC VASCULAR SURGERY)

## 2022-05-18 PROCEDURE — 25010000002 ERTAPENEM PER 500 MG: Performed by: INTERNAL MEDICINE

## 2022-05-18 PROCEDURE — 97605 NEG PRS WND THER DME<=50SQCM: CPT

## 2022-05-18 PROCEDURE — 82962 GLUCOSE BLOOD TEST: CPT

## 2022-05-18 PROCEDURE — 99239 HOSP IP/OBS DSCHRG MGMT >30: CPT | Performed by: NURSE PRACTITIONER

## 2022-05-18 RX ORDER — CHOLECALCIFEROL (VITAMIN D3) 125 MCG
5 CAPSULE ORAL NIGHTLY PRN
Start: 2022-05-18 | End: 2022-09-07

## 2022-05-18 RX ORDER — LISINOPRIL 5 MG/1
5 TABLET ORAL
Qty: 30 TABLET | Refills: 0 | Status: SHIPPED | OUTPATIENT
Start: 2022-05-19

## 2022-05-18 RX ORDER — ACETAMINOPHEN 325 MG/1
650 TABLET ORAL EVERY 4 HOURS PRN
Start: 2022-05-18 | End: 2022-09-07

## 2022-05-18 RX ORDER — LISINOPRIL 5 MG/1
5 TABLET ORAL
Status: DISCONTINUED | OUTPATIENT
Start: 2022-05-18 | End: 2022-05-18 | Stop reason: HOSPADM

## 2022-05-18 RX ORDER — PSEUDOEPHEDRINE HCL 30 MG
100 TABLET ORAL 2 TIMES DAILY PRN
Start: 2022-05-18 | End: 2022-09-07

## 2022-05-18 RX ORDER — LOPERAMIDE HYDROCHLORIDE 2 MG/1
2 CAPSULE ORAL 4 TIMES DAILY PRN
Start: 2022-05-18 | End: 2022-09-07

## 2022-05-18 RX ADMIN — LISINOPRIL 5 MG: 5 TABLET ORAL at 10:51

## 2022-05-18 RX ADMIN — ERTAPENEM 1 G: 1 INJECTION, POWDER, LYOPHILIZED, FOR SOLUTION INTRAMUSCULAR; INTRAVENOUS at 11:38

## 2022-05-18 RX ADMIN — Medication 10 ML: at 08:27

## 2022-05-18 RX ADMIN — Medication 10 ML: at 08:30

## 2022-05-18 RX ADMIN — INSULIN LISPRO 2 UNITS: 100 INJECTION, SOLUTION INTRAVENOUS; SUBCUTANEOUS at 08:26

## 2022-05-18 RX ADMIN — INSULIN DETEMIR 10 UNITS: 100 INJECTION, SOLUTION SUBCUTANEOUS at 08:27

## 2022-05-18 RX ADMIN — INSULIN LISPRO 6 UNITS: 100 INJECTION, SOLUTION INTRAVENOUS; SUBCUTANEOUS at 11:38

## 2022-05-18 RX ADMIN — HEPARIN SODIUM 5000 UNITS: 5000 INJECTION, SOLUTION INTRAVENOUS; SUBCUTANEOUS at 08:26

## 2022-05-18 RX ADMIN — AMLODIPINE BESYLATE 10 MG: 10 TABLET ORAL at 08:26

## 2022-05-18 RX ADMIN — ACETAMINOPHEN 650 MG: 325 TABLET ORAL at 13:06

## 2022-05-18 NOTE — DISCHARGE SUMMARY
[facility] Medicine Services  DISCHARGE SUMMARY    Patient Name: Jesse Siu  : 1935  MRN: [id number]    Date of Admission: 5/10/2022 12:30 PM  Date of Discharge:  2022  Primary Care Physician: IRVIN Martinez MD    Consults     Date and Time Order Name Status Description    2022  6:12 AM Inpatient Infectious Diseases Consult Completed     5/10/2022  4:08 PM Inpatient Cardiothoracic Surgery Consult Completed     5/10/2022  4:08 PM Inpatient Infectious Diseases Consult Completed           Hospital Course     Presenting Problem:   Cellulitis of right foot [L03.115]    Active Hospital Problems    Diagnosis  POA   • Cellulitis of right foot [L03.115]  Yes   • Type 2 diabetes mellitus with hyperglycemia (HCC) [E11.65]  Yes   • Renal insufficiency [N28.9]  Yes   • Foot ulceration, right, with necrosis of muscle (HCC) [L97.513]  Yes      Resolved Hospital Problems    Diagnosis Date Resolved POA   • Gas gangrene of foot ([facility]) [A48.0] 2022 Yes      Hospital Course:  Jesse Siu is a 87 y.o. 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 Angela following, s/p amputation of R great toe and R second toe with extensive soft tissue debridement   -Will continue wound VAC at [place], 3x/week wound vac changes at [facility] PT as outpatient  -Zosyn changed to IV Invanz per ID. DC plan is daily infusions of Invanz at [facility] office. Will likely require prolonged course. PICC in place. Dr. Cesar Serrano will follow.  -NWB RLE. PT has recommended WC or knee scooter for mobility while recovering. Patient has had WC delivered to room and practiced with PT.     Renal insufficiency  -Improved with IVF     DM2 w/ hyperglycemia  -A1c 8.9 on 22. Patient placed on basal-bolus  regimen in hospital. Changed BID Levemir to daily dose based on low morning BGs.  -Resume metformin at [place].  -Patient to record BG x 1 week and take to follow-up PCP appointment. Will need good BG control in setting of wound infection and healing. Defer to PCP for outpatient management and monitoring.     HTN  - Continued home Norvasc in hospital and will continue at [place].  - Patient still having intermittent elevated BPs. Added lisinopril 5 mg given patient's known DM2. Titrate up as needed, monitor for cough.     Thrombocytosis  - Recommend following with PCP. May benefit from referral to hematology if platelets continue to be elevated.    Discharge Follow Up Recommendations for outpatient labs/diagnostics:  --Check BG daily in the morning before eating, before lunch, before dinner, and before bed for one week. Take information to PCP.  --Follow up with PCP one week. BMP. May need second antihyperglycemic agent for tighter BG control. May need referral to hematology for elevated platelets.  --Follow up [facility] office on Thursday, 5/19/22, for IV Invanz infusion.  --Follow up with Dr. Serrano as scheduled.    Day of Discharge     HPI:   Patient resting in bed, sleeping. Awakes to voice. He has no complaints. Denies any pain.     Review of Systems  Gen- No fevers, chills  CV- No chest pain, palpitations  Resp- No cough, dyspnea  GI- No N/V/D, abd pain    Vital Signs:   Temp:  [97.6 °F (36.4 °C)-98 °F (36.7 °C)] 97.6 °F (36.4 °C)  Heart Rate:  [51-71] 67  Resp:  [16-18] 16  BP: (140-181)/() 140/70      Physical Exam:  Constitutional: No acute distress, awake, alert  HENT: NCAT, mucous membranes moist  Respiratory: Clear to auscultation bilaterally, respiratory effort normal, room air   Cardiovascular: RRR, no murmurs, rubs, or gallops  Gastrointestinal: Positive bowel sounds, soft, nontender, nondistended  Musculoskeletal: No LLE edema, + LLE pedal pulse, RLE wrapped, wound vac in place, + popliteal  pulse  Psychiatric: Appropriate affect, cooperative  Neurologic: Oriented x 3, strength symmetric in all extremities, Cranial Nerves grossly intact to confrontation, speech clear  Skin: No rashes      Pertinent  and/or Most Recent Results     LAB RESULTS:      Lab 05/18/22  1220 05/14/22  1056 05/13/22  0626 05/12/22  0503   WBC 10.38 12.36* 11.21* 13.95*   HEMOGLOBIN 10.8* 9.5* 9.2* 9.7*   HEMATOCRIT 34.1* 29.1* 28.7* 29.9*   PLATELETS 559* 472* 499* 499*   NEUTROS ABS  --  9.84* 9.93* 11.19*   IMMATURE GRANS (ABS)  --  0.14* 0.08* 0.10*   LYMPHS ABS  --  1.43 0.77 1.40   MONOS ABS  --  0.58 0.41 0.79   EOS ABS  --  0.31 0.00 0.40   MCV 87.9 88.4 86.2 86.4         Lab 05/18/22  1117 05/14/22  1057 05/13/22  0626 05/12/22  1028 05/12/22  0503   SODIUM 140 141 136  --  137   POTASSIUM 4.8 4.3 4.2 4.7 4.2   CHLORIDE 104 105 100  --  99   CO2 25.0 29.0 25.0  --  26.0   ANION GAP 11.0 7.0 11.0  --  12.0   BUN 26* 22 22  --  22   CREATININE 1.04 1.17 1.29*  --  1.23   EGFR 69.5 60.3 53.7*  --  56.8*   GLUCOSE 221* 166* 397*  --  199*   CALCIUM 8.7 8.6 8.3*  --  8.7   HEMOGLOBIN A1C  --   --  8.90*  --   --                          Brief Urine Lab Results     None        Microbiology Results (last 10 days)     Procedure Component Value - Date/Time    Anaerobic Culture - Tissue, Foot, Right [[id number]] Collected: 05/12/22 1618    Lab Status: Final result Specimen: Tissue from Foot, Right Updated: 05/17/22 0823     Anaerobic Culture No anaerobes isolated at 5 days    Fungus Culture - Tissue, Foot, Right [[id number]] Collected: 05/12/22 1618    Lab Status: Preliminary result Specimen: Tissue from Foot, Right Updated: 05/17/22 2032     Fungus Culture No fungus isolated at less than 1 week    Tissue / Bone Culture - Tissue, Foot, Right [[id number]]  (Abnormal)  (Susceptibility) Collected: 05/12/22 1618    Lab Status: Final result Specimen: Tissue from Foot, Right Updated: 05/14/22 0647     Tissue Culture Scant growth (1+)  Citrobacter koseri      Light growth (2+) Normal Skin Ana     Gram Stain Many (4+) WBCs seen      Many (4+) Gram positive cocci in pairs and clusters      Moderate (3+) Gram positive bacilli    Susceptibility      Citrobacter koseri      BRANDIN      Cefepime Susceptible      Ceftazidime Susceptible      Ceftriaxone Susceptible      Gentamicin Susceptible      Levofloxacin Susceptible      Piperacillin + Tazobactam Susceptible      Tetracycline Susceptible      Trimethoprim + Sulfamethoxazole Susceptible                       Susceptibility Comments     Citrobacter koseri    Cefazolin sensitivity will not be reported for Enterobacteriaceae in non-urine isolates. If cefazolin is preferred, please call the microbiology lab to request an E-test.  With the exception of urinary-sourced infections, aminoglycosides should not be used as monotherapy.             AFB Culture - Tissue, Foot, Right [[id number]] Collected: 05/12/22 1618    Lab Status: Preliminary result Specimen: Tissue from Foot, Right Updated: 05/17/22 2032     AFB Culture No AFB isolated at less than 1 week     AFB Stain No acid fast bacilli seen on concentrated smear    Wound Culture - Wound, Foot [[id number]]  (Abnormal)  (Susceptibility) Collected: 05/10/22 1455    Lab Status: Final result Specimen: Wound from Foot Updated: 05/12/22 1145     Wound Culture Light growth (2+) Citrobacter koseri      Light growth (2+) Normal Skin Ana     Gram Stain No WBCs seen      Many (4+) Gram positive cocci      Moderate (3+) Gram negative bacilli    Susceptibility      Citrobacter koseri      BRANDIN      Cefepime Susceptible      Ceftazidime Susceptible      Ceftriaxone Susceptible      Gentamicin Susceptible      Levofloxacin Susceptible      Piperacillin + Tazobactam Susceptible      Tetracycline Susceptible      Trimethoprim + Sulfamethoxazole Susceptible                       Susceptibility Comments     Citrobacter koseri    Cefazolin sensitivity will not be reported for  Enterobacteriaceae in non-urine isolates. If cefazolin is preferred, please call the microbiology lab to request an E-test.  With the exception of urinary-sourced infections, aminoglycosides should not be used as monotherapy.             COVID PRE-OP / PRE-PROCEDURE SCREENING ORDER (NO ISOLATION) - Swab, Nasopharynx [[id number]]  (Normal) Collected: 05/10/22 1454    Lab Status: Final result Specimen: Swab from Nasopharynx Updated: 05/10/22 1532    Narrative:      The following orders were created for panel order COVID PRE-OP / PRE-PROCEDURE SCREENING ORDER (NO ISOLATION) - Swab, Nasopharynx.  Procedure                               Abnormality         Status                     ---------                               -----------         ------                     COVID-19, ABBOTT IN-HOUS...[[id number]]  Normal              Final result                 Please view results for these tests on the individual orders.    COVID-19, ABBOTT IN-HOUSE,NASAL Swab (NO TRANSPORT MEDIA) 2 HR TAT - Swab, Nasopharynx [[id number]]  (Normal) Collected: 05/10/22 1454    Lab Status: Final result Specimen: Swab from Nasopharynx Updated: 05/10/22 1532     COVID19 Presumptive Negative    Narrative:      Fact sheet for providers: https://www.fda.gov/media/447686/download     Fact sheet for patients: https://www.fda.gov/media/334159/download    Test performed by PCR.  If inconsistent with clinical signs and symptoms patient should be tested with different authorized molecular test.    Blood Culture - Blood, Arm, Right [[id number]]  (Normal) Collected: 05/10/22 1320    Lab Status: Final result Specimen: Blood from Arm, Right Updated: 05/15/22 1403     Blood Culture No growth at 5 days    Blood Culture - Blood, Blood, Central Line [[id number]]  (Normal) Collected: 05/10/22 1300    Lab Status: Final result Specimen: Blood, Central Line Updated: 05/15/22 1403     Blood Culture No growth at 5 days          XR Foot 3+ View Right    Result Date:  5/10/2022  XR FOOT 3+ VW RIGHT-  Date of Exam: 5/10/2022 1:29 PM  Indication: infection, r/o osteo; L03.115-Cellulitis of right lower limb; L97.513-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  Comparison: 11/30/2011  Technique: Three views of the foot were obtained.  FINDINGS: There is soft tissue ulceration along the dorsal medial aspect of the foot at the level of the first metatarsal-phalangeal joint. There is a moderate amount of gas within the soft tissues surrounding the first and second metatarsal phalangeal joints.  There is severe joint space narrowing involving the first and second metatarsal phalangeal joints.  There is some bony destruction involving the head of the first metatarsal subchondral cystic degenerative change or bony destruction involving the base of the proximal phalanx of the first digit.  No definite bony destruction seen of the second digit or head of the second metatarsal.  The other bony structures appear within normal limits.  Vascular calcifications are seen within the soft tissues.        1.  Moderate amount of gas within the soft tissues surrounding the first and second metatarsal phalangeal joints.  There appears to be bony destruction involving the head of the first metatarsal compatible with osteomyelitis.  There is subchondral cystic degenerative change or additional bony destruction involving the base of the proximal phalanx of the first digit.  No definite bony destruction of the second digit or second metatarsal.  This report was finalized on 5/10/2022 1:48 PM by Olivier Chong MD.      Duplex Lower Extremity Art / Grafts - Bilateral CAR    Result Date: 5/11/2022  · Monophasic waveforms in the distal right SFA and popliteal arteries. · 50 to 75% stenosis in the right anterior tibial artery · Suspected short segment occlusion in the right mid posterior tibial artery with collateral flow demonstrated. · Collateral filling seen at the level of  the right dorsalis pedis artery also. · Right HENRIQUE is moderately reduced at 0.78. · Biphasic flow in the left, SFA, popliteal, anterior tibial artery. · Monophasic flow in the left peroneal artery, no flow detected in the left posterior tibial artery. · Flow was demonstrated in the left dorsalis pedis artery · Left HENRIQUE calculated at 1.5 this suggest significant vessel calcification.      Peripheral Block    Result Date: 5/12/2022  Higinio Burris CRNA     5/12/2022  3:04 PM Peripheral Block Patient reassessed immediately prior to procedure Patient location during procedure: pre-op Reason for block: at surgeon's request and post-op pain management Performed by Anesthesiologist: Timothy Zaragoza Jr., MD Preanesthetic Checklist Completed: patient identified, IV checked, site marked, risks and benefits discussed, surgical consent, monitors and equipment checked, pre-op evaluation and timeout performed Prep: Sterile barriers:cap, gloves, mask, sterile barriers and washed/disinfected hands Prep: ChloraPrep Patient monitoring: blood pressure monitoring, continuous pulse oximetry and EKG Procedure Sedation: yes Performed under: general Guidance:ultrasound guided Images:still images obtained, printed/placed on chart Laterality:right Block Type:popliteal Injection Technique:single-shot Needle Type:echogenic Needle Gauge:20 G Resistance on Injection: none Medications Used: dexamethasone sodium phosphate injection, 2 mg bupivacaine PF (MARCAINE) 0.25 % injection, 30 mL Med administered at 5/12/2022 2:55 PM Post Assessment Injection Assessment: negative aspiration for heme, no paresthesia on injection and incremental injection Patient Tolerance:comfortable throughout block Complications:no Additional Notes Procedure:                                                     The pt was placed in  lateral position.  The Insertion site was  prepped and Draped in sterile fashion.  The pt was anesthetized with  IV Sedation( see meds).  Skin  and cutaneous tissue was infiltrated and anesthetized with 1% Lidocaine 3 mls via a 25g needle.  A BBraun 4 inch 18g echogenic needle was then  inserted approximately 3 cm proximal to the popliteal fossa at the lateral mid biceps femoris and advanced In-plane with Ultrasound guidance.  Normal Saline PSF was utilized for hydrodissection of tissue.  The popliteal artery was visualized and the common peroneal and tibial bifurcation was located.  LA injection spread was visualized, injection was incremental 1-5ml, injection pressure was normal or little, no intraneural injection, no vascular injection.  .  A BBraun 20g wire stylet catheter was placed via the needle with ultrasound visualization and confirmation with NS fluid bolus. The labeled catheter was then secured at insertion site with exofin tissue adhesive, benzoin, steristrips  and a CHG transparent dressing was placed over. Thank you       Results for orders placed during the hospital encounter of 05/10/22    Duplex Lower Extremity Art / Grafts - Bilateral CAR    Interpretation Summary  · Monophasic waveforms in the distal right SFA and popliteal arteries.  · 50 to 75% stenosis in the right anterior tibial artery  · Suspected short segment occlusion in the right mid posterior tibial artery with collateral flow demonstrated.  · Collateral filling seen at the level of the right dorsalis pedis artery also.  · Right HENRIQUE is moderately reduced at 0.78.  · Biphasic flow in the left, SFA, popliteal, anterior tibial artery.  · Monophasic flow in the left peroneal artery, no flow detected in the left posterior tibial artery.  · Flow was demonstrated in the left dorsalis pedis artery  · Left HENRIQUE calculated at 1.5 this suggest significant vessel calcification.      Results for orders placed during the hospital encounter of 05/10/22    Duplex Lower Extremity Art / Grafts - Bilateral CAR    Interpretation Summary  · Monophasic waveforms in the distal right SFA and popliteal  arteries.  · 50 to 75% stenosis in the right anterior tibial artery  · Suspected short segment occlusion in the right mid posterior tibial artery with collateral flow demonstrated.  · Collateral filling seen at the level of the right dorsalis pedis artery also.  · Right HENRIQUE is moderately reduced at 0.78.  · Biphasic flow in the left, SFA, popliteal, anterior tibial artery.  · Monophasic flow in the left peroneal artery, no flow detected in the left posterior tibial artery.  · Flow was demonstrated in the left dorsalis pedis artery  · Left HENRIQUE calculated at 1.5 this suggest significant vessel calcification.      Plan for Follow-up of Pending Labs/Results:   Pending Labs     Order Current Status    AFB Culture - Tissue, Foot, Right Preliminary result    Fungus Culture - Tissue, Foot, Right Preliminary result        Discharge Details        Discharge Medications      New Medications      Instructions Start Date   acetaminophen 325 MG tablet  Commonly known as: TYLENOL   650 mg, Oral, Every 4 Hours PRN, Over-the-counter medication      docusate sodium 100 MG capsule   100 mg, Oral, 2 Times Daily PRN, Over-the-counter medication      ertapenem 1 gm/100ml solution IV  Commonly known as: INVanz   1 g, Intravenous, Every 24 Hours   Start Date: May 19, 2022     lisinopril 5 MG tablet  Commonly known as: PRINIVIL,ZESTRIL   5 mg, Oral, Every 24 Hours Scheduled   Start Date: May 19, 2022     loperamide 2 MG capsule  Commonly known as: IMODIUM   2 mg, Oral, 4 Times Daily PRN, Over-the-counter medication      melatonin 5 MG tablet tablet   5 mg, Oral, Nightly PRN, Over-the-counter medication         Continue These Medications      Instructions Start Date   amLODIPine 10 MG tablet  Commonly known as: NORVASC   10 mg, Oral, Daily      metFORMIN  MG 24 hr tablet  Commonly known as: GLUCOPHAGE-XR   1,000 mg, Oral, 2 Times Daily      pravastatin 20 MG tablet  Commonly known as: PRAVACHOL   20 mg, Oral, Nightly         Stop These  Medications    amitriptyline 25 MG tablet  Commonly known as: ELAVIL     collagenase 250 UNIT/GM ointment            No Known Allergies      Discharge Disposition:      Diet:  Hospital:  Diet Order   Procedures   • Diet Regular; Consistent Carbohydrate       Activity:  Activity Instructions     Other Activity Instructions      Activity Instructions: Non-weight bearing on right foot          Restrictions or Other Recommendations:         CODE STATUS:    Code Status and Medical Interventions:   Ordered at: 05/10/22 1410     Code Status (Patient has no pulse and is not breathing):    CPR (Attempt to Resuscitate)     Medical Interventions (Patient has pulse or is breathing):    Full Support       Future Appointments   Date Time Provider Department Center   5/20/2022 11:00 AM Wesley Morgan, PT [facility]       Additional Instructions for the Follow-ups that You Need to Schedule     Ambulatory Referral to Wound Clinic   As directed      Discharge Follow-up with PCP   As directed       Currently Documented PCP:    IRVIN Martinez MD    PCP Phone Number:    [phone]     Follow Up Details: Post-hospital follow-up one week, repeat BMP         Discharge Follow-up with Specified Provider: MONICA Monslave   As directed      To: MONICA Monsalve    Follow Up Details: As scheduled         Discharge Follow-up with Specified Provider: Surgeon   As directed      Follow Up Details: Follow up with Dr. Angela in 2-4 weeks    To: Surgeon               JOHN James  05/18/22    Time Spent on Discharge:  I spent  45 minutes on this discharge activity which included: face-to-face encounter with the patient, reviewing the data in the system, coordination of the care with the nursing staff as well as consultants, documentation, and entering orders.

## 2022-05-18 NOTE — PLAN OF CARE
Goal Outcome Evaluation:  Plan of Care Reviewed With: patient           Outcome Evaluation: dressing changed today with MD able to assess. Pt con to have mild purple / maroon discoloration to wound edges noted. PT will cont with wound vac to help improve granulation and healing potential. PT transitioned pt to home wound vac to allow for d/c home with OP f/u for wound management.

## 2022-05-18 NOTE — CASE MANAGEMENT/SOCIAL WORK
Continued Stay Note  [facility]     Patient Name: Jesse Siu  MRN: [id number]  Today's Date: 5/18/2022    Admit Date: 5/10/2022     Discharge Plan     Row Name 05/18/22 1106       Plan    Plan Home with family/friend's assistance, daily IV infusions at [facility] and wound care at Located within [facility] OP wound clinic    Plan Comments CM spoke with patient at bedside and attempted to call daughter via phone regarding infusion appointment times - no answer from daughter. Updated patient's RN. Patient's first appointment at [facility] office will be on Thursday, 5/19 at 10:30am. Patient's second appointment time at [facility] office will be on Friday, 5/20 @ 10:30am. Dr Serrano spoke with Wesley from [facility] wound clinic and patient's OP wound care appointment will be on Friday, 5/20 at 11:00am.    Final Discharge Disposition Code 01 - home or self-care               Discharge Codes    No documentation.               Expected Discharge Date and Time     Expected Discharge Date Expected Discharge Time    May 18, 2022             Lois Mora RN

## 2022-05-18 NOTE — THERAPY WOUND CARE TREATMENT
Acute Care - Wound/Debridement Treatment Note  [facility]     Patient Name: Jesse Siu  : 1935  MRN: [id number]  Today's Date: 2022                Admit Date: 5/10/2022    Visit Dx:    ICD-10-CM ICD-9-CM   1. Cellulitis of right foot  L03.115 682.7   2. Foot ulceration, right, with necrosis of muscle ([facility])  L97.513 707.15     728.89   3. Leukocytosis, unspecified type  D72.829 288.60   4. Acute hematogenous osteomyelitis of right foot ([facility])  M86.071 730.07   5. Elevated serum creatinine  R79.89 790.99   6. Gas gangrene of foot ([facility])  A48.0 040.0       Patient Active Problem List   Diagnosis   • Cellulitis of right foot   • Type 2 diabetes mellitus with hyperglycemia ([facility])   • Renal insufficiency   • Foot ulceration, right, with necrosis of muscle ([facility])        Past Medical History:   Diagnosis Date   • Hyperlipidemia    • Hypertension    • Pre-diabetes         Past Surgical History:   Procedure Laterality Date   • AMPUTATION DIGIT Right 2022    Procedure: GREAT AND SECOND TOE AMPUTATION RIGHT;  Surgeon: Lemuel Angela MD;  Location: [facility];  Service: Vascular;  Laterality: Right;   • FOOT SURGERY Left     CANCER REMOVAL   • TONSILLECTOMY     • TURP / TRANSURETHRAL INCISION / DRAINAGE PROSTATE             Wound 05/10/22 1600 Right foot (Active)   Dressing Appearance intact 22 1100   Closure ISAIAS 22 0826   Base moist;pink;red;slough;subcutaneous;yellow;exposed structure;maroon/purple 22 1100   Periwound intact;dry 22 1100   Periwound Temperature warm 22 1100   Periwound Skin Turgor firm 22 1100   Edges irregular 22 1100   Drainage Characteristics/Odor serosanguineous 22 1100   Drainage Amount small 22 1100   Care, Wound irrigated with;sterile normal saline;negative pressure wound therapy 22 1100   Dressing Care dressing changed 22 1100   Wound Output (mL) 75 22 1100       Wound 22 Right anterior foot Incision (Active)    Dressing Appearance dry;intact;no drainage 05/18/22 0826   Closure ISAIAS 05/18/22 0826   Base dressing in place, unable to visualize 05/18/22 0826   Drainage Amount none 05/17/22 2124   Dressing Care gauze 05/17/22 2124       NPWT (Negative Pressure Wound Therapy) 05/13/22 0930 R foot wound (Active)   Therapy Setting continuous therapy 05/18/22 1100   Dressing foam, black;foam, white 05/18/22 1100   Pressure Setting 150 mmHg 05/18/22 1100   Sponges Inserted 2 05/18/22 1100   Sponges Removed 2 05/18/22 1100   Finger sweep complete Yes 05/18/22 1100         WOUND DEBRIDEMENT  Total area of Debridement: ~10cm2  Debridement Site 1  Location- Site 1: R foot and periwound area  Selective Debridement- Site 1: Wound Surface <20cmsq  Instruments- Site 1: tweezers, scissors  Excised Tissue Description- Site 1: moderate, slough, other (comment) (nonviable periwound skin)  Bleeding- Site 1: none               PT Assessment (last 12 hours)     PT Evaluation and Treatment     Row Name 05/18/22 1100          Physical Therapy Time and Intention    Subjective Information complains of;fatigue;weakness  -MF     Document Type therapy note (daily note);wound care  -MF     Mode of Treatment individual therapy;physical therapy  -     Row Name 05/18/22 1100          Pain    Pretreatment Pain Rating 0/10 - no pain  -MF     Posttreatment Pain Rating 0/10 - no pain  -     Row Name 05/18/22 1100          Wound 05/10/22 1600 Right foot    Wound - Properties Group Placement Date: 05/10/22  -AM Placement Time: 1600  -AM Side: Right  -AM Location: foot  -AM     Dressing Appearance intact  -MF     Base moist;pink;red;slough;subcutaneous;yellow;exposed structure;maroon/purple  bone to wound base with mild purple discoloration to periwound edge noted.  -MF     Periwound intact;dry  -MF     Periwound Temperature warm  -MF     Periwound Skin Turgor firm  -MF     Edges irregular  -MF     Drainage Characteristics/Odor serosanguineous  -MF      Drainage Amount small  -MF     Care, Wound irrigated with;sterile normal saline;negative pressure wound therapy  -MF     Dressing Care dressing changed  -MF     Wound Output (mL) 75  -MF     Retired Wound - Properties Group Placement Date: 05/10/22  -AM Placement Time: 1600  -AM Side: Right  -AM Location: foot  -AM     Retired Wound - Properties Group Date first assessed: 05/10/22  -AM Time first assessed: 1600  -AM Side: Right  -AM Location: foot  -AM     Row Name             Wound 05/12/22 Right anterior foot Incision    Wound - Properties Group Placement Date: 05/12/22  -MP Present on Hospital Admission: N  -MP Side: Right  -MP Orientation: anterior  -MP Location: foot  -MP Primary Wound Type: Incision  -MP     Retired Wound - Properties Group Placement Date: 05/12/22  -MP Present on Hospital Admission: N  -MP Side: Right  -MP Orientation: anterior  -MP Location: foot  -MP Primary Wound Type: Incision  -MP     Retired Wound - Properties Group Date first assessed: 05/12/22  -MP Present on Hospital Admission: N  -MP Side: Right  -MP Location: foot  -MP Primary Wound Type: Incision  -MP     Row Name 05/18/22 1100          NPWT (Negative Pressure Wound Therapy) 05/13/22 0930 R foot wound    NPWT (Negative Pressure Wound Therapy) - Properties Group Placement Date: 05/13/22  - Placement Time: 0930 -MF Location: R foot wound  -MF     Therapy Setting continuous therapy  -MF     Dressing foam, black;foam, white  -MF     Pressure Setting 150 mmHg  -MF     Sponges Inserted 2  1 white 1 black  -MF     Sponges Removed 2  1 white 1 black  -MF     Finger sweep complete Yes  -MF     Retired NPWT (Negative Pressure Wound Therapy) - Properties Group Placement Date: 05/13/22  -MF Placement Time: 0930 -MF Location: R foot wound  -MF     Retired NPWT (Negative Pressure Wound Therapy) - Properties Group Placement Date: 05/13/22  -MF Placement Time: 0930 -MF Location: R foot wound  -MF     Row Name 05/18/22 1100          Coping     Observed Emotional State calm;cooperative  -MF     Verbalized Emotional State acceptance  -MF     Trust Relationship/Rapport care explained  -MF     Row Name 05/18/22 1100          Plan of Care Review    Plan of Care Reviewed With patient  -MF     Outcome Evaluation dressing changed today with MD able to assess. Pt con to have mild purple / maroon discoloration to wound edges noted. PT will cont with wound vac to help improve granulation and healing potential. PT transitioned pt to home wound vac to allow for d/c home with OP f/u for wound management.  -     Row Name 05/18/22 1100          Positioning and Restraints    Pre-Treatment Position in bed  -MF     Post Treatment Position bed  -MF     In Bed supine;call light within reach  -MF           User Key  (r) = Recorded By, (t) = Taken By, (c) = Cosigned By    Initials Name Provider Type    MF Wesley Morgan, PT Physical Therapist    Yael Nam RN Registered Nurse    Konstantin Browning RN Registered Nurse              Physical Therapy Education                 Title: PT OT SLP Therapies (Done)     Topic: Physical Therapy (Done)     Point: Mobility training (Done)     Learning Progress Summary           Patient Acceptance, E,TB, VU by  at 5/17/2022 1542    Comment: reviewed safety with mobility, safety within hoime, wheelchair use, wheelchair setup and takedown    Eager, E,D,TB, VU,DU,NR by SC at 5/16/2022 1204    Comment: REVIEWED W/C PROPULSION  REVIEWED NWB    Acceptance, E, NR by SUSANA at 5/13/2022 1330    Comment: patient needs continued education on NWB status   Family Acceptance, E,TB, VU by  at 5/17/2022 1542    Comment: reviewed safety with mobility, safety within hoime, wheelchair use, wheelchair setup and takedown                   Point: Home exercise program (Done)     Learning Progress Summary           Patient Acceptance, E,TB, VU by  at 5/17/2022 1542    Comment: reviewed safety with mobility, safety within hoime, wheelchair use,  wheelchair setup and takedown    Eager, E,D,TB, VU,DU,NR by SC at 5/16/2022 1204    Comment: REVIEWED W/C PROPULSION  REVIEWED NWB    Acceptance, E, NR by  at 5/13/2022 1330    Comment: patient needs continued education on NWB status   Family Acceptance, E,TB, VU by  at 5/17/2022 1542    Comment: reviewed safety with mobility, safety within hoime, wheelchair use, wheelchair setup and takedown                   Point: Body mechanics (Done)     Learning Progress Summary           Patient Acceptance, E,TB, VU by  at 5/17/2022 1542    Comment: reviewed safety with mobility, safety within hoime, wheelchair use, wheelchair setup and takedown    Eager, E,D,TB, VU,DU,NR by SC at 5/16/2022 1204    Comment: REVIEWED W/C PROPULSION  REVIEWED NWB    Acceptance, E, NR by  at 5/13/2022 1330    Comment: patient needs continued education on NWB status   Family Acceptance, E,TB, VU by  at 5/17/2022 1542    Comment: reviewed safety with mobility, safety within hoime, wheelchair use, wheelchair setup and takedown                   Point: Precautions (Done)     Learning Progress Summary           Patient Acceptance, E,TB, VU by  at 5/17/2022 1542    Comment: reviewed safety with mobility, safety within hoime, wheelchair use, wheelchair setup and takedown    Eager, E,D,TB, VU,DU,NR by SC at 5/16/2022 1204    Comment: REVIEWED W/C PROPULSION  REVIEWED NWB    Acceptance, E, NR by  at 5/13/2022 1330    Comment: patient needs continued education on NWB status   Family Acceptance, E,TB, VU by  at 5/17/2022 1542    Comment: reviewed safety with mobility, safety within hoime, wheelchair use, wheelchair setup and takedown                               User Key     Initials Effective Dates Name Provider Type Discipline    SC 06/16/21 -  Destini Hooper PT Physical Therapist PT     06/16/21 -  Saumya Huertas PT Physical Therapist PT     09/22/20 -  Ryan Lock PT Physical Therapist PT                Recommendation  and Plan  Anticipated Equipment Needs at Discharge (PT): front wheeled walker, rolling knee walker, wheelchair  Anticipated Discharge Disposition (PT): inpatient rehabilitation facility  Planned Therapy Interventions (PT): balance training, bed mobility training, gait training, strengthening, transfer training, home exercise program  Therapy Frequency (PT): daily  Plan of Care Reviewed With: patient           Outcome Evaluation: dressing changed today with MD able to assess. Pt con to have mild purple / maroon discoloration to wound edges noted. PT will cont with wound vac to help improve granulation and healing potential. PT transitioned pt to home wound vac to allow for d/c home with OP f/u for wound management.  Plan of Care Reviewed With: patient            Time Calculation   PT Charges     Row Name 05/18/22 1100             Time Calculation    Start Time 1100  -MF      PT Goal Re-Cert Due Date 05/23/22  -MF              Untimed Charges    [id number] Pressure wound to 50 sqcm 40  -MF              Total Minutes    Untimed Charges Total Minutes 40  -MF       Total Minutes 40  -MF            User Key  (r) = Recorded By, (t) = Taken By, (c) = Cosigned By    Initials Name Provider Type     Wesley Morgan, PT Physical Therapist                  Therapy Charges for Today     Code Description Service Date Service Provider Modifiers Qty    60891891833 HC PT NEG PRESS WOUND TO 50SQCM DME1 5/17/2022 Wesley Morgan, PT  1    40662560597 HC PT NEG PRESS WOUND TO 50SQCM DME3 5/18/2022 Wesley Morgan, PT GP 1            PT G-Codes  Outcome Measure Options: AM-PAC 6 Clicks Basic Mobility (PT)  AM-PAC 6 Clicks Score (PT): 17       Wesley Morgan, PT  5/18/2022

## 2022-05-18 NOTE — PLAN OF CARE
Goal Outcome Evaluation:  Plan of Care Reviewed With: patient        Progress: improving   Pt is alert and oriented x4. Vss on room air. Sinus Arrhythmia on tele. Pt has slept the majority of the shift. No c/o of pain. PRN clonidine given as ordered for elevated BP. Dressing to right foot is CDI. Wound vac in place. Up with an assist x1 to bedside commode; voiding spontaneously. Anticipating possible d/c home today. Will continue to monitor.

## 2022-05-18 NOTE — PROGRESS NOTES
INFECTIOUS DISEASE Progress Note    Jesse Siu  1935  [id number]    Admission Date: 5/10/2022      Requesting Provider: Sil Navarro II, DO  Evaluating Physician: Cesar Serrano MD    Reason for Consultation: Right diabetic foot infection/gangrene    History of present illness:    5/11/22: Patient is a 87 y.o. male who is seen today for evaluation of progressively worsening right first and second toe wound infections with gangrene. He initially developed right second toe ulcerations proximally one month ago.  He has been seeing a podiatrist and was started on oral antibiotic.  He has noted substantial worsening of his foot over the last week.  He was seen by his podiatrist on 5/9 who noted gangrene with a foul odor and recommended that he come to the emergency room.  He was found to have right first and second toe gangrene.  He has been seen by Dr. Angela and is currently scheduled for amputation tomorrow.     5/12/22:  He has been afebrile overnight.His white blood cell count today is 14.  His right foot wound cultures growing light gram-negative bacilli in his Gram stain revealed many gram-positive cocci and moderate gram-negative bacilli. Blood cultures from 5/10 are no growth so far. Duplex lower extremity arterial assessment revealed a 50-75% stenosis in the Right anterior tibial artery, short segment occlusion in the right mid posterior tibial artery, and an HENRIQUE reduced at 0.78.X-rays of his right foot revealed soft tissue gas. Creatinine today is 1.23. He denies increased right foot pain.  He does have a history of peripheral neuropathy.  At the time of surgery today he was found to have extensive necrosis with with necrotizing fasciitis extending to the base of the first, second, and third metatarsals.    5/13/22:  He remains afebrile. Right foot cultures from 5/10 grew Citrobacter which was sensitive to tested antibiotics.  Right foot tissue culture from 5/12 is pending but the Gram stain  revealed many white blood cells, many gram-positive cocci in pairs and clusters, and moderate gram-positive bacilli. Creatinine today is 1.29.  White blood cell count is 11.2.  He denies increased foot pain.  He denies nausea, vomiting, and diarrhea.    5/14/22:   Wound vac in place, photo of 5/10 noted.  He remains afebrile. Wound culture of 5/12 also with Citrobacter. He denies any fever, chills, n/v/d.     5/15/22:  He remains afebrile.  Wound vac in place.  No n/v/d.      5/16/22: He has remained afebrile. Right foot culture grew Citrobacter sensitive to all tested antibiotics.  He denies nausea, vomiting, and diarrhea.  His wound VAC is scheduled to be changed later today.  He does not want to go to a subacute nursing facility for rehab, wound care, and intravenous antibiotic therapy.    5/17/22: He has remained afebrile.  He denies increased right foot pain.  He denies nausea, vomiting, and diarrhea.    5/18/22: He continues to feel better.  He has remained afebrile.  He denies right foot pain.  He denies nausea, vomiting, and diarrhea.  He is insistent on going home rather than going to rehab.    Past Medical History:   Diagnosis Date   • Hyperlipidemia    • Hypertension    • Pre-diabetes        Past Surgical History:   Procedure Laterality Date   • AMPUTATION DIGIT Right 5/12/2022    Procedure: GREAT AND SECOND TOE AMPUTATION RIGHT;  Surgeon: Lemuel Angela MD;  Location: [facility];  Service: Vascular;  Laterality: Right;   • FOOT SURGERY Left     CANCER REMOVAL   • TONSILLECTOMY     • TURP / TRANSURETHRAL INCISION / DRAINAGE PROSTATE         History reviewed. No pertinent family history.    Social History     Socioeconomic History   • Marital status:    Tobacco Use   • Smoking status: Never Smoker   • Smokeless tobacco: Never Used   Substance and Sexual Activity   • Alcohol use: Never   • Drug use: Never   • Sexual activity: Never       No Known Allergies      Medication:    Current  Facility-Administered Medications:   •  acetaminophen (TYLENOL) tablet 650 mg, 650 mg, Oral, Q4H PRN, 650 mg at 05/14/22 2058 **OR** acetaminophen (TYLENOL) 160 MG/5ML solution 650 mg, 650 mg, Oral, Q4H PRN **OR** acetaminophen (TYLENOL) suppository 650 mg, 650 mg, Rectal, Q4H PRN, Hiral Borges PA-C  •  amLODIPine (NORVASC) tablet 10 mg, 10 mg, Oral, Q24H, Linda Leslie MD, 10 mg at 05/17/22 0838  •  sennosides-docusate (PERICOLACE) 8.6-50 MG per tablet 2 tablet, 2 tablet, Oral, BID PRN **AND** polyethylene glycol (MIRALAX) packet 17 g, 17 g, Oral, Daily PRN **AND** bisacodyl (DULCOLAX) EC tablet 5 mg, 5 mg, Oral, Daily PRN **AND** bisacodyl (DULCOLAX) suppository 10 mg, 10 mg, Rectal, Daily PRN, Linda Leslie MD  •  cloNIDine (CATAPRES) tablet 0.1 mg, 0.1 mg, Oral, Q6H PRN, Linda Leslie MD, 0.1 mg at 05/17/22 2128  •  dextrose (D50W) (25 g/50 mL) IV injection 25 g, 25 g, Intravenous, Q15 Min PRN, Hiral Borges PA-C  •  dextrose (GLUTOSE) oral gel 15 g, 15 g, Oral, Q15 Min PRN, Hiral Borges PA-C  •  docusate sodium (COLACE) capsule 100 mg, 100 mg, Oral, BID PRN, Hiral Borges PA-C  •  ertapenem (INVanz) 1 g/100 mL 0.9% NS VTB (mbp), 1 g, Intravenous, Q24H, Cesar Serrano MD, 1 g at 05/17/22 1838  •  glucagon (human recombinant) (GLUCAGEN DIAGNOSTIC) injection 1 mg, 1 mg, Intramuscular, Q15 Min PRN, Hiral Borges PA-C  •  heparin (porcine) 5000 UNIT/ML injection 5,000 Units, 5,000 Units, Subcutaneous, Q12H, Hiral Borges PA-C, 5,000 Units at 05/17/22 2124  •  insulin detemir (LEVEMIR) injection 10 Units, 10 Units, Subcutaneous, Daily, Sarah Beth Caban APRN  •  Insulin Lispro (humaLOG) injection 0-9 Units, 0-9 Units, Subcutaneous, TID AC, Hiral Borges PA-C, 6 Units at 05/17/22 1209  •  loperamide (IMODIUM) capsule 2 mg, 2 mg, Oral, 4x Daily PRN, Linda Leslie MD, 2 mg at 05/15/22 1752  •  Magnesium Sulfate 2 gram Bolus, followed by 8 gram  infusion (total Mg dose 10 grams)- Mg less than or equal to 1mg/dL, 2 g, Intravenous, PRN **OR** Magnesium Sulfate 2 gram / 50mL Infusion (GIVE X 3 BAGS TO EQUAL 6GM TOTAL DOSE) - Mg 1.1 - 1.5 mg/dl, 2 g, Intravenous, PRN **OR** Magnesium Sulfate 4 gram infusion- Mg 1.6-1.9 mg/dL, 4 g, Intravenous, PRN, Hiral Borges PA-C  •  melatonin tablet 5 mg, 5 mg, Oral, Nightly PRN, Hiral Borges PA-C  •  [] morphine injection 2 mg, 2 mg, Intravenous, Q2H PRN **AND** naloxone (NARCAN) injection 0.4 mg, 0.4 mg, Intravenous, Q5 Min PRN, Hiral Borges PA-C  •  ondansetron (ZOFRAN) tablet 4 mg, 4 mg, Oral, Q6H PRN **OR** ondansetron (ZOFRAN) injection 4 mg, 4 mg, Intravenous, Q6H PRN, Hiral Borges PA-C  •  polyethylene glycol (MIRALAX) packet 17 g, 17 g, Oral, Daily, Hiral Borges PA-C, 17 g at 22 0857  •  potassium chloride (MICRO-K) CR capsule 40 mEq, 40 mEq, Oral, PRN **OR** potassium chloride (KLOR-CON) packet 40 mEq, 40 mEq, Oral, PRN **OR** potassium chloride 10 mEq in 100 mL IVPB, 10 mEq, Intravenous, Q1H PRN, Hiral Borges PA-C  •  pravastatin (PRAVACHOL) tablet 20 mg, 20 mg, Oral, Nightly, Hiral Borges PA-C, 20 mg at 22 2124  •  sodium chloride 0.9 % flush 10 mL, 10 mL, Intravenous, Q12H, Hiral Borges PA-C, 10 mL at 22 0838  •  sodium chloride 0.9 % flush 10 mL, 10 mL, Intravenous, PRN, Hiral Borges PA-C  •  sodium chloride 0.9 % flush 10 mL, 10 mL, Intravenous, Q12H, Cesar Serrano MD, 10 mL at 22 2124  •  sodium chloride 0.9 % flush 10 mL, 10 mL, Intravenous, PRN, Cesar Serrano MD    Antibiotics:  Anti-Infectives (From admission, onward)    Ordered     Dose/Rate Route Frequency Start Stop    22 1618  ertapenem (INVanz) 1 g/100 mL 0.9% NS VTB (mbp)        Ordering Provider: Cesar Serrano MD    1 g  over 30 Minutes Intravenous Every 24 Hours 22 1800 22 1759    22 0718  vancomycin 1250 mg/250 mL  0.9% NS IVPB ([facility])        Ordering Provider: Cici Shoemaker, PharmD    1,250 mg Intravenous Once 22 1500 22 1659    05/10/22 1608  piperacillin-tazobactam (ZOSYN) 3.375 g in iso-osmotic dextrose 50 ml (premix)        Ordering Provider: Sil Navarro II, DO    3.375 g  over 30 Minutes Intravenous Once 05/10/22 1700 05/10/22 1659    05/10/22 1416  meropenem (MERREM) 1 g/100 mL 0.9% NS (mbp)        Ordering Provider: Ole Hadley MD    1 g  over 30 Minutes Intravenous Once 05/10/22 1418 05/10/22 1521    05/10/22 1309  cefTRIAXone (ROCEPHIN) 1 g/100 mL 0.9% NS ([facility])        Ordering Provider: Ole Hadley MD    1 g  over 30 Minutes Intravenous Once 05/10/22 1311 05/10/22 1430    05/10/22 1307  vancomycin 1500 mg/500 mL 0.9% NS IVPB ([facility])        Ordering Provider: Ole Hadley MD    20 mg/kg × 70.3 kg Intravenous Once 05/10/22 1309 05/10/22 1541            Review of Systems:  See HPI      Physical Exam:   Vital Signs  Temp (24hrs), Av.9 °F (36.6 °C), Min:97.6 °F (36.4 °C), Max:98 °F (36.7 °C)    Temp  Min: 97.6 °F (36.4 °C)  Max: 98 °F (36.7 °C)  BP  Min: 123/68  Max: 181/83  Pulse  Min: 51  Max: 71  Resp  Min: 16  Max: 18  SpO2  Min: 90 %  Max: 100 %    GENERAL: Awake and alert, in no acute distress.   HEENT: Normocephalic, atraumatic.  PERRL. EOMI. No conjunctival injection. No icterus. Oropharynx clear without evidence of thrush or exudate.   NECK: Supple   HEART: RRR; No murmur, rubs, gallops.   LUNGS: Clear to auscultation bilaterally without wheezing, rales, rhonchi. Normal respiratory effort  ABDOMEN: Soft, nontender, nondistended. No rebound or guarding.   EXT: The right foot wound  Reveals a 14 x 4 cm x 3 cm deep wound with decreased necrotic tissue and increased granulation tissue.  He still has some devitalized tissue deep in the wound and there is some early tissue ischemia at the dorsal wound margin.   :  Without Hernandez catheter.  SKIN: Warm and dry without cutaneous eruptions  on Inspection/palpation.    NEURO: Oriented to PPT. Motor 5/5 strength bilaterally.  He has decreased sensation to light touch in both distal lower extremities.  PSYCHIATRIC: Normal insight and judgement. Cooperative with PE    Laboratory Data    Results from last 7 days   Lab Units 05/14/22  1056 05/13/22  0626 05/12/22  0503   WBC 10*3/mm3 12.36* 11.21* 13.95*   HEMOGLOBIN g/dL 9.5* 9.2* 9.7*   HEMATOCRIT % 29.1* 28.7* 29.9*   PLATELETS 10*3/mm3 472* 499* 499*     Results from last 7 days   Lab Units 05/14/22  1057   SODIUM mmol/L 141   POTASSIUM mmol/L 4.3   CHLORIDE mmol/L 105   CO2 mmol/L 29.0   BUN mg/dL 22   CREATININE mg/dL 1.17   GLUCOSE mg/dL 166*   CALCIUM mg/dL 8.6                             Estimated Creatinine Clearance: 44.2 mL/min (by C-G formula based on SCr of 1.17 mg/dL).      Microbiology:  No results found for: ACANTHNAEG, AFBCX, BPERTUSSISCX, BLOODCX  No results found for: BCIDPCR, CXREFLEX, CSFCX, CULTURETIS  No results found for: CULTURES, HSVCX, URCX  No results found for: EYECULTURE, GCCX, HSVCULTURE, LABHSV  No results found for: LEGIONELLA, MRSACX, MUMPSCX, MYCOPLASCX  No results found for: NOCARDIACX, STOOLCX  No results found for: THROATCX, UNSTIMCULT, URINECX, CULTURE, VZVCULTUR  No results found for: VIRALCULTU, WOUNDCX        Radiology:  Imaging Results (Last 72 Hours)     ** No results found for the last 72 hours. **            Impression:   1.  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  His foot wound is growing Citrobacter but this may have primarily been an anaerobic infection.  His antibiotic therapy is now switched to Invanz.   2.  Type 2 diabetes mellitus  3.  Acute kidney injury-Improved  4.  Peripheral vascular disease    PLAN/RECOMMENDATIONS:   1.  Continue wound care with a wound VAC  2.  Invanz 1 g IV daily  3.  Discharge to home today  4.  Invanz IV daily in our office  starting tomorrow  5.  Wound VAC changes MWF at [facility] BT wound care    I communicated with case management regarding his situation again today.  I discussed his wound care with Wesley Prado in PT wound care  I discussed his situation with the nursing staff today.  We will dose his Invanz around noon in order to discharge him in the early afternoon.  I discussed his complex situation with Dr. Angela again today     He still has a high risk for limb loss.  We are attempting to salvage his foot but I think he has a significant risk for requiring a right BKA.  Dr. Angela agrees with this assessment.  I spent over 35 minutes on his care today.      Outpatient orders:  1. Have him come to our office tomorrow Thursday 5/19 at 10:30 for: Invanz 1 gm IV daily  2. Appt to see me Friday 5/20 at 10:30- this has been made  3.  Appt in [facility] PT wound care at 11:30 Friday       Cesar Serrano MD  5/18/2022  07:02 EDT

## 2022-05-18 NOTE — PROGRESS NOTES
Cardiothoracic Surgery Progress Note      POD #: 6-transmetatarsal amputation of second third and great toe along with extensive soft tissue debridement wound VAC now in place for closure by secondary intent     LOS: 8 days      Subjective: Asleep this morning-resting comfortably.    Objective:  Vital Signs vital signs below noted T-max past 24 hours 98 °F  Temp:  [97.6 °F (36.4 °C)-98 °F (36.7 °C)] 97.6 °F (36.4 °C)  Heart Rate:  [51-71] 52  Resp:  [16-18] 16  BP: (123-181)/(68-89) 149/85    Physical Exam:   General Appearance: Asleep and appears to be resting comfortably   Lungs:   Heart:   Skin:   Incision: Amputation site has wound VAC in place with dry dressing no drainage noted margins of wound VAC appear to be viable     Results:  Results from last 7 days   Lab Units 05/14/22  1056   WBC 10*3/mm3 12.36*   HEMOGLOBIN g/dL 9.5*   HEMATOCRIT % 29.1*   PLATELETS 10*3/mm3 472*     Results from last 7 days   Lab Units 05/14/22  1057   SODIUM mmol/L 141   POTASSIUM mmol/L 4.3   CHLORIDE mmol/L 105   CO2 mmol/L 29.0   BUN mg/dL 22   CREATININE mg/dL 1.17   GLUCOSE mg/dL 166*   CALCIUM mg/dL 8.6         Assessment: #1.  Postop day 6 right great toe right second toe and right third toe total metatarsal amputation with extensive soft tissue debridement wound VAC now in place.  2.  Mild renal insufficiency new #3 prediabetes by history    Plan:  Overall medical management per hospitalist.  Antibiotics per infectious disease.  Wound VAC management per PT wound care.  Okay to rehabilitation with me at any time.    Lemuel Angela MD - 05/18/22 - 06:09 EDT

## 2022-05-19 NOTE — OUTREACH NOTE
Prep Survey    Flowsheet Row Responses   [facility] facility patient discharged from? [facility]   Is LACE score < 7 ? No   Emergency Room discharge w/ pulse ox? No   Eligibility Readm Mgmt   Discharge diagnosis Cellulitis of right footGas gangrene of foot amputation of R great toe and R second toe with extensive soft tissue    Does the patient have one of the following disease processes/diagnoses(primary or secondary)? General Surgery   Does the patient have Home health ordered? Yes   What is the Home health agency?  [facility]    Is there a DME ordered? Yes   What DME was ordered? [facility]    Prep survey completed? Yes          VANDANA PAYNE - Registered Nurse

## 2022-05-20 ENCOUNTER — HOSPITAL ENCOUNTER (OUTPATIENT)
Dept: PHYSICAL THERAPY | Facility: HOSPITAL | Age: 87
Setting detail: THERAPIES SERIES
Discharge: HOME | End: 2022-05-20

## 2022-05-20 DIAGNOSIS — L97.513 FOOT ULCERATION, RIGHT, WITH NECROSIS OF MUSCLE: ICD-10-CM

## 2022-05-20 DIAGNOSIS — L03.115 CELLULITIS OF RIGHT FOOT: Primary | ICD-10-CM

## 2022-05-20 PROCEDURE — 97162 PT EVAL MOD COMPLEX 30 MIN: CPT

## 2022-05-20 PROCEDURE — 97605 NEG PRS WND THER DME<=50SQCM: CPT

## 2022-05-20 PROCEDURE — 97597 DBRDMT OPN WND 1ST 20 CM/<: CPT

## 2022-05-20 NOTE — THERAPY EVALUATION
Outpatient Rehabilitation - Wound/Debridement Initial Eval  [facility]     Patient Name: Jesse Siu  : 1935  MRN: [id number]  Today's Date: 2022                  Admit Date: 2022    Visit Dx:    ICD-10-CM ICD-9-CM   1. Cellulitis of right foot  L03.115 682.7   2. Foot ulceration, right, with necrosis of muscle (HCC)  L97.513 707.15     728.89       Patient Active Problem List   Diagnosis   • Cellulitis of right foot   • Type 2 diabetes mellitus with hyperglycemia (HCC)   • Renal insufficiency   • Foot ulceration, right, with necrosis of muscle (HCC)        Past Medical History:   Diagnosis Date   • Hyperlipidemia    • Hypertension    • Pre-diabetes         Past Surgical History:   Procedure Laterality Date   • AMPUTATION DIGIT Right 2022    Procedure: GREAT AND SECOND TOE AMPUTATION RIGHT;  Surgeon: Lemuel Angela MD;  Location: [facility];  Service: Vascular;  Laterality: Right;   • FOOT SURGERY Left     CANCER REMOVAL   • TONSILLECTOMY     • TURP / TRANSURETHRAL INCISION / DRAINAGE PROSTATE          Patient History     Row Name 22 1200             History    Chief Complaint Ulcer, wound or other skin conditions  -MF      Brief Description of Current Complaint Pt s/p great toe and second toe ulceration  -MF      Patient's Rating of General Health Fair  -MF              Pain     Pain Location Foot  -MF      Pain at Present 0  -MF      Pain at Best 0  -MF      Pain at Worst 0  -MF              Daily Activities    Pt Participated in POC and Goals Yes  -MF            User Key  (r) = Recorded By, (t) = Taken By, (c) = Cosigned By    Initials Name Provider Type    Wesley Vallejo, PT Physical Therapist                EVALUATION     LDA Wound     Row Name 22 1200             Wound 22 1200 Right medial foot Amputation stump    Wound - Properties Group Placement Date: 22  -MF Placement Time: 1200  -MF Side: Right  -MF Orientation: medial  -MF Location: foot  -MF  Primary Wound Type: Amputation s  -MF      Dressing Appearance intact  -MF      Base moist;pink;red;slough;subcutaneous;yellow;exposed structure;black eschar  -MF      Black (%), Wound Tissue Color 10  -MF      Red (%), Wound Tissue Color 40  -MF      Yellow (%), Wound Tissue Color 50  -MF      Periwound intact;moist;pink  -MF      Periwound Temperature warm  -MF      Periwound Skin Turgor soft  -MF      Edges irregular  -MF      Wound Length (cm) 3 cm  -MF      Wound Width (cm) 10 cm  -MF      Wound Depth (cm) 3.5 cm  -MF      Wound Surface Area (cm^2) 30 cm^2  -MF      Wound Volume (cm^3) 105 cm^3  -MF      Drainage Characteristics/Odor serosanguineous  -MF      Drainage Amount small  -MF      Care, Wound irrigated with;sterile normal saline;debrided;negative pressure wound therapy  -MF      Dressing Care dressing changed  -MF      Retired Wound - Properties Group Placement Date: 05/20/22  - Placement Time: 1200  -MF Side: Right  -MF Orientation: medial  -MF Location: foot  -MF Primary Wound Type: Amputation s  -MF      Retired Wound - Properties Group Date first assessed: 05/20/22  - Time first assessed: 1200  -MF Side: Right  -MF Location: foot  -MF Primary Wound Type: Amputation s  -MF              NPWT (Negative Pressure Wound Therapy) 05/20/22 1200 R foot ampuation site    NPWT (Negative Pressure Wound Therapy) - Properties Group Placement Date: 05/20/22  - Placement Time: 1200  -MF Location: R foot ampuation site  -MF      Therapy Setting continuous therapy  -MF      Dressing foam, black;foam, white  -MF      Pressure Setting 125 mmHg  -MF      Sponges Inserted 2  1 white 1 black  -MF      Sponges Removed 2  1 white 1 black  -MF      Finger sweep complete Yes  -MF      Retired NPWT (Negative Pressure Wound Therapy) - Properties Group Placement Date: 05/20/22  -MF Placement Time: 1200  -MF Location: R foot ampuation site  -MF      Retired NPWT (Negative Pressure Wound Therapy) - Properties Group  Placement Date: 05/20/22  -MF Placement Time: 1200  -MF Location: R foot ampuation site  -MF            User Key  (r) = Recorded By, (t) = Taken By, (c) = Cosigned By    Initials Name Provider Type    Wesley Vallejo, PT Physical Therapist                  WOUND DEBRIDEMENT  Total area of Debridement: ~5cm2  Debridement Site 1  Location- Site 1: foot amputation site.  Selective Debridement- Site 1: Wound Surface <20cmsq  Instruments- Site 1: tweezers, scissors  Excised Tissue Description- Site 1: minimum, slough, eschar  Bleeding- Site 1: none              Therapy Education     Row Name 05/20/22 1200             Therapy Education    Education Details PT reviewed changing wound vac canister and vac management.  -MF      Given Bandaging/dressing change  -MF      Program Reinforced  -MF      How Provided Verbal;Demonstration  -MF      Provided to Patient;Caregiver  -MF      Level of Understanding Teach back education performed;Verbalized  -MF            User Key  (r) = Recorded By, (t) = Taken By, (c) = Cosigned By    Initials Name Provider Type    Wesley Vallejo, PT Physical Therapist                Recommendation and Plan   PT Assessment/Plan     Row Name 05/20/22 1200          PT Assessment    Functional Limitations Performance in self-care ADL;Limitations in functional capacity and performance;Limitations in community activities  -     Impairments Integumentary integrity  -     Assessment Comments Pt presents with recent d/c from hospital for toe ampuation and wound vac placement. Pt cont to have mild necrosis to periwound edges and bone exposure, but with some increase in granulation to wound base.  Pt will benefit from cont use of wound vac to help further improve healing potential and limit complications.  PT also able to lightly debride necrotic tissue to decrease bioburden.  -MF     Rehab Potential Fair  -MF     Patient/caregiver participated in establishment of treatment plan and goals Yes   -     Patient would benefit from skilled therapy intervention Yes  -            PT Plan    PT Frequency 3x/week  -     Predicted Duration of Therapy Intervention (PT) 10-12 weeks  -     Planned CPT's? PT EVAL MOD COMPLELITY: 09058;PT JOSE DEBRIDE OPEN WOUND UP TO 20 CM: 21084;PT NEG PRESS WOUND TO 50 SQCM 3: 07201;PT NONSELECT DEBRIDE 15 MIN: 26478;PT SELF CARE/MGMT/TRAIN 15 MIN: 56130;PT NLFU MIST: 18285  -     Physical Therapy Interventions (Optional Details) wound care;patient/family education  -     PT Plan Comments wound vac changes with debridement every 2-3 days.  -           User Key  (r) = Recorded By, (t) = Taken By, (c) = Cosigned By    Initials Name Provider Type    Wesley Vallejo, PT Physical Therapist                  Goals   PT OP Goals     Row Name 05/20/22 1200          PT Short Term Goals    STG Date to Achieve 07/04/22  -     STG 1 Decrease wound size by 25% as evidence of wound healing.  -     STG 2 increase granulation to >75% to improve wound healing.  -            Long Term Goals    LTG Date to Achieve 08/18/22  -     LTG 1 Decrease wound size by 75% as evidence of wound healing.  -     LTG 2 Increase granulation to >90% to improve wound healing.  -     LTG 3 Pt and family able to demonstrate home dressing management to allow for transition to home management.  -            Time Calculation    PT Goal Re-Cert Due Date 08/18/22  -           User Key  (r) = Recorded By, (t) = Taken By, (c) = Cosigned By    Initials Name Provider Type    Wesley Vallejo, PT Physical Therapist                Time Calculation: Start Time: 1200  Untimed Charges  PT Eval/Re-eval Minutes: 50  Wound Care: 22681 Selective debridement, 65439 Neg Press (DME) wound TO 50 sqcm  13553-Zkpqzrhhp debridement: 10  47204-Rpu Pressure wound to 50 sqcm: 25  Total Minutes  Untimed Charges Total Minutes: 85   Total Minutes: 85  Therapy Charges for Today     Code Description Service Date  Service Provider Modifiers Qty    46308323324 HC PT EVAL MOD COMPLEXITY 4 5/20/2022 Wesley Morgan, PT GP 1    23024497708 HC JOSE DEBRIDE OPEN WOUND UP TO 20CM 5/20/2022 Wesley Morgan, PT GP 1    24778937297 HC PT NEG PRESS WOUND TO 50SQCM DME2 5/20/2022 Wesley Morgan, PT GP 1                Wesley Morgan, PT  5/20/2022

## 2022-05-23 ENCOUNTER — HOSPITAL ENCOUNTER (OUTPATIENT)
Dept: PHYSICAL THERAPY | Facility: HOSPITAL | Age: 87
Setting detail: THERAPIES SERIES
Discharge: HOME | End: 2022-05-23

## 2022-05-23 DIAGNOSIS — L03.115 CELLULITIS OF RIGHT FOOT: Primary | ICD-10-CM

## 2022-05-23 DIAGNOSIS — L97.513 FOOT ULCERATION, RIGHT, WITH NECROSIS OF MUSCLE: ICD-10-CM

## 2022-05-23 PROCEDURE — 97605 NEG PRS WND THER DME<=50SQCM: CPT

## 2022-05-23 PROCEDURE — 97597 DBRDMT OPN WND 1ST 20 CM/<: CPT

## 2022-05-23 NOTE — THERAPY WOUND CARE TREATMENT
Outpatient Rehabilitation - Wound/Debridement Treatment Note  [facility]     Patient Name: Jesse Siu  : 1935  MRN: [id number]  Today's Date: 2022                 Admit Date: 2022    Visit Dx:    ICD-10-CM ICD-9-CM   1. Cellulitis of right foot  L03.115 682.7   2. Foot ulceration, right, with necrosis of muscle (HCC)  L97.513 707.15     728.89       Patient Active Problem List   Diagnosis   • Cellulitis of right foot   • Type 2 diabetes mellitus with hyperglycemia (HCC)   • Renal insufficiency   • Foot ulceration, right, with necrosis of muscle (HCC)        Past Medical History:   Diagnosis Date   • Hyperlipidemia    • Hypertension    • Pre-diabetes         Past Surgical History:   Procedure Laterality Date   • AMPUTATION DIGIT Right 2022    Procedure: GREAT AND SECOND TOE AMPUTATION RIGHT;  Surgeon: Lemuel Angela MD;  Location: [facility];  Service: Vascular;  Laterality: Right;   • FOOT SURGERY Left     CANCER REMOVAL   • TONSILLECTOMY     • TURP / TRANSURETHRAL INCISION / DRAINAGE PROSTATE           EVALUATION   PT Ortho     Row Name 22 1100       Subjective Comments    Subjective Comments Dtr reports some alarming from the wound vac, but she didn't want to shut it off so we could see what was happening. They plan to bring his supplies next tx.  -       Subjective Pain    Able to rate subjective pain? yes  -MC    Pre-Treatment Pain Level 0  -MC    Post-Treatment Pain Level 0  -MC       Transfers    Comment, (Transfers) remained seated in transport chair for tx  -MC          User Key  (r) = Recorded By, (t) = Taken By, (c) = Cosigned By    Initials Name Provider Type    Sarah Beth Jordan, PT Physical Therapist                 [facility] Wound     Row Name 22 1100             Wound 22 1200 Right medial foot Amputation stump    Wound - Properties Group Placement Date: 22  -MF Placement Time: 1200  -MF Side: Right  -MF Orientation: medial  -MF Location: foot  -MF  Primary Wound Type: Amputation s  -      Dressing Appearance dressing loose;moist drainage  loose along plantar surface  -      Base moist;pink;red;slough;subcutaneous;yellow;exposed structure;black eschar  -      Periwound intact;moist;pink  -      Periwound Temperature warm  -      Periwound Skin Turgor soft  -      Edges irregular  -      Drainage Characteristics/Odor serosanguineous  -      Drainage Amount small  -      Care, Wound cleansed with;wound cleanser;debrided;negative pressure wound therapy  -      Dressing Care dressing changed;other (see comments);gauze  vac, kerlix, spandage  -      Periwound Care cleansed with pH balanced cleanser;barrier film applied;other (see comments)  NoSting, stomapaste, drape border  -      Wound Output (mL) 50  -      Retired Wound - Properties Group Placement Date: 05/20/22  - Placement Time: 1200  -MF Side: Right  - Orientation: medial  - Location: foot  - Primary Wound Type: Amputation s  -      Retired Wound - Properties Group Date first assessed: 05/20/22  - Time first assessed: 1200  - Side: Right  - Location: foot  - Primary Wound Type: Amputation s  -              NPWT (Negative Pressure Wound Therapy) 05/20/22 1200 R foot ampuation site    NPWT (Negative Pressure Wound Therapy) - Properties Group Placement Date: 05/20/22  - Placement Time: 1200  -MF Location: R foot ampuation site  -      Therapy Setting continuous therapy  -      Dressing foam, black;foam, white  -      Pressure Setting 125 mmHg  -      Sponges Inserted 2  1 white, 1 black  -      Sponges Removed 2  1 white, 1 black  -      Finger sweep complete Yes  -      Retired NPWT (Negative Pressure Wound Therapy) - Properties Group Placement Date: 05/20/22  - Placement Time: 1200  -MF Location: R foot ampuation site  -      Retired NPWT (Negative Pressure Wound Therapy) - Properties Group Placement Date: 05/20/22  - Placement Time: 1200   - Location: R foot ampuation site  -            User Key  (r) = Recorded By, (t) = Taken By, (c) = Cosigned By    Initials Name Provider Type     Wesley Morgan, PT Physical Therapist    Sarah Beth Jordan, PT Physical Therapist                  WOUND DEBRIDEMENT  Total area of Debridement: 4 cm2  Debridement Site 1  Location- Site 1: foot amputation site.  Selective Debridement- Site 1: Wound Surface <20cmsq  Instruments- Site 1: tweezers, scissors  Excised Tissue Description- Site 1: minimum, slough, necrotic  Bleeding- Site 1: seeping, held pressure, 3-5 minutes, AgNitrate sticks              Therapy Education     Row Name 05/23/22 1100             Therapy Education    Education Details Continue current POC  -MC      Given Bandaging/dressing change  -      Program Reinforced  -MC      How Provided Verbal;Demonstration  -MC      Provided to Patient;Caregiver  -      Level of Understanding Verbalized  -            User Key  (r) = Recorded By, (t) = Taken By, (c) = Cosigned By    Initials Name Provider Type    Sarah Beth Jordan PT Physical Therapist                Recommendation and Plan   PT Assessment/Plan     Row Name 05/23/22 1100          PT Assessment    Functional Limitations Performance in self-care ADL;Limitations in functional capacity and performance;Limitations in community activities  -     Impairments Integumentary integrity  -     Assessment Comments PT able to debride additional necrotic material from the wound base today. Pt with leaking from the plantar surface, addressed with additional stomapaste and larger area covered with drape border. Pt still with extensive exposed bone in the proximal aspect of the wound. Pt will continue to benefit from NPWT to promote healing.  -     Rehab Potential Fair  -     Patient/caregiver participated in establishment of treatment plan and goals Yes  -     Patient would benefit from skilled therapy intervention Yes  -MC             PT Plan    PT Frequency 3x/week  -     Physical Therapy Interventions (Optional Details) wound care;patient/family education  -     PT Plan Comments debridement prn, vac  -           User Key  (r) = Recorded By, (t) = Taken By, (c) = Cosigned By    Initials Name Provider Type    Sarah Beth Jordan, PT Physical Therapist                Goals   PT OP Goals     Row Name 05/23/22 1144          Time Calculation    PT Goal Re-Cert Due Date 08/18/22  -           User Key  (r) = Recorded By, (t) = Taken By, (c) = Cosigned By    Initials Name Provider Type    Sarah Beth Jordan, PT Physical Therapist                PT Goal Re-Cert Due Date: 08/18/22            Time Calculation: Start Time: 0945  Untimed Charges  32970-Rfdjaxwqy debridement: 10  12226-Zks Pressure wound to 50 sqcm: 25  Total Minutes  Untimed Charges Total Minutes: 35   Total Minutes: 35  Therapy Charges for Today     Code Description Service Date Service Provider Modifiers Qty    76916081625 HC JOSE DEBRIDE OPEN WOUND UP TO 20CM 5/23/2022 Sarah Beth Bailey, PT GP 1    90704356103 HC PT NEG PRESS WOUND TO 50SQCM DME2 5/23/2022 Sarah Beth Bailey, PT GP 1                  Sarah Beth Bailey, PT  5/23/2022

## 2022-05-25 ENCOUNTER — HOSPITAL ENCOUNTER (OUTPATIENT)
Dept: PHYSICAL THERAPY | Facility: HOSPITAL | Age: 87
Setting detail: THERAPIES SERIES
Discharge: HOME | End: 2022-05-25

## 2022-05-25 DIAGNOSIS — L97.513 FOOT ULCERATION, RIGHT, WITH NECROSIS OF MUSCLE: ICD-10-CM

## 2022-05-25 DIAGNOSIS — L03.115 CELLULITIS OF RIGHT FOOT: Primary | ICD-10-CM

## 2022-05-25 PROCEDURE — 97605 NEG PRS WND THER DME<=50SQCM: CPT

## 2022-05-25 PROCEDURE — 97597 DBRDMT OPN WND 1ST 20 CM/<: CPT

## 2022-05-25 NOTE — THERAPY WOUND CARE TREATMENT
Outpatient Rehabilitation - Wound/Debridement Treatment Note  [facility]     Patient Name: Jesse Siu  : 1935  MRN: [id number]  Today's Date: 2022                 Admit Date: 2022    Visit Dx:    ICD-10-CM ICD-9-CM   1. Cellulitis of right foot  L03.115 682.7   2. Foot ulceration, right, with necrosis of muscle (HCC)  L97.513 707.15     728.89       Patient Active Problem List   Diagnosis   • Cellulitis of right foot   • Type 2 diabetes mellitus with hyperglycemia (HCC)   • Renal insufficiency   • Foot ulceration, right, with necrosis of muscle (HCC)        Past Medical History:   Diagnosis Date   • Hyperlipidemia    • Hypertension    • Pre-diabetes         Past Surgical History:   Procedure Laterality Date   • AMPUTATION DIGIT Right 2022    Procedure: GREAT AND SECOND TOE AMPUTATION RIGHT;  Surgeon: Lemuel Angela MD;  [facility]  Service: Vascular;  Laterality: Right;   • FOOT SURGERY Left     CANCER REMOVAL   • TONSILLECTOMY     • TURP / TRANSURETHRAL INCISION / DRAINAGE PROSTATE           EVALUATION   PT Ortho     Row Name 22 1500       Subjective Comments    Subjective Comments Both dtrs present for treatment. Report MD who saw the wound today was pleased with his progress. He continues with daily IV abx.  -MC       Subjective Pain    Able to rate subjective pain? yes  -MC    Pre-Treatment Pain Level 0  -MC    Post-Treatment Pain Level 0  -MC       Transfers    Comment, (Transfers) remained seated for tx  -MC          User Key  (r) = Recorded By, (t) = Taken By, (c) = Cosigned By    Initials Name Provider Type    Sarah Beth Jordan PT Physical Therapist                 [facility] Wound     Row Name 22 1500             Wound 22 1200 Right medial foot Amputation stump    Wound - Properties Group Placement Date: 22  -MF Placement Time: 1200  -MF Side: Right  -MF Orientation: medial  -MF Location: foot  -MF Primary Wound Type: Amputation s  -MF       Wound Image View All Images View Images  -      Dressing Appearance intact;moist drainage  temp dressing from MD office  -      Base moist;pink;red;slough;subcutaneous;yellow;exposed structure;black eschar  bone in proximal wound base  -      Periwound intact;moist;pink;ecchymotic;other (see comments)  two areas of darkening skin, questionable ischemia  -      Periwound Temperature warm  -      Periwound Skin Turgor soft  -      Edges irregular  -      Drainage Characteristics/Odor serosanguineous  -      Drainage Amount small  -      Care, Wound cleansed with;wound cleanser;debrided;negative pressure wound therapy  -      Dressing Care dressing changed  vac, kerlix, spandage  -      Periwound Care cleansed with pH balanced cleanser;barrier film applied;other (see comments)  NoSting, stomapaste, drape border  -      Wound Output (mL) 100  -MC      Retired Wound - Properties Group Placement Date: 05/20/22  - Placement Time: 1200  -MF Side: Right  - Orientation: medial  - Location: foot  -MF Primary Wound Type: Amputation s  -      Retired Wound - Properties Group Date first assessed: 05/20/22  - Time first assessed: 1200  -MF Side: Right  - Location: foot  -MF Primary Wound Type: Amputation s  -MF              NPWT (Negative Pressure Wound Therapy) 05/20/22 1200 R foot ampuation site    NPWT (Negative Pressure Wound Therapy) - Properties Group Placement Date: 05/20/22  - Placement Time: 1200  -MF Location: R foot ampuation site  -      Therapy Setting continuous therapy  -      Dressing foam, black;foam, white  -      Pressure Setting 125 mmHg  -      Sponges Inserted 2  1 white, 1 black  -      Sponges Removed 2  1 white, 1 black  -MC      Finger sweep complete Yes  -      Retired NPWT (Negative Pressure Wound Therapy) - Properties Group Placement Date: 05/20/22  - Placement Time: 1200  -MF Location: R foot ampuation site  -      Retired NPWT (Negative Pressure  Wound Therapy) - Properties Group Placement Date: 05/20/22  -MF Placement Time: 1200  - Location: R foot ampuation site  -            User Key  (r) = Recorded By, (t) = Taken By, (c) = Cosigned By    Initials Name Provider Type    Wesley Vallejo, PT Physical Therapist    Sarah Beth Jordan, PT Physical Therapist                  WOUND DEBRIDEMENT  Total area of Debridement: 2 cm2  Debridement Site 1  Location- Site 1: foot amputation site.  Selective Debridement- Site 1: Wound Surface <20cmsq  Instruments- Site 1: tweezers, #15, scapel  Excised Tissue Description- Site 1: minimum, slough, necrotic  Bleeding- Site 1: scant, held pressure, 1 minute              Therapy Education     Row Name 05/25/22 1500             Therapy Education    Education Details Continue current POC. Reviewed moist dressings to be placed (saline-moist gauze, kerlix) in worst case scenario event of needing to remove vac dressing.  -MC      Given Bandaging/dressing change  -      Program Reinforced  -      How Provided Verbal;Demonstration  -MC      Provided to Patient;Caregiver  -      Level of Understanding Verbalized  -            User Key  (r) = Recorded By, (t) = Taken By, (c) = Cosigned By    Initials Name Provider Type    Sarah Beth Jordan, PT Physical Therapist                Recommendation and Plan   PT Assessment/Plan     Row Name 05/25/22 1500          PT Assessment    Functional Limitations Performance in self-care ADL;Limitations in functional capacity and performance;Limitations in community activities  -     Impairments Integumentary integrity  -     Assessment Comments Pt with additional area of potential ischemia to the plantar/proximal skin flap that will require careful observation. The darker, questionably ischemic area to the dorsal wound edge looks somewhat improved today. Pt will continue to benefit from NPWT to promote healing.  -     Rehab Potential Fair  -MC     Patient/caregiver  participated in establishment of treatment plan and goals Yes  -     Patient would benefit from skilled therapy intervention Yes  -            PT Plan    PT Frequency 3x/week  -     Physical Therapy Interventions (Optional Details) wound care;patient/family education  -     PT Plan Comments debridement, vac  -           User Key  (r) = Recorded By, (t) = Taken By, (c) = Cosigned By    Initials Name Provider Type    Sarah Beth Jordan, PT Physical Therapist                Goals   PT OP Goals     Row Name 05/25/22 1519          Time Calculation    PT Goal Re-Cert Due Date 08/18/22  -           User Key  (r) = Recorded By, (t) = Taken By, (c) = Cosigned By    Initials Name Provider Type     Sarah Beth Bailey, PT Physical Therapist                PT Goal Re-Cert Due Date: 08/18/22            Time Calculation: Start Time: 1430  Untimed Charges  09489-Iziepcuax debridement: 10  34484-Agk Pressure wound to 50 sqcm: 25  Total Minutes  Untimed Charges Total Minutes: 35   Total Minutes: 35  Therapy Charges for Today     Code Description Service Date Service Provider Modifiers Qty    18441808909 HC JOSE DEBRIDE OPEN WOUND UP TO 20CM 5/25/2022 Sarah Beth Bailey, PT GP 1    42069206715 HC PT NEG PRESS WOUND TO 50SQCM DME2 5/25/2022 Sarah Beth Bailey, PT GP 1                  aSrah Beth Bailey, PT  5/25/2022

## 2022-05-27 ENCOUNTER — HOSPITAL ENCOUNTER (OUTPATIENT)
Dept: PHYSICAL THERAPY | Facility: HOSPITAL | Age: 87
Setting detail: THERAPIES SERIES
Discharge: HOME | End: 2022-05-27

## 2022-05-27 DIAGNOSIS — L03.115 CELLULITIS OF RIGHT FOOT: Primary | ICD-10-CM

## 2022-05-27 DIAGNOSIS — L97.513 FOOT ULCERATION, RIGHT, WITH NECROSIS OF MUSCLE: ICD-10-CM

## 2022-05-27 PROCEDURE — 97597 DBRDMT OPN WND 1ST 20 CM/<: CPT

## 2022-05-27 PROCEDURE — 97605 NEG PRS WND THER DME<=50SQCM: CPT

## 2022-05-27 NOTE — THERAPY WOUND CARE TREATMENT
Outpatient Rehabilitation - Wound/Debridement Treatment Note  [facility]     Patient Name: Jesse Siu  : 1935  MRN: [id number]  Today's Date: 2022                   Admit Date: 2022    Visit Dx:    ICD-10-CM ICD-9-CM   1. Cellulitis of right foot  L03.115 682.7   2. Foot ulceration, right, with necrosis of muscle (HCC)  L97.513 707.15     728.89       Patient Active Problem List   Diagnosis   • Cellulitis of right foot   • Type 2 diabetes mellitus with hyperglycemia (HCC)   • Renal insufficiency   • Foot ulceration, right, with necrosis of muscle (HCC)        Past Medical History:   Diagnosis Date   • Hyperlipidemia    • Hypertension    • Pre-diabetes         Past Surgical History:   Procedure Laterality Date   • AMPUTATION DIGIT Right 2022    Procedure: GREAT AND SECOND TOE AMPUTATION RIGHT;  Surgeon: Lemuel Angela MD;  Location: [facility];  Service: Vascular;  Laterality: Right;   • FOOT SURGERY Left     CANCER REMOVAL   • TONSILLECTOMY     • TURP / TRANSURETHRAL INCISION / DRAINAGE PROSTATE           EVALUATION   PT Ortho     Row Name 22 0900       Subjective Comments    Subjective Comments No complaints or changes from the pt. Sees Dr. Serrano next Wednesday, PT suggests moving appts to accommodate seeing the MD  -MC       Subjective Pain    Able to rate subjective pain? yes  -MC    Pre-Treatment Pain Level 0  -MC    Post-Treatment Pain Level 0  -MC       Transfers    Comment, (Transfers) remained seated for tx  -MC          User Key  (r) = Recorded By, (t) = Taken By, (c) = Cosigned By    Initials Name Provider Type    Sarah Beth Jordan PT Physical Therapist                 [facility] Wound     Row Name 22 0900             Wound 22 1200 Right medial foot Amputation stump    Wound - Properties Group Placement Date: 22  -MF Placement Time: 1200  -MF Side: Right  -MF Orientation: medial  -MF Location: foot  -MF Primary Wound Type: Amputation s  -MF      Wound  Image View All Images View Images  -      Dressing Appearance intact;no drainage  -      Base moist;pink;red;slough;subcutaneous;yellow;exposed structure;black eschar  bone in proximal wound base  -      Periwound intact;moist;pink;ecchymotic;other (see comments)  two areas of darkening skin, questionable ischemia  -      Periwound Temperature warm  -      Periwound Skin Turgor soft  -      Edges irregular  -      Wound Length (cm) 2.5 cm  -      Wound Width (cm) 9.6 cm  -      Wound Depth (cm) 3.5 cm  -      Wound Surface Area (cm^2) 24 cm^2  -MC      Wound Volume (cm^3) 84 cm^3  -      Drainage Characteristics/Odor serosanguineous  -      Drainage Amount small  -      Care, Wound cleansed with;wound cleanser;debrided;negative pressure wound therapy  -      Dressing Care dressing changed  vac, kerlix, spandage  -      Periwound Care cleansed with pH balanced cleanser;barrier film applied;other (see comments)  NoSting, stomapaste, drape border  -      Wound Output (mL) 125  changed canister  -      Retired Wound - Properties Group Placement Date: 05/20/22  - Placement Time: 1200  -MF Side: Right  -MF Orientation: medial  -MF Location: foot  -MF Primary Wound Type: Amputation s  -MF      Retired Wound - Properties Group Date first assessed: 05/20/22  - Time first assessed: 1200  -MF Side: Right  -MF Location: foot  -MF Primary Wound Type: Amputation s  -MF              NPWT (Negative Pressure Wound Therapy) 05/20/22 1200 R foot ampuation site    NPWT (Negative Pressure Wound Therapy) - Properties Group Placement Date: 05/20/22  - Placement Time: 1200  -MF Location: R foot ampuation site  -      Therapy Setting continuous therapy  -      Dressing foam, black;foam, white  -      Contact Layer other (see comments)  zuri Ag collagen  -      Pressure Setting 125 mmHg  -      Sponges Inserted 2  1 white, 1 black  -MC      Sponges Removed 2  1 white, 1 black  -MC       Finger sweep complete Yes  -MC      Retired NPWT (Negative Pressure Wound Therapy) - Properties Group Placement Date: 05/20/22  - Placement Time: 1200  - Location: R foot ampuation site  -      Retired NPWT (Negative Pressure Wound Therapy) - Properties Group Placement Date: 05/20/22  - Placement Time: 1200  - Location: R foot ampuation site  -            User Key  (r) = Recorded By, (t) = Taken By, (c) = Cosigned By    Initials Name Provider Type    Wesley Vallejo, PT Physical Therapist    Sarah Beth Jordan, PT Physical Therapist                  WOUND DEBRIDEMENT  Total area of Debridement: 3 cm2  Debridement Site 1  Location- Site 1: foot amputation site.  Selective Debridement- Site 1: Wound Surface <20cmsq  Instruments- Site 1: tweezers  Excised Tissue Description- Site 1: minimum, slough  Bleeding- Site 1: scant, held pressure, 1 minute              Therapy Education     Row Name 05/27/22 0900             Therapy Education    Education Details Continue POC for vac changes and debridement prn. Pt is likely to lose some additional skin from the dorsal and plantar flaps where there is current darkening/ischemia, but this should not drastically affect his overall wound healing.  -MC      Given Bandaging/dressing change  -MC      Program Reinforced  -MC      How Provided Verbal;Demonstration  -MC      Provided to Patient;Caregiver  -MC      Level of Understanding Verbalized  -MC            User Key  (r) = Recorded By, (t) = Taken By, (c) = Cosigned By    Initials Name Provider Type    Sarah Beth Jordan PT Physical Therapist                Recommendation and Plan   PT Assessment/Plan     Row Name 05/27/22 0900          PT Assessment    Functional Limitations Performance in self-care ADL;Limitations in functional capacity and performance;Limitations in community activities  -MC     Impairments Integumentary integrity  -MC     Assessment Comments Pt with relatively stable wound dimensions  since beginning NPWT. Pt with  wound base, with exposed bone and tendon/connective tissue still present. Added zuri Ag collagen to encourage additional proliferation of viable tissue. Pt still with dark areas of questionable ischemia to the plantar and dorsal skin flaps. Will continue to monitor.  -     Rehab Potential Fair  -     Patient/caregiver participated in establishment of treatment plan and goals Yes  -     Patient would benefit from skilled therapy intervention Yes  -            PT Plan    PT Frequency 3x/week  -     Physical Therapy Interventions (Optional Details) wound care;patient/family education  -     PT Plan Comments debridement, vac  -           User Key  (r) = Recorded By, (t) = Taken By, (c) = Cosigned By    Initials Name Provider Type     Sarah Beth Bailey, PT Physical Therapist                Goals   PT OP Goals     Row Name 05/27/22 0956          Time Calculation    PT Goal Re-Cert Due Date 08/18/22  -           User Key  (r) = Recorded By, (t) = Taken By, (c) = Cosigned By    Initials Name Provider Type     Sarah Beth Bailey, PT Physical Therapist                PT Goal Re-Cert Due Date: 08/18/22            Time Calculation: Start Time: 0900  Untimed Charges  37303-Vlyngwfjl debridement: 10  24782-Pns Pressure wound to 50 sqcm: 25  Total Minutes  Untimed Charges Total Minutes: 35   Total Minutes: 35  Therapy Charges for Today     Code Description Service Date Service Provider Modifiers Qty    14882844269 HC JOSE DEBRIDE OPEN WOUND UP TO 20CM 5/27/2022 Sarah Beth Bailey, PT GP 1    29994715356 HC PT NEG PRESS WOUND TO 50SQCM DME2 5/27/2022 Sarah Beth Bailey, PT GP 1                  Sarah Beth Bailey, PT  5/27/2022

## 2022-05-29 ENCOUNTER — HOSPITAL ENCOUNTER (OUTPATIENT)
Dept: PHYSICAL THERAPY | Facility: HOSPITAL | Age: 87
Setting detail: THERAPIES SERIES
Discharge: HOME | End: 2022-05-29

## 2022-05-29 DIAGNOSIS — L03.115 CELLULITIS OF RIGHT FOOT: ICD-10-CM

## 2022-05-29 DIAGNOSIS — Z89.421 STATUS POST AMPUTATION OF TOE OF RIGHT FOOT: ICD-10-CM

## 2022-05-29 DIAGNOSIS — L97.513 FOOT ULCERATION, RIGHT, WITH NECROSIS OF MUSCLE: Primary | ICD-10-CM

## 2022-05-29 PROCEDURE — 97605 NEG PRS WND THER DME<=50SQCM: CPT

## 2022-05-29 PROCEDURE — 97597 DBRDMT OPN WND 1ST 20 CM/<: CPT

## 2022-05-29 NOTE — THERAPY WOUND CARE TREATMENT
Outpatient Rehabilitation - Wound/Debridement Treatment Note  [facility]     Patient Name: Jesse Siu  : 1935  MRN: [id number]  Today's Date: 2022                 Admit Date: 2022    Visit Dx:    ICD-10-CM ICD-9-CM   1. Foot ulceration, right, with necrosis of muscle ([facility])  L97.513 707.15     728.89   2. Cellulitis of right foot  L03.115 682.7   3. Status post amputation of toe of right foot ([facility])  Z89.421 V49.72       Patient Active Problem List   Diagnosis   • Cellulitis of right foot   • Type 2 diabetes mellitus with hyperglycemia (HCC)   • Renal insufficiency   • Foot ulceration, right, with necrosis of muscle (HCC)        Past Medical History:   Diagnosis Date   • Hyperlipidemia    • Hypertension    • Pre-diabetes         Past Surgical History:   Procedure Laterality Date   • AMPUTATION DIGIT Right 2022    Procedure: GREAT AND SECOND TOE AMPUTATION RIGHT;  Surgeon: Lemule Angela MD;  Location: [facility];  Service: Vascular;  Laterality: Right;   • FOOT SURGERY Left     CANCER REMOVAL   • TONSILLECTOMY     • TURP / TRANSURETHRAL INCISION / DRAINAGE PROSTATE           EVALUATION   PT Ortho     Row Name 22 1115       Subjective Comments    Subjective Comments Pt without complaints, accompanied by daughter and family.  -       Subjective Pain    Able to rate subjective pain? yes  -VANI    Pre-Treatment Pain Level 0  -JM    Post-Treatment Pain Level 0  -JM       Transfers    Comment, (Transfers) remained seated in w/c for tx  -JM          User Key  (r) = Recorded By, (t) = Taken By, (c) = Cosigned By    Initials Name Provider Type    Nereyda Beck, PT Physical Therapist                 [facility] Wound     Row Name 22 1115             Wound 22 1200 Right medial foot Amputation stump    Wound - Properties Group Placement Date: 22  -MF Placement Time: 1200  -MF Side: Right  -MF Orientation: medial  -MF Location: foot  -MF Primary Wound Type: Amputation s  -MF       Wound Image View All Images View Images  -      Dressing Appearance dry;intact  -      Base moist;pink;red;slough;subcutaneous;yellow;exposed structure;black eschar  bone in proximal wound base  -      Periwound intact;moist;pink;ecchymotic;other (see comments)  dark edges slightly improved  -      Periwound Temperature warm  -      Periwound Skin Turgor soft  -      Edges irregular  -      Drainage Characteristics/Odor serosanguineous  -      Drainage Amount small  -      Care, Wound cleansed with;wound cleanser;debrided;negative pressure wound therapy  -      Dressing Care dressing changed  vac, kerlix, spandage  -      Periwound Care barrier film applied;cleansed with pH balanced cleanser;dry periwound area maintained  nosting, paste/drape border  -      Wound Output (mL) 10  -      Retired Wound - Properties Group Placement Date: 05/20/22  - Placement Time: 1200  -MF Side: Right  - Orientation: medial  - Location: foot  - Primary Wound Type: Amputation s  -      Retired Wound - Properties Group Date first assessed: 05/20/22  - Time first assessed: 1200  -MF Side: Right  - Location: foot  - Primary Wound Type: Amputation s  -              NPWT (Negative Pressure Wound Therapy) 05/20/22 1200 R foot ampuation site    NPWT (Negative Pressure Wound Therapy) - Properties Group Placement Date: 05/20/22  - Placement Time: 1200  -MF Location: R foot ampuation site  -      Therapy Setting continuous therapy  -      Dressing foam, black;foam, white  -      Contact Layer other (see comments)  zuri  -      Pressure Setting 150 mmHg  -      Sponges Inserted 3  1 white, 1 black to fill, 1 black for trac pad  -      Sponges Removed 2  1 white, 1 black  -      Finger sweep complete Yes  -      Retired NPWT (Negative Pressure Wound Therapy) - Properties Group Placement Date: 05/20/22  - Placement Time: 1200  -MF Location: R foot ampuation site  -      Retired  NPWT (Negative Pressure Wound Therapy) - Properties Group Placement Date: 05/20/22  -MF Placement Time: 1200  -MF Location: R foot ampuation site  -            User Key  (r) = Recorded By, (t) = Taken By, (c) = Cosigned By    Initials Name Provider Type    Wesley Vallejo, PT Physical Therapist    Nereyda Beck, PT Physical Therapist                  WOUND DEBRIDEMENT  Total area of Debridement: 4cmsq  Debridement Site 1  Location- Site 1: foot amputation site.  Selective Debridement- Site 1: Wound Surface <20cmsq  Instruments- Site 1: tweezers  Excised Tissue Description- Site 1: minimum, slough  Bleeding- Site 1: scant              Therapy Education     Row Name 05/29/22 1112             Therapy Education    Education Details Reinforced leg elevation and NWB R foot, increased nutrition for wound healing, good blood glucose control.  Family to call Dr. Angela's office to see if MD appt can be moved earlier (currently scheduled for 3:00), or if MD can see pt during his PT appt 6/8 at 11:15.  -JM      Given Bandaging/dressing change  -      Program Reinforced  -VANI      How Provided Verbal;Demonstration  -VANI      Provided to Patient;Caregiver  -VANI      Level of Understanding Verbalized  -            User Key  (r) = Recorded By, (t) = Taken By, (c) = Cosigned By    Initials Name Provider Type    Nereyda Beck PT Physical Therapist                Recommendation and Plan   PT Assessment/Plan     Row Name 05/29/22 1115          PT Assessment    Functional Limitations Performance in self-care ADL;Limitations in functional capacity and performance;Limitations in community activities  -     Impairments Integumentary integrity  -     Assessment Comments Darkened periwound edges slightly improved today with less black discoloration.  PT removed toe box from offloading shoe as proximal seam possibly causing pressure to wound, PT also added additional black vac foam to pad under trac pad.  Wound  bed with slow increase in pink healthy tissue, still with exposed bone and CT.  Continue with NPWT to promote wound healing.  -     Rehab Potential Fair  -     Patient/caregiver participated in establishment of treatment plan and goals Yes  -     Patient would benefit from skilled therapy intervention Yes  -            PT Plan    PT Frequency 2x/week;3x/week  -     Physical Therapy Interventions (Optional Details) patient/family education;wound care  -     PT Plan Comments VAC, debridement  -           User Key  (r) = Recorded By, (t) = Taken By, (c) = Cosigned By    Initials Name Provider Type    Nereyda Beck, PT Physical Therapist                Goals   PT OP Goals     Row Name 05/29/22 1115          Time Calculation    PT Goal Re-Cert Due Date 08/18/22  -           User Key  (r) = Recorded By, (t) = Taken By, (c) = Cosigned By    Initials Name Provider Type    Nereyda Beck, PT Physical Therapist                PT Goal Re-Cert Due Date: 08/18/22            Time Calculation: Start Time: 1115  Untimed Charges  04826-Ivamnitoa debridement: 10  27619-Zds Pressure wound to 50 sqcm: 30  Total Minutes  Untimed Charges Total Minutes: 40   Total Minutes: 40  Therapy Charges for Today     Code Description Service Date Service Provider Modifiers Qty    86276101194 HC JOSE DEBRIDE OPEN WOUND UP TO 20CM 5/29/2022 Nereyda Bello, PT GP 1    15734938243 HC PT NEG PRESS WOUND TO 50SQCM DME2 5/29/2022 Nereyda Bello, PT GP 1                  Nereyda Bello, PT  5/29/2022

## 2022-06-01 ENCOUNTER — HOSPITAL ENCOUNTER (OUTPATIENT)
Dept: PHYSICAL THERAPY | Facility: HOSPITAL | Age: 87
Setting detail: THERAPIES SERIES
Discharge: HOME | End: 2022-06-01

## 2022-06-01 DIAGNOSIS — Z89.421 STATUS POST AMPUTATION OF TOE OF RIGHT FOOT: ICD-10-CM

## 2022-06-01 DIAGNOSIS — L97.513 FOOT ULCERATION, RIGHT, WITH NECROSIS OF MUSCLE: Primary | ICD-10-CM

## 2022-06-01 DIAGNOSIS — L03.115 CELLULITIS OF RIGHT FOOT: ICD-10-CM

## 2022-06-01 PROCEDURE — 97605 NEG PRS WND THER DME<=50SQCM: CPT

## 2022-06-01 PROCEDURE — 97597 DBRDMT OPN WND 1ST 20 CM/<: CPT

## 2022-06-01 NOTE — THERAPY WOUND CARE TREATMENT
Outpatient Rehabilitation - Wound/Debridement Treatment Note  [facility]     Patient Name: Jesse Siu  : 1935  MRN: [id number]  Today's Date: 2022                 Admit Date: 2022    Visit Dx:    ICD-10-CM ICD-9-CM   1. Foot ulceration, right, with necrosis of muscle ([facility])  L97.513 707.15     728.89   2. Cellulitis of right foot  L03.115 682.7   3. Status post amputation of toe of right foot ([facility])  Z89.421 V49.72       Patient Active Problem List   Diagnosis   • Cellulitis of right foot   • Type 2 diabetes mellitus with hyperglycemia (HCC)   • Renal insufficiency   • Foot ulceration, right, with necrosis of muscle ([facility])        Past Medical History:   Diagnosis Date   • Hyperlipidemia    • Hypertension    • Pre-diabetes         Past Surgical History:   Procedure Laterality Date   • AMPUTATION DIGIT Right 2022    Procedure: GREAT AND SECOND TOE AMPUTATION RIGHT;  Surgeon: Lemuel Angela MD;  Location: [facility];  Service: Vascular;  Laterality: Right;   • FOOT SURGERY Left     CANCER REMOVAL   • TONSILLECTOMY     • TURP / TRANSURETHRAL INCISION / DRAINAGE PROSTATE           EVALUATION   PT Ortho     Row Name 22 1500       Subjective Comments    Subjective Comments No complaints or changes. Just saw Dr. Serrano, who is concerned about lack of progress since last week.  -       Subjective Pain    Able to rate subjective pain? yes  -MC    Pre-Treatment Pain Level 0  -MC    Post-Treatment Pain Level 0  -MC       Transfers    Comment, (Transfers) remained seated for tx  -          User Key  (r) = Recorded By, (t) = Taken By, (c) = Cosigned By    Initials Name Provider Type    Sarah Beth Jordan, PT Physical Therapist                 [facility] Wound     Row Name 22 1500             Wound 22 1200 Right medial foot Amputation stump    Wound - Properties Group Placement Date: 22  -MF Placement Time: 1200  -MF Side: Right  -MF Orientation: medial  -MF Location: foot  -MF  Primary Wound Type: Amputation s  -      Dressing Appearance dry;intact  temp dressing from MD office  -      Base moist;pink;red;slough;subcutaneous;yellow;exposed structure;black eschar  bone in proximal wound base  -      Periwound intact;moist;pink;ecchymotic;other (see comments)  dark edges slightly improved  -      Periwound Temperature warm  -      Periwound Skin Turgor soft  -      Edges irregular  -      Drainage Characteristics/Odor serosanguineous  -      Drainage Amount small  -      Care, Wound cleansed with;wound cleanser;debrided;negative pressure wound therapy  -      Dressing Care dressing applied  vac, kerlix, spandage  -      Periwound Care cleansed with pH balanced cleanser;barrier film applied;other (see comments)  NoSting, Stomapaste drape border  -      Wound Output (mL) 50  -      Retired Wound - Properties Group Placement Date: 05/20/22  - Placement Time: 1200  -MF Side: Right  - Orientation: medial  - Location: foot  - Primary Wound Type: Amputation s  -      Retired Wound - Properties Group Date first assessed: 05/20/22  - Time first assessed: 1200  -MF Side: Right  - Location: foot  - Primary Wound Type: Amputation s  -              NPWT (Negative Pressure Wound Therapy) 05/20/22 1200 R foot ampuation site    NPWT (Negative Pressure Wound Therapy) - Properties Group Placement Date: 05/20/22  - Placement Time: 1200  -MF Location: R foot ampuation site  -      Therapy Setting continuous therapy  -      Dressing foam, black;foam, white  -      Contact Layer other (see comments)  zuri Ag collagen  -      Pressure Setting 150 mmHg  -      Sponges Inserted 2  1 white, 1 black  -      Sponges Removed other (see comments)  temp dressing removed from MD office  -      Finger sweep complete Yes  -      Retired NPWT (Negative Pressure Wound Therapy) - Properties Group Placement Date: 05/20/22  - Placement Time: 1200  -MF Location: R  foot ampuation site  -      Retired NPWT (Negative Pressure Wound Therapy) - Properties Group Placement Date: 05/20/22  - Placement Time: 1200  - Location: R foot ampuation site  -            User Key  (r) = Recorded By, (t) = Taken By, (c) = Cosigned By    Initials Name Provider Type     Wesley Morgan, PT Physical Therapist    Sarah Beth Jordan, PT Physical Therapist                  WOUND DEBRIDEMENT  Total area of Debridement: 3 cm2  Debridement Site 1  Location- Site 1: foot amputation site.  Selective Debridement- Site 1: Wound Surface <20cmsq  Instruments- Site 1: tweezers, #15, scapel  Excised Tissue Description- Site 1: minimum, slough, necrotic  Bleeding- Site 1: seeping, held pressure, 1 minute              Therapy Education     Row Name 06/01/22 1500             Therapy Education    Education Details Continue current POC. Offload as much as possible. Discussed NWB exercise options to allow pt to keep his functional mobility and strength while protecting the wound.  -MC      Given Bandaging/dressing change  -      Program Reinforced  -      How Provided Verbal;Demonstration  -MC      Provided to Patient;Caregiver  -MC      Level of Understanding Verbalized  -            User Key  (r) = Recorded By, (t) = Taken By, (c) = Cosigned By    Initials Name Provider Type    Sarah Beth Jordan, PT Physical Therapist                Recommendation and Plan   PT Assessment/Plan     Row Name 06/01/22 1500          PT Assessment    Functional Limitations Performance in self-care ADL;Limitations in functional capacity and performance;Limitations in community activities  -     Impairments Integumentary integrity  -     Assessment Comments PT able to debride some additional necrotic periwound skin today, as well as some deeper slough. Exposed bone still visible in base. Pt will continue to benefit from debridement and NPWT to promote healing.  -     Rehab Potential Fair  -      Patient/caregiver participated in establishment of treatment plan and goals Yes  -     Patient would benefit from skilled therapy intervention Yes  -            PT Plan    PT Frequency 2x/week  -     Physical Therapy Interventions (Optional Details) wound care;patient/family education  -     PT Plan Comments debridement, VAC  -           User Key  (r) = Recorded By, (t) = Taken By, (c) = Cosigned By    Initials Name Provider Type    Sarah Beth Jordan, PT Physical Therapist                Goals   PT OP Goals     Row Name 06/01/22 1552          Time Calculation    PT Goal Re-Cert Due Date 08/18/22  -           User Key  (r) = Recorded By, (t) = Taken By, (c) = Cosigned By    Initials Name Provider Type     Sarah Beth Bailey, PT Physical Therapist                PT Goal Re-Cert Due Date: 08/18/22            Time Calculation: Start Time: 1520  Untimed Charges  88745-Rjwhrpbuv debridement: 10  86436-Ffb Pressure wound to 50 sqcm: 25  Total Minutes  Untimed Charges Total Minutes: 35   Total Minutes: 35  Therapy Charges for Today     Code Description Service Date Service Provider Modifiers Qty    23980131038 HC JOSE DEBRIDE OPEN WOUND UP TO 20CM 6/1/2022 Sarah Beth Bailey, PT GP 1    25462838905 HC PT NEG PRESS WOUND TO 50SQCM DME2 6/1/2022 Sarah Beth Bailey, PT GP 1                  Sarah Beth Bailey, PT  6/1/2022

## 2022-06-03 ENCOUNTER — HOSPITAL ENCOUNTER (OUTPATIENT)
Dept: PHYSICAL THERAPY | Facility: HOSPITAL | Age: 87
Setting detail: THERAPIES SERIES
Discharge: HOME | End: 2022-06-03

## 2022-06-03 ENCOUNTER — TRANSCRIBE ORDERS (OUTPATIENT)
Dept: ADMINISTRATIVE | Facility: HOSPITAL | Age: 87
End: 2022-06-03

## 2022-06-03 ENCOUNTER — HOSPITAL ENCOUNTER (OUTPATIENT)
Dept: GENERAL RADIOLOGY | Facility: HOSPITAL | Age: 87
Discharge: HOME | End: 2022-06-03
Admitting: INTERNAL MEDICINE

## 2022-06-03 DIAGNOSIS — Z89.421 STATUS POST AMPUTATION OF TOE OF RIGHT FOOT: ICD-10-CM

## 2022-06-03 DIAGNOSIS — R19.7 DIARRHEA, UNSPECIFIED TYPE: Primary | ICD-10-CM

## 2022-06-03 DIAGNOSIS — R19.7 DIARRHEA, UNSPECIFIED TYPE: ICD-10-CM

## 2022-06-03 DIAGNOSIS — L97.513 FOOT ULCERATION, RIGHT, WITH NECROSIS OF MUSCLE: Primary | ICD-10-CM

## 2022-06-03 DIAGNOSIS — L03.115 CELLULITIS OF RIGHT FOOT: ICD-10-CM

## 2022-06-03 PROCEDURE — 97597 DBRDMT OPN WND 1ST 20 CM/<: CPT

## 2022-06-03 PROCEDURE — 74018 RADEX ABDOMEN 1 VIEW: CPT

## 2022-06-03 PROCEDURE — 97605 NEG PRS WND THER DME<=50SQCM: CPT

## 2022-06-03 NOTE — THERAPY WOUND CARE TREATMENT
Outpatient Rehabilitation - Wound/Debridement Treatment Note  [facility]     Patient Name: Jesse Siu  : 1935  MRN: [id number]  Today's Date: 6/3/2022                 Admit Date: 6/3/2022    Visit Dx:    ICD-10-CM ICD-9-CM   1. Foot ulceration, right, with necrosis of muscle ([facility])  L97.513 707.15     728.89   2. Cellulitis of right foot  L03.115 682.7   3. Status post amputation of toe of right foot ([facility])  Z89.421 V49.72             Patient Active Problem List   Diagnosis   • Cellulitis of right foot   • Type 2 diabetes mellitus with hyperglycemia ([facility])   • Renal insufficiency   • Foot ulceration, right, with necrosis of muscle ([facility])        Past Medical History:   Diagnosis Date   • Hyperlipidemia    • Hypertension    • Pre-diabetes         Past Surgical History:   Procedure Laterality Date   • AMPUTATION DIGIT Right 2022    Procedure: GREAT AND SECOND TOE AMPUTATION RIGHT;  Surgeon: Lemuel Angela MD;  Location: [facility];  Service: Vascular;  Laterality: Right;   • FOOT SURGERY Left     CANCER REMOVAL   • TONSILLECTOMY     • TURP / TRANSURETHRAL INCISION / DRAINAGE PROSTATE           EVALUATION   PT Ortho     Row Name 22 1000       Subjective Comments    Subjective Comments No new complaints this session. Plans to see Dr. Serrano following next treatment, requesting new pictures.  -       Subjective Pain    Able to rate subjective pain? yes  -    Pre-Treatment Pain Level 0  -    Post-Treatment Pain Level 0  -       Transfers    Comment, (Transfers) remained seated for tx  -          User Key  (r) = Recorded By, (t) = Taken By, (c) = Cosigned By    Initials Name Provider Type     Nimesh España, PT Physical Therapist                 [facility] Wound     Row Name 22 1000             Wound 22 1200 Right medial foot Amputation stump    Wound - Properties Group Placement Date: 22  -MF Placement Time: 1200  -MF Side: Right  -MF Orientation: medial  -MF Location: foot   -MF Primary Wound Type: Amputation s  -MF      Wound Image View All Images View Images  -      Dressing Appearance dry;intact  -      Base moist;pink;red;slough;subcutaneous;yellow;exposed structure;black eschar  bone in proximal wound base  -      Periwound intact;moist;pink;ecchymotic;other (see comments)  Mild periwound maceration  -      Periwound Temperature warm  -      Periwound Skin Turgor soft  -      Edges irregular  -      Wound Length (cm) 1.9 cm  -      Wound Width (cm) 9.6 cm  -      Wound Depth (cm) 2.3 cm  -      Wound Surface Area (cm^2) 18.24 cm^2  -      Wound Volume (cm^3) 41.952 cm^3  -      Drainage Characteristics/Odor serosanguineous  -      Drainage Amount small  -      Care, Wound cleansed with;wound cleanser;debrided;negative pressure wound therapy  -      Dressing Care dressing changed  Vac, kerlix, spandage  -      Periwound Care cleansed with pH balanced cleanser;dry periwound area maintained;barrier film applied;other (see comments)  NoSting, Stomapaste, border drape  -      Wound Output (mL) 50  -LH      Retired Wound - Properties Group Placement Date: 05/20/22  - Placement Time: 1200  -MF Side: Right  -MF Orientation: medial  -MF Location: foot  -MF Primary Wound Type: Amputation s  -MF      Retired Wound - Properties Group Date first assessed: 05/20/22  - Time first assessed: 1200  -MF Side: Right  -MF Location: foot  -MF Primary Wound Type: Amputation s  -MF              NPWT (Negative Pressure Wound Therapy) 05/20/22 1200 R foot ampuation site    NPWT (Negative Pressure Wound Therapy) - Properties Group Placement Date: 05/20/22  - Placement Time: 1200  -MF Location: R foot ampuation site  -      Therapy Setting continuous therapy  -      Dressing foam, black;foam, white  -      Contact Layer other (see comments)  Dayna Ag  -      Pressure Setting 150 mmHg  -      Sponges Inserted 2  1 black, 1 white  -      Sponges Removed 2  1  black, 1 white  -      Finger sweep complete Yes  -      Retired NPWT (Negative Pressure Wound Therapy) - Properties Group Placement Date: 05/20/22  - Placement Time: 1200  -MF Location: R foot ampuation site  -      Retired NPWT (Negative Pressure Wound Therapy) - Properties Group Placement Date: 05/20/22  - Placement Time: 1200  -MF Location: R foot ampuation site  -            User Key  (r) = Recorded By, (t) = Taken By, (c) = Cosigned By    Initials Name Provider Type     Wesley Morgan, PT Physical Therapist     Nimesh España, PT Physical Therapist                  WOUND DEBRIDEMENT  Total area of Debridement: ~4cm2  Debridement Site 1  Location- Site 1: foot amputation site.  Selective Debridement- Site 1: Wound Surface <20cmsq  Instruments- Site 1: tweezers, #15, scapel  Excised Tissue Description- Site 1: minimum, slough, necrotic  Bleeding- Site 1: seeping, held pressure, 1 minute              Therapy Education     Row Name 06/03/22 1000             Therapy Education    Education Details Continue with current POC, offload wound as much as possible with RLE elevation.  -      Given Bandaging/dressing change  -      Program Reinforced  -      How Provided Verbal;Demonstration  -      Provided to Patient;Caregiver  -      Level of Understanding Verbalized  -            User Key  (r) = Recorded By, (t) = Taken By, (c) = Cosigned By    Initials Name Provider Type     Nimesh España, PT Physical Therapist                Recommendation and Plan   PT Assessment/Plan     Row Name 06/03/22 1000          PT Assessment    Functional Limitations Performance in self-care ADL;Limitations in functional capacity and performance;Limitations in community activities  -     Impairments Integumentary integrity  -     Assessment Comments Pt's amputation site appears to be making improvements in reduction of black, necrotic tissue at wound base near exposed bone although a majority of the  wound base remains covered in thick, firmly adhered slough. PT able to debride some slough this session to help improve wound healing potential. Pt would continue to benefit from NPWT to help increase granulation and promote optimal wound healing environment.  -     Rehab Potential Fair  -     Patient/caregiver participated in establishment of treatment plan and goals Yes  -     Patient would benefit from skilled therapy intervention Yes  -            PT Plan    PT Frequency 2x/week  -     Physical Therapy Interventions (Optional Details) wound care;patient/family education  -     PT Plan Comments debridement, VAC  -           User Key  (r) = Recorded By, (t) = Taken By, (c) = Cosigned By    Initials Name Provider Type     Nimesh España, PT Physical Therapist                Goals   PT OP Goals     Row Name 06/03/22 1031          Time Calculation    PT Goal Re-Cert Due Date 08/18/22  -           User Key  (r) = Recorded By, (t) = Taken By, (c) = Cosigned By    Initials Name Provider Type     Nimesh España, PT Physical Therapist                PT Goal Re-Cert Due Date: 08/18/22            Time Calculation: Start Time: 0900  Untimed Charges  71219-Vbkwirpyq debridement: 10  22492-Pcq Pressure wound to 50 sqcm: 30  Total Minutes  Untimed Charges Total Minutes: 40   Total Minutes: 40  Therapy Charges for Today     Code Description Service Date Service Provider Modifiers Qty    80125117254 HC JOSE DEBRIDE OPEN WOUND UP TO 20CM 6/3/2022 Nimesh España, PT GP 1    66047208752 HC PT NEG PRESS WOUND TO 50SQCM DME2 6/3/2022 Nimesh España, PT GP 1                  Nimesh España PT  6/3/2022

## 2022-06-06 ENCOUNTER — HOSPITAL ENCOUNTER (OUTPATIENT)
Dept: PHYSICAL THERAPY | Facility: HOSPITAL | Age: 87
Setting detail: THERAPIES SERIES
Discharge: HOME | End: 2022-06-06

## 2022-06-06 DIAGNOSIS — L97.513 FOOT ULCERATION, RIGHT, WITH NECROSIS OF MUSCLE: Primary | ICD-10-CM

## 2022-06-06 DIAGNOSIS — Z89.421 STATUS POST AMPUTATION OF TOE OF RIGHT FOOT: ICD-10-CM

## 2022-06-06 DIAGNOSIS — L03.115 CELLULITIS OF RIGHT FOOT: ICD-10-CM

## 2022-06-06 PROCEDURE — 97597 DBRDMT OPN WND 1ST 20 CM/<: CPT

## 2022-06-06 NOTE — THERAPY WOUND CARE TREATMENT
Outpatient Rehabilitation - Wound/Debridement Treatment Note  [facility]     Patient Name: Jesse Siu  : 1935  MRN: [id number]  Today's Date: 2022                 Admit Date: 2022    Visit Dx:    ICD-10-CM ICD-9-CM   1. Foot ulceration, right, with necrosis of muscle ([facility])  L97.513 707.15     728.89   2. Cellulitis of right foot  L03.115 682.7   3. Status post amputation of toe of right foot ([facility])  Z89.421 V49.72         Patient Active Problem List   Diagnosis   • Cellulitis of right foot   • Type 2 diabetes mellitus with hyperglycemia (HCC)   • Renal insufficiency   • Foot ulceration, right, with necrosis of muscle (HCC)        Past Medical History:   Diagnosis Date   • Hyperlipidemia    • Hypertension    • Pre-diabetes         Past Surgical History:   Procedure Laterality Date   • AMPUTATION DIGIT Right 2022    Procedure: GREAT AND SECOND TOE AMPUTATION RIGHT;  Surgeon: Lemuel Angela MD;  Location: [facility];  Service: Vascular;  Laterality: Right;   • FOOT SURGERY Left     CANCER REMOVAL   • TONSILLECTOMY     • TURP / TRANSURETHRAL INCISION / DRAINAGE PROSTATE           EVALUATION   PT Ortho     Row Name 22 1300       Subjective Comments    Subjective Comments Pt reports alarming of vac with leak alert, currently operating without alarming, but noted pressure fluctuations.  -VANI       Subjective Pain    Able to rate subjective pain? yes  -VANI    Pre-Treatment Pain Level 0  -JM    Post-Treatment Pain Level 0  -JM       Transfers    Comment, (Transfers) remained seated in w/c for tx  -JM          User Key  (r) = Recorded By, (t) = Taken By, (c) = Cosigned By    Initials Name Provider Type    Nereyda Beck, PT Physical Therapist                 LDA Wound     Row Name 22 1300             Wound 22 1200 Right medial foot Amputation stump    Wound - Properties Group Placement Date: 22  -MF Placement Time: 1200  -MF Side: Right  -MF Orientation: medial  -MF  Location: foot  -MF Primary Wound Type: Amputation s  -MF      Wound Image View All Images View Images  -      Dressing Appearance dressing loose;moist drainage  -      Base moist;pink;red;slough;subcutaneous;yellow;exposed structure;black eschar  bone in proximal wound base  -      Periwound intact;moist;pink;ecchymotic;other (see comments);macerated;pale white;yeast  mod maceration, yeast proximally  -      Periwound Temperature warm  -      Periwound Skin Turgor soft  -      Edges irregular  -      Drainage Characteristics/Odor serosanguineous  -      Drainage Amount small  -      Care, Wound cleansed with;wound cleanser;debrided  -      Dressing Care dressing applied;gauze, [id number];gauze  -      Periwound Care cleansed with pH balanced cleanser;dry periwound area maintained  -      Wound Output (mL) 150  discared canister due to vac on HOLD  -      Retired Wound - Properties Group Placement Date: 05/20/22  - Placement Time: 1200  - Side: Right  - Orientation: medial  - Location: foot  -MF Primary Wound Type: Amputation s  -MF      Retired Wound - Properties Group Date first assessed: 05/20/22  - Time first assessed: 1200  - Side: Right  - Location: foot  -MF Primary Wound Type: Amputation s  -MF              NPWT (Negative Pressure Wound Therapy) 05/20/22 1200 R foot ampuation site    NPWT (Negative Pressure Wound Therapy) - Properties Group Placement Date: 05/20/22  - Placement Time: 1200  - Location: R foot ampuation site  -      Therapy Setting vacuum off  on HOLD d/t yeast and maceration  -      Sponges Removed 2  1 white, 1 black  -      Finger sweep complete Yes  -      Retired NPWT (Negative Pressure Wound Therapy) - Properties Group Placement Date: 05/20/22  - Placement Time: 1200  - Location: R foot ampuation site  -      Retired NPWT (Negative Pressure Wound Therapy) - Properties Group Placement Date: 05/20/22  - Placement Time: 1200  -  Location: R foot ampuation site  -            User Key  (r) = Recorded By, (t) = Taken By, (c) = Cosigned By    Initials Name Provider Type    Wesley Vallejo, PT Physical Therapist    Nereyda Beck, PT Physical Therapist                  WOUND DEBRIDEMENT  Total area of Debridement: 4cmsq  Debridement Site 1  Location- Site 1: foot amputation site.  Selective Debridement- Site 1: Wound Surface <20cmsq  Instruments- Site 1: tweezers  Excised Tissue Description- Site 1: moderate, slough  Bleeding- Site 1: none              Therapy Education     Row Name 06/06/22 1300             Therapy Education    Education Details Keep dressing dry/intact, change if saturated, with saline-moist gauze to lightly pack, dry gauze to cover and kerlix/spandage.  PT will request nystatin powder prescription from [facility] office for pt to  and bring to next PT tx.  PT will also contact Dr. Angela to ask if he wants to see pt's foot prior to PT placing new vac dressing at 6/8 appt.  -JM      Given Bandaging/dressing change  -      Program Reinforced;Modified  -      How Provided Verbal;Demonstration  -VANI      Provided to Patient;Caregiver  -VANI      Level of Understanding Verbalized  -            User Key  (r) = Recorded By, (t) = Taken By, (c) = Cosigned By    Initials Name Provider Type    Nereyda Beck, PT Physical Therapist                Recommendation and Plan   PT Assessment/Plan     Row Name 06/06/22 1300          PT Assessment    Functional Limitations Performance in self-care ADL;Limitations in functional capacity and performance;Limitations in community activities  -     Impairments Integumentary integrity  -     Assessment Comments Pt's R foot with moderate maceration and yeast rash today, so PT placed vac on HOLD for 1-2 txs.  PT spoke with Dr. Serrano's assistant to request nystatin powder, and to notify MD that he can visualize wound at appt tomorrow since vac is off.  Pt will continue  to require debridement and NPWT.  PT will resume vac once skin integrity improved.  -            PT Plan    PT Frequency 2x/week  -     Physical Therapy Interventions (Optional Details) patient/family education;wound care  -     PT Plan Comments VAC on HOLD, debridement/dressings  -           User Key  (r) = Recorded By, (t) = Taken By, (c) = Cosigned By    Initials Name Provider Type    Nereyda Beck, PT Physical Therapist                Goals   PT OP Goals     Row Name 06/06/22 1300          Time Calculation    PT Goal Re-Cert Due Date 08/18/22  -           User Key  (r) = Recorded By, (t) = Taken By, (c) = Cosigned By    Initials Name Provider Type    Nereyda Beck, PT Physical Therapist                PT Goal Re-Cert Due Date: 08/18/22            Time Calculation: Start Time: 1300  Untimed Charges  23714-Lxpmsoccl debridement: 20  Total Minutes  Untimed Charges Total Minutes: 20   Total Minutes: 20  Therapy Charges for Today     Code Description Service Date Service Provider Modifiers Qty    [id number] HC JOSE DEBRIDE OPEN WOUND UP TO 20CM 6/6/2022 Nereyda Bello, PT GP 1                  Nereyda Bello, PT  6/6/2022

## 2022-06-08 ENCOUNTER — HOSPITAL ENCOUNTER (OUTPATIENT)
Dept: PHYSICAL THERAPY | Facility: HOSPITAL | Age: 87
Setting detail: THERAPIES SERIES
Discharge: HOME | End: 2022-06-08

## 2022-06-08 DIAGNOSIS — L97.513 FOOT ULCERATION, RIGHT, WITH NECROSIS OF MUSCLE: Primary | ICD-10-CM

## 2022-06-08 DIAGNOSIS — Z89.421 STATUS POST AMPUTATION OF TOE OF RIGHT FOOT: ICD-10-CM

## 2022-06-08 DIAGNOSIS — L03.115 CELLULITIS OF RIGHT FOOT: ICD-10-CM

## 2022-06-08 PROCEDURE — 97597 DBRDMT OPN WND 1ST 20 CM/<: CPT

## 2022-06-08 NOTE — THERAPY WOUND CARE TREATMENT
Outpatient Rehabilitation - Wound/Debridement Treatment Note  [facility]     Patient Name: Jesse Siu  : 1935  MRN: [id number]  Today's Date: 2022                 Admit Date: 2022    Visit Dx:    ICD-10-CM ICD-9-CM   1. Foot ulceration, right, with necrosis of muscle ([facility])  L97.513 707.15     728.89   2. Cellulitis of right foot  L03.115 682.7   3. Status post amputation of toe of right foot ([facility])  Z89.421 V49.72       Patient Active Problem List   Diagnosis   • Cellulitis of right foot   • Type 2 diabetes mellitus with hyperglycemia (HCC)   • Renal insufficiency   • Foot ulceration, right, with necrosis of muscle (HCC)        Past Medical History:   Diagnosis Date   • Hyperlipidemia    • Hypertension    • Pre-diabetes         Past Surgical History:   Procedure Laterality Date   • AMPUTATION DIGIT Right 2022    Procedure: GREAT AND SECOND TOE AMPUTATION RIGHT;  Surgeon: Lemuel Angela MD;  Location: [facility];  Service: Vascular;  Laterality: Right;   • FOOT SURGERY Left     CANCER REMOVAL   • TONSILLECTOMY     • TURP / TRANSURETHRAL INCISION / DRAINAGE PROSTATE           EVALUATION   PT Ortho     Row Name 22 1100       Subjective Comments    Subjective Comments Pt was assessed yesterday by both Dr. Angela and Dr. Serrano. Pt reports they were both reasonably pleased and want to give the wound a couple more weeks to try to see some more healing progress. Pt reports he thinks they said they would write him a Rx for nystatin but hasn't heard anything else about it.  -MC       Subjective Pain    Able to rate subjective pain? yes  -MC    Pre-Treatment Pain Level 0  -MC    Post-Treatment Pain Level 0  -MC       Transfers    Comment, (Transfers) remained seated for tx  -          User Key  (r) = Recorded By, (t) = Taken By, (c) = Cosigned By    Initials Name Provider Type    Sarah Beth Jordan, PT Physical Therapist                 [facility] Wound     Row Name 22 1100              Wound 05/20/22 1200 Right medial foot Amputation stump    Wound - Properties Group Placement Date: 05/20/22  - Placement Time: 1200  -MF Side: Right  -MF Orientation: medial  -MF Location: foot  -MF Primary Wound Type: Amputation s  -MF      Dressing Appearance intact;moist drainage  -      Base moist;pink;red;slough;subcutaneous;yellow;exposed structure;black eschar  bone in proximal wound base  -      Periwound intact;moist;pink;ecchymotic;other (see comments);macerated;pale white;yeast  mod maceration, yeast proximally  -      Periwound Temperature warm  -      Periwound Skin Turgor soft  -      Edges irregular  -      Drainage Characteristics/Odor serosanguineous  -      Drainage Amount small  -      Care, Wound cleansed with;wound cleanser;debrided  -      Dressing Care dressing applied;antimicrobial agent applied;foam;silver impregnated;low-adherent;gauze  saline-moist HFBc, mepilex Ag, kerlix, spandage  -      Periwound Care cleansed with pH balanced cleanser;barrier ointment applied;topical treatment applied  thin layer of antifungal and zguard, mixed  -      Retired Wound - Properties Group Placement Date: 05/20/22  - Placement Time: 1200  -MF Side: Right  -MF Orientation: medial  - Location: foot  -MF Primary Wound Type: Amputation s  -MF      Retired Wound - Properties Group Date first assessed: 05/20/22  - Time first assessed: 1200  -MF Side: Right  -MF Location: foot  -MF Primary Wound Type: Amputation s  -MF              NPWT (Negative Pressure Wound Therapy) 05/20/22 1200 R foot ampuation site    NPWT (Negative Pressure Wound Therapy) - Properties Group Placement Date: 05/20/22  - Placement Time: 1200  -MF Location: R foot ampuation site  -MF      Therapy Setting vacuum off  on HOLD due to yeast  -      Retired NPWT (Negative Pressure Wound Therapy) - Properties Group Placement Date: 05/20/22  - Placement Time: 1200  -MF Location: R foot ampuation site  -MF       Retired NPWT (Negative Pressure Wound Therapy) - Properties Group Placement Date: 05/20/22  - Placement Time: 1200  - Location: R foot ampuation site  -            User Key  (r) = Recorded By, (t) = Taken By, (c) = Cosigned By    Initials Name Provider Type    Wesley Vallejo, PT Physical Therapist    Sarah Beth Jordan, PT Physical Therapist                  WOUND DEBRIDEMENT  Total area of Debridement: 3 cm2  Debridement Site 1  Location- Site 1: foot amputation site.  Selective Debridement- Site 1: Wound Surface <20cmsq  Instruments- Site 1: tweezers  Excised Tissue Description- Site 1: minimum, slough, necrotic  Bleeding- Site 1: none              Therapy Education     Row Name 06/08/22 1100             Therapy Education    Education Details Keep dressing dry and intact. Will attempt to place NPWT next visit if pt has nystatin available.  -      Given Bandaging/dressing change  -      Program Reinforced;Modified  -      How Provided Verbal;Demonstration  -MC      Provided to Patient;Caregiver  -MC      Level of Understanding Verbalized  -            User Key  (r) = Recorded By, (t) = Taken By, (c) = Cosigned By    Initials Name Provider Type    Sarah Beth Jordan, PT Physical Therapist                Recommendation and Plan   PT Assessment/Plan     Row Name 06/08/22 1100          PT Assessment    Functional Limitations Performance in self-care ADL;Limitations in functional capacity and performance;Limitations in community activities  -     Impairments Integumentary integrity  -     Assessment Comments Pt with no notable change since last assessment. He still has moderate yeast and moisture to the proximal periwound area, preventing use of wound vac today. PT able to debride some additional slough from the wound base. Pt will continue to benefit from skilled PT wound care, with plans to replace NPWT next visit if nystatin available and periwound skin status appropriate.  -      Rehab Potential Fair  -     Patient/caregiver participated in establishment of treatment plan and goals Yes  -     Patient would benefit from skilled therapy intervention Yes  -            PT Plan    PT Frequency 2x/week  -     Physical Therapy Interventions (Optional Details) wound care;patient/family education  -     PT Plan Comments debridement, Vac on HOLD until periwound improved/nystatin available  -           User Key  (r) = Recorded By, (t) = Taken By, (c) = Cosigned By    Initials Name Provider Type    Sarah Beth Jordan, PT Physical Therapist                Goals   PT OP Goals     Row Name 06/08/22 1144          Time Calculation    PT Goal Re-Cert Due Date 08/18/22  -           User Key  (r) = Recorded By, (t) = Taken By, (c) = Cosigned By    Initials Name Provider Type    Sarah Beth Jordan, PT Physical Therapist                PT Goal Re-Cert Due Date: 08/18/22            Time Calculation: Start Time: 1115  Untimed Charges  78794-Zuaqjliny debridement: 20  Total Minutes  Untimed Charges Total Minutes: 20   Total Minutes: 20  Therapy Charges for Today     Code Description Service Date Service Provider Modifiers Qty    [id number]  JOSE DEBRIDE OPEN WOUND UP TO 20CM 6/8/2022 Sarah Beth Bailey, PT GP 1                  Sarah Beth Bailey, PT  6/8/2022

## 2022-06-10 ENCOUNTER — HOSPITAL ENCOUNTER (OUTPATIENT)
Dept: PHYSICAL THERAPY | Facility: HOSPITAL | Age: 87
Setting detail: THERAPIES SERIES
Discharge: HOME | End: 2022-06-10

## 2022-06-10 DIAGNOSIS — Z89.421 STATUS POST AMPUTATION OF TOE OF RIGHT FOOT: ICD-10-CM

## 2022-06-10 DIAGNOSIS — L03.115 CELLULITIS OF RIGHT FOOT: ICD-10-CM

## 2022-06-10 DIAGNOSIS — L97.513 FOOT ULCERATION, RIGHT, WITH NECROSIS OF MUSCLE: Primary | ICD-10-CM

## 2022-06-10 PROCEDURE — 97597 DBRDMT OPN WND 1ST 20 CM/<: CPT

## 2022-06-10 NOTE — THERAPY WOUND CARE TREATMENT
Outpatient Rehabilitation - Wound/Debridement Treatment Note  [facility]     Patient Name: Jesse Siu  : 1935  MRN: [id number]  Today's Date: 6/10/2022                   Admit Date: 6/10/2022    Visit Dx:    ICD-10-CM ICD-9-CM   1. Foot ulceration, right, with necrosis of muscle ([facility])  L97.513 707.15     728.89   2. Cellulitis of right foot  L03.115 682.7   3. Status post amputation of toe of right foot ([facility])  Z89.421 V49.72       Patient Active Problem List   Diagnosis   • Cellulitis of right foot   • Type 2 diabetes mellitus with hyperglycemia (HCC)   • Renal insufficiency   • Foot ulceration, right, with necrosis of muscle ([facility])        Past Medical History:   Diagnosis Date   • Hyperlipidemia    • Hypertension    • Pre-diabetes         Past Surgical History:   Procedure Laterality Date   • AMPUTATION DIGIT Right 2022    Procedure: GREAT AND SECOND TOE AMPUTATION RIGHT;  Surgeon: Lemuel Angela MD;  Location: [facility];  Service: Vascular;  Laterality: Right;   • FOOT SURGERY Left     CANCER REMOVAL   • TONSILLECTOMY     • TURP / TRANSURETHRAL INCISION / DRAINAGE PROSTATE           EVALUATION   PT Ortho     Row Name 06/10/22 1500       Subjective Comments    Subjective Comments No complaints or changes  -       Subjective Pain    Able to rate subjective pain? yes  -MC    Pre-Treatment Pain Level 0  -MC    Post-Treatment Pain Level 0  -MC       Transfers    Comment, (Transfers) remained seated for tx  -          User Key  (r) = Recorded By, (t) = Taken By, (c) = Cosigned By    Initials Name Provider Type    Sarah Beth Jordan PT Physical Therapist                 LDA Wound     Row Name 06/10/22 1500             Wound 22 1200 Right medial foot Amputation stump    Wound - Properties Group Placement Date: 22  -MF Placement Time: 1200  -MF Side: Right  -MF Orientation: medial  -MF Location: foot  -MF Primary Wound Type: Amputation s  -MF      Wound Image View All Images View  Images  -      Dressing Appearance intact;moist drainage  -      Base moist;pink;red;slough;subcutaneous;yellow;exposed structure;black eschar  bone in proximal wound base  -      Periwound intact;moist;pink;ecchymotic;other (see comments);macerated;pale white;yeast  min maceration, yeast proximally, improving  -      Periwound Temperature warm  -      Periwound Skin Turgor soft  -      Edges irregular  -      Wound Length (cm) 3 cm  -      Wound Width (cm) 9.8 cm  -      Wound Depth (cm) --  obscured, at leats 4 cm  -      Wound Surface Area (cm^2) 29.4 cm^2  -      Drainage Characteristics/Odor serosanguineous  -      Drainage Amount small  -      Care, Wound cleansed with;wound cleanser;debrided  -      Dressing Care dressing applied;silver impregnated;collagen;antimicrobial agent applied;foam;low-adherent;gauze  zuri, saline-moist HFBc, mepilex Ag, primafix, kerlix, spandage  -      Periwound Care cleansed with pH balanced cleanser;barrier ointment applied;topical treatment applied  zguard/antifungal mixed  -      Retired Wound - Properties Group Placement Date: 05/20/22  - Placement Time: 1200  -MF Side: Right  - Orientation: medial  - Location: foot  -MF Primary Wound Type: Amputation s  -      Retired Wound - Properties Group Date first assessed: 05/20/22  - Time first assessed: 1200  -MF Side: Right  -MF Location: foot  -MF Primary Wound Type: Amputation s  -MF              NPWT (Negative Pressure Wound Therapy) 05/20/22 1200 R foot ampuation site    NPWT (Negative Pressure Wound Therapy) - Properties Group Placement Date: 05/20/22  - Placement Time: 1200  -MF Location: R foot ampuation site  -      Therapy Setting vacuum off  on HOLD  -      Retired NPWT (Negative Pressure Wound Therapy) - Properties Group Placement Date: 05/20/22  - Placement Time: 1200  -MF Location: R foot ampuation site  -      Retired NPWT (Negative Pressure Wound Therapy) -  Properties Group Placement Date: 05/20/22  - Placement Time: 1200  -MF Location: R foot ampuation site  -            User Key  (r) = Recorded By, (t) = Taken By, (c) = Cosigned By    Initials Name Provider Type    Wesley Vallejo, PT Physical Therapist    Sarah Beth Jordan PT Physical Therapist                  WOUND DEBRIDEMENT  Total area of Debridement: 2 cm2  Debridement Site 1  Location- Site 1: foot amputation site.  Selective Debridement- Site 1: Wound Surface <20cmsq  Instruments- Site 1: tweezers  Excised Tissue Description- Site 1: minimum, slough  Bleeding- Site 1: none              Therapy Education     Row Name 06/10/22 1500             Therapy Education    Education Details Continue current POC until able to start use of nystatin with wound vac  -      Given Bandaging/dressing change  -      Program Reinforced;Modified  -      How Provided Verbal;Demonstration  -      Provided to Patient;Caregiver  -      Level of Understanding Verbalized  -            User Key  (r) = Recorded By, (t) = Taken By, (c) = Cosigned By    Initials Name Provider Type    Sarah Beth Jordan PT Physical Therapist                Recommendation and Plan   PT Assessment/Plan     Row Name 06/10/22 1500          PT Assessment    Functional Limitations Performance in self-care ADL;Limitations in functional capacity and performance;Limitations in community activities  -     Impairments Integumentary integrity  -     Assessment Comments Pt with relatively stable wound dimensions since last assessment. Width measuring somewhat wider, likely due to relaxing of wound edges without use of NPWT as opposed to any actual worsening. Periwound yeast is improving but still too severe to allow use of NPWT today. Pt will continue to benefit from skilled PT wound care to promote healing.  -     Rehab Potential Fair  -     Patient/caregiver participated in establishment of treatment plan and goals Yes  -      Patient would benefit from skilled therapy intervention Yes  -            PT Plan    PT Frequency 2x/week  -     Physical Therapy Interventions (Optional Details) wound care;patient/family education  -     PT Plan Comments debridement, Vac on HOLD  -           User Key  (r) = Recorded By, (t) = Taken By, (c) = Cosigned By    Initials Name Provider Type    Sarah Beth Jordan, PT Physical Therapist                Goals   PT OP Goals     Row Name 06/10/22 1558          Time Calculation    PT Goal Re-Cert Due Date 08/18/22  -           User Key  (r) = Recorded By, (t) = Taken By, (c) = Cosigned By    Initials Name Provider Type     Sarah Beth Bailey, PT Physical Therapist                PT Goal Re-Cert Due Date: 08/18/22            Time Calculation: Start Time: 1345  Untimed Charges  27584-Poqerbkfd debridement: 20  Total Minutes  Untimed Charges Total Minutes: 20   Total Minutes: 20  Therapy Charges for Today     Code Description Service Date Service Provider Modifiers Qty    [id number] HC JOSE DEBRIDE OPEN WOUND UP TO 20CM 6/10/2022 Sarah Beth Bailey, PT GP 1                  Sarah Beth Bailey, PT  6/10/2022

## 2022-06-13 ENCOUNTER — HOSPITAL ENCOUNTER (OUTPATIENT)
Dept: PHYSICAL THERAPY | Facility: HOSPITAL | Age: 87
Setting detail: THERAPIES SERIES
Discharge: HOME | End: 2022-06-13

## 2022-06-13 DIAGNOSIS — L03.115 CELLULITIS OF RIGHT FOOT: ICD-10-CM

## 2022-06-13 DIAGNOSIS — Z89.421 STATUS POST AMPUTATION OF TOE OF RIGHT FOOT: ICD-10-CM

## 2022-06-13 DIAGNOSIS — L97.513 FOOT ULCERATION, RIGHT, WITH NECROSIS OF MUSCLE: Primary | ICD-10-CM

## 2022-06-13 PROCEDURE — 97597 DBRDMT OPN WND 1ST 20 CM/<: CPT

## 2022-06-13 NOTE — THERAPY WOUND CARE TREATMENT
Outpatient Rehabilitation - Wound/Debridement Treatment Note  [facility]     Patient Name: Jesse Siu  : 1935  MRN: [id number]  Today's Date: 2022                 Admit Date: 2022    Visit Dx:    ICD-10-CM ICD-9-CM   1. Foot ulceration, right, with necrosis of muscle (HCC)  L97.513 707.15     728.89   2. Cellulitis of right foot  L03.115 682.7   3. Status post amputation of toe of right foot (HCC)  Z89.421 V49.72         Patient Active Problem List   Diagnosis   • Cellulitis of right foot   • Type 2 diabetes mellitus with hyperglycemia (HCC)   • Renal insufficiency   • Foot ulceration, right, with necrosis of muscle (HCC)        Past Medical History:   Diagnosis Date   • Hyperlipidemia    • Hypertension    • Pre-diabetes         Past Surgical History:   Procedure Laterality Date   • AMPUTATION DIGIT Right 2022    Procedure: GREAT AND SECOND TOE AMPUTATION RIGHT;  Surgeon: Lemuel Angela MD;  Location: [facility];  Service: Vascular;  Laterality: Right;   • FOOT SURGERY Left     CANCER REMOVAL   • TONSILLECTOMY     • TURP / TRANSURETHRAL INCISION / DRAINAGE PROSTATE           EVALUATION   PT Ortho     Row Name 22 0945       Subjective Comments    Subjective Comments Pt without complaints, daughter brought nystatin powder to tx today.  States MD wants pictures for [facility] appt tomorrow.  -VANI       Subjective Pain    Able to rate subjective pain? yes  -VANI    Pre-Treatment Pain Level 0  -    Post-Treatment Pain Level 0  -JM       Transfers    Comment, (Transfers) remained seated in w/c for tx  -          User Key  (r) = Recorded By, (t) = Taken By, (c) = Cosigned By    Initials Name Provider Type    Nereyda Beck PT Physical Therapist                 [facility] Wound     Row Name 22 0945             Wound 22 1200 Right medial foot Amputation stump    Wound - Properties Group Placement Date: 22  -MF Placement Time: 1200  -MF Side: Right  -MF Orientation: medial   - Location: foot  -MF Primary Wound Type: Amputation s  -MF      Wound Image View All Images View Images  -      Dressing Appearance intact;moist drainage  -      Base moist;pink;red;slough;subcutaneous;yellow;exposed structure;granulating  bone in proximal wound base, increasing granulation  -      Periwound intact;moist;pink;other (see comments);macerated;pale white;yeast  min maceration, yeast proximally, improving  -      Periwound Temperature warm  -      Periwound Skin Turgor soft  -      Edges irregular  -      Wound Length (cm) 3 cm  -      Wound Width (cm) 10 cm  -      Wound Depth (cm) 2.5 cm  -      Wound Surface Area (cm^2) 30 cm^2  -JM      Wound Volume (cm^3) 75 cm^3  -JM      Drainage Characteristics/Odor serosanguineous  -      Drainage Amount small  -      Care, Wound cleansed with;wound cleanser;debrided  -      Dressing Care dressing applied;antimicrobial agent applied;foam;abdominal pad;gauze  saline-moist HFBc, ABD, kerlix, spandage  -      Periwound Care topical treatment applied;cleansed with pH balanced cleanser;dry periwound area maintained  nystatin powder, nosting spray crusting  -      Retired Wound - Properties Group Placement Date: 05/20/22  - Placement Time: 1200  - Side: Right  - Orientation: medial  - Location: foot  -MF Primary Wound Type: Amputation s  -      Retired Wound - Properties Group Date first assessed: 05/20/22  - Time first assessed: 1200  -MF Side: Right  - Location: foot  -MF Primary Wound Type: Amputation s  -MF              NPWT (Negative Pressure Wound Therapy) 05/20/22 1200 R foot ampuation site    NPWT (Negative Pressure Wound Therapy) - Properties Group Placement Date: 05/20/22  - Placement Time: 1200  - Location: R foot ampuation site  -      Therapy Setting vacuum off  on HOLD  -      Retired NPWT (Negative Pressure Wound Therapy) - Properties Group Placement Date: 05/20/22  - Placement Time: 1200  -  Location: R foot ampuation site  -      Retired NPWT (Negative Pressure Wound Therapy) - Properties Group Placement Date: 05/20/22  - Placement Time: 1200  - Location: R foot ampuation site  -            User Key  (r) = Recorded By, (t) = Taken By, (c) = Cosigned By    Initials Name Provider Type    Wesley Vallejo, PT Physical Therapist    Nereyda Beck, PT Physical Therapist                  WOUND DEBRIDEMENT  Total area of Debridement: 3cmsq  Debridement Site 1  Location- Site 1: foot amputation site.  Selective Debridement- Site 1: Wound Surface <20cmsq  Instruments- Site 1: tweezers  Excised Tissue Description- Site 1: minimum, slough  Bleeding- Site 1: none              Therapy Education     Row Name 06/13/22 0734             Therapy Education    Education Details Keep HFB packing in place, but change outer ABD pad/kerlix/spandage daily to reduce maceration, and to add nystatin powder to skin around wound.  Reinforced NWB R foot.  Plan to resume wound vac next tx.  -JM      Given Bandaging/dressing change  -      Program Reinforced;Modified  -      How Provided Verbal;Demonstration  -      Provided to Patient;Caregiver  -      Level of Understanding Verbalized  -            User Key  (r) = Recorded By, (t) = Taken By, (c) = Cosigned By    Initials Name Provider Type    Nereyda Beck, PT Physical Therapist                Recommendation and Plan   PT Assessment/Plan     Row Name 06/13/22 0945          PT Assessment    Functional Limitations Performance in self-care ADL;Limitations in functional capacity and performance;Limitations in community activities  -     Impairments Integumentary integrity  -     Assessment Comments Pt's R foot wound with improved beefy red granulation formation, now with thin granulation over proximal bone.  Pt still with exposed bone of lateral metatarsal.  Periwound yeast improving, still macerated today.  PT initiated use of nystatin powder  and crusting of periwound to hopefully allow resuming of wound vac next tx.  -     Rehab Potential Fair  -     Patient/caregiver participated in establishment of treatment plan and goals Yes  -     Patient would benefit from skilled therapy intervention Yes  -            PT Plan    PT Frequency 2x/week;3x/week  -     Physical Therapy Interventions (Optional Details) patient/family education;wound care  -     PT Plan Comments resume vac, debridement  -           User Key  (r) = Recorded By, (t) = Taken By, (c) = Cosigned By    Initials Name Provider Type    Nereyda Beck, PT Physical Therapist                Goals   PT OP Goals     Row Name 06/13/22 0945          Time Calculation    PT Goal Re-Cert Due Date 08/18/22  -           User Key  (r) = Recorded By, (t) = Taken By, (c) = Cosigned By    Initials Name Provider Type    Nereyda Beck, PT Physical Therapist                PT Goal Re-Cert Due Date: 08/18/22            Time Calculation: Start Time: 0945  Untimed Charges  91738-Njonhehqx debridement: 25  Total Minutes  Untimed Charges Total Minutes: 25   Total Minutes: 25  Therapy Charges for Today     Code Description Service Date Service Provider Modifiers Qty    28138163471 HC JOSE DEBRIDE OPEN WOUND UP TO 20CM 6/13/2022 Nereyda Bello, PT GP 1                  Nereyda Bello, PT  6/13/2022

## 2022-06-14 ENCOUNTER — LAB (OUTPATIENT)
Dept: LAB | Facility: HOSPITAL | Age: 87
End: 2022-06-14

## 2022-06-14 ENCOUNTER — TRANSCRIBE ORDERS (OUTPATIENT)
Dept: LAB | Facility: HOSPITAL | Age: 87
End: 2022-06-14

## 2022-06-14 DIAGNOSIS — N17.9 ACUTE RENAL FAILURE, UNSPECIFIED ACUTE RENAL FAILURE TYPE: ICD-10-CM

## 2022-06-14 DIAGNOSIS — M86.171 OSTEOMYELITIS OF FOOT, RIGHT, ACUTE: ICD-10-CM

## 2022-06-14 DIAGNOSIS — L03.115 CELLULITIS OF RIGHT FOOT: Primary | ICD-10-CM

## 2022-06-14 LAB
ALBUMIN SERPL-MCNC: 3.5 G/DL (ref 3.5–5.2)
ALBUMIN/GLOB SERPL: 1.1 G/DL
ALP SERPL-CCNC: 148 U/L (ref 39–117)
ALT SERPL W P-5'-P-CCNC: 18 U/L (ref 1–41)
ANION GAP SERPL CALCULATED.3IONS-SCNC: 9 MMOL/L (ref 5–15)
AST SERPL-CCNC: 19 U/L (ref 1–40)
BASOPHILS # BLD AUTO: 0.09 10*3/MM3 (ref 0–0.2)
BASOPHILS NFR BLD AUTO: 1.1 % (ref 0–1.5)
BILIRUB SERPL-MCNC: 0.3 MG/DL (ref 0–1.2)
BUN SERPL-MCNC: 22 MG/DL (ref 8–23)
BUN/CREAT SERPL: 19.8 (ref 7–25)
CALCIUM SPEC-SCNC: 9.3 MG/DL (ref 8.6–10.5)
CHLORIDE SERPL-SCNC: 106 MMOL/L (ref 98–107)
CO2 SERPL-SCNC: 27 MMOL/L (ref 22–29)
CREAT SERPL-MCNC: 1.11 MG/DL (ref 0.76–1.27)
CRP SERPL-MCNC: 0.83 MG/DL (ref 0–0.5)
DEPRECATED RDW RBC AUTO: 50.4 FL (ref 37–54)
EGFRCR SERPLBLD CKD-EPI 2021: 64.3 ML/MIN/1.73
EOSINOPHIL # BLD AUTO: 0.22 10*3/MM3 (ref 0–0.4)
EOSINOPHIL NFR BLD AUTO: 2.6 % (ref 0.3–6.2)
ERYTHROCYTE [DISTWIDTH] IN BLOOD BY AUTOMATED COUNT: 15.8 % (ref 12.3–15.4)
ERYTHROCYTE [SEDIMENTATION RATE] IN BLOOD: 23 MM/HR (ref 0–20)
GLOBULIN UR ELPH-MCNC: 3.1 GM/DL
GLUCOSE SERPL-MCNC: 133 MG/DL (ref 65–99)
HCT VFR BLD AUTO: 33.9 % (ref 37.5–51)
HGB BLD-MCNC: 11 G/DL (ref 13–17.7)
IMM GRANULOCYTES # BLD AUTO: 0.03 10*3/MM3 (ref 0–0.05)
IMM GRANULOCYTES NFR BLD AUTO: 0.4 % (ref 0–0.5)
LYMPHOCYTES # BLD AUTO: 1.22 10*3/MM3 (ref 0.7–3.1)
LYMPHOCYTES NFR BLD AUTO: 14.4 % (ref 19.6–45.3)
MCH RBC QN AUTO: 28.6 PG (ref 26.6–33)
MCHC RBC AUTO-ENTMCNC: 32.4 G/DL (ref 31.5–35.7)
MCV RBC AUTO: 88.3 FL (ref 79–97)
MONOCYTES # BLD AUTO: 0.67 10*3/MM3 (ref 0.1–0.9)
MONOCYTES NFR BLD AUTO: 7.9 % (ref 5–12)
NEUTROPHILS NFR BLD AUTO: 6.26 10*3/MM3 (ref 1.7–7)
NEUTROPHILS NFR BLD AUTO: 73.6 % (ref 42.7–76)
NRBC BLD AUTO-RTO: 0 /100 WBC (ref 0–0.2)
PLATELET # BLD AUTO: 394 10*3/MM3 (ref 140–450)
PMV BLD AUTO: 10 FL (ref 6–12)
POTASSIUM SERPL-SCNC: 4.5 MMOL/L (ref 3.5–5.2)
PROT SERPL-MCNC: 6.6 G/DL (ref 6–8.5)
RBC # BLD AUTO: 3.84 10*6/MM3 (ref 4.14–5.8)
SODIUM SERPL-SCNC: 142 MMOL/L (ref 136–145)
WBC NRBC COR # BLD: 8.49 10*3/MM3 (ref 3.4–10.8)

## 2022-06-14 PROCEDURE — 80053 COMPREHEN METABOLIC PANEL: CPT | Performed by: INTERNAL MEDICINE

## 2022-06-14 PROCEDURE — 86140 C-REACTIVE PROTEIN: CPT | Performed by: INTERNAL MEDICINE

## 2022-06-14 PROCEDURE — 85025 COMPLETE CBC W/AUTO DIFF WBC: CPT | Performed by: INTERNAL MEDICINE

## 2022-06-14 PROCEDURE — 36415 COLL VENOUS BLD VENIPUNCTURE: CPT | Performed by: INTERNAL MEDICINE

## 2022-06-14 PROCEDURE — 85652 RBC SED RATE AUTOMATED: CPT | Performed by: INTERNAL MEDICINE

## 2022-06-15 ENCOUNTER — HOSPITAL ENCOUNTER (OUTPATIENT)
Dept: PHYSICAL THERAPY | Facility: HOSPITAL | Age: 87
Setting detail: THERAPIES SERIES
Discharge: HOME | End: 2022-06-15

## 2022-06-15 DIAGNOSIS — L03.115 CELLULITIS OF RIGHT FOOT: ICD-10-CM

## 2022-06-15 DIAGNOSIS — L97.513 FOOT ULCERATION, RIGHT, WITH NECROSIS OF MUSCLE: Primary | ICD-10-CM

## 2022-06-15 DIAGNOSIS — Z89.421 STATUS POST AMPUTATION OF TOE OF RIGHT FOOT: ICD-10-CM

## 2022-06-15 PROCEDURE — 97605 NEG PRS WND THER DME<=50SQCM: CPT

## 2022-06-15 PROCEDURE — 97597 DBRDMT OPN WND 1ST 20 CM/<: CPT

## 2022-06-15 NOTE — THERAPY PROGRESS REPORT/RE-CERT
Outpatient Rehabilitation - Wound/Debridement Progress Note  [facility]     Patient Name: Jesse Siu  : 1935  MRN: [id number]  Today's Date: 6/15/2022                 Admit Date: 6/15/2022    Visit Dx:    ICD-10-CM ICD-9-CM   1. Foot ulceration, right, with necrosis of muscle ([facility])  L97.513 707.15     728.89   2. Cellulitis of right foot  L03.115 682.7   3. Status post amputation of toe of right foot ([facility])  Z89.421 V49.72       Patient Active Problem List   Diagnosis   • Cellulitis of right foot   • Type 2 diabetes mellitus with hyperglycemia (HCC)   • Renal insufficiency   • Foot ulceration, right, with necrosis of muscle ([facility])        Past Medical History:   Diagnosis Date   • Hyperlipidemia    • Hypertension    • Pre-diabetes         Past Surgical History:   Procedure Laterality Date   • AMPUTATION DIGIT Right 2022    Procedure: GREAT AND SECOND TOE AMPUTATION RIGHT;  Surgeon: Lemuel Angela MD;  Location: [facility];  Service: Vascular;  Laterality: Right;   • FOOT SURGERY Left     CANCER REMOVAL   • TONSILLECTOMY     • TURP / TRANSURETHRAL INCISION / DRAINAGE PROSTATE           EVALUATION   PT Ortho     Row Name 06/15/22 1500       Subjective Comments    Subjective Comments No complaints or changes  -       Subjective Pain    Able to rate subjective pain? yes  -MC    Pre-Treatment Pain Level 0  -MC    Post-Treatment Pain Level 0  -MC       Transfers    Comment, (Transfers) remained seated for tx  -          User Key  (r) = Recorded By, (t) = Taken By, (c) = Cosigned By    Initials Name Provider Type    Sarah Beth Jordan PT Physical Therapist                 [facility] Wound     Row Name 06/15/22 1500             Wound 22 1200 Right medial foot Amputation stump    Wound - Properties Group Placement Date: 22  -MF Placement Time: 1200  -MF Side: Right  -MF Orientation: medial  -MF Location: foot  -MF Primary Wound Type: Amputation s  -MF      Wound Image View All Images View Images   -      Dressing Appearance intact;moist drainage  -      Base moist;pink;red;slough;subcutaneous;yellow;exposed structure;granulating  bone in proximal wound base, increasing granulation  -      Periwound intact;moist;pink;macerated;pale white;yeast  no maceration, scant yeast remaining  -      Periwound Temperature warm  -      Periwound Skin Turgor soft  -      Edges irregular  -      Wound Length (cm) 3 cm  -      Wound Width (cm) 10 cm  -      Wound Depth (cm) 2.5 cm  -      Wound Surface Area (cm^2) 30 cm^2  -      Wound Volume (cm^3) 75 cm^3  -      Drainage Characteristics/Odor serosanguineous  -      Drainage Amount small  -      Care, Wound cleansed with;wound cleanser;debrided;negative pressure wound therapy  -      Dressing Care dressing applied  vac, kerlix, spandage  -      Periwound Care cleansed with pH balanced cleanser;barrier film applied;topical treatment applied  Nystatin/NoSting crust x2, stomapaste, drape border  -      Wound Output (mL) 0  new canister  -      Retired Wound - Properties Group Placement Date: 05/20/22  - Placement Time: 1200  -MF Side: Right  - Orientation: medial  - Location: foot  -MF Primary Wound Type: Amputation s  -      Retired Wound - Properties Group Date first assessed: 05/20/22  - Time first assessed: 1200  -MF Side: Right  -MF Location: foot  -MF Primary Wound Type: Amputation s  -MF              NPWT (Negative Pressure Wound Therapy) 05/20/22 1200 R foot ampuation site    NPWT (Negative Pressure Wound Therapy) - Properties Group Placement Date: 05/20/22  - Placement Time: 1200  -MF Location: R foot ampuation site  -      Therapy Setting continuous therapy  -      Dressing foam, black;foam, white  -      Contact Layer other (see comments)  zuri Ag  -      Pressure Setting 150 mmHg  -      Sponges Inserted 2  1 white, 1 black  -      Sponges Removed other (see comments)  removed HFB packing  -       Finger sweep complete Yes  -MC      Retired NPWT (Negative Pressure Wound Therapy) - Properties Group Placement Date: 05/20/22  - Placement Time: 1200  -MF Location: R foot ampuation site  -      Retired NPWT (Negative Pressure Wound Therapy) - Properties Group Placement Date: 05/20/22  - Placement Time: 1200  -MF Location: R foot ampuation site  -            User Key  (r) = Recorded By, (t) = Taken By, (c) = Cosigned By    Initials Name Provider Type    Wesley Vallejo, PT Physical Therapist    Sarah Beth Jordan, PT Physical Therapist                  WOUND DEBRIDEMENT  Total area of Debridement: 3 cm2  Debridement Site 1  Location- Site 1: foot amputation site.  Selective Debridement- Site 1: Wound Surface <20cmsq  Instruments- Site 1: tweezers  Excised Tissue Description- Site 1: minimum, slough  Bleeding- Site 1: none              Therapy Education     Row Name 06/15/22 1500             Therapy Education    Education Details Continue with NPWT  -MC      Given Bandaging/dressing change  -      Program Reinforced;Modified  -      How Provided Verbal;Demonstration  -MC      Provided to Patient;Caregiver  -MC      Level of Understanding Verbalized  -            User Key  (r) = Recorded By, (t) = Taken By, (c) = Cosigned By    Initials Name Provider Type    Sarah Beth Jordan, PT Physical Therapist                Recommendation and Plan   PT Assessment/Plan     Row Name 06/15/22 1500          PT Assessment    Functional Limitations Performance in self-care ADL;Limitations in functional capacity and performance;Limitations in community activities  -     Impairments Integumentary integrity  -     Assessment Comments Pt with stable wound dimensions since last assessment. The periwound is much improved, with no maceration and only scant yeast noted after initiating use of nystatin powder. Resumed use of NPWT to promote additional granulation and promote wound closure. Pt will continue to  benefit from skilled PT wound care to promote healing.  -     Rehab Potential Fair  -     Patient/caregiver participated in establishment of treatment plan and goals Yes  -     Patient would benefit from skilled therapy intervention Yes  -            PT Plan    PT Frequency 2x/week;3x/week  -     Predicted Duration of Therapy Intervention (PT) 24 visits  -     Planned CPT's? PT SELF CARE/MGMT/TRAIN 15 MIN: 59796;PT NONSELECT DEBRIDE 15 MIN: 89019;PT NEG PRESS WOUND TO 50 SQCM 3: 43428;PT JOSE DEBRIDE OPEN WOUND UP TO 20 CM: 64534;PT NLFU MIST: 79284;PT THER SUPP EA 15 MIN  -     Physical Therapy Interventions (Optional Details) wound care;patient/family education  -     PT Plan Comments debridement prn, vac with nystatin crusting  -           User Key  (r) = Recorded By, (t) = Taken By, (c) = Cosigned By    Initials Name Provider Type    Sarah Beth Jordan, PT Physical Therapist                Goals   PT OP Goals     Row Name 06/15/22 1509 06/15/22 1500       PT Short Term Goals    STG Date to Achieve -- 07/04/22  -    STG 1 -- Decrease wound size by 25% as evidence of wound healing.  -    STG 1 Progress -- Ongoing  -    STG 2 -- increase granulation to >75% to improve wound healing.  -    STG 2 Progress -- Ongoing  -       Long Term Goals    LTG Date to Achieve -- 08/18/22  -    LTG 1 -- Decrease wound size by 75% as evidence of wound healing.  -    LTG 1 Progress -- Ongoing  -    LTG 2 -- Increase granulation to >90% to improve wound healing.  -    LTG 2 Progress -- Ongoing  -    LTG 3 -- Pt and family able to demonstrate home dressing management to allow for transition to home management.  -    LTG 3 Progress -- Ongoing  -       Time Calculation    PT Goal Re-Cert Due Date 08/18/22  - 08/18/22  -          User Key  (r) = Recorded By, (t) = Taken By, (c) = Cosigned By    Initials Name Provider Type    Sarah Beth Jordan, PT Physical Therapist                PT  Goal Re-Cert Due Date: 08/18/22  PT Short Term Goals  STG Date to Achieve: 07/04/22  STG 1: Decrease wound size by 25% as evidence of wound healing.  STG 1 Progress: Ongoing  STG 2: increase granulation to >75% to improve wound healing.  STG 2 Progress: Ongoing  Long Term Goals  LTG Date to Achieve: 08/18/22  LTG 1: Decrease wound size by 75% as evidence of wound healing.  LTG 1 Progress: Ongoing  LTG 2: Increase granulation to >90% to improve wound healing.  LTG 2 Progress: Ongoing  LTG 3: Pt and family able to demonstrate home dressing management to allow for transition to home management.  LTG 3 Progress: Ongoing      Time Calculation: Start Time: 1340  Untimed Charges  84541-Flaaqbnls debridement: 15  64739-Sqo Pressure wound to 50 sqcm: 25  Total Minutes  Untimed Charges Total Minutes: 40   Total Minutes: 40  Therapy Charges for Today     Code Description Service Date Service Provider Modifiers Qty    34380333386 HC JOSE DEBRIDE OPEN WOUND UP TO 20CM 6/15/2022 Sarah Beth Bailey, PT GP 1    16432946960 HC PT NEG PRESS WOUND TO 50SQCM DME2 6/15/2022 Sarah Beth Bailey, PT GP 1                  Sarah Beth Bailey, PT  6/15/2022

## 2022-06-17 ENCOUNTER — HOSPITAL ENCOUNTER (OUTPATIENT)
Dept: PHYSICAL THERAPY | Facility: HOSPITAL | Age: 87
Setting detail: THERAPIES SERIES
Discharge: HOME | End: 2022-06-17

## 2022-06-17 DIAGNOSIS — L97.513 FOOT ULCERATION, RIGHT, WITH NECROSIS OF MUSCLE: Primary | ICD-10-CM

## 2022-06-17 DIAGNOSIS — Z89.421 STATUS POST AMPUTATION OF TOE OF RIGHT FOOT: ICD-10-CM

## 2022-06-17 DIAGNOSIS — L03.115 CELLULITIS OF RIGHT FOOT: ICD-10-CM

## 2022-06-17 PROCEDURE — 97605 NEG PRS WND THER DME<=50SQCM: CPT

## 2022-06-17 PROCEDURE — 97597 DBRDMT OPN WND 1ST 20 CM/<: CPT

## 2022-06-17 NOTE — THERAPY WOUND CARE TREATMENT
Outpatient Rehabilitation - Wound/Debridement Treatment Note  [facility]     Patient Name: Jesse Siu  : 1935  MRN: [id number]  Today's Date: 2022                 Admit Date: 2022    Visit Dx:    ICD-10-CM ICD-9-CM   1. Foot ulceration, right, with necrosis of muscle ([facility])  L97.513 707.15     728.89   2. Cellulitis of right foot  L03.115 682.7   3. Status post amputation of toe of right foot ([facility])  Z89.421 V49.72       Patient Active Problem List   Diagnosis   • Cellulitis of right foot   • Type 2 diabetes mellitus with hyperglycemia (HCC)   • Renal insufficiency   • Foot ulceration, right, with necrosis of muscle ([facility])        Past Medical History:   Diagnosis Date   • Hyperlipidemia    • Hypertension    • Pre-diabetes         Past Surgical History:   Procedure Laterality Date   • AMPUTATION DIGIT Right 2022    Procedure: GREAT AND SECOND TOE AMPUTATION RIGHT;  Surgeon: Lemuel Angela MD;  [facility];  Service: Vascular;  Laterality: Right;   • FOOT SURGERY Left     CANCER REMOVAL   • TONSILLECTOMY     • TURP / TRANSURETHRAL INCISION / DRAINAGE PROSTATE           EVALUATION   PT Ortho     Row Name 22 1400       Subjective Comments    Subjective Comments Pt reports some issues with leakage alarms and noise from the wound vac since last night. Notably, he dropped the unit accidentally this morning, and while still somewhat louder than normal, the vac is no longer alarming.  -MC       Subjective Pain    Able to rate subjective pain? yes  -MC    Pre-Treatment Pain Level 0  -MC    Post-Treatment Pain Level 0  -MC       Transfers    Comment, (Transfers) remained seated for tx  -          User Key  (r) = Recorded By, (t) = Taken By, (c) = Cosigned By    Initials Name Provider Type    Sarah Beth Jordan PT Physical Therapist                 [facility] Wound     Row Name 22 1400             Wound 22 1200 Right medial foot Amputation stump    Wound - Properties Group  Placement Date: 05/20/22 - Placement Time: 1200  -MF Side: Right  - Orientation: medial  - Location: foot  -MF Primary Wound Type: Amputation s  -      Dressing Appearance intact;moist drainage;dressing loose  slight seal loss at toe  -      Base moist;pink;red;slough;subcutaneous;yellow;exposed structure;granulating  bone in proximal wound base, increasing granulation  -      Periwound intact;moist;pink;macerated;pale white;yeast  mild maceration to immediate periwound, yeast improving  -      Periwound Temperature warm  -      Periwound Skin Turgor soft  -      Edges irregular  -      Drainage Characteristics/Odor serosanguineous  -      Drainage Amount small  -      Care, Wound cleansed with;wound cleanser;debrided;negative pressure wound therapy  -      Dressing Care dressing changed  vac, kerlix, spandage  -      Periwound Care cleansed with pH balanced cleanser;barrier film applied;other (see comments)  NoSting, stomapaste, drape  -      Wound Output (mL) 25  -      Retired Wound - Properties Group Placement Date: 05/20/22  - Placement Time: 1200  - Side: Right  - Orientation: medial  - Location: foot  -MF Primary Wound Type: Amputation s  -      Retired Wound - Properties Group Date first assessed: 05/20/22  - Time first assessed: 1200  - Side: Right  - Location: foot  -MF Primary Wound Type: Amputation s  -              NPWT (Negative Pressure Wound Therapy) 05/20/22 1200 R foot ampuation site    NPWT (Negative Pressure Wound Therapy) - Properties Group Placement Date: 05/20/22  - Placement Time: 1200  - Location: R foot ampuation site  -      Therapy Setting continuous therapy  -      Dressing foam, black;foam, white  -      Contact Layer other (see comments)  zuri  -      Pressure Setting 150 mmHg  -      Sponges Inserted 2  1 white, 1 black  -      Sponges Removed 2  1 white, 1 black  -MC      Finger sweep complete Yes  -      Retired  NPWT (Negative Pressure Wound Therapy) - Properties Group Placement Date: 05/20/22  - Placement Time: 1200  -MF Location: R foot ampuation site  -      Retired NPWT (Negative Pressure Wound Therapy) - Properties Group Placement Date: 05/20/22  - Placement Time: 1200  -MF Location: R foot ampuation site  -            User Key  (r) = Recorded By, (t) = Taken By, (c) = Cosigned By    Initials Name Provider Type    Wesley Vallejo, PT Physical Therapist    Sarah Beth Jordan, PT Physical Therapist                  WOUND DEBRIDEMENT  Total area of Debridement: 2 cm2  Debridement Site 1  Location- Site 1: foot amputation site.  Selective Debridement- Site 1: Wound Surface <20cmsq  Instruments- Site 1: tweezers  Excised Tissue Description- Site 1: minimum, slough  Bleeding- Site 1: scant, held pressure, 1 minute              Therapy Education     Row Name 06/17/22 1400             Therapy Education    Education Details Continue NPWT, reviewed troubleshooting alarms  -MC      Given Bandaging/dressing change  -      Program Reinforced;Modified  -      How Provided Verbal;Demonstration  -MC      Provided to Patient;Caregiver  -MC      Level of Understanding Verbalized  -            User Key  (r) = Recorded By, (t) = Taken By, (c) = Cosigned By    Initials Name Provider Type    Sarah Beth Jordan PT Physical Therapist                Recommendation and Plan   PT Assessment/Plan     Row Name 06/17/22 1400          PT Assessment    Functional Limitations Performance in self-care ADL;Limitations in functional capacity and performance;Limitations in community activities  -     Impairments Integumentary integrity  -     Assessment Comments Pt with stable wound bed status since last assessment. Pt partially lost the wound vac seal overnight, leading to some additional moisture and maceration to the immediate periwound today. PT able to clean the area well and get a good seal to continue NPWT as  appropriate.  -     Rehab Potential Fair  -     Patient/caregiver participated in establishment of treatment plan and goals Yes  -     Patient would benefit from skilled therapy intervention Yes  -            PT Plan    PT Frequency 2x/week;3x/week  -     Physical Therapy Interventions (Optional Details) wound care;patient/family education  -     PT Plan Comments debridement prn, vac. Pt to see Dr. Maggie Munoz, and the doctor wants to see the wound at that time.  -           User Key  (r) = Recorded By, (t) = Taken By, (c) = Cosigned By    Initials Name Provider Type    Sarah Beth Jordan, PT Physical Therapist                Goals   PT OP Goals     Row Name 06/17/22 1458          Time Calculation    PT Goal Re-Cert Due Date 08/18/22  -           User Key  (r) = Recorded By, (t) = Taken By, (c) = Cosigned By    Initials Name Provider Type     Sarah Beth Bailey, PT Physical Therapist                PT Goal Re-Cert Due Date: 08/18/22            Time Calculation: Start Time: 1345  Untimed Charges  26656-Qvbwgsxcw debridement: 10  16496-Alf Pressure wound to 50 sqcm: 25  Total Minutes  Untimed Charges Total Minutes: 35   Total Minutes: 35  Therapy Charges for Today     Code Description Service Date Service Provider Modifiers Qty    44052349142 HC JOSE DEBRIDE OPEN WOUND UP TO 20CM 6/17/2022 Sarah Beth Bailey, PT GP 1    [id number] HC PT NEG PRESS WOUND TO 50SQCM DME2 6/17/2022 Sarah Beth Bailey, PT GP 1                  Sarah Beth Bailey, PT  6/17/2022

## 2022-06-20 ENCOUNTER — TRANSCRIBE ORDERS (OUTPATIENT)
Dept: LAB | Facility: HOSPITAL | Age: 87
End: 2022-06-20

## 2022-06-20 ENCOUNTER — LAB (OUTPATIENT)
Dept: LAB | Facility: HOSPITAL | Age: 87
End: 2022-06-20

## 2022-06-20 ENCOUNTER — HOSPITAL ENCOUNTER (OUTPATIENT)
Dept: PHYSICAL THERAPY | Facility: HOSPITAL | Age: 87
Setting detail: THERAPIES SERIES
Discharge: HOME | End: 2022-06-20

## 2022-06-20 DIAGNOSIS — M87.171: Primary | ICD-10-CM

## 2022-06-20 DIAGNOSIS — L03.115 CELLULITIS OF RIGHT FOOT: ICD-10-CM

## 2022-06-20 DIAGNOSIS — Z89.421 STATUS POST AMPUTATION OF TOE OF RIGHT FOOT: ICD-10-CM

## 2022-06-20 DIAGNOSIS — M87.171: ICD-10-CM

## 2022-06-20 DIAGNOSIS — L97.513 FOOT ULCERATION, RIGHT, WITH NECROSIS OF MUSCLE: Primary | ICD-10-CM

## 2022-06-20 LAB
ALBUMIN SERPL-MCNC: 3.7 G/DL (ref 3.5–5.2)
ALBUMIN/GLOB SERPL: 1 G/DL
ALP SERPL-CCNC: 157 U/L (ref 39–117)
ALT SERPL W P-5'-P-CCNC: 19 U/L (ref 1–41)
ANION GAP SERPL CALCULATED.3IONS-SCNC: 9 MMOL/L (ref 5–15)
AST SERPL-CCNC: 19 U/L (ref 1–40)
BASOPHILS # BLD AUTO: 0.08 10*3/MM3 (ref 0–0.2)
BASOPHILS NFR BLD AUTO: 0.9 % (ref 0–1.5)
BILIRUB SERPL-MCNC: 0.2 MG/DL (ref 0–1.2)
BUN SERPL-MCNC: 23 MG/DL (ref 8–23)
BUN/CREAT SERPL: 21.1 (ref 7–25)
CALCIUM SPEC-SCNC: 9.6 MG/DL (ref 8.6–10.5)
CHLORIDE SERPL-SCNC: 104 MMOL/L (ref 98–107)
CO2 SERPL-SCNC: 28 MMOL/L (ref 22–29)
CREAT SERPL-MCNC: 1.09 MG/DL (ref 0.76–1.27)
CRP SERPL-MCNC: 0.92 MG/DL (ref 0–0.5)
DEPRECATED RDW RBC AUTO: 52.5 FL (ref 37–54)
EGFRCR SERPLBLD CKD-EPI 2021: 65.7 ML/MIN/1.73
EOSINOPHIL # BLD AUTO: 0.11 10*3/MM3 (ref 0–0.4)
EOSINOPHIL NFR BLD AUTO: 1.3 % (ref 0.3–6.2)
ERYTHROCYTE [DISTWIDTH] IN BLOOD BY AUTOMATED COUNT: 15.7 % (ref 12.3–15.4)
ERYTHROCYTE [SEDIMENTATION RATE] IN BLOOD: 32 MM/HR (ref 0–20)
GLOBULIN UR ELPH-MCNC: 3.6 GM/DL
GLUCOSE SERPL-MCNC: 216 MG/DL (ref 65–99)
HCT VFR BLD AUTO: 37.7 % (ref 37.5–51)
HGB BLD-MCNC: 11.9 G/DL (ref 13–17.7)
IMM GRANULOCYTES # BLD AUTO: 0.04 10*3/MM3 (ref 0–0.05)
IMM GRANULOCYTES NFR BLD AUTO: 0.5 % (ref 0–0.5)
LYMPHOCYTES # BLD AUTO: 1.41 10*3/MM3 (ref 0.7–3.1)
LYMPHOCYTES NFR BLD AUTO: 16.2 % (ref 19.6–45.3)
MCH RBC QN AUTO: 28.7 PG (ref 26.6–33)
MCHC RBC AUTO-ENTMCNC: 31.6 G/DL (ref 31.5–35.7)
MCV RBC AUTO: 90.8 FL (ref 79–97)
MONOCYTES # BLD AUTO: 0.59 10*3/MM3 (ref 0.1–0.9)
MONOCYTES NFR BLD AUTO: 6.8 % (ref 5–12)
NEUTROPHILS NFR BLD AUTO: 6.5 10*3/MM3 (ref 1.7–7)
NEUTROPHILS NFR BLD AUTO: 74.3 % (ref 42.7–76)
NRBC BLD AUTO-RTO: 0 /100 WBC (ref 0–0.2)
PLATELET # BLD AUTO: 450 10*3/MM3 (ref 140–450)
PMV BLD AUTO: 10.3 FL (ref 6–12)
POTASSIUM SERPL-SCNC: 4.5 MMOL/L (ref 3.5–5.2)
PROT SERPL-MCNC: 7.3 G/DL (ref 6–8.5)
RBC # BLD AUTO: 4.15 10*6/MM3 (ref 4.14–5.8)
SODIUM SERPL-SCNC: 141 MMOL/L (ref 136–145)
WBC NRBC COR # BLD: 8.73 10*3/MM3 (ref 3.4–10.8)

## 2022-06-20 PROCEDURE — 85652 RBC SED RATE AUTOMATED: CPT

## 2022-06-20 PROCEDURE — 97605 NEG PRS WND THER DME<=50SQCM: CPT

## 2022-06-20 PROCEDURE — 36415 COLL VENOUS BLD VENIPUNCTURE: CPT

## 2022-06-20 PROCEDURE — 80053 COMPREHEN METABOLIC PANEL: CPT

## 2022-06-20 PROCEDURE — 86140 C-REACTIVE PROTEIN: CPT

## 2022-06-20 PROCEDURE — 85025 COMPLETE CBC W/AUTO DIFF WBC: CPT

## 2022-06-20 NOTE — THERAPY WOUND CARE TREATMENT
Outpatient Rehabilitation - Wound/Debridement Treatment Note  [facility]     Patient Name: Jesse Siu  : 1935  MRN: [id number]  Today's Date: 2022                 Admit Date: 2022    Visit Dx:    ICD-10-CM ICD-9-CM   1. Foot ulceration, right, with necrosis of muscle (Formerly [facility])  L97.513 707.15     728.89   2. Cellulitis of right foot  L03.115 682.7   3. Status post amputation of toe of right foot (Formerly [facility])  Z89.421 V49.72       Patient Active Problem List   Diagnosis   • Cellulitis of right foot   • Type 2 diabetes mellitus with hyperglycemia (HCC)   • Renal insufficiency   • Foot ulceration, right, with necrosis of muscle (HCC)        Past Medical History:   Diagnosis Date   • Hyperlipidemia    • Hypertension    • Pre-diabetes         Past Surgical History:   Procedure Laterality Date   • AMPUTATION DIGIT Right 2022    Procedure: GREAT AND SECOND TOE AMPUTATION RIGHT;  Surgeon: Lemuel Angela MD;  Location: [facility];  Service: Vascular;  Laterality: Right;   • FOOT SURGERY Left     CANCER REMOVAL   • TONSILLECTOMY     • TURP / TRANSURETHRAL INCISION / DRAINAGE PROSTATE           EVALUATION   PT Ortho     Row Name 22 1300       Subjective Comments    Subjective Comments Pt reports no issues with vac pump, no alarms.  Family states pt is seeing Dr. Serrano tomorrow and MD will remove the dressing to look at the wound.  Pt is scheduled to return to PT on Wednesday, so family states they can re-wrap pt's foot after MD appt.  -VANI       Subjective Pain    Able to rate subjective pain? yes  -VANI    Pre-Treatment Pain Level 0  -    Post-Treatment Pain Level 0  -JM       Transfers    Comment, (Transfers) remained seated in w/c  -VANI          User Key  (r) = Recorded By, (t) = Taken By, (c) = Cosigned By    Initials Name Provider Type    Nereyda Beck, PT Physical Therapist                 [facility] Wound     Row Name 22 1300             Wound 22 1200 Right medial foot Amputation  stump    Wound - Properties Group Placement Date: 05/20/22  - Placement Time: 1200  -MF Side: Right  -MF Orientation: medial  - Location: foot  -MF Primary Wound Type: Amputation s  -      Dressing Appearance intact;other (see comments)  prox edge loose but seal intact  -      Base dressing in place, unable to visualize  -      Periwound intact;moist;pink;macerated;pale white;yeast  mild maceration to immediate periwound, yeast improving  -      Periwound Temperature warm  -      Periwound Skin Turgor soft  -      Edges irregular  -      Drainage Characteristics/Odor serosanguineous;serous  -      Drainage Amount small  -      Care, Wound other (see comments)  reinforced loose edge with additional drape  -      Dressing Care dressing reinforced  reinforced with drape  -      Periwound Care dry periwound area maintained;barrier film applied  -      Wound Output (mL) 50  -JM      Retired Wound - Properties Group Placement Date: 05/20/22  - Placement Time: 1200  -MF Side: Right  - Orientation: medial  - Location: foot  -MF Primary Wound Type: Amputation s  -MF      Retired Wound - Properties Group Date first assessed: 05/20/22  - Time first assessed: 1200  -MF Side: Right  - Location: foot  - Primary Wound Type: Amputation s  -MF              NPWT (Negative Pressure Wound Therapy) 05/20/22 1200 R foot ampuation site    NPWT (Negative Pressure Wound Therapy) - Properties Group Placement Date: 05/20/22  - Placement Time: 1200  -MF Location: R foot ampuation site  -MF      Therapy Setting continuous therapy  -      Pressure Setting 150 mmHg  -      Retired NPWT (Negative Pressure Wound Therapy) - Properties Group Placement Date: 05/20/22  - Placement Time: 1200  -MF Location: R foot ampuation site  -      Retired NPWT (Negative Pressure Wound Therapy) - Properties Group Placement Date: 05/20/22  - Placement Time: 1200  -MF Location: R foot ampuation site  -MF             User Key  (r) = Recorded By, (t) = Taken By, (c) = Cosigned By    Initials Name Provider Type    Wesley Vallejo, PT Physical Therapist    Nereyda Beck, PT Physical Therapist                 Therapy Education     Row Name 06/20/22 1300             Therapy Education    Education Details Plan to leave vac dressing in place until MD appt tomorrow, family to dress wound with HFB or saline-moist gauze until next PT appt.  Need to coordinate schedules so that PT can see him after MD appts to replace wound vac.  -VANI      Given Bandaging/dressing change  -      Program Reinforced;Modified  -VANI      How Provided Verbal;Demonstration  -VANI      Provided to Patient;Caregiver  -VANI      Level of Understanding Verbalized  -            User Key  (r) = Recorded By, (t) = Taken By, (c) = Cosigned By    Initials Name Provider Type    Nereyda Beck, PT Physical Therapist                Recommendation and Plan   PT Assessment/Plan     Row Name 06/20/22 1300          PT Assessment    Functional Limitations Performance in self-care ADL;Limitations in functional capacity and performance;Limitations in community activities  -     Impairments Integumentary integrity  -     Assessment Comments VAC dressing with intact seal, only minimal loosening of drape proximally that PT reinforced.  PT deferred vac dressing change today as MD is going to remove vac dressing during appt tomorrow.  PT reinforced need to schedule appointments so that PT can replace wound vac after MD appts.  -            PT Plan    Physical Therapy Interventions (Optional Details) patient/family education;wound care  -     PT Plan Comments vac, debridement  -           User Key  (r) = Recorded By, (t) = Taken By, (c) = Cosigned By    Initials Name Provider Type    Nereyda Beck, PT Physical Therapist                Goals   PT OP Goals     Row Name 06/20/22 1300          Time Calculation    PT Goal Re-Cert Due Date 08/18/22  -VANI            User Key  (r) = Recorded By, (t) = Taken By, (c) = Cosigned By    Initials Name Provider Type     Nereyda Bello, PT Physical Therapist                PT Goal Re-Cert Due Date: 08/18/22            Time Calculation: Start Time: 1300  Untimed Charges  61010-Grt Pressure wound to 50 sqcm: 15  Total Minutes  Untimed Charges Total Minutes: 15   Total Minutes: 15  Therapy Charges for Today     Code Description Service Date Service Provider Modifiers Qty    82779789485  PT NEG PRESS WOUND TO 50SQCM DME1 6/20/2022 Nereyda Bello, PT  1                  Nereyda eBllo, PT  6/20/2022

## 2022-06-21 ENCOUNTER — HOSPITAL ENCOUNTER (OUTPATIENT)
Dept: PHYSICAL THERAPY | Facility: HOSPITAL | Age: 87
Setting detail: THERAPIES SERIES
Discharge: HOME | End: 2022-06-21

## 2022-06-21 DIAGNOSIS — L97.513 FOOT ULCERATION, RIGHT, WITH NECROSIS OF MUSCLE: Primary | ICD-10-CM

## 2022-06-21 DIAGNOSIS — Z89.421 STATUS POST AMPUTATION OF TOE OF RIGHT FOOT: ICD-10-CM

## 2022-06-21 DIAGNOSIS — L03.115 CELLULITIS OF RIGHT FOOT: ICD-10-CM

## 2022-06-21 PROCEDURE — 97605 NEG PRS WND THER DME<=50SQCM: CPT

## 2022-06-21 PROCEDURE — 97597 DBRDMT OPN WND 1ST 20 CM/<: CPT

## 2022-06-21 NOTE — THERAPY WOUND CARE TREATMENT
Outpatient Rehabilitation - Wound/Debridement Treatment Note  [facility]     Patient Name: Jesse Siu  : 1935  MRN: [id number]  Today's Date: 2022                 Admit Date: 2022    Visit Dx:    ICD-10-CM ICD-9-CM   1. Foot ulceration, right, with necrosis of muscle (HCC)  L97.513 707.15     728.89   2. Cellulitis of right foot  L03.115 682.7   3. Status post amputation of toe of right foot ([facility])  Z89.421 V49.72         Patient Active Problem List   Diagnosis   • Cellulitis of right foot   • Type 2 diabetes mellitus with hyperglycemia (HCC)   • Renal insufficiency   • Foot ulceration, right, with necrosis of muscle (HCC)        Past Medical History:   Diagnosis Date   • Hyperlipidemia    • Hypertension    • Pre-diabetes         Past Surgical History:   Procedure Laterality Date   • AMPUTATION DIGIT Right 2022    Procedure: GREAT AND SECOND TOE AMPUTATION RIGHT;  Surgeon: Lemuel Angela MD;  Location: [facility];  Service: Vascular;  Laterality: Right;   • FOOT SURGERY Left     CANCER REMOVAL   • TONSILLECTOMY     • TURP / TRANSURETHRAL INCISION / DRAINAGE PROSTATE           EVALUATION   PT Ortho     Row Name 22 1240       Subjective Comments    Subjective Comments Pt presented to PT dept s/p LID appt where MD removed wound vac dressing, was scheduled for tomorrow, but PT was able to see pt today and replace wound vac.  Pt states MD was pleased with wound appearance, he continues on IV abx.  -JM       Subjective Pain    Able to rate subjective pain? yes  -JM    Pre-Treatment Pain Level 0  -JM    Post-Treatment Pain Level 0  -JM       Transfers    Comment, (Transfers) remained seated in w/c for tx  -JM          User Key  (r) = Recorded By, (t) = Taken By, (c) = Cosigned By    Initials Name Provider Type    Nereyda Beck, PT Physical Therapist                 [facility] Wound     Row Name 22 1240             Wound 22 1200 Right medial foot Amputation stump    Wound -  Properties Group Placement Date: 05/20/22  - Placement Time: 1200  -MF Side: Right  -MF Orientation: medial  -MF Location: foot  -MF Primary Wound Type: Amputation s  -MF      Wound Image View All Images View Images  -      Dressing Appearance intact;moist drainage;other (see comments)  temporary gauze dressing placed at MD office  -      Base granulating;moist;pink;red;exposed structure;slough;subcutaneous;yellow;white  exposed bone in base  -      Periwound intact;moist;pink;macerated;pale white;yeast  min maceration, yeast improving, mild erythema of prox/med periwound  -      Periwound Temperature warm  -      Periwound Skin Turgor soft  -      Edges irregular  -      Wound Length (cm) 2.3 cm  -      Wound Width (cm) 9.7 cm  -      Wound Depth (cm) 2.5 cm  -      Wound Surface Area (cm^2) 22.31 cm^2  -      Wound Volume (cm^3) 55.775 cm^3  -      Drainage Characteristics/Odor serosanguineous;serous  -      Drainage Amount small  -      Care, Wound cleansed with;wound cleanser;debrided;negative pressure wound therapy  -      Dressing Care dressing applied  vac, kerlix, spandage  -      Periwound Care cleansed with pH balanced cleanser;dry periwound area maintained;other (see comments)  nystatin powder/nosting crusting x2 layers, stomapaste and drape border  -      Wound Output (mL) 100  canister changed  -      Retired Wound - Properties Group Placement Date: 05/20/22  - Placement Time: 1200  - Side: Right  -MF Orientation: medial  -MF Location: foot  -MF Primary Wound Type: Amputation s  -MF      Retired Wound - Properties Group Date first assessed: 05/20/22  - Time first assessed: 1200  - Side: Right  -MF Location: foot  -MF Primary Wound Type: Amputation s  -MF              NPWT (Negative Pressure Wound Therapy) 05/20/22 1200 R foot ampuation site    NPWT (Negative Pressure Wound Therapy) - Properties Group Placement Date: 05/20/22  - Placement Time: 1200  -  Location: R foot ampuation site  -MF      Therapy Setting continuous therapy  -JM      Dressing foam, black;foam, white  -JM      Contact Layer other (see comments)  zuri ag  -JM      Pressure Setting 150 mmHg  -JM      Sponges Inserted 3  1 white, 1 black to fill, 1 black for padding under trac pad  -JM      Sponges Removed other (see comments)  gauze packing  -JM      Finger sweep complete Yes  -JM      Retired NPWT (Negative Pressure Wound Therapy) - Properties Group Placement Date: 05/20/22  - Placement Time: 1200  -MF Location: R foot ampuation site  -MF      Retired NPWT (Negative Pressure Wound Therapy) - Properties Group Placement Date: 05/20/22  -MF Placement Time: 1200  -MF Location: R foot ampuation site  -            User Key  (r) = Recorded By, (t) = Taken By, (c) = Cosigned By    Initials Name Provider Type    Wesley Vallejo, PT Physical Therapist    Nereyda Beck, PT Physical Therapist                  WOUND DEBRIDEMENT  Total area of Debridement: 4cmsq  Debridement Site 1  Location- Site 1: foot amputation site.  Selective Debridement- Site 1: Wound Surface <20cmsq  Instruments- Site 1: tweezers  Excised Tissue Description- Site 1: minimum, slough  Bleeding- Site 1: none              Therapy Education     Row Name 06/21/22 1240             Therapy Education    Education Details Reinforced home management of vac (changing canister, patching leaks, troubleshooting alarms).  Reinforced NWB and leg elevation  -JM      Given Bandaging/dressing change  -      Program Reinforced;Modified  -VANI      How Provided Verbal;Demonstration  -JM      Provided to Patient;Caregiver  -JM      Level of Understanding Verbalized  -            User Key  (r) = Recorded By, (t) = Taken By, (c) = Cosigned By    Initials Name Provider Type    Nereyda Beck, PT Physical Therapist                Recommendation and Plan   PT Assessment/Plan     Row Name 06/21/22 1244          PT Assessment     Functional Limitations Performance in self-care ADL;Limitations in functional capacity and performance;Limitations in community activities  -     Impairments Integumentary integrity  -     Assessment Comments Pt's R foot with decreased dimensions, improved granulation of distal wound bed, still with exposed bone proximally.  Periwound yeast improving, still min maceration and erythema of foot.  Continue current POC.  -            PT Plan    PT Frequency 2x/week;3x/week  -     Physical Therapy Interventions (Optional Details) patient/family education;wound care  -     PT Plan Comments vac, debridement  -           User Key  (r) = Recorded By, (t) = Taken By, (c) = Cosigned By    Initials Name Provider Type    Nereyda Beck, PT Physical Therapist                Goals   PT OP Goals     Row Name 06/21/22 1240          Time Calculation    PT Goal Re-Cert Due Date 08/18/22  -           User Key  (r) = Recorded By, (t) = Taken By, (c) = Cosigned By    Initials Name Provider Type    Nereyda Beck, PT Physical Therapist                PT Goal Re-Cert Due Date: 08/18/22            Time Calculation: Start Time: 1240  Untimed Charges  52760-Qdzlytkui debridement: 10  10327-Vsa Pressure wound to 50 sqcm: 25  Total Minutes  Untimed Charges Total Minutes: 35   Total Minutes: 35  Therapy Charges for Today     Code Description Service Date Service Provider Modifiers Qty    69211353719 HC PT NEG PRESS WOUND TO 50SQCM DME2 6/21/2022 Nereyda Bello, PT GP 1    [id number] HC JOSE DEBRIDE OPEN WOUND UP TO 20CM 6/21/2022 Nereyda Bello, PT GP 1                  Nereyda Bello, PT  6/21/2022

## 2022-06-23 LAB
FUNGUS WND CULT: NORMAL
MYCOBACTERIUM SPEC CULT: NORMAL
NIGHT BLUE STAIN TISS: NORMAL

## 2022-06-24 ENCOUNTER — HOSPITAL ENCOUNTER (OUTPATIENT)
Dept: PHYSICAL THERAPY | Facility: HOSPITAL | Age: 87
Setting detail: THERAPIES SERIES
Discharge: HOME | End: 2022-06-24

## 2022-06-24 DIAGNOSIS — L97.513 FOOT ULCERATION, RIGHT, WITH NECROSIS OF MUSCLE: Primary | ICD-10-CM

## 2022-06-24 DIAGNOSIS — L03.115 CELLULITIS OF RIGHT FOOT: ICD-10-CM

## 2022-06-24 DIAGNOSIS — Z89.421 STATUS POST AMPUTATION OF TOE OF RIGHT FOOT: ICD-10-CM

## 2022-06-24 PROCEDURE — 97605 NEG PRS WND THER DME<=50SQCM: CPT

## 2022-06-24 PROCEDURE — 97597 DBRDMT OPN WND 1ST 20 CM/<: CPT

## 2022-06-24 NOTE — THERAPY WOUND CARE TREATMENT
Outpatient Rehabilitation - Wound/Debridement Treatment Note  [facility]     Patient Name: Jesse Siu  : 1935  MRN: [id number]  Today's Date: 2022                 Admit Date: 2022    Visit Dx:    ICD-10-CM ICD-9-CM   1. Foot ulceration, right, with necrosis of muscle (Formerly [facility])  L97.513 707.15     728.89   2. Cellulitis of right foot  L03.115 682.7   3. Status post amputation of toe of right foot (Formerly [facility])  Z89.421 V49.72       Patient Active Problem List   Diagnosis   • Cellulitis of right foot   • Type 2 diabetes mellitus with hyperglycemia (HCC)   • Renal insufficiency   • Foot ulceration, right, with necrosis of muscle (Former[facility])        Past Medical History:   Diagnosis Date   • Hyperlipidemia    • Hypertension    • Pre-diabetes         Past Surgical History:   Procedure Laterality Date   • AMPUTATION DIGIT Right 2022    Procedure: GREAT AND SECOND TOE AMPUTATION RIGHT;  Surgeon: Lemuel Angela MD;  Location: [facility];  Service: Vascular;  Laterality: Right;   • FOOT SURGERY Left     CANCER REMOVAL   • TONSILLECTOMY     • TURP / TRANSURETHRAL INCISION / DRAINAGE PROSTATE           EVALUATION   PT Ortho     Row Name 22 0845       Subjective Comments    Subjective Comments Pt without complaints.  Family states they had to tape down loose edges of drape due to leak alarm yesterday.  -JM       Subjective Pain    Able to rate subjective pain? yes  -JM    Pre-Treatment Pain Level 0  -JM    Post-Treatment Pain Level 0  -JM       Transfers    Comment, (Transfers) remained in w/c for tx  -JM          User Key  (r) = Recorded By, (t) = Taken By, (c) = Cosigned By    Initials Name Provider Type    Nereyda Beck, PT Physical Therapist                 [facility] Wound     Row Name 22 0845             Wound 22 1200 Right medial foot Amputation stump    Wound - Properties Group Placement Date: 22  -MF Placement Time: 1200  -MF Side: Right  -MF Orientation: medial  -MF Location: foot   -MF Primary Wound Type: Amputation s  -MF      Dressing Appearance intact;moist drainage  -      Base granulating;moist;pink;red;exposed structure;slough;subcutaneous;yellow;white  exposed bone in base, increasing granulation  -      Periwound intact;moist;pink;macerated;pale white;yeast  min maceration, yeast improving, mild erythema of prox/med periwound  -      Periwound Temperature warm  -      Periwound Skin Turgor soft  -      Edges irregular  -      Drainage Characteristics/Odor serosanguineous;serous  -JM      Drainage Amount small  -      Care, Wound cleansed with;wound cleanser;debrided;negative pressure wound therapy  -      Dressing Care dressing changed  vac, kerlix, size 6 spandage  -      Periwound Care cleansed with pH balanced cleanser;dry periwound area maintained;other (see comments)  nystatin powder/nosting spray crusting x2 layers, stomapaste/drape border  -      Wound Output (mL) 25  -JM      Retired Wound - Properties Group Placement Date: 05/20/22  - Placement Time: 1200  -MF Side: Right  -MF Orientation: medial  - Location: foot  -MF Primary Wound Type: Amputation s  -MF      Retired Wound - Properties Group Date first assessed: 05/20/22  - Time first assessed: 1200  -MF Side: Right  -MF Location: foot  -MF Primary Wound Type: Amputation s  -MF              NPWT (Negative Pressure Wound Therapy) 05/20/22 1200 R foot ampuation site    NPWT (Negative Pressure Wound Therapy) - Properties Group Placement Date: 05/20/22  - Placement Time: 1200  -MF Location: R foot ampuation site  -      Therapy Setting continuous therapy  -      Dressing foam, black;foam, white  -      Contact Layer other (see comments)  zuri  -      Pressure Setting 150 mmHg  -      Sponges Inserted 2  1 white, 1 black  -JM      Sponges Removed 3  1 white, 2 black  -JM      Finger sweep complete Yes  -      Retired NPWT (Negative Pressure Wound Therapy) - Properties Group Placement  Date: 05/20/22  - Placement Time: 1200  -MF Location: R foot ampuation site  -      Retired NPWT (Negative Pressure Wound Therapy) - Properties Group Placement Date: 05/20/22  - Placement Time: 1200  - Location: R foot ampuation site  -            User Key  (r) = Recorded By, (t) = Taken By, (c) = Cosigned By    Initials Name Provider Type    Wesley Vallejo, PT Physical Therapist    Nereyda Beck, PT Physical Therapist                  WOUND DEBRIDEMENT  Total area of Debridement: 4cmsq  Debridement Site 1  Location- Site 1: foot amputation site.  Selective Debridement- Site 1: Wound Surface <20cmsq  Instruments- Site 1: tweezers  Excised Tissue Description- Site 1: minimum, slough  Bleeding- Site 1: none              Therapy Education     Row Name 06/24/22 0834             Therapy Education    Education Details Continuation of POC  -      Given Bandaging/dressing change  -      Program Reinforced;Modified  -      How Provided Verbal;Demonstration  -      Provided to Patient;Caregiver  -      Level of Understanding Verbalized  -            User Key  (r) = Recorded By, (t) = Taken By, (c) = Cosigned By    Initials Name Provider Type    Nereyda Beck, PT Physical Therapist                Recommendation and Plan   PT Assessment/Plan     Row Name 06/24/22 5544          PT Assessment    Functional Limitations Performance in self-care ADL;Limitations in functional capacity and performance;Limitations in community activities  -     Impairments Integumentary integrity  -     Assessment Comments Pt's R foot wound with increasing granulation, still with exposed bone proximally with thin pink granulation layer starting to form over bone.  Periwound still with min maceration and yeast but slowly improving with nystatin use.  -            PT Plan    PT Frequency 2x/week;3x/week  -     Physical Therapy Interventions (Optional Details) patient/family education;wound care  -      PT Plan Comments vac, debridement  -           User Key  (r) = Recorded By, (t) = Taken By, (c) = Cosigned By    Initials Name Provider Type    Nereyda Beck, PT Physical Therapist                Goals   PT OP Goals     Row Name 06/24/22 0845          Time Calculation    PT Goal Re-Cert Due Date 08/18/22  -VANI           User Key  (r) = Recorded By, (t) = Taken By, (c) = Cosigned By    Initials Name Provider Type    Nereyda Beck, PT Physical Therapist                PT Goal Re-Cert Due Date: 08/18/22            Time Calculation: Start Time: 0845  Untimed Charges  91315-Lmfafgqwy debridement: 10  02093-Ese Pressure wound to 50 sqcm: 25  Total Minutes  Untimed Charges Total Minutes: 35   Total Minutes: 35  Therapy Charges for Today     Code Description Service Date Service Provider Modifiers Qty    08474912975 HC PT NEG PRESS WOUND TO 50SQCM DME2 6/24/2022 Nereyda Bello, PT GP 1    [id number] HC JOSE DEBRIDE OPEN WOUND UP TO 20CM 6/24/2022 Nereyda Bello, PT GP 1                  Nereyda Bello, PT  6/24/2022

## 2022-06-28 ENCOUNTER — HOSPITAL ENCOUNTER (OUTPATIENT)
Dept: PHYSICAL THERAPY | Facility: HOSPITAL | Age: 87
Setting detail: THERAPIES SERIES
Discharge: HOME | End: 2022-06-28

## 2022-06-28 DIAGNOSIS — L03.115 CELLULITIS OF RIGHT FOOT: ICD-10-CM

## 2022-06-28 DIAGNOSIS — L97.513 FOOT ULCERATION, RIGHT, WITH NECROSIS OF MUSCLE: Primary | ICD-10-CM

## 2022-06-28 DIAGNOSIS — Z89.421 STATUS POST AMPUTATION OF TOE OF RIGHT FOOT: ICD-10-CM

## 2022-06-28 PROCEDURE — 97605 NEG PRS WND THER DME<=50SQCM: CPT

## 2022-06-28 PROCEDURE — 97597 DBRDMT OPN WND 1ST 20 CM/<: CPT

## 2022-06-28 NOTE — THERAPY WOUND CARE TREATMENT
Outpatient Rehabilitation - Wound/Debridement Treatment Note  [facility]     Patient Name: Jesse Siu  : 1935  MRN: [id number]  Today's Date: 2022                 Admit Date: 2022    Visit Dx:    ICD-10-CM ICD-9-CM   1. Foot ulceration, right, with necrosis of muscle ([facility])  L97.513 707.15     728.89   2. Cellulitis of right foot  L03.115 682.7   3. Status post amputation of toe of right foot ([facility])  Z89.421 V49.72       Patient Active Problem List   Diagnosis   • Cellulitis of right foot   • Type 2 diabetes mellitus with hyperglycemia (HCC)   • Renal insufficiency   • Foot ulceration, right, with necrosis of muscle ([facility])        Past Medical History:   Diagnosis Date   • Hyperlipidemia    • Hypertension    • Pre-diabetes         Past Surgical History:   Procedure Laterality Date   • AMPUTATION DIGIT Right 2022    Procedure: GREAT AND SECOND TOE AMPUTATION RIGHT;  Surgeon: Lemuel Angela MD;  Location: [facility];  Service: Vascular;  Laterality: Right;   • FOOT SURGERY Left     CANCER REMOVAL   • TONSILLECTOMY     • TURP / TRANSURETHRAL INCISION / DRAINAGE PROSTATE           EVALUATION   PT Ortho     Row Name 22 1300       Subjective Comments    Subjective Comments Pt just seen at [facility], states he is continuing on IV abx for another week.  -VANI       Subjective Pain    Able to rate subjective pain? yes  -VANI    Pre-Treatment Pain Level 0  -    Post-Treatment Pain Level 0  -       Transfers    Comment, (Transfers) seated in w/c for tx  -          User Key  (r) = Recorded By, (t) = Taken By, (c) = Cosigned By    Initials Name Provider Type    Nereyda Beck, PT Physical Therapist                 LDA Wound     Row Name 22 1300             Wound 22 1200 Right medial foot Amputation stump    Wound - Properties Group Placement Date: 22  -MF Placement Time: 1200  -MF Side: Right  -MF Orientation: medial  -MF Location: foot  -MF Primary Wound Type: Amputation s   -      Wound Image View All Images View Images  -      Dressing Appearance intact;dry;other (see comments)  temporary gauze dressing placed at MD office  -      Base granulating;moist;pink;red;exposed structure;slough;subcutaneous;yellow;white  exposed bone in base, increasing granulation  -      Periwound intact;moist;pink;macerated;pale white;yeast  min maceration, yeast improving, mild erythema of prox/med periwound  -      Periwound Temperature warm  -      Periwound Skin Turgor soft  -      Edges irregular  -      Wound Length (cm) 1.6 cm  -      Wound Width (cm) 9.4 cm  -      Wound Depth (cm) 2.4 cm  -      Wound Surface Area (cm^2) 15.04 cm^2  -      Wound Volume (cm^3) 36.096 cm^3  -      Drainage Characteristics/Odor serosanguineous;serous  -      Drainage Amount small  -      Care, Wound cleansed with;wound cleanser;debrided;negative pressure wound therapy  -      Dressing Care dressing applied  vac, kerlix, spandage  -      Periwound Care barrier film applied;cleansed with pH balanced cleanser;dry periwound area maintained  nystatin powder/nosting spray crust x2 layers, stomapaste/drape border  -      Wound Output (mL) 75  canister changed  -      Retired Wound - Properties Group Placement Date: 05/20/22  - Placement Time: 1200  -MF Side: Right  - Orientation: medial  - Location: foot  - Primary Wound Type: Amputation s  -      Retired Wound - Properties Group Date first assessed: 05/20/22  - Time first assessed: 1200  -MF Side: Right  - Location: foot  -MF Primary Wound Type: Amputation s  -MF              NPWT (Negative Pressure Wound Therapy) 05/20/22 1200 R foot ampuation site    NPWT (Negative Pressure Wound Therapy) - Properties Group Placement Date: 05/20/22  - Placement Time: 1200  -MF Location: R foot ampuation site  -      Therapy Setting continuous therapy  -      Dressing foam, black;foam, white  -      Contact Layer other (see  comments)  zuri  -JM      Pressure Setting 150 mmHg  -JM      Sponges Inserted 2  1 white, 1 black  -JM      Sponges Removed other (see comments)  gauze packing from MD office  -VANI      Finger sweep complete Yes  -JM      Retired NPWT (Negative Pressure Wound Therapy) - Properties Group Placement Date: 05/20/22  -MF Placement Time: 1200  -MF Location: R foot ampuation site  -MF      Retired NPWT (Negative Pressure Wound Therapy) - Properties Group Placement Date: 05/20/22  -MF Placement Time: 1200  -MF Location: R foot ampuation site  -            User Key  (r) = Recorded By, (t) = Taken By, (c) = Cosigned By    Initials Name Provider Type    Wesley Vallejo, PT Physical Therapist    Nereyda Beck, PT Physical Therapist                  WOUND DEBRIDEMENT  Total area of Debridement: 4cmsq  Debridement Site 1  Location- Site 1: foot amputation site.  Selective Debridement- Site 1: Wound Surface <20cmsq  Instruments- Site 1: tweezers, #15, scapel  Excised Tissue Description- Site 1: minimum, slough  Bleeding- Site 1: scant              Therapy Education     Row Name 06/28/22 1300             Therapy Education    Education Details Continue NWB and leg elevation.  Recommended increased protein intake for wound healing.  -VANI      Given Bandaging/dressing change  -VANI      Program Reinforced  -VANI      How Provided Verbal;Demonstration  -VANI      Provided to Patient;Caregiver  -VANI      Level of Understanding Verbalized  -            User Key  (r) = Recorded By, (t) = Taken By, (c) = Cosigned By    Initials Name Provider Type    Nereyda Beck, PT Physical Therapist                Recommendation and Plan   PT Assessment/Plan     Row Name 06/28/22 1300          PT Assessment    Functional Limitations Performance in self-care ADL;Limitations in functional capacity and performance;Limitations in community activities  -VANI     Impairments Integumentary integrity  -     Assessment Comments Pt with  decreased dimensions R foot wound, increased granulation, less erythema of prox periwound.  Continues to benefit from NPWT and debridement.  -            PT Plan    PT Frequency 2x/week;3x/week  -     Physical Therapy Interventions (Optional Details) patient/family education;wound care  -     PT Plan Comments vac, debridement  -           User Key  (r) = Recorded By, (t) = Taken By, (c) = Cosigned By    Initials Name Provider Type    Nereyda Beck, PT Physical Therapist                Goals   PT OP Goals     Row Name 06/28/22 1300          Time Calculation    PT Goal Re-Cert Due Date 08/18/22  -           User Key  (r) = Recorded By, (t) = Taken By, (c) = Cosigned By    Initials Name Provider Type    Nereyda Beck, PT Physical Therapist                PT Goal Re-Cert Due Date: 08/18/22            Time Calculation: Start Time: 1300  Untimed Charges  80720-Stlfkxmph debridement: 10  09932-Eip Pressure wound to 50 sqcm: 25  Total Minutes  Untimed Charges Total Minutes: 35   Total Minutes: 35  Therapy Charges for Today     Code Description Service Date Service Provider Modifiers Qty    69771573434 HC JOSE DEBRIDE OPEN WOUND UP TO 20CM 6/28/2022 Nereyda Bello, PT GP 1    22497311470 HC PT NEG PRESS WOUND TO 50SQCM DME2 6/28/2022 Nereyda Bello, PT GP 1                  Nereyda Bello, PT  6/28/2022

## 2022-07-01 ENCOUNTER — HOSPITAL ENCOUNTER (OUTPATIENT)
Dept: PHYSICAL THERAPY | Facility: HOSPITAL | Age: 87
Setting detail: THERAPIES SERIES
Discharge: HOME | End: 2022-07-01

## 2022-07-01 DIAGNOSIS — L03.115 CELLULITIS OF RIGHT FOOT: ICD-10-CM

## 2022-07-01 DIAGNOSIS — L97.513 FOOT ULCERATION, RIGHT, WITH NECROSIS OF MUSCLE: Primary | ICD-10-CM

## 2022-07-01 DIAGNOSIS — Z89.421 STATUS POST AMPUTATION OF TOE OF RIGHT FOOT: ICD-10-CM

## 2022-07-01 PROCEDURE — 97605 NEG PRS WND THER DME<=50SQCM: CPT

## 2022-07-01 PROCEDURE — 97597 DBRDMT OPN WND 1ST 20 CM/<: CPT

## 2022-07-01 NOTE — THERAPY WOUND CARE TREATMENT
Outpatient Rehabilitation - Wound/Debridement Treatment Note  [facility]     Patient Name: Jesse Siu  : 1935  MRN: [id number]  Today's Date: 2022                 Admit Date: 2022    Visit Dx:    ICD-10-CM ICD-9-CM   1. Foot ulceration, right, with necrosis of muscle (HCC)  L97.513 707.15     728.89   2. Cellulitis of right foot  L03.115 682.7   3. Status post amputation of toe of right foot ([facility])  Z89.421 V49.72       Patient Active Problem List   Diagnosis   • Cellulitis of right foot   • Type 2 diabetes mellitus with hyperglycemia (HCC)   • Renal insufficiency   • Foot ulceration, right, with necrosis of muscle (HCC)        Past Medical History:   Diagnosis Date   • Hyperlipidemia    • Hypertension    • Pre-diabetes         Past Surgical History:   Procedure Laterality Date   • AMPUTATION DIGIT Right 2022    Procedure: GREAT AND SECOND TOE AMPUTATION RIGHT;  Surgeon: Lemuel Angela MD;  Location: [facility];  Service: Vascular;  Laterality: Right;   • FOOT SURGERY Left     CANCER REMOVAL   • TONSILLECTOMY     • TURP / TRANSURETHRAL INCISION / DRAINAGE PROSTATE           EVALUATION   PT Ortho     Row Name 22 1245       Subjective Comments    Subjective Comments Pt without complaints.  Family reports pt's pump has been alarming therapy inactive and leak alarms.  PT explained that if the leak is not addressed, the suction will be turned off.  -JM       Subjective Pain    Able to rate subjective pain? yes  -JM    Pre-Treatment Pain Level 0  -JM    Post-Treatment Pain Level 0  -JM       Transfers    Comment, (Transfers) seated in w/c for tx  -JM          User Key  (r) = Recorded By, (t) = Taken By, (c) = Cosigned By    Initials Name Provider Type    Nereyda Beck, PT Physical Therapist                 LDA Wound     Row Name 22 1245             Wound 22 1200 Right medial foot Amputation stump    Wound - Properties Group Placement Date: 22  - Placement Time:  1200  -MF Side: Right  - Orientation: medial  - Location: foot  -MF Primary Wound Type: Amputation s  -MF      Dressing Appearance dry;intact;other (see comments)  slightly loose around base of toes  -      Base granulating;moist;pink;red;exposed structure;slough;subcutaneous;yellow;white  exposed bone in base, increasing granulation  -      Periwound intact;moist;pink;macerated;pale white;yeast  min maceration, yeast improving, mild erythema of prox/med periwound  -      Periwound Temperature warm  -      Periwound Skin Turgor soft  -      Edges irregular  -      Drainage Characteristics/Odor serosanguineous;serous  -      Drainage Amount small  -      Care, Wound cleansed with;wound cleanser;debrided;negative pressure wound therapy  -      Dressing Care dressing changed  vac, kerlix, spandage  -      Periwound Care barrier film applied;cleansed with pH balanced cleanser;dry periwound area maintained;topical treatment applied  nystatin powder/nosting spray crusting, paste/drape border  -      Wound Output (mL) 25  -JM      Retired Wound - Properties Group Placement Date: 05/20/22  - Placement Time: 1200  -MF Side: Right  - Orientation: medial  - Location: foot  -MF Primary Wound Type: Amputation s  -MF      Retired Wound - Properties Group Date first assessed: 05/20/22  - Time first assessed: 1200  - Side: Right  - Location: foot  -MF Primary Wound Type: Amputation s  -MF              NPWT (Negative Pressure Wound Therapy) 05/20/22 1200 R foot ampuation site    NPWT (Negative Pressure Wound Therapy) - Properties Group Placement Date: 05/20/22  - Placement Time: 1200  - Location: R foot ampuation site  -      Therapy Setting continuous therapy  -      Dressing foam, black  -JM      Contact Layer other (see comments)  zuri  -      Pressure Setting 150 mmHg  -JM      Sponges Inserted 1  1 black  -JM      Sponges Removed 2  1 white, 1 black  -JM      Finger sweep  complete Yes  -JM      Retired NPWT (Negative Pressure Wound Therapy) - Properties Group Placement Date: 05/20/22  - Placement Time: 1200  -MF Location: R foot ampuation site  -      Retired NPWT (Negative Pressure Wound Therapy) - Properties Group Placement Date: 05/20/22  - Placement Time: 1200  -MF Location: R foot ampuation site  -            User Key  (r) = Recorded By, (t) = Taken By, (c) = Cosigned By    Initials Name Provider Type    Wesley Vallejo, PT Physical Therapist    Nereyda Beck, PT Physical Therapist                  WOUND DEBRIDEMENT  Total area of Debridement: 4cmsq  Debridement Site 1  Location- Site 1: foot amputation site.  Selective Debridement- Site 1: Wound Surface <20cmsq  Instruments- Site 1: tweezers, #15, scapel  Excised Tissue Description- Site 1: minimum, slough  Bleeding- Site 1: scant              Therapy Education     Row Name 07/01/22 6554             Therapy Education    Education Details Reviewed troubleshooting alarms, to remove dressing and place moist gauze dressing if suction off for longer than 3 hours.  -JM      Given Bandaging/dressing change  -      Program Reinforced  -      How Provided Verbal;Demonstration  -VANI      Provided to Patient;Caregiver  -VANI      Level of Understanding Verbalized  -            User Key  (r) = Recorded By, (t) = Taken By, (c) = Cosigned By    Initials Name Provider Type    Nereyda Beck, PT Physical Therapist                Recommendation and Plan   PT Assessment/Plan     Row Name 07/01/22 6198          PT Assessment    Functional Limitations Performance in self-care ADL;Limitations in functional capacity and performance;Limitations in community activities  -     Impairments Integumentary integrity  -     Assessment Comments Pt with improving granulation of wound, less erythema and yeast improving.  PT held white foam and trial of black vac foam only to help promote granulation in depth.  Continue  current POC.  -            PT Plan    PT Frequency 2x/week;3x/week  -     Physical Therapy Interventions (Optional Details) patient/family education;wound care  -     PT Plan Comments vac, debridement  -           User Key  (r) = Recorded By, (t) = Taken By, (c) = Cosigned By    Initials Name Provider Type    Nereyda Beck, PT Physical Therapist                Goals   PT OP Goals     Row Name 07/01/22 1245          Time Calculation    PT Goal Re-Cert Due Date 08/18/22  -           User Key  (r) = Recorded By, (t) = Taken By, (c) = Cosigned By    Initials Name Provider Type    Nereyda Beck, PT Physical Therapist                PT Goal Re-Cert Due Date: 08/18/22            Time Calculation: Start Time: 1245  Untimed Charges  94843-Hzykvlgzv debridement: 10  80965-Djl Pressure wound to 50 sqcm: 25  Total Minutes  Untimed Charges Total Minutes: 35   Total Minutes: 35  Therapy Charges for Today     Code Description Service Date Service Provider Modifiers Qty    16193406925 HC JOSE DEBRIDE OPEN WOUND UP TO 20CM 7/1/2022 Nereyda Bello, PT GP 1    66811511036 HC PT NEG PRESS WOUND TO 50SQCM DME2 7/1/2022 Nereyda Bello, PT GP 1                  Nereyda Bello, PT  7/1/2022

## 2022-07-04 ENCOUNTER — LAB (OUTPATIENT)
Dept: LAB | Facility: HOSPITAL | Age: 87
End: 2022-07-04

## 2022-07-04 ENCOUNTER — TRANSCRIBE ORDERS (OUTPATIENT)
Dept: LAB | Facility: HOSPITAL | Age: 87
End: 2022-07-04

## 2022-07-04 DIAGNOSIS — M86.171 OSTEOMYELITIS OF ANKLE OR FOOT, RIGHT, ACUTE: ICD-10-CM

## 2022-07-04 DIAGNOSIS — N17.9 ACUTE RENAL FAILURE, UNSPECIFIED ACUTE RENAL FAILURE TYPE: ICD-10-CM

## 2022-07-04 DIAGNOSIS — M72.6 NECROTIZING FASCIITIS: ICD-10-CM

## 2022-07-04 DIAGNOSIS — K83.09 BACTERIAL CHOLANGITIS: ICD-10-CM

## 2022-07-04 DIAGNOSIS — B96.89 BACTERIAL CHOLANGITIS: ICD-10-CM

## 2022-07-04 DIAGNOSIS — L03.115 CELLULITIS OF RIGHT FOOT: ICD-10-CM

## 2022-07-04 DIAGNOSIS — N17.9 ACUTE RENAL FAILURE, UNSPECIFIED ACUTE RENAL FAILURE TYPE: Primary | ICD-10-CM

## 2022-07-04 LAB
ALBUMIN SERPL-MCNC: 3.6 G/DL (ref 3.5–5.2)
ALBUMIN/GLOB SERPL: 1.1 G/DL
ALP SERPL-CCNC: 144 U/L (ref 39–117)
ALT SERPL W P-5'-P-CCNC: 25 U/L (ref 1–41)
ANION GAP SERPL CALCULATED.3IONS-SCNC: 10 MMOL/L (ref 5–15)
AST SERPL-CCNC: 24 U/L (ref 1–40)
BASOPHILS # BLD AUTO: 0.07 10*3/MM3 (ref 0–0.2)
BASOPHILS NFR BLD AUTO: 1 % (ref 0–1.5)
BILIRUB SERPL-MCNC: 0.2 MG/DL (ref 0–1.2)
BUN SERPL-MCNC: 25 MG/DL (ref 8–23)
BUN/CREAT SERPL: 23.1 (ref 7–25)
CALCIUM SPEC-SCNC: 9.3 MG/DL (ref 8.6–10.5)
CHLORIDE SERPL-SCNC: 105 MMOL/L (ref 98–107)
CO2 SERPL-SCNC: 29 MMOL/L (ref 22–29)
CREAT SERPL-MCNC: 1.08 MG/DL (ref 0.76–1.27)
CRP SERPL-MCNC: <0.3 MG/DL (ref 0–0.5)
DEPRECATED RDW RBC AUTO: 53.6 FL (ref 37–54)
EGFRCR SERPLBLD CKD-EPI 2021: 66.4 ML/MIN/1.73
EOSINOPHIL # BLD AUTO: 0.12 10*3/MM3 (ref 0–0.4)
EOSINOPHIL NFR BLD AUTO: 1.7 % (ref 0.3–6.2)
ERYTHROCYTE [DISTWIDTH] IN BLOOD BY AUTOMATED COUNT: 16.1 % (ref 12.3–15.4)
ERYTHROCYTE [SEDIMENTATION RATE] IN BLOOD: 15 MM/HR (ref 0–20)
GLOBULIN UR ELPH-MCNC: 3.3 GM/DL
GLUCOSE SERPL-MCNC: 188 MG/DL (ref 65–99)
HCT VFR BLD AUTO: 39.3 % (ref 37.5–51)
HGB BLD-MCNC: 12.2 G/DL (ref 13–17.7)
IMM GRANULOCYTES # BLD AUTO: 0.02 10*3/MM3 (ref 0–0.05)
IMM GRANULOCYTES NFR BLD AUTO: 0.3 % (ref 0–0.5)
LYMPHOCYTES # BLD AUTO: 1.24 10*3/MM3 (ref 0.7–3.1)
LYMPHOCYTES NFR BLD AUTO: 17.2 % (ref 19.6–45.3)
MCH RBC QN AUTO: 28.3 PG (ref 26.6–33)
MCHC RBC AUTO-ENTMCNC: 31 G/DL (ref 31.5–35.7)
MCV RBC AUTO: 91.2 FL (ref 79–97)
MONOCYTES # BLD AUTO: 0.62 10*3/MM3 (ref 0.1–0.9)
MONOCYTES NFR BLD AUTO: 8.6 % (ref 5–12)
NEUTROPHILS NFR BLD AUTO: 5.12 10*3/MM3 (ref 1.7–7)
NEUTROPHILS NFR BLD AUTO: 71.2 % (ref 42.7–76)
NRBC BLD AUTO-RTO: 0 /100 WBC (ref 0–0.2)
PLATELET # BLD AUTO: 351 10*3/MM3 (ref 140–450)
PMV BLD AUTO: 10.2 FL (ref 6–12)
POTASSIUM SERPL-SCNC: 4.6 MMOL/L (ref 3.5–5.2)
PROT SERPL-MCNC: 6.9 G/DL (ref 6–8.5)
RBC # BLD AUTO: 4.31 10*6/MM3 (ref 4.14–5.8)
SODIUM SERPL-SCNC: 144 MMOL/L (ref 136–145)
WBC NRBC COR # BLD: 7.19 10*3/MM3 (ref 3.4–10.8)

## 2022-07-04 PROCEDURE — 85025 COMPLETE CBC W/AUTO DIFF WBC: CPT

## 2022-07-04 PROCEDURE — 86140 C-REACTIVE PROTEIN: CPT

## 2022-07-04 PROCEDURE — 80053 COMPREHEN METABOLIC PANEL: CPT

## 2022-07-04 PROCEDURE — 85652 RBC SED RATE AUTOMATED: CPT

## 2022-07-04 PROCEDURE — 36415 COLL VENOUS BLD VENIPUNCTURE: CPT

## 2022-07-05 ENCOUNTER — HOSPITAL ENCOUNTER (OUTPATIENT)
Dept: PHYSICAL THERAPY | Facility: HOSPITAL | Age: 87
Setting detail: THERAPIES SERIES
Discharge: HOME | End: 2022-07-05

## 2022-07-05 DIAGNOSIS — L03.115 CELLULITIS OF RIGHT FOOT: ICD-10-CM

## 2022-07-05 DIAGNOSIS — Z89.421 STATUS POST AMPUTATION OF TOE OF RIGHT FOOT: ICD-10-CM

## 2022-07-05 DIAGNOSIS — L97.513 FOOT ULCERATION, RIGHT, WITH NECROSIS OF MUSCLE: Primary | ICD-10-CM

## 2022-07-05 PROCEDURE — 97605 NEG PRS WND THER DME<=50SQCM: CPT

## 2022-07-05 PROCEDURE — 97597 DBRDMT OPN WND 1ST 20 CM/<: CPT

## 2022-07-05 NOTE — THERAPY WOUND CARE TREATMENT
Outpatient Rehabilitation - Wound/Debridement Treatment Note  [facility]     Patient Name: Jesse Siu  : 1935  MRN: [id number]  Today's Date: 2022                 Admit Date: 2022    Visit Dx:    ICD-10-CM ICD-9-CM   1. Foot ulceration, right, with necrosis of muscle ([facility])  L97.513 707.15     728.89   2. Cellulitis of right foot  L03.115 682.7   3. Status post amputation of toe of right foot ([facility])  Z89.421 V49.72       Patient Active Problem List   Diagnosis   • Cellulitis of right foot   • Type 2 diabetes mellitus with hyperglycemia (HCC)   • Renal insufficiency   • Foot ulceration, right, with necrosis of muscle ([facility])        Past Medical History:   Diagnosis Date   • Hyperlipidemia    • Hypertension    • Pre-diabetes         Past Surgical History:   Procedure Laterality Date   • AMPUTATION DIGIT Right 2022    Procedure: GREAT AND SECOND TOE AMPUTATION RIGHT;  Surgeon: Lemuel Angela MD;  Location: [facility];  Service: Vascular;  Laterality: Right;   • FOOT SURGERY Left     CANCER REMOVAL   • TONSILLECTOMY     • TURP / TRANSURETHRAL INCISION / DRAINAGE PROSTATE           EVALUATION   PT Ortho     Row Name 22 1400       Subjective Comments    Subjective Comments No complaints or changes  -       Subjective Pain    Able to rate subjective pain? yes  -MC    Pre-Treatment Pain Level 0  -MC    Post-Treatment Pain Level 0  -MC       Transfers    Comment, (Transfers) seated in w/c for tx  -MC          User Key  (r) = Recorded By, (t) = Taken By, (c) = Cosigned By    Initials Name Provider Type    Sarah Beth Jordan PT Physical Therapist                 LDA Wound     Row Name 22 1400             Wound 22 1200 Right medial foot Amputation stump    Wound - Properties Group Placement Date: 22  -MF Placement Time: 1200  -MF Side: Right  -MF Orientation: medial  -MF Location: foot  -MF Primary Wound Type: Amputation s  -MF      Wound Image View All Images View Images   -      Dressing Appearance intact;other (see comments)  temp dressing from [facility]  -      Base granulating;moist;pink;red;exposed structure;slough;subcutaneous;yellow;white  exposed bone in base, increasing granulation  -      Periwound intact;moist;pink;macerated;pale white  no maceration, no visible yeast rash, mild erythema of prox/med periwound  -      Periwound Temperature warm  -      Periwound Skin Turgor soft  -      Edges irregular  -      Wound Length (cm) 2 cm  -MC      Wound Width (cm) 8.9 cm  -MC      Wound Depth (cm) 2.5 cm  -MC      Wound Surface Area (cm^2) 17.8 cm^2  -MC      Wound Volume (cm^3) 44.5 cm^3  -      Drainage Characteristics/Odor serosanguineous;serous  -      Drainage Amount small  -      Care, Wound cleansed with;wound cleanser;debrided;negative pressure wound therapy  -      Dressing Care dressing applied  vac, kerlix, spandage  -      Periwound Care cleansed with pH balanced cleanser;barrier film applied;topical treatment applied  NyStatin/NoSting crust x2  -      Wound Output (mL) 100  -MC      Retired Wound - Properties Group Placement Date: 05/20/22  - Placement Time: 1200  -MF Side: Right  -MF Orientation: medial  -MF Location: foot  -MF Primary Wound Type: Amputation s  -MF      Retired Wound - Properties Group Date first assessed: 05/20/22  - Time first assessed: 1200  -MF Side: Right  -MF Location: foot  -MF Primary Wound Type: Amputation s  -MF              NPWT (Negative Pressure Wound Therapy) 05/20/22 1200 R foot ampuation site    NPWT (Negative Pressure Wound Therapy) - Properties Group Placement Date: 05/20/22  - Placement Time: 1200  -MF Location: R foot ampuation site  -      Therapy Setting continuous therapy  -      Dressing foam, black  -      Contact Layer other (see comments)  zuri Ag  -      Pressure Setting 150 mmHg  -      Sponges Inserted 2  1 black to fill, 1 black to cover for trac pad  -MC      Sponges Removed  other (see comments)  temp dressing  -MC      Finger sweep complete Yes  -MC      Retired NPWT (Negative Pressure Wound Therapy) - Properties Group Placement Date: 05/20/22  - Placement Time: 1200  -MF Location: R foot ampuation site  -      Retired NPWT (Negative Pressure Wound Therapy) - Properties Group Placement Date: 05/20/22  - Placement Time: 1200  -MF Location: R foot ampuation site  -            User Key  (r) = Recorded By, (t) = Taken By, (c) = Cosigned By    Initials Name Provider Type    Wesley Vallejo, PT Physical Therapist    Sarah Beth Jordan, PT Physical Therapist                  WOUND DEBRIDEMENT  Total area of Debridement: 2 cm2  Debridement Site 1  Location- Site 1: foot amputation site.  Selective Debridement- Site 1: Wound Surface <20cmsq  Instruments- Site 1: tweezers  Excised Tissue Description- Site 1: minimum, slough  Bleeding- Site 1: none              Therapy Education     Row Name 07/05/22 1400             Therapy Education    Education Details Continue current POC  -      Given Bandaging/dressing change  -      Program Reinforced  -      How Provided Verbal;Demonstration  -      Provided to Patient;Caregiver  -      Level of Understanding Verbalized  -            User Key  (r) = Recorded By, (t) = Taken By, (c) = Cosigned By    Initials Name Provider Type    Sarah Beth Jordan PT Physical Therapist                Recommendation and Plan   PT Assessment/Plan     Row Name 07/05/22 1400          PT Assessment    Functional Limitations Performance in self-care ADL;Limitations in functional capacity and performance;Limitations in community activities  -     Impairments Integumentary integrity  -     Assessment Comments Pt with stable wound dimensions since last assessment. However, the base appears to have increasing granulation and lex wound edges. Pt will continue to benefit from skilled PT wound care to promote healing.  -     Rehab  Potential Fair  -     Patient/caregiver participated in establishment of treatment plan and goals Yes  -     Patient would benefit from skilled therapy intervention Yes  -            PT Plan    PT Frequency 2x/week  -     Physical Therapy Interventions (Optional Details) wound care;patient/family education  -     PT Plan Comments debridement, vac  -           User Key  (r) = Recorded By, (t) = Taken By, (c) = Cosigned By    Initials Name Provider Type     Sarah Beth Bailey, PT Physical Therapist                Goals   PT OP Goals     Row Name 07/05/22 1414          Time Calculation    PT Goal Re-Cert Due Date 08/18/22  -           User Key  (r) = Recorded By, (t) = Taken By, (c) = Cosigned By    Initials Name Provider Type     Sarah Beth Bailey, PT Physical Therapist                PT Goal Re-Cert Due Date: 08/18/22            Time Calculation: Start Time: 1115  Untimed Charges  04757-Hprnhcjnk debridement: 10  94647-Gdy Pressure wound to 50 sqcm: 25  Total Minutes  Untimed Charges Total Minutes: 35   Total Minutes: 35  Therapy Charges for Today     Code Description Service Date Service Provider Modifiers Qty    31495042481 HC JOSE DEBRIDE OPEN WOUND UP TO 20CM 7/5/2022 Sarah Beth Bailey, PT GP 1    21819843682 HC PT NEG PRESS WOUND TO 50SQCM DME2 7/5/2022 Sarah Beth Bailey, PT GP 1                  Sarah Beth Bailey, PT  7/5/2022

## 2022-07-07 ENCOUNTER — HOSPITAL ENCOUNTER (OUTPATIENT)
Dept: PHYSICAL THERAPY | Facility: HOSPITAL | Age: 87
Setting detail: THERAPIES SERIES
Discharge: HOME | End: 2022-07-07

## 2022-07-07 DIAGNOSIS — L03.115 CELLULITIS OF RIGHT FOOT: ICD-10-CM

## 2022-07-07 DIAGNOSIS — Z89.421 STATUS POST AMPUTATION OF TOE OF RIGHT FOOT: ICD-10-CM

## 2022-07-07 DIAGNOSIS — L97.513 FOOT ULCERATION, RIGHT, WITH NECROSIS OF MUSCLE: Primary | ICD-10-CM

## 2022-07-07 PROCEDURE — 97605 NEG PRS WND THER DME<=50SQCM: CPT

## 2022-07-07 PROCEDURE — 97597 DBRDMT OPN WND 1ST 20 CM/<: CPT

## 2022-07-07 NOTE — THERAPY WOUND CARE TREATMENT
Outpatient Rehabilitation - Wound/Debridement Treatment Note  [facility]     Patient Name: Jesse Siu  : 1935  MRN: [id number]  Today's Date: 2022                 Admit Date: 2022    Visit Dx:    ICD-10-CM ICD-9-CM   1. Foot ulceration, right, with necrosis of muscle (HCC)  L97.513 707.15     728.89   2. Cellulitis of right foot  L03.115 682.7   3. Status post amputation of toe of right foot (HCC)  Z89.421 V49.72       Patient Active Problem List   Diagnosis   • Cellulitis of right foot   • Type 2 diabetes mellitus with hyperglycemia (HCC)   • Renal insufficiency   • Foot ulceration, right, with necrosis of muscle (HCC)        Past Medical History:   Diagnosis Date   • Hyperlipidemia    • Hypertension    • Pre-diabetes         Past Surgical History:   Procedure Laterality Date   • AMPUTATION DIGIT Right 2022    Procedure: GREAT AND SECOND TOE AMPUTATION RIGHT;  Surgeon: Lemuel Angela MD;  Location: [facility];  Service: Vascular;  Laterality: Right;   • FOOT SURGERY Left     CANCER REMOVAL   • TONSILLECTOMY     • TURP / TRANSURETHRAL INCISION / DRAINAGE PROSTATE           EVALUATION   PT Ortho     Row Name 22 1115       Subjective Comments    Subjective Comments Pt without complaints.  Family states pt is off IV abx, now on two oral abx for a month, sees Dr. Serrano in 3 weeks.  -JM       Subjective Pain    Able to rate subjective pain? yes  -JM    Pre-Treatment Pain Level 0  -JM    Post-Treatment Pain Level 0  -JM       Transfers    Comment, (Transfers) seated in w/c for tx  -JM          User Key  (r) = Recorded By, (t) = Taken By, (c) = Cosigned By    Initials Name Provider Type    Nereyda Beck PT Physical Therapist                 JORDY Wound     Row Name 22 1115             Wound 22 1200 Right medial foot Amputation stump    Wound - Properties Group Placement Date: 22  -MF Placement Time: 1200  -MF Side: Right  -MF Orientation: medial  -MF Location:  foot  -MF Primary Wound Type: Amputation s  -      Dressing Appearance dry;intact  -      Base granulating;moist;pink;red;exposed structure;slough;subcutaneous;yellow;white  exposed bone in base, increasing granulation  -      Periwound intact;moist;pink;macerated;pale white  min maceration, improving erythema  -      Periwound Temperature warm  -      Periwound Skin Turgor soft  -      Edges irregular  -      Drainage Characteristics/Odor serosanguineous;serous  -      Drainage Amount small  -      Care, Wound cleansed with;wound cleanser;debrided;negative pressure wound therapy  -      Dressing Care dressing changed  -      Periwound Care barrier film applied;cleansed with pH balanced cleanser;dry periwound area maintained;other (see comments)  nystatin/nosting crust x1 layer  -      Wound Output (mL) 150  -JM      Retired Wound - Properties Group Placement Date: 05/20/22  - Placement Time: 1200  -MF Side: Right  - Orientation: medial  - Location: foot  -MF Primary Wound Type: Amputation s  -MF      Retired Wound - Properties Group Date first assessed: 05/20/22  - Time first assessed: 1200  -MF Side: Right  - Location: foot  -MF Primary Wound Type: Amputation s  -              NPWT (Negative Pressure Wound Therapy) 05/20/22 1200 R foot ampuation site    NPWT (Negative Pressure Wound Therapy) - Properties Group Placement Date: 05/20/22  - Placement Time: 1200  -MF Location: R foot ampuation site  -      Therapy Setting continuous therapy  -      Dressing foam, black  -JM      Contact Layer other (see comments)  zuri  -      Pressure Setting 150 mmHg  -      Sponges Inserted 1  1 black strip doubled over for padding under trac pad  -      Sponges Removed 2  2 black  -JM      Finger sweep complete Yes  -JM      Retired NPWT (Negative Pressure Wound Therapy) - Properties Group Placement Date: 05/20/22  - Placement Time: 1200  -MF Location: R foot ampuation site  -       Retired NPWT (Negative Pressure Wound Therapy) - Properties Group Placement Date: 05/20/22  - Placement Time: 1200  - Location: R foot ampuation site  -            User Key  (r) = Recorded By, (t) = Taken By, (c) = Cosigned By    Initials Name Provider Type    Wesley Vallejo, PT Physical Therapist    Nereyda Beck, PT Physical Therapist                  WOUND DEBRIDEMENT  Total area of Debridement: 3cmsq  Debridement Site 1  Location- Site 1: foot amputation site.  Selective Debridement- Site 1: Wound Surface <20cmsq  Instruments- Site 1: tweezers  Excised Tissue Description- Site 1: minimum, slough  Bleeding- Site 1: none              Therapy Education     Row Name 07/07/22 1115             Therapy Education    Given Bandaging/dressing change  -      Program Reinforced  -      How Provided Verbal;Demonstration  -      Provided to Patient;Caregiver  -      Level of Understanding Verbalized  -            User Key  (r) = Recorded By, (t) = Taken By, (c) = Cosigned By    Initials Name Provider Type    Nereyda Beck, PT Physical Therapist                Recommendation and Plan   PT Assessment/Plan     Row Name 07/07/22 1115          PT Assessment    Functional Limitations Performance in self-care ADL;Limitations in functional capacity and performance;Limitations in community activities  -     Impairments Integumentary integrity  -     Assessment Comments Continued increase in granulation, with distal aspect beefy red, proximal side walls with increased beefy red granulation, bone still exposed but with thin pink layer of granulation forming.  Pt making good progress with current tx.  -            PT Plan    PT Frequency 2x/week  -     Physical Therapy Interventions (Optional Details) patient/family education;wound care  -     PT Plan Comments debridement, vac  -           User Key  (r) = Recorded By, (t) = Taken By, (c) = Cosigned By    Initials Name Provider Type     Nereyda Beck, PT Physical Therapist                Goals   PT OP Goals     Row Name 07/07/22 1115          Time Calculation    PT Goal Re-Cert Due Date 08/18/22  -VANI           User Key  (r) = Recorded By, (t) = Taken By, (c) = Cosigned By    Initials Name Provider Type    Nereyda Beck, PT Physical Therapist                PT Goal Re-Cert Due Date: 08/18/22            Time Calculation: Start Time: 1115  Untimed Charges  84491-Gdjwpsbad debridement: 10  64096-Ndb Pressure wound to 50 sqcm: 25  Total Minutes  Untimed Charges Total Minutes: 35   Total Minutes: 35  Therapy Charges for Today     Code Description Service Date Service Provider Modifiers Qty    69836447466 HC PT NEG PRESS WOUND TO 50SQCM DME2 7/7/2022 Nereyda Bello, PT GP 1    52547508683 HC JOSE DEBRIDE OPEN WOUND UP TO 20CM 7/7/2022 Nereyda Bello, PT GP 1                  Nereyda Bello, PT  7/7/2022

## 2022-07-11 ENCOUNTER — HOSPITAL ENCOUNTER (OUTPATIENT)
Dept: PHYSICAL THERAPY | Facility: HOSPITAL | Age: 87
Setting detail: THERAPIES SERIES
Discharge: HOME | End: 2022-07-11

## 2022-07-11 DIAGNOSIS — Z89.421 STATUS POST AMPUTATION OF TOE OF RIGHT FOOT: ICD-10-CM

## 2022-07-11 DIAGNOSIS — L97.513 FOOT ULCERATION, RIGHT, WITH NECROSIS OF MUSCLE: Primary | ICD-10-CM

## 2022-07-11 DIAGNOSIS — L03.115 CELLULITIS OF RIGHT FOOT: ICD-10-CM

## 2022-07-11 PROCEDURE — 97605 NEG PRS WND THER DME<=50SQCM: CPT

## 2022-07-11 PROCEDURE — 97597 DBRDMT OPN WND 1ST 20 CM/<: CPT

## 2022-07-11 NOTE — THERAPY WOUND CARE TREATMENT
Outpatient Rehabilitation - Wound/Debridement Treatment Note  [facility]     Patient Name: Jesse Siu  : 1935  MRN: [id number]  Today's Date: 2022                 Admit Date: 2022    Visit Dx:    ICD-10-CM ICD-9-CM   1. Foot ulceration, right, with necrosis of muscle (HCC)  L97.513 707.15     728.89   2. Cellulitis of right foot  L03.115 682.7   3. Status post amputation of toe of right foot ([facility])  Z89.421 V49.72       Patient Active Problem List   Diagnosis   • Cellulitis of right foot   • Type 2 diabetes mellitus with hyperglycemia (HCC)   • Renal insufficiency   • Foot ulceration, right, with necrosis of muscle (HCC)        Past Medical History:   Diagnosis Date   • Hyperlipidemia    • Hypertension    • Pre-diabetes         Past Surgical History:   Procedure Laterality Date   • AMPUTATION DIGIT Right 2022    Procedure: GREAT AND SECOND TOE AMPUTATION RIGHT;  Surgeon: Lemuel Angela MD;  Location: [facility];  Service: Vascular;  Laterality: Right;   • FOOT SURGERY Left     CANCER REMOVAL   • TONSILLECTOMY     • TURP / TRANSURETHRAL INCISION / DRAINAGE PROSTATE           EVALUATION   PT Ortho     Row Name 22 1115       Subjective Comments    Subjective Comments No complaints, accompanied by daughter, who states there was an air leak alarm, but she pressed around the pt's toes and the alarm stopped.  -VANI       Subjective Pain    Able to rate subjective pain? yes  -VANI    Pre-Treatment Pain Level 0  -JM    Post-Treatment Pain Level 0  -JM       Transfers    Comment, (Transfers) seated in w/c for tx  -JM          User Key  (r) = Recorded By, (t) = Taken By, (c) = Cosigned By    Initials Name Provider Type    Nereyda Beck PT Physical Therapist                 JORDY Wound     Row Name 22 1115             Wound 22 1200 Right medial foot Amputation stump    Wound - Properties Group Placement Date: 22  -MF Placement Time: 1200  -MF Side: Right  -MF Orientation:  medial  -MF Location: foot  -MF Primary Wound Type: Amputation s  -MF      Wound Image View All Images View Images  -      Dressing Appearance dry;intact  -JM      Base granulating;moist;pink;red;exposed structure;slough;subcutaneous;yellow;white  exposed bone in base, increasing granulation  -      Periwound intact;moist;pink;macerated;pale white  min maceration, improving erythema  -      Periwound Temperature warm  -      Periwound Skin Turgor soft  -      Edges irregular  -JM      Wound Length (cm) 1.4 cm  -JM      Wound Width (cm) 8.7 cm  -JM      Wound Depth (cm) 2.2 cm  -JM      Wound Surface Area (cm^2) 12.18 cm^2  -JM      Wound Volume (cm^3) 26.796 cm^3  -      Drainage Characteristics/Odor serosanguineous;serous  -      Drainage Amount small  -JM      Care, Wound cleansed with;wound cleanser;debrided;negative pressure wound therapy  -      Dressing Care dressing changed  -      Periwound Care barrier film applied;cleansed with pH balanced cleanser;dry periwound area maintained;other (see comments)  nystatin powder/nosting crusting x2 layers  -      Wound Output (mL) 200  canister changed  -JM      Retired Wound - Properties Group Placement Date: 05/20/22  - Placement Time: 1200  -MF Side: Right  - Orientation: medial  - Location: foot  -MF Primary Wound Type: Amputation s  -MF      Retired Wound - Properties Group Date first assessed: 05/20/22  - Time first assessed: 1200  -MF Side: Right  -MF Location: foot  -MF Primary Wound Type: Amputation s  -MF              NPWT (Negative Pressure Wound Therapy) 05/20/22 1200 R foot ampuation site    NPWT (Negative Pressure Wound Therapy) - Properties Group Placement Date: 05/20/22  - Placement Time: 1200  -MF Location: R foot ampuation site  -      Therapy Setting continuous therapy  -      Dressing foam, black  -      Contact Layer other (see comments)  zuri ag  -      Pressure Setting 150 mmHg  -      Sponges Inserted 2   1 black to fill, 1 black for trac pad  -      Sponges Removed 1  -JM      Finger sweep complete Yes  -JM      Retired NPWT (Negative Pressure Wound Therapy) - Properties Group Placement Date: 05/20/22  - Placement Time: 1200  -MF Location: R foot ampuation site  -      Retired NPWT (Negative Pressure Wound Therapy) - Properties Group Placement Date: 05/20/22  - Placement Time: 1200  -MF Location: R foot ampuation site  -            User Key  (r) = Recorded By, (t) = Taken By, (c) = Cosigned By    Initials Name Provider Type    Wesley Vallejo, PT Physical Therapist    Nereyda Beck, PT Physical Therapist                  WOUND DEBRIDEMENT  Total area of Debridement: 3cmsq  Debridement Site 1  Location- Site 1: foot amputation site.  Selective Debridement- Site 1: Wound Surface <20cmsq  Instruments- Site 1: tweezers  Excised Tissue Description- Site 1: minimum, slough  Bleeding- Site 1: none              Therapy Education     Row Name 07/11/22 1250             Therapy Education    Education Details Reinforced NWB, nutrition for wound healing  -JM      Given Bandaging/dressing change  -      Program Reinforced  -      How Provided Verbal;Demonstration  -      Provided to Patient;Caregiver  -      Level of Understanding Verbalized  -            User Key  (r) = Recorded By, (t) = Taken By, (c) = Cosigned By    Initials Name Provider Type    Nereyda Beck, PT Physical Therapist                Recommendation and Plan   PT Assessment/Plan     Row Name 07/11/22 1075          PT Assessment    Functional Limitations Performance in self-care ADL;Limitations in functional capacity and performance;Limitations in community activities  -     Impairments Integumentary integrity  -     Assessment Comments Pt's foot wound with decreasing dimensions, good contraction of wound edges, increasing granulation but still with exposed bone proximally.  Periwound still with min maceration and mild  yeast, so continued with nystatin crusting.  -            PT Plan    PT Frequency 2x/week  -     Physical Therapy Interventions (Optional Details) patient/family education;wound care  -     PT Plan Comments vac, debridement  -           User Key  (r) = Recorded By, (t) = Taken By, (c) = Cosigned By    Initials Name Provider Type    Nereyda Beck, PT Physical Therapist                Goals   PT OP Goals     Row Name 07/11/22 1115          Time Calculation    PT Goal Re-Cert Due Date 08/18/22  -           User Key  (r) = Recorded By, (t) = Taken By, (c) = Cosigned By    Initials Name Provider Type    Nereyda Beck, PT Physical Therapist                PT Goal Re-Cert Due Date: 08/18/22            Time Calculation: Start Time: 1115  Untimed Charges  30410-Ytjzheuio debridement: 10  81444-Phh Pressure wound to 50 sqcm: 25  Total Minutes  Untimed Charges Total Minutes: 35   Total Minutes: 35  Therapy Charges for Today     Code Description Service Date Service Provider Modifiers Qty    93839873092 HC PT NEG PRESS WOUND TO 50SQCM DME2 7/11/2022 Neeryda Bello, PT GP 1    24741067041 HC JOSE DEBRIDE OPEN WOUND UP TO 20CM 7/11/2022 Nereyda Bello, PT GP 1                  Nereyda Bello, PT  7/11/2022

## 2022-07-13 ENCOUNTER — HOSPITAL ENCOUNTER (OUTPATIENT)
Dept: PHYSICAL THERAPY | Facility: HOSPITAL | Age: 87
Setting detail: THERAPIES SERIES
Discharge: HOME | End: 2022-07-13

## 2022-07-13 DIAGNOSIS — Z89.421 STATUS POST AMPUTATION OF TOE OF RIGHT FOOT: ICD-10-CM

## 2022-07-13 DIAGNOSIS — L97.513 FOOT ULCERATION, RIGHT, WITH NECROSIS OF MUSCLE: Primary | ICD-10-CM

## 2022-07-13 DIAGNOSIS — L03.115 CELLULITIS OF RIGHT FOOT: ICD-10-CM

## 2022-07-13 PROCEDURE — 97605 NEG PRS WND THER DME<=50SQCM: CPT

## 2022-07-13 NOTE — THERAPY PROGRESS REPORT/RE-CERT
Outpatient Rehabilitation - Wound/Debridement Progress Note  [facility]     Patient Name: Jesse Siu  : 1935  MRN: [id number]  Today's Date: 2022                 Admit Date: 2022    Visit Dx:    ICD-10-CM ICD-9-CM   1. Foot ulceration, right, with necrosis of muscle ([facility])  L97.513 707.15     728.89   2. Cellulitis of right foot  L03.115 682.7   3. Status post amputation of toe of right foot ([facility])  Z89.421 V49.72       Patient Active Problem List   Diagnosis   • Cellulitis of right foot   • Type 2 diabetes mellitus with hyperglycemia ([facility])   • Renal insufficiency   • Foot ulceration, right, with necrosis of muscle ([facility])        Past Medical History:   Diagnosis Date   • Hyperlipidemia    • Hypertension    • Pre-diabetes         Past Surgical History:   Procedure Laterality Date   • AMPUTATION DIGIT Right 2022    Procedure: GREAT AND SECOND TOE AMPUTATION RIGHT;  Surgeon: Lemuel Angela MD;  Location: [facility];  Service: Vascular;  Laterality: Right;   • FOOT SURGERY Left     CANCER REMOVAL   • TONSILLECTOMY     • TURP / TRANSURETHRAL INCISION / DRAINAGE PROSTATE           EVALUATION   PT Ortho     Row Name 22 1300       Subjective Comments    Subjective Comments wound vac working well with no new issues  -MF       Subjective Pain    Able to rate subjective pain? yes  -MF    Pre-Treatment Pain Level 0  -MF    Post-Treatment Pain Level 0  -MF       Transfers    Comment, (Transfers) seated in w/c for tx  -MF          User Key  (r) = Recorded By, (t) = Taken By, (c) = Cosigned By    Initials Name Provider Type    Wesley Vallejo, PT Physical Therapist                 [facility] Wound     Row Name 22 1300             Wound 22 1200 Right medial foot Amputation stump    Wound - Properties Group Placement Date: 22  -MF Placement Time: 1200  -MF Side: Right  -MF Orientation: medial  -MF Location: foot  -MF Primary Wound Type: Amputation s  -MF      Dressing Appearance  dry;intact  -MF      Base granulating;moist;pink;red;exposed structure;slough;subcutaneous;yellow;white  -MF      Black (%), Wound Tissue Color 0  -MF      Red (%), Wound Tissue Color 80  -MF      Yellow (%), Wound Tissue Color 20  -MF      Periwound intact;moist;pink;macerated;pale white  -MF      Periwound Temperature warm  -MF      Periwound Skin Turgor soft  -MF      Edges irregular  -MF      Wound Length (cm) 1.3 cm  -MF      Wound Width (cm) 8.5 cm  -MF      Wound Depth (cm) 2.2 cm  -MF      Wound Surface Area (cm^2) 11.05 cm^2  -MF      Wound Volume (cm^3) 24.31 cm^3  -MF      Drainage Characteristics/Odor serosanguineous  -MF      Drainage Amount small  -MF      Care, Wound irrigated with;sterile normal saline;negative pressure wound therapy  -MF      Dressing Care dressing changed  -MF      Periwound Care barrier film applied  antifungal powder with no sting powder  -MF      Wound Output (mL) 25  -MF      Retired Wound - Properties Group Placement Date: 05/20/22  - Placement Time: 1200  -MF Side: Right  -MF Orientation: medial  -MF Location: foot  -MF Primary Wound Type: Amputation s  -MF      Retired Wound - Properties Group Date first assessed: 05/20/22  - Time first assessed: 1200  -MF Side: Right  -MF Location: foot  -MF Primary Wound Type: Amputation s  -MF              NPWT (Negative Pressure Wound Therapy) 05/20/22 1200 R foot ampuation site    NPWT (Negative Pressure Wound Therapy) - Properties Group Placement Date: 05/20/22  - Placement Time: 1200  -MF Location: R foot ampuation site  -MF      Therapy Setting continuous therapy  -MF      Dressing foam, black  -MF      Contact Layer other (see comments)  zuri  -MF      Pressure Setting 150 mmHg  -MF      Sponges Inserted 2  -MF      Sponges Removed 2  -MF      Finger sweep complete Yes  -MF      Retired NPWT (Negative Pressure Wound Therapy) - Properties Group Placement Date: 05/20/22  - Placement Time: 1200  -MF Location: R foot ampuation  site  -      Retired NPWT (Negative Pressure Wound Therapy) - Properties Group Placement Date: 05/20/22  - Placement Time: 1200  - Location: R foot ampuation site  -            User Key  (r) = Recorded By, (t) = Taken By, (c) = Cosigned By    Initials Name Provider Type    Wesley Vallejo, PT Physical Therapist                  WOUND DEBRIDEMENT                       Recommendation and Plan   PT Assessment/Plan     Row Name 07/13/22 1300          PT Assessment    Functional Limitations Performance in self-care ADL;Limitations in functional capacity and performance;Limitations in community activities  -     Impairments Integumentary integrity  -     Assessment Comments wound measurements cont to decrease slowly with no new issues noted. All STGs met and pt making good progress to other goals.  Pt will benefit from cont use of wound vac to helpfurther granulate wound bed over exposed bone and improve healing potential.  -     Rehab Potential Fair  -     Patient/caregiver participated in establishment of treatment plan and goals Yes  -     Patient would benefit from skilled therapy intervention Yes  -            PT Plan    PT Frequency 2x/week  -     Physical Therapy Interventions (Optional Details) wound care;patient/family education  -     PT Plan Comments wound vac, debridement, dressing management  -           User Key  (r) = Recorded By, (t) = Taken By, (c) = Cosigned By    Initials Name Provider Type    Wesley Vallejo, PT Physical Therapist                Goals   PT OP Goals     Row Name 07/13/22 1300          PT Short Term Goals    STG Date to Achieve 08/18/22  -     STG 1 Decrease wound size by 25% as evidence of wound healing.  -     STG 1 Progress Met  -     STG 2 increase granulation to >75% to improve wound healing.  -     STG 2 Progress Met  -            Long Term Goals    LTG Date to Achieve 08/18/22  -     LTG 1 Decrease wound size by 75% as evidence of  wound healing.  -     LTG 1 Progress Ongoing  -     LTG 2 Increase granulation to >90% to improve wound healing.  -     LTG 2 Progress Ongoing  -     LTG 3 Pt and family able to demonstrate home dressing management to allow for transition to home management.  -     LTG 3 Progress Ongoing  -            Time Calculation    PT Goal Re-Cert Due Date 08/18/22  -           User Key  (r) = Recorded By, (t) = Taken By, (c) = Cosigned By    Initials Name Provider Type    Wesley Vallejo, PT Physical Therapist                PT Goal Re-Cert Due Date: 08/18/22  PT Short Term Goals  STG Date to Achieve: 08/18/22  STG 1: Decrease wound size by 25% as evidence of wound healing.  STG 1 Progress: Met  STG 2: increase granulation to >75% to improve wound healing.  STG 2 Progress: Met  Long Term Goals  LTG Date to Achieve: 08/18/22  LTG 1: Decrease wound size by 75% as evidence of wound healing.  LTG 1 Progress: Ongoing  LTG 2: Increase granulation to >90% to improve wound healing.  LTG 2 Progress: Ongoing  LTG 3: Pt and family able to demonstrate home dressing management to allow for transition to home management.  LTG 3 Progress: Ongoing      Time Calculation: Start Time: 1300  Untimed Charges  87153-Ags Pressure wound to 50 sqcm: 25  Total Minutes  Untimed Charges Total Minutes: 25   Total Minutes: 25  Therapy Charges for Today     Code Description Service Date Service Provider Modifiers Qty    77091930620  PT NEG PRESS WOUND TO 50SQCM DME2 7/13/2022 Wesley Morgan, PT GP 1                  Wesley Morgan, PT  7/13/2022

## 2022-07-15 ENCOUNTER — HOSPITAL ENCOUNTER (OUTPATIENT)
Dept: PHYSICAL THERAPY | Facility: HOSPITAL | Age: 87
Setting detail: THERAPIES SERIES
Discharge: HOME | End: 2022-07-15

## 2022-07-15 DIAGNOSIS — L03.115 CELLULITIS OF RIGHT FOOT: ICD-10-CM

## 2022-07-15 DIAGNOSIS — L97.513 FOOT ULCERATION, RIGHT, WITH NECROSIS OF MUSCLE: Primary | ICD-10-CM

## 2022-07-15 DIAGNOSIS — Z89.421 STATUS POST AMPUTATION OF TOE OF RIGHT FOOT: ICD-10-CM

## 2022-07-15 PROCEDURE — 97605 NEG PRS WND THER DME<=50SQCM: CPT

## 2022-07-15 PROCEDURE — 97597 DBRDMT OPN WND 1ST 20 CM/<: CPT

## 2022-07-15 NOTE — THERAPY WOUND CARE TREATMENT
Outpatient Rehabilitation - Wound/Debridement Treatment Note  [facility]     Patient Name: Jesse Siu  : 1935  MRN: [id number]  Today's Date: 7/15/2022                 Admit Date: 7/15/2022    Visit Dx:    ICD-10-CM ICD-9-CM   1. Foot ulceration, right, with necrosis of muscle (HCC)  L97.513 707.15     728.89   2. Cellulitis of right foot  L03.115 682.7   3. Status post amputation of toe of right foot ([facility])  Z89.421 V49.72       Patient Active Problem List   Diagnosis   • Cellulitis of right foot   • Type 2 diabetes mellitus with hyperglycemia (HCC)   • Renal insufficiency   • Foot ulceration, right, with necrosis of muscle (HCC)        Past Medical History:   Diagnosis Date   • Hyperlipidemia    • Hypertension    • Pre-diabetes         Past Surgical History:   Procedure Laterality Date   • AMPUTATION DIGIT Right 2022    Procedure: GREAT AND SECOND TOE AMPUTATION RIGHT;  Surgeon: Lemuel Angela MD;  Location: [facility];  Service: Vascular;  Laterality: Right;   • FOOT SURGERY Left     CANCER REMOVAL   • TONSILLECTOMY     • TURP / TRANSURETHRAL INCISION / DRAINAGE PROSTATE           EVALUATION   PT Ortho     Row Name 07/15/22 1300       Subjective Comments    Subjective Comments Pt without complaints, thinks the sock on his foot might be a little tight because it is making an indention in his leg.  -VANI       Subjective Pain    Able to rate subjective pain? yes  -VANI    Pre-Treatment Pain Level 0  -JM    Post-Treatment Pain Level 0  -JM       Transfers    Comment, (Transfers) seated in w/c for tx  -JM          User Key  (r) = Recorded By, (t) = Taken By, (c) = Cosigned By    Initials Name Provider Type    Nereyda Beck, PT Physical Therapist                 [facility] Wound     Row Name 07/15/22 1300             Wound 22 1200 Right medial foot Amputation stump    Wound - Properties Group Placement Date: 22  -MF Placement Time: 1200  -MF Side: Right  -MF Orientation: medial  -MF  Location: foot  -MF Primary Wound Type: Amputation s  -MF      Wound Image View All Images View Images  -      Dressing Appearance dry;intact  -JM      Base granulating;moist;pink;red;exposed structure;slough;subcutaneous;yellow;white  -JM      Periwound intact;moist;pink;macerated;pale white  -JM      Periwound Temperature warm  -JM      Periwound Skin Turgor soft  -JM      Edges irregular  -JM      Wound Length (cm) 1.2 cm  -JM      Wound Width (cm) 8.8 cm  -JM      Wound Depth (cm) 2.9 cm  deepest aspect measured at plantar edge of exposed bone  -JM      Wound Surface Area (cm^2) 10.56 cm^2  -JM      Wound Volume (cm^3) 30.624 cm^3  -JM      Drainage Characteristics/Odor serosanguineous;serous  -JM      Drainage Amount small  -JM      Care, Wound cleansed with;wound cleanser;debrided;negative pressure wound therapy  -      Dressing Care dressing changed  -      Periwound Care barrier film applied;cleansed with pH balanced cleanser;dry periwound area maintained;other (see comments)  nystatin powder/nosting crusting x2 layers  -JM      Wound Output (mL) 50  -JM      Retired Wound - Properties Group Placement Date: 05/20/22  - Placement Time: 1200  -MF Side: Right  - Orientation: medial  - Location: foot  -MF Primary Wound Type: Amputation s  -MF      Retired Wound - Properties Group Date first assessed: 05/20/22  - Time first assessed: 1200  -MF Side: Right  - Location: foot  -MF Primary Wound Type: Amputation s  -MF              NPWT (Negative Pressure Wound Therapy) 05/20/22 1200 R foot ampuation site    NPWT (Negative Pressure Wound Therapy) - Properties Group Placement Date: 05/20/22  - Placement Time: 1200  -MF Location: R foot ampuation site  -      Therapy Setting continuous therapy  -JM      Dressing foam, black  -JM      Contact Layer other (see comments)  zuri ag  -JM      Pressure Setting 150 mmHg  -JM      Sponges Inserted 2  -JM      Sponges Removed 2  -JM      Finger sweep  complete Yes  -JM      Retired NPWT (Negative Pressure Wound Therapy) - Properties Group Placement Date: 05/20/22  - Placement Time: 1200  -MF Location: R foot ampuation site  -      Retired NPWT (Negative Pressure Wound Therapy) - Properties Group Placement Date: 05/20/22  - Placement Time: 1200  -MF Location: R foot ampuation site  -            User Key  (r) = Recorded By, (t) = Taken By, (c) = Cosigned By    Initials Name Provider Type    Wesley Vallejo, PT Physical Therapist    Nereyda Beck, PT Physical Therapist                  WOUND DEBRIDEMENT  Total area of Debridement: 4cmsq  Debridement Site 1  Location- Site 1: foot amputation site.  Selective Debridement- Site 1: Wound Surface <20cmsq  Instruments- Site 1: tweezers  Excised Tissue Description- Site 1: minimum, slough  Bleeding- Site 1: scant              Therapy Education     Row Name 07/15/22 1300             Therapy Education    Education Details Reinforced NWB and leg elevation for swelling.  -JM      Given Bandaging/dressing change  -      Program Reinforced  -VANI      How Provided Verbal;Demonstration  -VANI      Provided to Patient;Caregiver  -VANI      Level of Understanding Verbalized  -            User Key  (r) = Recorded By, (t) = Taken By, (c) = Cosigned By    Initials Name Provider Type    Nereyda Beck, PT Physical Therapist                Recommendation and Plan   PT Assessment/Plan     Row Name 07/15/22 1300          PT Assessment    Functional Limitations Performance in self-care ADL;Limitations in functional capacity and performance;Limitations in community activities  -     Impairments Integumentary integrity  -     Assessment Comments Distal R foot wound continuing to reepithelialize with decreasing width.  Proximal wound still with exposed bone in depth but increasing granulation of side walls.  Pt able to probe slightly deeper depth today and plantar aspect of exposed bone.  Pt overall improving  well with wound vac tx.  -            PT Plan    PT Frequency 2x/week  -     Physical Therapy Interventions (Optional Details) patient/family education;wound care  -     PT Plan Comments vac, debridement  -           User Key  (r) = Recorded By, (t) = Taken By, (c) = Cosigned By    Initials Name Provider Type    Nereyda Beck, PT Physical Therapist                Goals   PT OP Goals     Row Name 07/15/22 1300          Time Calculation    PT Goal Re-Cert Due Date 08/18/22  -           User Key  (r) = Recorded By, (t) = Taken By, (c) = Cosigned By    Initials Name Provider Type    Nereyda eBck, PT Physical Therapist                PT Goal Re-Cert Due Date: 08/18/22            Time Calculation: Start Time: 1300  Untimed Charges  04844-Dwvqkjimv debridement: 10  30775-Jyh Pressure wound to 50 sqcm: 25  Total Minutes  Untimed Charges Total Minutes: 35   Total Minutes: 35  Therapy Charges for Today     Code Description Service Date Service Provider Modifiers Qty    91208190951 HC JOSE DEBRIDE OPEN WOUND UP TO 20CM 7/15/2022 Nereyda Bello, PT GP 1    16571489366 HC PT NEG PRESS WOUND TO 50SQCM DME2 7/15/2022 Nereyda Bello, PT GP 1                  Nereyda Bello, PT  7/15/2022

## 2022-07-18 ENCOUNTER — HOSPITAL ENCOUNTER (OUTPATIENT)
Dept: PHYSICAL THERAPY | Facility: HOSPITAL | Age: 87
Setting detail: THERAPIES SERIES
Discharge: HOME | End: 2022-07-18

## 2022-07-18 DIAGNOSIS — L03.115 CELLULITIS OF RIGHT FOOT: ICD-10-CM

## 2022-07-18 DIAGNOSIS — Z89.421 STATUS POST AMPUTATION OF TOE OF RIGHT FOOT: ICD-10-CM

## 2022-07-18 DIAGNOSIS — L97.513 FOOT ULCERATION, RIGHT, WITH NECROSIS OF MUSCLE: Primary | ICD-10-CM

## 2022-07-18 PROCEDURE — 97605 NEG PRS WND THER DME<=50SQCM: CPT

## 2022-07-18 PROCEDURE — 97597 DBRDMT OPN WND 1ST 20 CM/<: CPT

## 2022-07-18 NOTE — THERAPY WOUND CARE TREATMENT
Outpatient Rehabilitation - Wound/Debridement Treatment Note  [facility]     Patient Name: Jesse Siu  : 1935  MRN: [id number]  Today's Date: 2022                 Admit Date: 2022    Visit Dx:    ICD-10-CM ICD-9-CM   1. Foot ulceration, right, with necrosis of muscle ([facility])  L97.513 707.15     728.89   2. Cellulitis of right foot  L03.115 682.7   3. Status post amputation of toe of right foot ([facility])  Z89.421 V49.72       Patient Active Problem List   Diagnosis   • Cellulitis of right foot   • Type 2 diabetes mellitus with hyperglycemia (HCC)   • Renal insufficiency   • Foot ulceration, right, with necrosis of muscle (HCC)        Past Medical History:   Diagnosis Date   • Hyperlipidemia    • Hypertension    • Pre-diabetes         Past Surgical History:   Procedure Laterality Date   • AMPUTATION DIGIT Right 2022    Procedure: GREAT AND SECOND TOE AMPUTATION RIGHT;  Surgeon: Lemuel Angela MD;  Location: [facility];  Service: Vascular;  Laterality: Right;   • FOOT SURGERY Left     CANCER REMOVAL   • TONSILLECTOMY     • TURP / TRANSURETHRAL INCISION / DRAINAGE PROSTATE           EVALUATION   PT Ortho     Row Name 22 1200       Subjective Comments    Subjective Comments No complaints or changes from the pt.  -MC       Subjective Pain    Able to rate subjective pain? yes  -MC    Pre-Treatment Pain Level 0  -MC    Post-Treatment Pain Level 0  -MC       Transfers    Comment, (Transfers) seated in w/c for tx  -          User Key  (r) = Recorded By, (t) = Taken By, (c) = Cosigned By    Initials Name Provider Type    Sarah Beth Jordan PT Physical Therapist                 [facility] Wound     Row Name 22 1200             Wound 22 1200 Right medial foot Amputation stump    Wound - Properties Group Placement Date: 22  -MF Placement Time: 1200  -MF Side: Right  -MF Orientation: medial  -MF Location: foot  -MF Primary Wound Type: Amputation s  -MF      Dressing Appearance  intact;dry  -      Base granulating;moist;pink;red;exposed structure;slough;subcutaneous;yellow;white  -MC      Periwound intact;moist;pink;macerated;pale white  -      Periwound Temperature warm  -      Periwound Skin Turgor soft  -      Edges irregular  -      Drainage Characteristics/Odor serosanguineous;serous  -MC      Drainage Amount small  -      Care, Wound cleansed with;wound cleanser;debrided;negative pressure wound therapy  -      Dressing Care dressing changed  vac, kerlix, spandage  -      Periwound Care cleansed with pH balanced cleanser;barrier film applied;other (see comments);topical treatment applied  NoSting/Nystatin crust x2, stoma paste, drape border  -      Wound Output (mL) 100  -MC      Retired Wound - Properties Group Placement Date: 05/20/22  - Placement Time: 1200  -MF Side: Right  -MF Orientation: medial  - Location: foot  - Primary Wound Type: Amputation s  -      Retired Wound - Properties Group Date first assessed: 05/20/22  - Time first assessed: 1200  -MF Side: Right  - Location: foot  - Primary Wound Type: Amputation s  -              NPWT (Negative Pressure Wound Therapy) 05/20/22 1200 R foot ampuation site    NPWT (Negative Pressure Wound Therapy) - Properties Group Placement Date: 05/20/22  - Placement Time: 1200  -MF Location: R foot ampuation site  -      Therapy Setting continuous therapy  -MC      Dressing foam, black  -MC      Contact Layer other (see comments)  zuri Ag collagen  -      Pressure Setting 150 mmHg  -MC      Sponges Inserted 2  -MC      Sponges Removed 2  -MC      Finger sweep complete Yes  -MC      Retired NPWT (Negative Pressure Wound Therapy) - Properties Group Placement Date: 05/20/22  - Placement Time: 1200  -MF Location: R foot ampuation site  -      Retired NPWT (Negative Pressure Wound Therapy) - Properties Group Placement Date: 05/20/22  - Placement Time: 1200  -MF Location: R foot ampuation site  -             User Key  (r) = Recorded By, (t) = Taken By, (c) = Cosigned By    Initials Name Provider Type    Wesley Vallejo, PT Physical Therapist    Sarah Beth Jordan, PT Physical Therapist                  WOUND DEBRIDEMENT  Total area of Debridement: 2 cm2  Debridement Site 1  Location- Site 1: foot amputation site.  Selective Debridement- Site 1: Wound Surface <20cmsq  Instruments- Site 1: tweezers  Excised Tissue Description- Site 1: minimum, slough  Bleeding- Site 1: scant, held pressure, 1 minute              Therapy Education     Row Name 07/18/22 1200             Therapy Education    Education Details Continue current POC  -MC      Given Bandaging/dressing change  -      Program Reinforced  -      How Provided Verbal;Demonstration  -      Provided to Patient;Caregiver  -      Level of Understanding Verbalized  -            User Key  (r) = Recorded By, (t) = Taken By, (c) = Cosigned By    Initials Name Provider Type    Sarah Beth Jordan, PT Physical Therapist                Recommendation and Plan   PT Assessment/Plan     Row Name 07/18/22 1200          PT Assessment    Functional Limitations Performance in self-care ADL;Limitations in functional capacity and performance;Limitations in community activities  -     Impairments Integumentary integrity  -     Assessment Comments Pt with increasing granulation tissue and wound edge contraction with use of NPWT. Periwound tissue remains clear of any yeast or excoriation. Pt will continue to benefit from current POC.  -     Rehab Potential Fair  -     Patient/caregiver participated in establishment of treatment plan and goals Yes  -     Patient would benefit from skilled therapy intervention Yes  -MC            PT Plan    PT Frequency 2x/week  -     Physical Therapy Interventions (Optional Details) wound care;patient/family education  -     PT Plan Comments debridement, vac  -           User Key  (r) = Recorded By, (t) = Taken  By, (c) = Cosigned By    Initials Name Provider Type    Sarah Beth Jordan, PT Physical Therapist                Goals   PT OP Goals     Row Name 07/18/22 1215          Time Calculation    PT Goal Re-Cert Due Date 08/18/22  -           User Key  (r) = Recorded By, (t) = Taken By, (c) = Cosigned By    Initials Name Provider Type    Sarah Beth Jordan, PT Physical Therapist                PT Goal Re-Cert Due Date: 08/18/22            Time Calculation: Start Time: 1115  Untimed Charges  42035-Bqxohwroy debridement: 10  13250-Chy Pressure wound to 50 sqcm: 25  Total Minutes  Untimed Charges Total Minutes: 35   Total Minutes: 35  Therapy Charges for Today     Code Description Service Date Service Provider Modifiers Qty    44977098511 HC JOSE DEBRIDE OPEN WOUND UP TO 20CM 7/18/2022 Sarah Beth Bailey, PT GP 1    78934971060 HC PT NEG PRESS WOUND TO 50SQCM DME2 7/18/2022 Sarah Beth Bailey, PT GP 1                  Sarah Beth Bailey, PT  7/18/2022

## 2022-07-20 ENCOUNTER — HOSPITAL ENCOUNTER (OUTPATIENT)
Dept: PHYSICAL THERAPY | Facility: HOSPITAL | Age: 87
Setting detail: THERAPIES SERIES
Discharge: HOME | End: 2022-07-20

## 2022-07-20 DIAGNOSIS — Z89.421 STATUS POST AMPUTATION OF TOE OF RIGHT FOOT: ICD-10-CM

## 2022-07-20 DIAGNOSIS — L03.115 CELLULITIS OF RIGHT FOOT: ICD-10-CM

## 2022-07-20 DIAGNOSIS — L97.513 FOOT ULCERATION, RIGHT, WITH NECROSIS OF MUSCLE: Primary | ICD-10-CM

## 2022-07-20 PROCEDURE — 97605 NEG PRS WND THER DME<=50SQCM: CPT

## 2022-07-20 PROCEDURE — 97597 DBRDMT OPN WND 1ST 20 CM/<: CPT

## 2022-07-22 ENCOUNTER — HOSPITAL ENCOUNTER (OUTPATIENT)
Dept: PHYSICAL THERAPY | Facility: HOSPITAL | Age: 87
Setting detail: THERAPIES SERIES
Discharge: HOME | End: 2022-07-22

## 2022-07-22 DIAGNOSIS — L03.115 CELLULITIS OF RIGHT FOOT: ICD-10-CM

## 2022-07-22 DIAGNOSIS — L97.513 FOOT ULCERATION, RIGHT, WITH NECROSIS OF MUSCLE: Primary | ICD-10-CM

## 2022-07-22 DIAGNOSIS — Z89.421 STATUS POST AMPUTATION OF TOE OF RIGHT FOOT: ICD-10-CM

## 2022-07-22 PROCEDURE — 97605 NEG PRS WND THER DME<=50SQCM: CPT

## 2022-07-22 PROCEDURE — 97597 DBRDMT OPN WND 1ST 20 CM/<: CPT

## 2022-07-22 NOTE — THERAPY WOUND CARE TREATMENT
Outpatient Rehabilitation - Wound/Debridement Treatment Note  [facility]     Patient Name: Jesse Siu  : 1935  MRN: [id number]  Today's Date: 2022                 Admit Date: 2022    Visit Dx:    ICD-10-CM ICD-9-CM   1. Foot ulceration, right, with necrosis of muscle ([facility])  L97.513 707.15     728.89   2. Status post amputation of toe of right foot ([facility])  Z89.421 V49.72   3. Cellulitis of right foot  L03.115 682.7       Patient Active Problem List   Diagnosis   • Cellulitis of right foot   • Type 2 diabetes mellitus with hyperglycemia ([facility])   • Renal insufficiency   • Foot ulceration, right, with necrosis of muscle ([facility])        Past Medical History:   Diagnosis Date   • Hyperlipidemia    • Hypertension    • Pre-diabetes         Past Surgical History:   Procedure Laterality Date   • AMPUTATION DIGIT Right 2022    Procedure: GREAT AND SECOND TOE AMPUTATION RIGHT;  Surgeon: Lemuel Angela MD;  [facility];  Service: Vascular;  Laterality: Right;   • FOOT SURGERY Left     CANCER REMOVAL   • TONSILLECTOMY     • TURP / TRANSURETHRAL INCISION / DRAINAGE PROSTATE           EVALUATION   PT Ortho     Row Name 22 1400       Subjective Comments    Subjective Comments No complaints or changes  -       Subjective Pain    Able to rate subjective pain? yes  -    Pre-Treatment Pain Level 0  -MC    Post-Treatment Pain Level 0  -MC       Transfers    Comment, (Transfers) seated in w/c for tx  -          User Key  (r) = Recorded By, (t) = Taken By, (c) = Cosigned By    Initials Name Provider Type    Sarah Beth Jordan PT Physical Therapist                 [facility] Wound     Row Name 22 1400             Wound 22 1200 Right medial foot Amputation stump    Wound - Properties Group Placement Date: 22  -MF Placement Time: 1200  -MF Side: Right  -MF Orientation: medial  -MF Location: foot  -MF Primary Wound Type: Amputation s  -MF      Dressing Appearance intact;no drainage   -      Base granulating;moist;pink;red;exposed structure;slough;subcutaneous;yellow;white  -      Periwound intact;moist;pink;macerated;pale white  -      Periwound Temperature warm  -      Periwound Skin Turgor soft  -      Edges irregular  -      Drainage Characteristics/Odor serosanguineous;serous  -      Drainage Amount small  -      Care, Wound cleansed with;wound cleanser;debrided;negative pressure wound therapy  -      Dressing Care dressing changed  vac, kerlix, spandage  -      Periwound Care cleansed with pH balanced cleanser;barrier film applied;topical treatment applied  NoSting/Nystatin crust x2, stomapaste, drape border  -      Wound Output (mL) 150  changed canister  -      Retired Wound - Properties Group Placement Date: 05/20/22  - Placement Time: 1200  -MF Side: Right  - Orientation: medial  - Location: foot  - Primary Wound Type: Amputation s  -      Retired Wound - Properties Group Date first assessed: 05/20/22  - Time first assessed: 1200  -MF Side: Right  - Location: foot  - Primary Wound Type: Amputation s  -              NPWT (Negative Pressure Wound Therapy) 05/20/22 1200 R foot ampuation site    NPWT (Negative Pressure Wound Therapy) - Properties Group Placement Date: 05/20/22  - Placement Time: 1200  -MF Location: R foot ampuation site  -      Therapy Setting continuous therapy  -      Dressing foam, black  -      Contact Layer other (see comments)  zuri Ag  -      Pressure Setting 150 mmHg  -      Sponges Inserted 2  -MC      Sponges Removed 2  -MC      Finger sweep complete Yes  -      Retired NPWT (Negative Pressure Wound Therapy) - Properties Group Placement Date: 05/20/22  - Placement Time: 1200  -MF Location: R foot ampuation site  -      Retired NPWT (Negative Pressure Wound Therapy) - Properties Group Placement Date: 05/20/22  - Placement Time: 1200  -MF Location: R foot ampuation site  -            User Key  (r) =  Recorded By, (t) = Taken By, (c) = Cosigned By    Initials Name Provider Type    Wesley Vallejo, PT Physical Therapist    Sarah Beth Jordan, PT Physical Therapist                  WOUND DEBRIDEMENT  Total area of Debridement: 3 cm2  Debridement Site 1  Location- Site 1: foot amputation site.  Selective Debridement- Site 1: Wound Surface <20cmsq  Instruments- Site 1: tweezers  Excised Tissue Description- Site 1: minimum, slough, other (comment) (skin debris)              Therapy Education     Row Name 07/22/22 1400             Therapy Education    Education Details Continue current POC  -MC      Given Bandaging/dressing change  -      Program Reinforced  -MC      How Provided Verbal;Demonstration  -MC      Provided to Patient;Caregiver  -MC      Level of Understanding Verbalized  -            User Key  (r) = Recorded By, (t) = Taken By, (c) = Cosigned By    Initials Name Provider Type    Sarah Beth Jordan PT Physical Therapist                Recommendation and Plan   PT Assessment/Plan     Row Name 07/22/22 1400          PT Assessment    Functional Limitations Performance in self-care ADL;Limitations in functional capacity and performance;Limitations in community activities  -     Impairments Integumentary integrity  -     Assessment Comments Pt continues to improve, with lex superficial wound edges and increasing granulation. Exposed bone still present. Pt will continue to benefit from skilled PT wound care, including NPWT management, to continue progress.  -     Rehab Potential Fair  -     Patient/caregiver participated in establishment of treatment plan and goals Yes  -     Patient would benefit from skilled therapy intervention Yes  -MC            PT Plan    PT Frequency 2x/week  -     Physical Therapy Interventions (Optional Details) wound care;patient/family education  -     PT Plan Comments debridement, vac  -           User Key  (r) = Recorded By, (t) = Taken By,  (c) = Cosigned By    Initials Name Provider Type    Sarah Beth Jordan, PT Physical Therapist                Goals   PT OP Goals     Row Name 07/22/22 1458          Time Calculation    PT Goal Re-Cert Due Date 08/18/22  -           User Key  (r) = Recorded By, (t) = Taken By, (c) = Cosigned By    Initials Name Provider Type     Sarah Beth Bailey, PT Physical Therapist                PT Goal Re-Cert Due Date: 08/18/22            Time Calculation: Start Time: 1115  Untimed Charges  65959-Xkoorohwc debridement: 10  13504-Qhl Pressure wound to 50 sqcm: 25  Total Minutes  Untimed Charges Total Minutes: 35   Total Minutes: 35  Therapy Charges for Today     Code Description Service Date Service Provider Modifiers Qty    05119469235 HC JOSE DEBRIDE OPEN WOUND UP TO 20CM 7/22/2022 Sarah Beth Bailey, PT GP 1    66773930151 HC PT NEG PRESS WOUND TO 50SQCM DME2 7/22/2022 Sarah Beth Bailey, PT GP 1                  Sarah Beth Bailey, PT  7/22/2022

## 2022-07-25 ENCOUNTER — HOSPITAL ENCOUNTER (OUTPATIENT)
Dept: PHYSICAL THERAPY | Facility: HOSPITAL | Age: 87
Setting detail: THERAPIES SERIES
Discharge: HOME | End: 2022-07-25

## 2022-07-25 DIAGNOSIS — Z89.421 STATUS POST AMPUTATION OF TOE OF RIGHT FOOT: ICD-10-CM

## 2022-07-25 DIAGNOSIS — L03.115 CELLULITIS OF RIGHT FOOT: ICD-10-CM

## 2022-07-25 DIAGNOSIS — L97.513 FOOT ULCERATION, RIGHT, WITH NECROSIS OF MUSCLE: Primary | ICD-10-CM

## 2022-07-25 PROCEDURE — 97605 NEG PRS WND THER DME<=50SQCM: CPT

## 2022-07-25 PROCEDURE — 97597 DBRDMT OPN WND 1ST 20 CM/<: CPT

## 2022-07-25 NOTE — THERAPY WOUND CARE TREATMENT
Outpatient Rehabilitation - Wound/Debridement Treatment Note  [facility]     Patient Name: Jesse Siu  : 1935  MRN: [id number]  Today's Date: 2022                 Admit Date: 2022    Visit Dx:    ICD-10-CM ICD-9-CM   1. Foot ulceration, right, with necrosis of muscle (Formerly [facility])  L97.513 707.15     728.89   2. Status post amputation of toe of right foot (Formerly [facility])  Z89.421 V49.72   3. Cellulitis of right foot  L03.115 682.7       Patient Active Problem List   Diagnosis   • Cellulitis of right foot   • Type 2 diabetes mellitus with hyperglycemia (Formerly [facility])   • Renal insufficiency   • Foot ulceration, right, with necrosis of muscle ([facility]ly [facility])        Past Medical History:   Diagnosis Date   • Hyperlipidemia    • Hypertension    • Pre-diabetes         Past Surgical History:   Procedure Laterality Date   • AMPUTATION DIGIT Right 2022    Procedure: GREAT AND SECOND TOE AMPUTATION RIGHT;  Surgeon: Lemuel Angela MD;  Location: [facility];  Service: Vascular;  Laterality: Right;   • FOOT SURGERY Left     CANCER REMOVAL   • TONSILLECTOMY     • TURP / TRANSURETHRAL INCISION / DRAINAGE PROSTATE           EVALUATION   PT Ortho     Row Name 22 1030       Subjective Comments    Subjective Comments pt cont to have no new issues.  -MF       Subjective Pain    Able to rate subjective pain? yes  -MF    Pre-Treatment Pain Level 0  -MF       Transfers    Comment, (Transfers) seated in w/c for tx  -MF          User Key  (r) = Recorded By, (t) = Taken By, (c) = Cosigned By    Initials Name Provider Type    Wesley Vallejo, PT Physical Therapist                 JORDY Wound     Row Name 22 1030             Wound 22 1200 Right medial foot Amputation stump    Wound - Properties Group Placement Date: 22  -MF Placement Time: 1200  -MF Side: Right  -MF Orientation: medial  - Location: foot  -MF Primary Wound Type: Amputation s  -MF      Dressing Appearance intact  -MF      Base  granulating;moist;pink;red;exposed structure;slough;subcutaneous;yellow;white  pt cont to have mild bone exposure  -MF      Periwound intact;moist;pink;macerated;pale white  -MF      Periwound Temperature warm  -MF      Periwound Skin Turgor soft  -MF      Edges irregular  -MF      Wound Length (cm) 0.8 cm  -MF      Wound Width (cm) 7.5 cm  -MF      Wound Depth (cm) 2.2 cm  -MF      Wound Surface Area (cm^2) 6 cm^2  -MF      Wound Volume (cm^3) 13.2 cm^3  -MF      Drainage Characteristics/Odor serosanguineous  -MF      Drainage Amount small  -MF      Care, Wound irrigated with;sterile normal saline;debrided;negative pressure wound therapy  -MF      Dressing Care dressing changed  -MF      Periwound Care cleansed with pH balanced cleanser  -MF      Wound Output (mL) 50  -MF      Retired Wound - Properties Group Placement Date: 05/20/22  - Placement Time: 1200  -MF Side: Right  -MF Orientation: medial  -MF Location: foot  -MF Primary Wound Type: Amputation s  -MF      Retired Wound - Properties Group Date first assessed: 05/20/22  - Time first assessed: 1200  -MF Side: Right  -MF Location: foot  -MF Primary Wound Type: Amputation s  -MF              NPWT (Negative Pressure Wound Therapy) 05/20/22 1200 R foot ampuation site    NPWT (Negative Pressure Wound Therapy) - Properties Group Placement Date: 05/20/22  - Placement Time: 1200  -MF Location: R foot ampuation site  -MF      Therapy Setting continuous therapy  -MF      Dressing foam, black  -MF      Contact Layer other (see comments)  zuri Ag  -MF      Pressure Setting 150 mmHg  -MF      Sponges Inserted 1  -MF      Sponges Removed 2  -MF      Finger sweep complete Yes  -MF      Retired NPWT (Negative Pressure Wound Therapy) - Properties Group Placement Date: 05/20/22  -MF Placement Time: 1200  -MF Location: R foot ampuation site  -MF      Retired NPWT (Negative Pressure Wound Therapy) - Properties Group Placement Date: 05/20/22  - Placement Time: 1200   - Location: R foot ampuation site  -            User Key  (r) = Recorded By, (t) = Taken By, (c) = Cosigned By    Initials Name Provider Type    Wesley Vallejo, PT Physical Therapist                  WOUND DEBRIDEMENT  Total area of Debridement: ~3cm2  Debridement Site 1  Location- Site 1: foot amputation site.  Selective Debridement- Site 1: Wound Surface <20cmsq  Instruments- Site 1: tweezers  Excised Tissue Description- Site 1: minimum, slough  Bleeding- Site 1: none                 Recommendation and Plan   PT Assessment/Plan     Row Name 07/25/22 1030          PT Assessment    Functional Limitations Performance in self-care ADL;Limitations in functional capacity and performance;Limitations in community activities  -     Impairments Integumentary integrity  -     Assessment Comments Pt cont to have good decrease in wound size with no new issues noted.  Pt cont to have mild bone exposure and will benefit from cont use of wound vac to help further reduce wound size.  -     Rehab Potential Fair  -     Patient/caregiver participated in establishment of treatment plan and goals Yes  -     Patient would benefit from skilled therapy intervention Yes  -            PT Plan    PT Frequency 2x/week  -     Physical Therapy Interventions (Optional Details) wound care;patient/family education  -     PT Plan Comments cont with debridement and wound vac management.  -           User Key  (r) = Recorded By, (t) = Taken By, (c) = Cosigned By    Initials Name Provider Type    Wesley Vallejo, PT Physical Therapist                Goals   PT OP Goals     Row Name 07/25/22 1030          Time Calculation    PT Goal Re-Cert Due Date 08/18/22  -           User Key  (r) = Recorded By, (t) = Taken By, (c) = Cosigned By    Initials Name Provider Type    Wesley Vallejo, PT Physical Therapist                PT Goal Re-Cert Due Date: 08/18/22            Time Calculation: Start Time: 1030  Untimed  Charges  15180-Asyulhwlq debridement: 10  74116-Kpy Pressure wound to 50 sqcm: 25  Total Minutes  Untimed Charges Total Minutes: 35   Total Minutes: 35  Therapy Charges for Today     Code Description Service Date Service Provider Modifiers Qty    07883199947 HC PT NEG PRESS WOUND TO 50SQCM DME2 7/25/2022 Wesley Morgan, PT GP 1    50150644373 HC JOSE DEBRIDE OPEN WOUND UP TO 20CM 7/25/2022 Wesley Morgan, PT GP 1                  Wesley Morgan, PT  7/25/2022

## 2022-07-27 ENCOUNTER — OFFICE VISIT (OUTPATIENT)
Dept: CARDIAC SURGERY | Facility: CLINIC | Age: 87
End: 2022-07-27

## 2022-07-27 ENCOUNTER — HOSPITAL ENCOUNTER (OUTPATIENT)
Dept: PHYSICAL THERAPY | Facility: HOSPITAL | Age: 87
Setting detail: THERAPIES SERIES
Discharge: HOME | End: 2022-07-27

## 2022-07-27 VITALS
HEIGHT: 70 IN | WEIGHT: 155 LBS | OXYGEN SATURATION: 98 % | HEART RATE: 84 BPM | TEMPERATURE: 98.7 F | DIASTOLIC BLOOD PRESSURE: 70 MMHG | BODY MASS INDEX: 22.19 KG/M2 | SYSTOLIC BLOOD PRESSURE: 118 MMHG

## 2022-07-27 DIAGNOSIS — Z89.421 STATUS POST AMPUTATION OF TOE OF RIGHT FOOT: Primary | ICD-10-CM

## 2022-07-27 DIAGNOSIS — L97.513 FOOT ULCERATION, RIGHT, WITH NECROSIS OF MUSCLE: ICD-10-CM

## 2022-07-27 DIAGNOSIS — L03.115 CELLULITIS OF RIGHT FOOT: Primary | ICD-10-CM

## 2022-07-27 DIAGNOSIS — L03.115 CELLULITIS OF RIGHT FOOT: ICD-10-CM

## 2022-07-27 PROCEDURE — 97597 DBRDMT OPN WND 1ST 20 CM/<: CPT

## 2022-07-27 PROCEDURE — 99024 POSTOP FOLLOW-UP VISIT: CPT | Performed by: THORACIC SURGERY (CARDIOTHORACIC VASCULAR SURGERY)

## 2022-07-27 PROCEDURE — 97605 NEG PRS WND THER DME<=50SQCM: CPT

## 2022-07-27 RX ORDER — GLIPIZIDE 2.5 MG/1
2.5 TABLET, EXTENDED RELEASE ORAL DAILY
COMMUNITY
Start: 2022-05-23 | End: 2022-09-07

## 2022-07-27 RX ORDER — METRONIDAZOLE 500 MG/1
500 TABLET ORAL 3 TIMES DAILY
COMMUNITY
Start: 2022-07-05 | End: 2022-09-07

## 2022-07-27 RX ORDER — AMITRIPTYLINE HYDROCHLORIDE 25 MG/1
25 TABLET, FILM COATED ORAL
COMMUNITY
Start: 2022-07-05 | End: 2022-09-07

## 2022-07-27 RX ORDER — LEVOFLOXACIN 500 MG/1
500 TABLET, FILM COATED ORAL DAILY
COMMUNITY
Start: 2022-07-05 | End: 2022-09-07

## 2022-07-27 NOTE — PROGRESS NOTES
"Patient Information  Jesse Siu                                                                                          [address] [place] [address]      1935  [unfilled]  [unfilled]    Chief Complaint   Patient presents with   • Hospital Follow Up Visit     Hosp follow up right great toe,2nd and 3rd toe amputation 5/12- GFE - Wet gangrene. Pt states that he is having trouble with his medication affecting his stomach, no complaints of foot pain. Anxious to be completed w/ the wound vac.        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  He got wound VAC therapy at this time    Vitals:    07/27/22 1356   BP: 118/70   Pulse: 84   Temp: 98.7 °F (37.1 °C)   SpO2: 98%   Weight: 70.3 kg (155 lb)   Height: 177.8 cm (70\")        Physical Exam examination reveals remarkable closure of this large open amputated debrided site.  There is still  a deep cavity present in the  area proximal portion of the incision in the area of the tarsal bones..  No drainage noted.     Lab/other results:    Assessment: #1.  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  Wound VAC is in place.  Excellent granulation and excellent closure of this large debrided amputation site.    Plan: Continue wound VAC management for this deep cavity in the most proximal portion of the incision.  Plan to see the patient back in 6 weeks for follow-up.  Antibiotics per infectious disease, Dr. Cesar Angela M.D.   "

## 2022-07-27 NOTE — THERAPY WOUND CARE TREATMENT
Outpatient Rehabilitation - Wound/Debridement Treatment Note  [facility]     Patient Name: Jesse Siu  : 1935  MRN: [id number]  Today's Date: 2022                 Admit Date: 2022    Visit Dx:    ICD-10-CM ICD-9-CM   1. Status post amputation of toe of right foot ([facility])  Z89.421 V49.72   2. Cellulitis of right foot  L03.115 682.7   3. Foot ulceration, right, with necrosis of muscle ([facility])  L97.513 707.15     728.89     L medial foot          Patient Active Problem List   Diagnosis   • Cellulitis of right foot   • Type 2 diabetes mellitus with hyperglycemia ([facility])   • Renal insufficiency   • Foot ulceration, right, with necrosis of muscle ([facility])        Past Medical History:   Diagnosis Date   • Hyperlipidemia    • Hypertension    • Pre-diabetes         Past Surgical History:   Procedure Laterality Date   • AMPUTATION DIGIT Right 2022    Procedure: GREAT AND SECOND TOE AMPUTATION RIGHT;  Surgeon: Lemuel Angela MD;  Location: [facility];  Service: Vascular;  Laterality: Right;   • FOOT SURGERY Left     CANCER REMOVAL   • TONSILLECTOMY     • TURP / TRANSURETHRAL INCISION / DRAINAGE PROSTATE           EVALUATION   PT Ortho     Row Name 22 1600       Subjective Comments    Subjective Comments No complaints or changes  -       Subjective Pain    Able to rate subjective pain? yes  -    Pre-Treatment Pain Level 0  -    Post-Treatment Pain Level 0  -       Transfers    Comment, (Transfers) seated in w/c for tx  -LH          User Key  (r) = Recorded By, (t) = Taken By, (c) = Cosigned By    Initials Name Provider Type     Nimesh España, PT Physical Therapist                 LDA Wound     Row Name 22 1600             Wound 22 1200 Right medial foot Amputation stump    Wound - Properties Group Placement Date: 22  -MF Placement Time: 1200  -MF Side: Right  -MF Orientation: medial  -MF Location: foot  -MF Primary Wound Type: Amputation s  -MF      Wound Image View All  Images View Images  -      Dressing Appearance dry;intact;other (see comments)  temporary gauze packing from Dr. Angela's office  -      Base granulating;moist;pink;red;exposed structure;slough;subcutaneous;yellow;white  pt cont to have mild bone exposure  -      Periwound intact;moist;pink;macerated;pale white  -      Periwound Temperature warm  -      Periwound Skin Turgor soft  -      Edges irregular  -      Drainage Characteristics/Odor serosanguineous  -      Drainage Amount small  -      Care, Wound irrigated with;wound cleanser;debrided;negative pressure wound therapy  -      Dressing Care dressing changed  vac  -      Periwound Care cleansed with pH balanced cleanser;dry periwound area maintained;barrier film applied  NoSting/Nystatin crusting x2, stomapaste, border drape  -      Wound Output (mL) 50  -LH      Retired Wound - Properties Group Placement Date: 05/20/22  - Placement Time: 1200  -MF Side: Right  - Orientation: medial  - Location: foot  -MF Primary Wound Type: Amputation s  -MF      Retired Wound - Properties Group Date first assessed: 05/20/22  - Time first assessed: 1200  -MF Side: Right  -MF Location: foot  -MF Primary Wound Type: Amputation s  -MF              NPWT (Negative Pressure Wound Therapy) 05/20/22 1200 R foot ampuation site    NPWT (Negative Pressure Wound Therapy) - Properties Group Placement Date: 05/20/22  - Placement Time: 1200  -MF Location: R foot ampuation site  -      Therapy Setting continuous therapy  -      Dressing foam, black  -      Contact Layer other (see comments)  Dayna Ag  -      Pressure Setting 150 mmHg  -      Sponges Inserted 1  1 black  -LH      Sponges Removed 1  1 black  -LH      Finger sweep complete Yes  -      Retired NPWT (Negative Pressure Wound Therapy) - Properties Group Placement Date: 05/20/22  - Placement Time: 1200  -MF Location: R foot ampuation site  -      Retired NPWT (Negative Pressure Wound  Therapy) - Properties Group Placement Date: 05/20/22  -MF Placement Time: 1200  - Location: R foot ampuation site  -            User Key  (r) = Recorded By, (t) = Taken By, (c) = Cosigned By    Initials Name Provider Type     Wesley Morgan, PT Physical Therapist     Nimesh España, PT Physical Therapist                  WOUND DEBRIDEMENT  Total area of Debridement: 2cm2  Debridement Site 1  Location- Site 1: foot amputation site.  Selective Debridement- Site 1: Wound Surface <20cmsq  Instruments- Site 1: tweezers  Excised Tissue Description- Site 1: slough  Bleeding- Site 1: none              Therapy Education     Row Name 07/27/22 1600             Therapy Education    Education Details Continue current POC.  -      Given Bandaging/dressing change  -      Program Reinforced  -      How Provided Verbal;Demonstration  -      Provided to Patient;Caregiver  -      Level of Understanding Verbalized  -            User Key  (r) = Recorded By, (t) = Taken By, (c) = Cosigned By    Initials Name Provider Type     Nimesh España, PT Physical Therapist                Recommendation and Plan   PT Assessment/Plan     Row Name 07/27/22 1600          PT Assessment    Functional Limitations Performance in self-care ADL;Limitations in functional capacity and performance;Limitations in community activities  -     Impairments Integumentary integrity  -     Assessment Comments Pt demonstrating good  increases in granulation of wound base this session, although still remains with mild bone exposure at deep wound base. Pt would continue to benefit from NPWT to help increase granulation and promote wound healing.  -     Rehab Potential Fair  -     Patient/caregiver participated in establishment of treatment plan and goals Yes  -     Patient would benefit from skilled therapy intervention Yes  -            PT Plan    PT Frequency 2x/week  -     Physical Therapy Interventions (Optional Details)  wound care;patient/family education  -     PT Plan Comments debridement, vac  -           User Key  (r) = Recorded By, (t) = Taken By, (c) = Cosigned By    Initials Name Provider Type     Nimesh España, PT Physical Therapist                Goals   PT OP Goals     Row Name 07/27/22 1616          Time Calculation    PT Goal Re-Cert Due Date 08/18/22  -           User Key  (r) = Recorded By, (t) = Taken By, (c) = Cosigned By    Initials Name Provider Type     Nimesh España, PT Physical Therapist                PT Goal Re-Cert Due Date: 08/18/22            Time Calculation: Start Time: 1430  Untimed Charges  10719-Wihjppjuv debridement: 10  41271-Ewi Pressure wound to 50 sqcm: 25  Total Minutes  Untimed Charges Total Minutes: 35   Total Minutes: 35  Therapy Charges for Today     Code Description Service Date Service Provider Modifiers Qty    28839748600 HC JOSE DEBRIDE OPEN WOUND UP TO 20CM 7/27/2022 Nimesh España, PT GP 1    90632716533 HC PT NEG PRESS WOUND TO 50SQCM DME2 7/27/2022 Nimesh España, PT GP 1                  Nimesh España PT  7/27/2022

## 2022-07-29 ENCOUNTER — HOSPITAL ENCOUNTER (OUTPATIENT)
Dept: PHYSICAL THERAPY | Facility: HOSPITAL | Age: 87
Setting detail: THERAPIES SERIES
Discharge: HOME | End: 2022-07-29

## 2022-07-29 DIAGNOSIS — L03.115 CELLULITIS OF RIGHT FOOT: ICD-10-CM

## 2022-07-29 DIAGNOSIS — L97.513 FOOT ULCERATION, RIGHT, WITH NECROSIS OF MUSCLE: ICD-10-CM

## 2022-07-29 DIAGNOSIS — Z89.421 STATUS POST AMPUTATION OF TOE OF RIGHT FOOT: Primary | ICD-10-CM

## 2022-07-29 PROCEDURE — 97605 NEG PRS WND THER DME<=50SQCM: CPT

## 2022-07-29 PROCEDURE — 97597 DBRDMT OPN WND 1ST 20 CM/<: CPT

## 2022-07-29 NOTE — THERAPY WOUND CARE TREATMENT
Outpatient Rehabilitation - Wound/Debridement Treatment Note  [facility]     Patient Name: Jesse Siu  : 1935  MRN: [id number]  Today's Date: 2022                 Admit Date: 2022    Visit Dx:    ICD-10-CM ICD-9-CM   1. Status post amputation of toe of right foot (Former[facility])  Z89.421 V49.72   2. Cellulitis of right foot  L03.115 682.7   3. Foot ulceration, right, with necrosis of muscle ([facility])  L97.513 707.15     728.89       Patient Active Problem List   Diagnosis   • Cellulitis of right foot   • Type 2 diabetes mellitus with hyperglycemia ([facility])   • Renal insufficiency   • Foot ulceration, right, with necrosis of muscle ([facility])        Past Medical History:   Diagnosis Date   • Hyperlipidemia    • Hypertension    • Pre-diabetes         Past Surgical History:   Procedure Laterality Date   • AMPUTATION DIGIT Right 2022    Procedure: GREAT AND SECOND TOE AMPUTATION RIGHT;  Surgeon: Lemuel Angela MD;  Location: [facility];  Service: Vascular;  Laterality: Right;   • FOOT SURGERY Left     CANCER REMOVAL   • TONSILLECTOMY     • TURP / TRANSURETHRAL INCISION / DRAINAGE PROSTATE           EVALUATION   PT Ortho     Row Name 22 1300       Subjective Comments    Subjective Comments No complaints, reports compliance with NWB and leg elevation.  -       Subjective Pain    Able to rate subjective pain? yes  -    Pre-Treatment Pain Level 0  -JM    Post-Treatment Pain Level 0  -JM       Transfers    Comment, (Transfers) remained seated in w/c  -          User Key  (r) = Recorded By, (t) = Taken By, (c) = Cosigned By    Initials Name Provider Type    Nereyda Beck, PT Physical Therapist                 [facility] Wound     Row Name 22 1300             Wound 22 1200 Right medial foot Amputation stump    Wound - Properties Group Placement Date: 22  -MF Placement Time: 1200  -MF Side: Right  -MF Orientation: medial  -MF Location: foot  -MF Primary Wound Type: Amputation s  -MF       Wound Image View All Images View Images  -      Dressing Appearance dry;intact  -      Base granulating;moist;pink;red;exposed structure;slough;subcutaneous;yellow;white  bone exposed in depth  -      Periwound intact;moist;pink;macerated;pale white  -      Periwound Temperature warm  -      Periwound Skin Turgor soft  -      Edges irregular  -JM      Wound Length (cm) 1.1 cm  -JM      Wound Width (cm) 8.5 cm  -JM      Wound Depth (cm) 2 cm  -JM      Wound Surface Area (cm^2) 9.35 cm^2  -JM      Wound Volume (cm^3) 18.7 cm^3  -JM      Drainage Characteristics/Odor serosanguineous  -      Drainage Amount small  -JM      Care, Wound cleansed with;wound cleanser;debrided;negative pressure wound therapy  -      Dressing Care dressing changed  vac, kerlix, spandage  -      Periwound Care cleansed with pH balanced cleanser;dry periwound area maintained;topical treatment applied  nystatin powder/nosting spray crusting x1 layer, drape/paste border  -      Wound Output (mL) 50  -JM      Retired Wound - Properties Group Placement Date: 05/20/22  - Placement Time: 1200  -MF Side: Right  - Orientation: medial  - Location: foot  -MF Primary Wound Type: Amputation s  -MF      Retired Wound - Properties Group Date first assessed: 05/20/22  - Time first assessed: 1200  -MF Side: Right  -MF Location: foot  -MF Primary Wound Type: Amputation s  -MF              NPWT (Negative Pressure Wound Therapy) 05/20/22 1200 R foot ampuation site    NPWT (Negative Pressure Wound Therapy) - Properties Group Placement Date: 05/20/22  - Placement Time: 1200  -MF Location: R foot ampuation site  -      Therapy Setting continuous therapy  -      Dressing foam, black  -      Contact Layer other (see comments)  zuri ag  -      Pressure Setting 150 mmHg  -      Sponges Inserted 2  1 black 1/2-thickness strip, 1 black for trac pad  -      Sponges Removed 1  -      Finger sweep complete Yes  -JM      Retired  NPWT (Negative Pressure Wound Therapy) - Properties Group Placement Date: 05/20/22  - Placement Time: 1200  -MF Location: R foot ampuation site  -      Retired NPWT (Negative Pressure Wound Therapy) - Properties Group Placement Date: 05/20/22  - Placement Time: 1200  -MF Location: R foot ampuation site  -            User Key  (r) = Recorded By, (t) = Taken By, (c) = Cosigned By    Initials Name Provider Type    Wesley Vallejo, PT Physical Therapist    Nereyda Beck, PT Physical Therapist                  WOUND DEBRIDEMENT  Total area of Debridement: 2cmsq  Debridement Site 1  Location- Site 1: foot amputation site.  Selective Debridement- Site 1: Wound Surface <20cmsq  Instruments- Site 1: tweezers  Excised Tissue Description- Site 1: minimum, slough  Bleeding- Site 1: none              Therapy Education     Row Name 07/29/22 1300             Therapy Education    Given Bandaging/dressing change  -      Program Reinforced  -VANI      How Provided Verbal;Demonstration  -      Provided to Patient;Caregiver  -      Level of Understanding Verbalized  -            User Key  (r) = Recorded By, (t) = Taken By, (c) = Cosigned By    Initials Name Provider Type    Nereyda Beck, PT Physical Therapist                Recommendation and Plan   PT Assessment/Plan     Row Name 07/29/22 1300          PT Assessment    Functional Limitations Performance in self-care ADL;Limitations in functional capacity and performance;Limitations in community activities  -     Impairments Integumentary integrity  -     Assessment Comments Pt continuing to improve with increasing granulation and new epithelial growth of distal aspect.  Depth still with exposed bone, will continue to benefit from NPWT.  -            PT Plan    PT Frequency 2x/week  -     Physical Therapy Interventions (Optional Details) patient/family education;wound care  -     PT Plan Comments debridement, vac  -           User Key   (r) = Recorded By, (t) = Taken By, (c) = Cosigned By    Initials Name Provider Type    Nereyda Beck, PT Physical Therapist                Goals   PT OP Goals     Row Name 07/29/22 1300          Time Calculation    PT Goal Re-Cert Due Date 08/18/22  -VANI           User Key  (r) = Recorded By, (t) = Taken By, (c) = Cosigned By    Initials Name Provider Type    Nereyda Beck, PT Physical Therapist                PT Goal Re-Cert Due Date: 08/18/22            Time Calculation: Start Time: 1300  Untimed Charges  34903-Kxubxxeht debridement: 10  12459-Rnk Pressure wound to 50 sqcm: 25  Total Minutes  Untimed Charges Total Minutes: 35   Total Minutes: 35  Therapy Charges for Today     Code Description Service Date Service Provider Modifiers Qty    90677605839 HC JOSE DEBRIDE OPEN WOUND UP TO 20CM 7/29/2022 Nereyda Bello, PT GP 1    64606640776 HC PT NEG PRESS WOUND TO 50SQCM DME2 7/29/2022 Nereyda Bello, PT GP 1                  Nereyda Bello, PT  7/29/2022

## 2022-08-02 ENCOUNTER — HOSPITAL ENCOUNTER (OUTPATIENT)
Dept: PHYSICAL THERAPY | Facility: HOSPITAL | Age: 87
Setting detail: THERAPIES SERIES
Discharge: HOME | End: 2022-08-02

## 2022-08-02 DIAGNOSIS — L97.513 FOOT ULCERATION, RIGHT, WITH NECROSIS OF MUSCLE: ICD-10-CM

## 2022-08-02 DIAGNOSIS — Z89.421 STATUS POST AMPUTATION OF TOE OF RIGHT FOOT: Primary | ICD-10-CM

## 2022-08-02 DIAGNOSIS — L03.115 CELLULITIS OF RIGHT FOOT: ICD-10-CM

## 2022-08-02 PROCEDURE — 97597 DBRDMT OPN WND 1ST 20 CM/<: CPT

## 2022-08-02 PROCEDURE — 97605 NEG PRS WND THER DME<=50SQCM: CPT

## 2022-08-02 NOTE — THERAPY WOUND CARE TREATMENT
Outpatient Rehabilitation - Wound/Debridement Treatment Note  [facility]     Patient Name: Jesse Siu  : 1935  MRN: [id number]  Today's Date: 2022                 Admit Date: 2022    Visit Dx:    ICD-10-CM ICD-9-CM   1. 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 Cellulitis of right foot  L03.115 682.7   3. Foot ulceration, right, with necrosis of muscle (HCC)  L97.513 707.15     728.89         Patient Active Problem List   Diagnosis   • Cellulitis of right foot   • Type 2 diabetes mellitus with hyperglycemia (HCC)   • Renal insufficiency   • Foot ulceration, right, with necrosis of muscle (HCC)        Past Medical History:   Diagnosis Date   • Hyperlipidemia    • Hypertension    • Pre-diabetes         Past Surgical History:   Procedure Laterality Date   • AMPUTATION DIGIT Right 2022    Procedure: GREAT AND SECOND TOE AMPUTATION RIGHT;  Surgeon: Lemuel Angela MD;  Location: [facility] include [facility] beds at the [facility];  Service: Vascular;  Laterality: Right;   • FOOT SURGERY Left     CANCER REMOVAL   • TONSILLECTOMY     • TURP / TRANSURETHRAL INCISION / DRAINAGE PROSTATE           EVALUATION   PT Ortho     Row Name 22 1130       Subjective Comments    Subjective Comments Pt just seen by Dr. Serrano at [facility] office, states MD pleased with his progress, has follow-up in one month.  Pt currently on a 7-day course of Keflex s/p dental procedure, but otherwise no abx.  -JM       Subjective Pain    Able to rate subjective pain? yes  -JM    Pre-Treatment Pain Level 0  -JM    Post-Treatment Pain Level 0  -JM       Transfers    Comment, (Transfers) remained seated in w/c for tx  -JM          User Key  (r) = Recorded By, (t) = Taken By, (c) = Cosigned By    Initials Name Provider Type    Nereyda Beck PT Physical Therapist                 JORDY Wound     Row Name 22 1130             Wound 22 1200 Right medial foot Amputation stump    Wound - Properties Group Placement Date:  05/20/22  - Placement Time: 1200  -MF Side: Right  - Orientation: medial  - Location: foot  -MF Primary Wound Type: Amputation s  -MF      Wound Image View All Images View Images  -      Dressing Appearance dry;intact  -      Base granulating;moist;pink;red;exposed structure;slough;subcutaneous;yellow;white  bone exposed in depth, distal aspect nearly closed  -      Periwound intact;moist;pink;macerated;pale white  -      Periwound Temperature warm  -      Periwound Skin Turgor soft  -      Edges irregular  -      Drainage Characteristics/Odor serosanguineous  -      Drainage Amount small  -      Care, Wound cleansed with;wound cleanser;debrided;negative pressure wound therapy  -      Dressing Care dressing changed  vac, kerlix, size 6 spandage  -      Periwound Care cleansed with pH balanced cleanser;dry periwound area maintained;topical treatment applied  nystatin powder/nosting spray crusting, drape border  -      Wound Output (mL) 75  canister changed  -      Retired Wound - Properties Group Placement Date: 05/20/22  - Placement Time: 1200  -MF Side: Right  - Orientation: medial  - Location: foot  -MF Primary Wound Type: Amputation s  -MF      Retired Wound - Properties Group Date first assessed: 05/20/22  - Time first assessed: 1200  - Side: Right  - Location: foot  -MF Primary Wound Type: Amputation s  -MF              NPWT (Negative Pressure Wound Therapy) 05/20/22 1200 R foot ampuation site    NPWT (Negative Pressure Wound Therapy) - Properties Group Placement Date: 05/20/22  - Placement Time: 1200  - Location: R foot ampuation site  -      Therapy Setting continuous therapy  -      Dressing foam, black  -      Contact Layer other (see comments)  zuri  -      Pressure Setting 150 mmHg  -      Sponges Inserted 2  1 strip of half-thickness black to fill, 1 black for trac pad  -      Sponges Removed 2  -      Finger sweep complete Yes  -       "Retired NPWT (Negative Pressure Wound Therapy) - Properties Group Placement Date: 05/20/22  - Placement Time: 1200  - Location: R foot ampuation site  -      Retired NPWT (Negative Pressure Wound Therapy) - Properties Group Placement Date: 05/20/22  - Placement Time: 1200  - Location: R foot ampuation site  -            User Key  (r) = Recorded By, (t) = Taken By, (c) = Cosigned By    Initials Name Provider Type    Wesley Vallejo, PT Physical Therapist    Nereyda Beck, PT Physical Therapist                  WOUND DEBRIDEMENT  Total area of Debridement: 2cmsq  Debridement Site 1  Location- Site 1: foot amputation site.  Selective Debridement- Site 1: Wound Surface <20cmsq  Instruments- Site 1: tweezers, scissors  Excised Tissue Description- Site 1: minimum, slough, other (comment) (hypertrophic crust)  Bleeding- Site 1: scant              Therapy Education     Row Name 08/02/22 1130             Therapy Education    Education Details Reinforced continued NWB, nutrition for wound healing, leg elevation for edema.  Recommended using waffle cushion for pt c/o \"sore tailbone\". encouraged repositioning to avoid pressure injury to buttocks/coccyx.  -JM      Given Bandaging/dressing change  -      Program Reinforced  -VANI      How Provided Verbal;Demonstration  -VANI      Provided to Patient;Caregiver  -      Level of Understanding Verbalized  -            User Key  (r) = Recorded By, (t) = Taken By, (c) = Cosigned By    Initials Name Provider Type    Nereyda Beck, PT Physical Therapist                Recommendation and Plan   PT Assessment/Plan     Row Name 08/02/22 6621          PT Assessment    Functional Limitations Performance in self-care ADL;Limitations in functional capacity and performance;Limitations in community activities  -     Impairments Integumentary integrity  -     Assessment Comments R foot wound nearly closed distally, proximal deep aspect still with exposed " bone but continued increase in granulation tissue.  Continue current POC.  -            PT Plan    PT Frequency 2x/week  -     Physical Therapy Interventions (Optional Details) patient/family education;wound care  -     PT Plan Comments vac, debridement  -           User Key  (r) = Recorded By, (t) = Taken By, (c) = Cosigned By    Initials Name Provider Type    Nereyda Beck, PT Physical Therapist                Goals   PT OP Goals     Row Name 08/02/22 1130          Time Calculation    PT Goal Re-Cert Due Date 08/18/22  -           User Key  (r) = Recorded By, (t) = Taken By, (c) = Cosigned By    Initials Name Provider Type    Nereyda Beck, PT Physical Therapist                PT Goal Re-Cert Due Date: 08/18/22            Time Calculation: Start Time: 1130  Untimed Charges  76086-Kjqjbwbdq debridement: 10  80425-Xti Pressure wound to 50 sqcm: 25  Total Minutes  Untimed Charges Total Minutes: 35   Total Minutes: 35  Therapy Charges for Today     Code Description Service Date Service Provider Modifiers Qty    69164977341 HC PT NEG PRESS WOUND TO 50SQCM DME2 8/2/2022 Nereyda Bello, PT GP 1    99780060863 HC JOSE DEBRIDE OPEN WOUND UP TO 20CM 8/2/2022 Nereyda Bello, PT GP 1                  Nereyda Bello, PT  8/2/2022

## 2022-08-04 ENCOUNTER — HOSPITAL ENCOUNTER (OUTPATIENT)
Dept: PHYSICAL THERAPY | Facility: HOSPITAL | Age: 87
Setting detail: THERAPIES SERIES
Discharge: HOME | End: 2022-08-04

## 2022-08-04 DIAGNOSIS — Z89.421 STATUS POST AMPUTATION OF TOE OF RIGHT FOOT: Primary | ICD-10-CM

## 2022-08-04 DIAGNOSIS — L03.115 CELLULITIS OF RIGHT FOOT: ICD-10-CM

## 2022-08-04 DIAGNOSIS — L97.513 FOOT ULCERATION, RIGHT, WITH NECROSIS OF MUSCLE: ICD-10-CM

## 2022-08-04 PROCEDURE — 97605 NEG PRS WND THER DME<=50SQCM: CPT

## 2022-08-04 PROCEDURE — 97597 DBRDMT OPN WND 1ST 20 CM/<: CPT

## 2022-08-04 NOTE — THERAPY WOUND CARE TREATMENT
Outpatient Rehabilitation - Wound/Debridement Treatment Note  [facility]     Patient Name: Jesse Siu  : 1935  MRN: [id number]  Today's Date: 2022                 Admit Date: 2022    Visit Dx:    ICD-10-CM ICD-9-CM   1. Status post amputation of toe of right foot ([facility])  Z89.421 V49.72   2. Cellulitis of right foot  L03.115 682.7   3. Foot ulceration, right, with necrosis of muscle ([facility])  L97.513 707.15     728.89     R medial foot          Patient Active Problem List   Diagnosis   • Cellulitis of right foot   • Type 2 diabetes mellitus with hyperglycemia ([facility])   • Renal insufficiency   • Foot ulceration, right, with necrosis of muscle ([facility])        Past Medical History:   Diagnosis Date   • Hyperlipidemia    • Hypertension    • Pre-diabetes         Past Surgical History:   Procedure Laterality Date   • AMPUTATION DIGIT Right 2022    Procedure: GREAT AND SECOND TOE AMPUTATION RIGHT;  Surgeon: Lemuel Angela MD;  Location: [facility];  Service: Vascular;  Laterality: Right;   • FOOT SURGERY Left     CANCER REMOVAL   • TONSILLECTOMY     • TURP / TRANSURETHRAL INCISION / DRAINAGE PROSTATE           EVALUATION   PT Ortho     Row Name 22 1000       Subjective Comments    Subjective Comments No new complaints or issues  -       Subjective Pain    Able to rate subjective pain? yes  -    Pre-Treatment Pain Level 0  -LH    Post-Treatment Pain Level 0  -LH       Transfers    Comment, (Transfers) remained seated in w/c for tx  -LH          User Key  (r) = Recorded By, (t) = Taken By, (c) = Cosigned By    Initials Name Provider Type     Nimesh España, PT Physical Therapist                 LDA Wound     Row Name 22 1000             Wound 22 1200 Right medial foot Amputation stump    Wound - Properties Group Placement Date: 22  -MF Placement Time: 1200  -MF Side: Right  -MF Orientation: medial  -MF Location: foot  -MF Primary Wound Type: Amputation s  -MF      Wound Image  View All Images View Images  -      Dressing Appearance dry;intact  -      Base granulating;moist;pink;red;exposed structure;slough;subcutaneous;yellow;white  bone exposed in depth, distal aspect nearly closed  -      Periwound intact;moist;pink;macerated;pale white  -      Periwound Temperature warm  -      Periwound Skin Turgor soft  -      Edges irregular  -      Wound Length (cm) 1.1 cm  -      Wound Width (cm) 8.2 cm  -      Wound Depth (cm) 2 cm  -      Wound Surface Area (cm^2) 9.02 cm^2  -LH      Wound Volume (cm^3) 18.04 cm^3  -      Drainage Characteristics/Odor serosanguineous  -      Drainage Amount small  -      Care, Wound cleansed with;wound cleanser;debrided;negative pressure wound therapy  -      Dressing Care dressing changed  Vac, kerlix, size 6 spandage  -      Periwound Care cleansed with pH balanced cleanser;dry periwound area maintained;topical treatment applied  Nystatin powder/nosting spray crusting x2, border drape  -      Wound Output (mL) 0  -      Retired Wound - Properties Group Placement Date: 05/20/22  - Placement Time: 1200  -MF Side: Right  - Orientation: medial  - Location: foot  -MF Primary Wound Type: Amputation s  -MF      Retired Wound - Properties Group Date first assessed: 05/20/22  - Time first assessed: 1200  -MF Side: Right  -MF Location: foot  -MF Primary Wound Type: Amputation s  -MF              NPWT (Negative Pressure Wound Therapy) 05/20/22 1200 R foot ampuation site    NPWT (Negative Pressure Wound Therapy) - Properties Group Placement Date: 05/20/22  - Placement Time: 1200  -MF Location: R foot ampuation site  -      Therapy Setting continuous therapy  -      Dressing foam, black  -      Contact Layer other (see comments)  Dayna Ag  -      Pressure Setting 150 mmHg  -      Sponges Inserted 2  2 black (1 half thickness strip to fill, 1 for trac pad)  -      Sponges Removed 2  2 black  -      Finger sweep  complete Yes  -LH      Retired NPWT (Negative Pressure Wound Therapy) - Properties Group Placement Date: 05/20/22  - Placement Time: 1200  -MF Location: R foot ampuation site  -      Retired NPWT (Negative Pressure Wound Therapy) - Properties Group Placement Date: 05/20/22  - Placement Time: 1200  -MF Location: R foot ampuation site  -            User Key  (r) = Recorded By, (t) = Taken By, (c) = Cosigned By    Initials Name Provider Type     Wesley Morgan, PT Physical Therapist     Nimesh España, PT Physical Therapist                  WOUND DEBRIDEMENT  Total area of Debridement: 2cm2  Debridement Site 1  Location- Site 1: foot amputation site.  Selective Debridement- Site 1: Wound Surface <20cmsq  Instruments- Site 1: tweezers, scissors  Excised Tissue Description- Site 1: minimum, slough  Bleeding- Site 1: none              Therapy Education     Row Name 08/04/22 1000             Therapy Education    Education Details Continue current POC  -      Given Bandaging/dressing change  -      Program Reinforced  -      How Provided Verbal;Demonstration  -      Provided to Patient;Caregiver  -      Level of Understanding Verbalized  -            User Key  (r) = Recorded By, (t) = Taken By, (c) = Cosigned By    Initials Name Provider Type     Nimesh España, PT Physical Therapist                Recommendation and Plan   PT Assessment/Plan     Row Name 08/04/22 1000          PT Assessment    Functional Limitations Performance in self-care ADL;Limitations in functional capacity and performance;Limitations in community activities  -     Impairments Integumentary integrity  -     Assessment Comments Pt continuing to demonstrate improvements in re-epithelialization of distal portion of wound as it is nearly resurfaced. Pt with improving granulation of deep aspect of wound although continues with 2 small areas of exposed bone. Pt would continue to benefit from NPWT to help promote wound  healing  -     Rehab Potential Fair  -     Patient/caregiver participated in establishment of treatment plan and goals Yes  -     Patient would benefit from skilled therapy intervention Yes  -            PT Plan    PT Frequency 2x/week  -     Physical Therapy Interventions (Optional Details) wound care;patient/family education  -     PT Plan Comments vac, debridement  -           User Key  (r) = Recorded By, (t) = Taken By, (c) = Cosigned By    Initials Name Provider Type     Nimesh España, PT Physical Therapist                Goals   PT OP Goals     Row Name 08/04/22 1009          Time Calculation    PT Goal Re-Cert Due Date 08/18/22  -           User Key  (r) = Recorded By, (t) = Taken By, (c) = Cosigned By    Initials Name Provider Type     Nimesh España, PT Physical Therapist                PT Goal Re-Cert Due Date: 08/18/22            Time Calculation: Start Time: 0845  Untimed Charges  37538-Xycicopip debridement: 10  79955-Gki Pressure wound to 50 sqcm: 25  Total Minutes  Untimed Charges Total Minutes: 35   Total Minutes: 35  Therapy Charges for Today     Code Description Service Date Service Provider Modifiers Qty    03095061681 HC JOSE DEBRIDE OPEN WOUND UP TO 20CM 8/4/2022 Nimesh España, PT GP 1    90392103541 HC PT NEG PRESS WOUND TO 50SQCM DME2 8/4/2022 Nimesh España, PT GP 1                  Nimesh España PT  8/4/2022

## 2022-08-08 ENCOUNTER — HOSPITAL ENCOUNTER (OUTPATIENT)
Dept: PHYSICAL THERAPY | Facility: HOSPITAL | Age: 87
Setting detail: THERAPIES SERIES
Discharge: HOME | End: 2022-08-08

## 2022-08-08 DIAGNOSIS — L03.115 CELLULITIS OF RIGHT FOOT: Primary | ICD-10-CM

## 2022-08-08 DIAGNOSIS — L97.513 FOOT ULCERATION, RIGHT, WITH NECROSIS OF MUSCLE: ICD-10-CM

## 2022-08-08 PROCEDURE — 97597 DBRDMT OPN WND 1ST 20 CM/<: CPT

## 2022-08-08 PROCEDURE — 97605 NEG PRS WND THER DME<=50SQCM: CPT

## 2022-08-08 NOTE — THERAPY WOUND CARE TREATMENT
Outpatient Rehabilitation - Wound/Debridement Treatment Note  [facility]     Patient Name: Jesse Siu  : 1935  MRN: [id number]  Today's Date: 2022                 Admit Date: 2022    Visit Dx:    ICD-10-CM ICD-9-CM   1. Cellulitis of right foot  L03.115 682.7   2. Foot ulceration, right, with necrosis of muscle (HCC)  L97.513 707.15     728.89     R medial foot incision            Patient Active Problem List   Diagnosis   • Cellulitis of right foot   • Type 2 diabetes mellitus with hyperglycemia (HCC)   • Renal insufficiency   • Foot ulceration, right, with necrosis of muscle (HCC)        Past Medical History:   Diagnosis Date   • Hyperlipidemia    • Hypertension    • Pre-diabetes         Past Surgical History:   Procedure Laterality Date   • AMPUTATION DIGIT Right 2022    Procedure: GREAT AND SECOND TOE AMPUTATION RIGHT;  Surgeon: Lemuel Angela MD;  Location: [facility];  Service: Vascular;  Laterality: Right;   • FOOT SURGERY Left     CANCER REMOVAL   • TONSILLECTOMY     • TURP / TRANSURETHRAL INCISION / DRAINAGE PROSTATE           EVALUATION   PT Ortho     Row Name 22 09       Subjective Comments    Subjective Comments Pt's daughter reports the wound vac machine was making more noise and she pressed around the dressing which appeared to resolve the leak.  -       Subjective Pain    Able to rate subjective pain? yes  -    Pre-Treatment Pain Level 0  -    Post-Treatment Pain Level 0  -       Transfers    Comment, (Transfers) remained seated in w/c for tx  -LH          User Key  (r) = Recorded By, (t) = Taken By, (c) = Cosigned By    Initials Name Provider Type     Nimesh España, PT Physical Therapist                 [facility] Wound     Row Name 22 09             Wound 22 1200 Right medial foot Amputation stump    Wound - Properties Group Placement Date: 22  -MF Placement Time: 1200  -MF Side: Right  -MF Orientation: medial  -MF Location: foot  -MF  Primary Wound Type: Amputation s  -MF      Wound Image View All Images View Images  -      Base granulating;moist;pink;red;exposed structure;slough;subcutaneous;yellow;white  bone exposed in depth, distal aspect nearly closed  -      Periwound intact;moist;pink;macerated;pale white  -      Periwound Temperature warm  -      Periwound Skin Turgor soft  -      Edges irregular  -      Drainage Characteristics/Odor serosanguineous  -      Drainage Amount small  -      Care, Wound cleansed with;wound cleanser;debrided;negative pressure wound therapy  -      Dressing Care dressing changed  Vac, kerlix, size 6 spandage  -      Periwound Care cleansed with pH balanced cleanser;dry periwound area maintained;topical treatment applied  Nystatin powder/nosting spray crusting, border drape  -      Wound Output (mL) 10  -LH      Retired Wound - Properties Group Placement Date: 05/20/22  - Placement Time: 1200  -MF Side: Right  - Orientation: medial  - Location: foot  - Primary Wound Type: Amputation s  -      Retired Wound - Properties Group Date first assessed: 05/20/22  - Time first assessed: 1200  -MF Side: Right  - Location: foot  - Primary Wound Type: Amputation s  -MF              NPWT (Negative Pressure Wound Therapy) 05/20/22 1200 R foot ampuation site    NPWT (Negative Pressure Wound Therapy) - Properties Group Placement Date: 05/20/22  - Placement Time: 1200  -MF Location: R foot ampuation site  -      Therapy Setting continuous therapy  -      Dressing foam, black  -LH      Contact Layer other (see comments)  Dayna Ag  -      Pressure Setting 150 mmHg  -      Sponges Inserted 2  2 black  -LH      Sponges Removed 2  2 black  -LH      Finger sweep complete Yes  -      Retired NPWT (Negative Pressure Wound Therapy) - Properties Group Placement Date: 05/20/22  - Placement Time: 1200  -MF Location: R foot ampuation site  -      Retired NPWT (Negative Pressure Wound  Therapy) - Properties Group Placement Date: 05/20/22  - Placement Time: 1200  - Location: R foot ampuation site  -            User Key  (r) = Recorded By, (t) = Taken By, (c) = Cosigned By    Initials Name Provider Type     Wesley Morgan, PT Physical Therapist     Nimesh España, PT Physical Therapist                  WOUND DEBRIDEMENT  Total area of Debridement: 2cmsq  Debridement Site 1  Location- Site 1: foot amputation site.  Selective Debridement- Site 1: Wound Surface <20cmsq  Instruments- Site 1: tweezers, scissors  Excised Tissue Description- Site 1: minimum, slough  Bleeding- Site 1: none              Therapy Education     Row Name 08/08/22 0900             Therapy Education    Education Details Continue current POC, if wound vac becomes louder and signals an air leak use the extra vac tape to seal dressing.  -      Given Bandaging/dressing change  -      Program Reinforced  -      How Provided Verbal;Demonstration  -      Provided to Patient;Caregiver  -      Level of Understanding Verbalized  -            User Key  (r) = Recorded By, (t) = Taken By, (c) = Cosigned By    Initials Name Provider Type     Nimesh España, PT Physical Therapist                Recommendation and Plan   PT Assessment/Plan     Row Name 08/08/22 0900          PT Assessment    Functional Limitations Performance in self-care ADL;Limitations in functional capacity and performance;Limitations in community activities  -     Impairments Integumentary integrity  -     Assessment Comments Pt with only one small area of superficial ulceration left at distal incision line with minimal to no periwound erythema remaining. Pt demonstrating increased granulation of deep/proximal wound base with reduced area of bone exposed. PT able to debride small amount of slough from wound base. Pt would continue to benefit from NPWT to help promote wound healing.  -     Rehab Potential Fair  -     Patient/caregiver  participated in establishment of treatment plan and goals Yes  -     Patient would benefit from skilled therapy intervention Yes  -            PT Plan    PT Frequency 2x/week  -     Physical Therapy Interventions (Optional Details) wound care;patient/family education  -     PT Plan Comments vac, debridement  -           User Key  (r) = Recorded By, (t) = Taken By, (c) = Cosigned By    Initials Name Provider Type     Nimesh España, PT Physical Therapist                Goals   PT OP Goals     Row Name 08/08/22 0952          Time Calculation    PT Goal Re-Cert Due Date 08/18/22  -           User Key  (r) = Recorded By, (t) = Taken By, (c) = Cosigned By    Initials Name Provider Type     Nimesh España, PT Physical Therapist                PT Goal Re-Cert Due Date: 08/18/22            Time Calculation: Start Time: 0845  Untimed Charges  18851-Pafjnesen debridement: 10  90635-Ixf Pressure wound to 50 sqcm: 25  Total Minutes  Untimed Charges Total Minutes: 35   Total Minutes: 35  Therapy Charges for Today     Code Description Service Date Service Provider Modifiers Qty    16337435777 HC JOSE DEBRIDE OPEN WOUND UP TO 20CM 8/8/2022 Nimesh España, PT GP 1    96700668300 HC PT NEG PRESS WOUND TO 50SQCM DME2 8/8/2022 Nimesh España, PT GP 1                  Nimesh España PT  8/8/2022

## 2022-08-11 ENCOUNTER — HOSPITAL ENCOUNTER (OUTPATIENT)
Dept: PHYSICAL THERAPY | Facility: HOSPITAL | Age: 87
Setting detail: THERAPIES SERIES
Discharge: HOME | End: 2022-08-11

## 2022-08-11 DIAGNOSIS — Z89.421 STATUS POST AMPUTATION OF TOE OF RIGHT FOOT: ICD-10-CM

## 2022-08-11 DIAGNOSIS — L97.513 FOOT ULCERATION, RIGHT, WITH NECROSIS OF MUSCLE: ICD-10-CM

## 2022-08-11 DIAGNOSIS — L03.115 CELLULITIS OF RIGHT FOOT: Primary | ICD-10-CM

## 2022-08-11 PROCEDURE — 97597 DBRDMT OPN WND 1ST 20 CM/<: CPT

## 2022-08-11 NOTE — THERAPY PROGRESS REPORT/RE-CERT
Outpatient Rehabilitation - Wound/Debridement Progress Note  [facility]     Patient Name: Jesse Siu  : 1935  MRN: [id number]  Today's Date: 2022                 Admit Date: 2022    Visit Dx:    ICD-10-CM ICD-9-CM   1. Cellulitis of right foot  L03.115 682.7   2. Foot ulceration, right, with necrosis of muscle ([facility])  L97.513 707.15     728.89   3. Status post amputation of toe of right foot ([facility])  Z89.421 V49.72       Patient Active Problem List   Diagnosis   • Cellulitis of right foot   • Type 2 diabetes mellitus with hyperglycemia (HCC)   • Renal insufficiency   • Foot ulceration, right, with necrosis of muscle ([facility])        Past Medical History:   Diagnosis Date   • Hyperlipidemia    • Hypertension    • Pre-diabetes         Past Surgical History:   Procedure Laterality Date   • AMPUTATION DIGIT Right 2022    Procedure: GREAT AND SECOND TOE AMPUTATION RIGHT;  Surgeon: Lemuel Angela MD;  Location: [facility];  Service: Vascular;  Laterality: Right;   • FOOT SURGERY Left     CANCER REMOVAL   • TONSILLECTOMY     • TURP / TRANSURETHRAL INCISION / DRAINAGE PROSTATE           EVALUATION   PT Ortho     Row Name 22 0845       Subjective Comments    Subjective Comments Daughter reports more fequent alarms from vac pump, mostly leak alarm.  -       Subjective Pain    Able to rate subjective pain? yes  -    Pre-Treatment Pain Level 0  -    Post-Treatment Pain Level 0  -       Transfers    Comment, (Transfers) remained seated in w/c for tx  -JM          User Key  (r) = Recorded By, (t) = Taken By, (c) = Cosigned By    Initials Name Provider Type    Neeryda Beck, PT Physical Therapist                 LDA Wound     Row Name 22 0845             Wound 22 1200 Right medial foot Amputation stump    Wound - Properties Group Placement Date: 22  -MF Placement Time: 1200  -MF Side: Right  -MF Orientation: medial  -MF Location: foot  -MF Primary Wound Type:  Amputation s  -MF      Wound Image View All Images View Images  -      Dressing Appearance dressing loose  distal drape loose near base of toes  -      Base granulating;moist;pink;red;exposed structure;slough;subcutaneous;yellow;white  bone exposed in depth, distal aspect nearly closed  -      Periwound intact;moist;pink;macerated;pale white  -      Periwound Temperature warm  -      Periwound Skin Turgor soft  -      Edges irregular  -      Wound Length (cm) 1.1 cm  -      Wound Width (cm) 5 cm  -JM      Wound Depth (cm) 1.7 cm  -JM      Wound Surface Area (cm^2) 5.5 cm^2  -JM      Wound Volume (cm^3) 9.35 cm^3  -JM      Drainage Characteristics/Odor serosanguineous  -      Drainage Amount small  -      Care, Wound cleansed with;wound cleanser;debrided  -      Dressing Care dressing applied;collagen;antimicrobial agent applied;silver impregnated;foam;gauze  zuri, HFBt to pack, mepilex ag, kerlix, spandage  -      Periwound Care cleansed with pH balanced cleanser;dry periwound area maintained  -      Wound Output (mL) 20  canister discarded  -      Retired Wound - Properties Group Placement Date: 05/20/22  - Placement Time: 1200  -MF Side: Right  - Orientation: medial  - Location: foot  -MF Primary Wound Type: Amputation s  -MF      Retired Wound - Properties Group Date first assessed: 05/20/22  - Time first assessed: 1200  -MF Side: Right  -MF Location: foot  -MF Primary Wound Type: Amputation s  -              NPWT (Negative Pressure Wound Therapy) 05/20/22 1200 R foot ampuation site    NPWT (Negative Pressure Wound Therapy) - Properties Group Placement Date: 05/20/22  - Placement Time: 1200  -MF Location: R foot ampuation site  -      Therapy Setting vacuum off  on HOLD d/t maceration  -      Sponges Removed 2  -JM      Finger sweep complete Yes  -      Retired NPWT (Negative Pressure Wound Therapy) - Properties Group Placement Date: 05/20/22  - Placement Time:  1200  -MF Location: R foot ampuation site  -      Retired NPWT (Negative Pressure Wound Therapy) - Properties Group Placement Date: 05/20/22  - Placement Time: 1200  -MF Location: R foot ampuation site  -            User Key  (r) = Recorded By, (t) = Taken By, (c) = Cosigned By    Initials Name Provider Type    Wesley Vallejo, PT Physical Therapist    Nereyda Beck, PT Physical Therapist                  WOUND DEBRIDEMENT  Total area of Debridement: 2cmsq  Debridement Site 1  Location- Site 1: R toe amputation site  Selective Debridement- Site 1: Wound Surface <20cmsq  Instruments- Site 1: tweezers  Excised Tissue Description- Site 1: minimum, slough  Bleeding- Site 1: scant              Therapy Education     Row Name 08/11/22 0811             Therapy Education    Education Details Plan to keep vac on HOLD over the weekend due to maceration.  Daughter to check dressing daily, and change ag foam and kerlix if saturated or disrupted.  Reinforced with pt to continue current NWB, BG control, and nutrition for wound healing.  -JM      Given Bandaging/dressing change  -      Program Reinforced;Modified  -VANI      How Provided Verbal;Demonstration  -VANI      Provided to Patient;Caregiver  -VANI      Level of Understanding Verbalized  -            User Key  (r) = Recorded By, (t) = Taken By, (c) = Cosigned By    Initials Name Provider Type    Nereyda Beck, RENE Physical Therapist                Recommendation and Plan   PT Assessment/Plan     Row Name 08/11/22 0828          PT Assessment    Functional Limitations Performance in self-care ADL;Limitations in functional capacity and performance;Limitations in community activities  -     Impairments Integumentary integrity  -     Assessment Comments Pt progressing toward goals with decreased wound width and increasing granulation of wound bed.  Pt still with small area of exposed bone in depth, but distal wound appears nearly closed with  hypertrophic crusting today.  Moderate maceration of periwound noted today, possibly from frequent leak alarms with interruption of vac tx.  PT placed vac on HOLD until next tx to allow periwound skin to recover.  Pt's daughter to change dressing PRN until next tx.  Pt will continue to benefit from wound vac for at least 1-2 more weeks to promote granulation over exposed bone.  Current goals and POC remain appropriate.  -     Rehab Potential Good  -     Patient/caregiver participated in establishment of treatment plan and goals Yes  -     Patient would benefit from skilled therapy intervention Yes  -            PT Plan    PT Frequency 2x/week  -     Predicted Duration of Therapy Intervention (PT) 12 visits  -     Planned CPT's? PT JOSE DEBRIDE OPEN WOUND UP TO 20 CM: 46813;PT NEG PRESS WOUND TO 50 SQCM 3: 16065;PT NONSELECT DEBRIDE 15 MIN: 36621;PT SELF CARE/MGMT/TRAIN 15 MIN: 64896;PT NLFU MIST: 22037  -     Physical Therapy Interventions (Optional Details) patient/family education;wound care  -     PT Plan Comments vac on HOLD until next tx, debridement  -           User Key  (r) = Recorded By, (t) = Taken By, (c) = Cosigned By    Initials Name Provider Type    Nereyda Beck, PT Physical Therapist                Goals   PT OP Goals     Row Name 08/11/22 0845          PT Short Term Goals    STG 1 Decrease wound size by 25% as evidence of wound healing.  -     STG 1 Progress Partially Met  -     STG 1 Progress Comments Met for width, ongoing for depth and length  -     STG 2 increase granulation to >75% to improve wound healing.  -     STG 2 Progress Met  -            Long Term Goals    LTG Date to Achieve 11/09/22  -     LTG 1 Decrease wound size by 75% as evidence of wound healing.  -     LTG 1 Progress Progressing  -     LTG 2 Increase granulation to >90% to improve wound healing.  -     LTG 2 Progress Progressing  -     LTG 3 Pt and family able to demonstrate home  dressing management to allow for transition to home management.  -VANI     LTG 3 Progress Ongoing  -VANI            Time Calculation    PT Goal Re-Cert Due Date 11/09/22  -VANI           User Key  (r) = Recorded By, (t) = Taken By, (c) = Cosigned By    Initials Name Provider Type    Nereyda Beck, PT Physical Therapist              Time Calculation: Start Time: 0845  Untimed Charges  49527-Drrdumlpz debridement: 25  Total Minutes  Untimed Charges Total Minutes: 25   Total Minutes: 25  Therapy Charges for Today     Code Description Service Date Service Provider Modifiers Qty    88969196231  JOSE DEBRIDE OPEN WOUND UP TO 20CM 8/11/2022 Nereyda Bello, PT GP 1                  Nereyda Bello, PT  8/11/2022

## 2022-08-15 ENCOUNTER — HOSPITAL ENCOUNTER (OUTPATIENT)
Dept: PHYSICAL THERAPY | Facility: HOSPITAL | Age: 87
Setting detail: THERAPIES SERIES
Discharge: HOME | End: 2022-08-15

## 2022-08-15 DIAGNOSIS — L03.115 CELLULITIS OF RIGHT FOOT: ICD-10-CM

## 2022-08-15 DIAGNOSIS — L97.513 FOOT ULCERATION, RIGHT, WITH NECROSIS OF MUSCLE: ICD-10-CM

## 2022-08-15 DIAGNOSIS — Z89.421 STATUS POST AMPUTATION OF TOE OF RIGHT FOOT: Primary | ICD-10-CM

## 2022-08-15 PROCEDURE — 97605 NEG PRS WND THER DME<=50SQCM: CPT

## 2022-08-15 PROCEDURE — 97597 DBRDMT OPN WND 1ST 20 CM/<: CPT

## 2022-08-15 NOTE — THERAPY WOUND CARE TREATMENT
Outpatient Rehabilitation - Wound/Debridement Treatment Note  [facility]     Patient Name: Jesse Siu  : 1935  MRN: [id number]  Today's Date: 8/15/2022                 Admit Date: 8/15/2022    Visit Dx:    ICD-10-CM ICD-9-CM   1. Status post amputation of toe of right foot ([facility])  Z89.421 V49.72   2. Foot ulceration, right, with necrosis of muscle ([facility])  L97.513 707.15     728.89   3. Cellulitis of right foot  L03.115 682.7       Patient Active Problem List   Diagnosis   • Cellulitis of right foot   • Type 2 diabetes mellitus with hyperglycemia (HCC)   • Renal insufficiency   • Foot ulceration, right, with necrosis of muscle ([facility])        Past Medical History:   Diagnosis Date   • Hyperlipidemia    • Hypertension    • Pre-diabetes         Past Surgical History:   Procedure Laterality Date   • AMPUTATION DIGIT Right 2022    Procedure: GREAT AND SECOND TOE AMPUTATION RIGHT;  Surgeon: Lemuel Angela MD;  Location: [facility];  Service: Vascular;  Laterality: Right;   • FOOT SURGERY Left     CANCER REMOVAL   • TONSILLECTOMY     • TURP / TRANSURETHRAL INCISION / DRAINAGE PROSTATE           EVALUATION   PT Ortho     Row Name 08/15/22 0845       Subjective Comments    Subjective Comments No complaints, daughter states she checked the dressing and didn't notice any drainage, so did not change it since last tx.  -VANI       Subjective Pain    Able to rate subjective pain? yes  -VANI    Pre-Treatment Pain Level 0  -JM    Post-Treatment Pain Level 0  -       Transfers    Comment, (Transfers) remained seated in w/c  -          User Key  (r) = Recorded By, (t) = Taken By, (c) = Cosigned By    Initials Name Provider Type    Nereyda Beck, PT Physical Therapist                 [facility] Wound     Row Name 08/15/22 0845             Wound 22 1200 Right medial foot Amputation stump    Wound - Properties Group Placement Date: 22  -MF Placement Time: 1200  -MF Side: Right  -MF Orientation: medial  -MF  Location: foot  -MF Primary Wound Type: Amputation s  -MF      Wound Image View All Images View Images  -      Dressing Appearance intact;moist drainage  -      Base granulating;moist;pink;red;exposed structure;slough;subcutaneous;yellow;white  bone exposed in depth, distal aspect with dry hypertrophic crust  -      Periwound intact;moist;pink;macerated;pale white  faint maceration plantar aspect  -      Periwound Temperature warm  -      Periwound Skin Turgor soft  -      Edges irregular  -      Drainage Characteristics/Odor serosanguineous  -      Drainage Amount small  -      Care, Wound cleansed with;wound cleanser;debrided;negative pressure wound therapy  -      Dressing Care dressing applied  vac, kerlix, spandage  -      Periwound Care cleansed with pH balanced cleanser;dry periwound area maintained;other (see comments)  nystatin powder/nosting crust x1 layer, drape border  -      Retired Wound - Properties Group Placement Date: 05/20/22  - Placement Time: 1200  -MF Side: Right  - Orientation: medial  - Location: foot  -MF Primary Wound Type: Amputation s  -MF      Retired Wound - Properties Group Date first assessed: 05/20/22  - Time first assessed: 1200  -MF Side: Right  -MF Location: foot  -MF Primary Wound Type: Amputation s  -MF              NPWT (Negative Pressure Wound Therapy) 05/20/22 1200 R foot ampuation site    NPWT (Negative Pressure Wound Therapy) - Properties Group Placement Date: 05/20/22  - Placement Time: 1200  -MF Location: R foot ampuation site  -      Therapy Setting continuous therapy  -      Dressing foam, black  -      Contact Layer other (see comments)  zuri ag  -      Pressure Setting 150 mmHg  -      Sponges Inserted 2  1 black strip to pack, 1 black for trac pad  -      Finger sweep complete Yes  -      Retired NPWT (Negative Pressure Wound Therapy) - Properties Group Placement Date: 05/20/22  - Placement Time: 1200  -MF Location:  R foot ampuation site  -      Retired NPWT (Negative Pressure Wound Therapy) - Properties Group Placement Date: 05/20/22  - Placement Time: 1200  - Location: R foot ampuation site  -            User Key  (r) = Recorded By, (t) = Taken By, (c) = Cosigned By    Initials Name Provider Type    Wesley Vallejo, PT Physical Therapist    Nereyda Beck, PT Physical Therapist                  WOUND DEBRIDEMENT  Total area of Debridement: 2cmsq  Debridement Site 1  Location- Site 1: R toe amputation site  Selective Debridement- Site 1: Wound Surface <20cmsq  Instruments- Site 1: tweezers  Excised Tissue Description- Site 1: minimum, slough  Bleeding- Site 1: scant              Therapy Education     Row Name 08/15/22 0802             Therapy Education    Education Details Continuation of wound vac  -      Given Bandaging/dressing change  -      Program Reinforced;Modified  -      How Provided Verbal;Demonstration  -      Provided to Patient;Caregiver  -      Level of Understanding Verbalized  -            User Key  (r) = Recorded By, (t) = Taken By, (c) = Cosigned By    Initials Name Provider Type    Nereyda Beck, PT Physical Therapist                Recommendation and Plan   PT Assessment/Plan     Row Name 08/15/22 0845          PT Assessment    Functional Limitations Performance in self-care ADL;Limitations in functional capacity and performance;Limitations in community activities  -     Impairments Integumentary integrity  -     Assessment Comments Pt's distal R foot wound is closed with thin hypertrohpic crusts, proximal depth with increasing granulation, still with exposed bone, so PT resumed wound vac this tx.  -            PT Plan    PT Frequency 2x/week  -     Physical Therapy Interventions (Optional Details) patient/family education;wound care  -     PT Plan Comments vac, debridement  -           User Key  (r) = Recorded By, (t) = Taken By, (c) = Cosigned By     Initials Name Provider Type    Nereyda Beck, PT Physical Therapist                Goals   PT OP Goals     Row Name 08/15/22 0845          Time Calculation    PT Goal Re-Cert Due Date 11/09/22  -VANI           User Key  (r) = Recorded By, (t) = Taken By, (c) = Cosigned By    Initials Name Provider Type    Nereyda Beck, PT Physical Therapist                PT Goal Re-Cert Due Date: 11/09/22            Time Calculation: Start Time: 0845  Untimed Charges  60819-Ewccxwyqb debridement: 10  42654-Isx Pressure wound to 50 sqcm: 25  Total Minutes  Untimed Charges Total Minutes: 35   Total Minutes: 35  Therapy Charges for Today     Code Description Service Date Service Provider Modifiers Qty    88561233640 HC PT NEG PRESS WOUND TO 50SQCM DME2 8/15/2022 Nereyda Bello, PT GP 1    06031657487 HC JOSE DEBRIDE OPEN WOUND UP TO 20CM 8/15/2022 Nereyda Bello, PT GP 1                  Nereyda Bello, PT  8/15/2022

## 2022-08-18 ENCOUNTER — HOSPITAL ENCOUNTER (OUTPATIENT)
Dept: PHYSICAL THERAPY | Facility: HOSPITAL | Age: 87
Setting detail: THERAPIES SERIES
Discharge: HOME | End: 2022-08-18

## 2022-08-18 DIAGNOSIS — Z89.421 STATUS POST AMPUTATION OF TOE OF RIGHT FOOT: Primary | ICD-10-CM

## 2022-08-18 DIAGNOSIS — L03.115 CELLULITIS OF RIGHT FOOT: ICD-10-CM

## 2022-08-18 DIAGNOSIS — L97.513 FOOT ULCERATION, RIGHT, WITH NECROSIS OF MUSCLE: ICD-10-CM

## 2022-08-18 PROCEDURE — 97597 DBRDMT OPN WND 1ST 20 CM/<: CPT

## 2022-08-18 PROCEDURE — 97605 NEG PRS WND THER DME<=50SQCM: CPT

## 2022-08-18 NOTE — THERAPY WOUND CARE TREATMENT
Outpatient Rehabilitation - Wound/Debridement Treatment Note  [facility]     Patient Name: Jesse Siu  : 1935  MRN: [id number]  Today's Date: 2022                 Admit Date: 2022    Visit Dx:    ICD-10-CM ICD-9-CM   1. Status post amputation of toe of right foot ([facility])  Z89.421 V49.72   2. Foot ulceration, right, with necrosis of muscle ([facility])  L97.513 707.15     728.89   3. Cellulitis of right foot  L03.115 682.7     R medial foot      Patient Active Problem List   Diagnosis   • Cellulitis of right foot   • Type 2 diabetes mellitus with hyperglycemia ([facility])   • Renal insufficiency   • Foot ulceration, right, with necrosis of muscle ([facility])        Past Medical History:   Diagnosis Date   • Hyperlipidemia    • Hypertension    • Pre-diabetes         Past Surgical History:   Procedure Laterality Date   • AMPUTATION DIGIT Right 2022    Procedure: GREAT AND SECOND TOE AMPUTATION RIGHT;  Surgeon: Lemuel Angela MD;  Location: [facility];  Service: Vascular;  Laterality: Right;   • FOOT SURGERY Left     CANCER REMOVAL   • TONSILLECTOMY     • TURP / TRANSURETHRAL INCISION / DRAINAGE PROSTATE           EVALUATION   PT Ortho     Row Name 22 09       Subjective Comments    Subjective Comments No new complaints or issues.  -       Subjective Pain    Able to rate subjective pain? yes  -    Pre-Treatment Pain Level 0  -    Post-Treatment Pain Level 0  -       Transfers    Comment, (Transfers) remained seated in w/c  -          User Key  (r) = Recorded By, (t) = Taken By, (c) = Cosigned By    Initials Name Provider Type     Nimesh España, PT Physical Therapist                 LDA Wound     Row Name 22 09             Wound 22 1200 Right medial foot Amputation stump    Wound - Properties Group Placement Date: 22  -MF Placement Time: 1200  -MF Side: Right  -MF Orientation: medial  -MF Location: foot  -MF Primary Wound Type: Amputation s  -MF      Wound Image View  All Images View Images  -      Dressing Appearance intact;moist drainage  -      Base granulating;moist;pink;red;exposed structure;slough;subcutaneous;yellow;white  bone exposed in depth, distal aspect with dry hypertrophic crust  -      Periwound intact;moist;pink;macerated;pale white  faint maceration plantar aspect, mild erythema  -      Periwound Temperature warm  -      Periwound Skin Turgor soft  -      Edges irregular  -      Wound Length (cm) 1.1 cm  -      Wound Width (cm) 4.8 cm  -      Wound Depth (cm) 1.7 cm  -      Wound Surface Area (cm^2) 5.28 cm^2  -      Wound Volume (cm^3) 8.976 cm^3  -      Drainage Characteristics/Odor serosanguineous  -      Drainage Amount small  -      Care, Wound cleansed with;wound cleanser;debrided;negative pressure wound therapy  -      Dressing Care dressing applied  vac, kerlix, spandage  -      Periwound Care cleansed with pH balanced cleanser;dry periwound area maintained;other (see comments)  nystatin powder/nosting crust x2 layers, drape border  -      Wound Output (mL) 20  canister changed  -      Retired Wound - Properties Group Placement Date: 05/20/22  - Placement Time: 1200  -MF Side: Right  - Orientation: medial  - Location: foot  -MF Primary Wound Type: Amputation s  -MF      Retired Wound - Properties Group Date first assessed: 05/20/22  - Time first assessed: 1200  -MF Side: Right  -MF Location: foot  -MF Primary Wound Type: Amputation s  -MF              NPWT (Negative Pressure Wound Therapy) 05/20/22 1200 R foot ampuation site    NPWT (Negative Pressure Wound Therapy) - Properties Group Placement Date: 05/20/22  - Placement Time: 1200  -MF Location: R foot ampuation site  -      Therapy Setting continuous therapy  -      Dressing foam, black  -      Contact Layer other (see comments)  Dayna ag  -      Pressure Setting 150 mmHg  -      Sponges Inserted 2  1 black to pack, 1 black for trac pad  -       Sponges Removed 2  2 black  -      Finger sweep complete Yes  -      Retired NPWT (Negative Pressure Wound Therapy) - Properties Group Placement Date: 05/20/22  - Placement Time: 1200  - Location: R foot ampuation site  -      Retired NPWT (Negative Pressure Wound Therapy) - Properties Group Placement Date: 05/20/22  - Placement Time: 1200  -MF Location: R foot ampuation site  -            User Key  (r) = Recorded By, (t) = Taken By, (c) = Cosigned By    Initials Name Provider Type     Wesley Morgan, PT Physical Therapist     Nimesh España, PT Physical Therapist                  WOUND DEBRIDEMENT  Total area of Debridement: 2cm2  Debridement Site 1  Location- Site 1: R toe amputation site  Selective Debridement- Site 1: Wound Surface <20cmsq  Instruments- Site 1: tweezers  Excised Tissue Description- Site 1: minimum, slough  Bleeding- Site 1: scant, held pressure, 1 minute              Therapy Education     Row Name 08/18/22 0900             Therapy Education    Education Details Continue current POC.  -      Given Bandaging/dressing change  -      Program Reinforced;Modified  -      How Provided Verbal;Demonstration  -      Provided to Patient;Caregiver  -      Level of Understanding Verbalized  -            User Key  (r) = Recorded By, (t) = Taken By, (c) = Cosigned By    Initials Name Provider Type     Nimesh España, PT Physical Therapist                Recommendation and Plan   PT Assessment/Plan     Row Name 08/18/22 0900          PT Assessment    Functional Limitations Performance in self-care ADL;Limitations in functional capacity and performance;Limitations in community activities  -     Impairments Integumentary integrity  -     Assessment Comments Pt's R foot wound continuing to demonstrate small area of exposed bone at depth with continuing improvements in granulation. PT able to debride small amount of hpertrophic crust from distal incision line revealing  good epethelialization beneath. Pt with mild periwound erythema this date. Pt would continue to benefit form NPWT to help promote wound healing.  -     Rehab Potential Good  -     Patient/caregiver participated in establishment of treatment plan and goals Yes  -     Patient would benefit from skilled therapy intervention Yes  -            PT Plan    PT Frequency 2x/week  -     Physical Therapy Interventions (Optional Details) wound care;patient/family education  -     PT Plan Comments vac, debridement  -           User Key  (r) = Recorded By, (t) = Taken By, (c) = Cosigned By    Initials Name Provider Type     Nimesh España, PT Physical Therapist                Goals   PT OP Goals     Row Name 08/18/22 0958          Time Calculation    PT Goal Re-Cert Due Date 11/09/22  -           User Key  (r) = Recorded By, (t) = Taken By, (c) = Cosigned By    Initials Name Provider Type     Nimesh España, PT Physical Therapist                PT Goal Re-Cert Due Date: 11/09/22            Time Calculation: Start Time: 0845  Untimed Charges  82518-Vclrehhbb debridement: 10  19623-Vep Pressure wound to 50 sqcm: 25  Total Minutes  Untimed Charges Total Minutes: 35   Total Minutes: 35  Therapy Charges for Today     Code Description Service Date Service Provider Modifiers Qty    80121375925 HC JOSE DEBRIDE OPEN WOUND UP TO 20CM 8/18/2022 Nimesh España, PT GP 1    13639016013 HC PT NEG PRESS WOUND TO 50SQCM DME2 8/18/2022 Nimesh España, PT GP 1                  Nimesh España PT  8/18/2022

## 2022-08-22 ENCOUNTER — HOSPITAL ENCOUNTER (OUTPATIENT)
Dept: PHYSICAL THERAPY | Facility: HOSPITAL | Age: 87
Setting detail: THERAPIES SERIES
Discharge: HOME | End: 2022-08-22

## 2022-08-22 DIAGNOSIS — L97.513 FOOT ULCERATION, RIGHT, WITH NECROSIS OF MUSCLE: ICD-10-CM

## 2022-08-22 DIAGNOSIS — Z89.421 STATUS POST AMPUTATION OF TOE OF RIGHT FOOT: Primary | ICD-10-CM

## 2022-08-22 PROCEDURE — 97605 NEG PRS WND THER DME<=50SQCM: CPT

## 2022-08-22 PROCEDURE — 97597 DBRDMT OPN WND 1ST 20 CM/<: CPT

## 2022-08-22 NOTE — THERAPY WOUND CARE TREATMENT
Outpatient Rehabilitation - Wound/Debridement Treatment Note  [facility]     Patient Name: Jesse Siu  : 1935  MRN: [id number]  Today's Date: 2022                 Admit Date: 2022    Visit Dx:    ICD-10-CM ICD-9-CM   1. Status post amputation of toe of right foot ([facility])  Z89.421 V49.72   2. Foot ulceration, right, with necrosis of muscle ([facility])  L97.513 707.15     728.89       Patient Active Problem List   Diagnosis   • Cellulitis of right foot   • Type 2 diabetes mellitus with hyperglycemia ([facility])   • Renal insufficiency   • Foot ulceration, right, with necrosis of muscle ([facility])        Past Medical History:   Diagnosis Date   • Hyperlipidemia    • Hypertension    • Pre-diabetes         Past Surgical History:   Procedure Laterality Date   • AMPUTATION DIGIT Right 2022    Procedure: GREAT AND SECOND TOE AMPUTATION RIGHT;  Surgeon: Lemuel Angela MD;  Location: [facility];  Service: Vascular;  Laterality: Right;   • FOOT SURGERY Left     CANCER REMOVAL   • TONSILLECTOMY     • TURP / TRANSURETHRAL INCISION / DRAINAGE PROSTATE           EVALUATION   PT Ortho     Row Name 22 09       Subjective Comments    Subjective Comments No complaints or changes.  -MC       Subjective Pain    Able to rate subjective pain? yes  -MC    Pre-Treatment Pain Level 0  -MC    Post-Treatment Pain Level 0  -MC       Transfers    Comment, (Transfers) remained seated in w/c for tx  -          User Key  (r) = Recorded By, (t) = Taken By, (c) = Cosigned By    Initials Name Provider Type    Sarah Beth Jordan PT Physical Therapist                 [facility] Wound     Row Name 22             Wound 22 1200 Right medial foot Amputation stump    Wound - Properties Group Placement Date: 22  -MF Placement Time: 1200  -MF Side: Right  -MF Orientation: medial  - Location: foot  -MF Primary Wound Type: Amputation s  -MF      Dressing Appearance intact;moist drainage  -      Base  granulating;moist;pink;red;exposed structure;slough;subcutaneous;yellow;white  bone palpable in depth, proximal crevice with some fibrous slough  -      Periwound intact;moist;pink;macerated;pale white  faint maceration plantar aspect, mild erythema  -      Periwound Temperature warm  -      Periwound Skin Turgor soft  -      Edges irregular  -      Drainage Characteristics/Odor serosanguineous  -      Drainage Amount small  -      Care, Wound cleansed with;wound cleanser;debrided;negative pressure wound therapy  -      Dressing Care dressing changed  vac, kerlix, spandage  -      Periwound Care cleansed with pH balanced cleanser;barrier film applied;topical treatment applied  Nystatin/Nosting crust x2, drape border  -      Wound Output (mL) 100  -      Retired Wound - Properties Group Placement Date: 05/20/22  - Placement Time: 1200  -MF Side: Right  - Orientation: medial  - Location: foot  - Primary Wound Type: Amputation s  -      Retired Wound - Properties Group Date first assessed: 05/20/22  - Time first assessed: 1200  -MF Side: Right  - Location: foot  - Primary Wound Type: Amputation s  -              NPWT (Negative Pressure Wound Therapy) 05/20/22 1200 R foot ampuation site    NPWT (Negative Pressure Wound Therapy) - Properties Group Placement Date: 05/20/22  - Placement Time: 1200  -MF Location: R foot ampuation site  -      Therapy Setting continuous therapy  -      Dressing foam, black  -      Contact Layer other (see comments)  zuri Ag  -      Pressure Setting 150 mmHg  -      Sponges Inserted 2  1 black to fill, 1 black to cover for trac pad  -      Sponges Removed 2  2 black  -MC      Finger sweep complete Yes  -      Retired NPWT (Negative Pressure Wound Therapy) - Properties Group Placement Date: 05/20/22  - Placement Time: 1200  -MF Location: R foot ampuation site  -      Retired NPWT (Negative Pressure Wound Therapy) - Properties Group  Placement Date: 05/20/22  - Placement Time: 1200  - Location: R foot ampuation site  -            User Key  (r) = Recorded By, (t) = Taken By, (c) = Cosigned By    Initials Name Provider Type     Wesley Morgan, PT Physical Therapist    Sarah Beth Jordan PT Physical Therapist                  WOUND DEBRIDEMENT  Total area of Debridement: 8 cm2  Debridement Site 1  Location- Site 1: R toe amputation site  Selective Debridement- Site 1: Wound Surface <20cmsq  Instruments- Site 1: tweezers, scissors  Excised Tissue Description- Site 1: minimum, slough, moderate, other (comment) (mod periwound crust, macerated debris)  Bleeding- Site 1: none              Therapy Education     Row Name 08/22/22 0900             Therapy Education    Education Details Continue current POC. Call provider if redness expands or worsens.  -      Given Bandaging/dressing change  -      Program Reinforced;Modified  -      How Provided Verbal;Demonstration  -MC      Provided to Patient;Caregiver  -      Level of Understanding Verbalized  -            User Key  (r) = Recorded By, (t) = Taken By, (c) = Cosigned By    Initials Name Provider Type    Sarah Beth Jordan PT Physical Therapist                Recommendation and Plan   PT Assessment/Plan     Row Name 08/22/22 0900          PT Assessment    Functional Limitations Performance in self-care ADL;Limitations in functional capacity and performance;Limitations in community activities  -     Impairments Integumentary integrity  -     Assessment Comments Pt with continued granulation and contraction of the wound edges. PT unable see bone, but can still palpate bone in the wound depth. The hypertrophic crust distal to the wound was macerated and easily debrided, revealing minimal fibrous slough in the crevice. Pt will continue to benefit from NPWT and debridement prn to promote healing.  -     Rehab Potential Good  -     Patient/caregiver participated in  establishment of treatment plan and goals Yes  -     Patient would benefit from skilled therapy intervention Yes  -            PT Plan    PT Frequency 2x/week  -     Physical Therapy Interventions (Optional Details) wound care;patient/family education  -     PT Plan Comments debridement, vac  -           User Key  (r) = Recorded By, (t) = Taken By, (c) = Cosigned By    Initials Name Provider Type    Sarah Beth Jordan, PT Physical Therapist                Goals   PT OP Goals     Row Name 08/22/22 0928          Time Calculation    PT Goal Re-Cert Due Date 11/09/22  -           User Key  (r) = Recorded By, (t) = Taken By, (c) = Cosigned By    Initials Name Provider Type     Sarah Beth Bailey, PT Physical Therapist                PT Goal Re-Cert Due Date: 11/09/22            Time Calculation: Start Time: 0900  Untimed Charges  39909-Xakqndpkk debridement: 5  06647-Rdt Pressure wound to 50 sqcm: 25  Total Minutes  Untimed Charges Total Minutes: 30   Total Minutes: 30  Therapy Charges for Today     Code Description Service Date Service Provider Modifiers Qty    69994571195 HC JOSE DEBRIDE OPEN WOUND UP TO 20CM 8/22/2022 Sarah Beth Bailey, PT GP 1    60815999656 HC PT NEG PRESS WOUND TO 50SQCM DME2 8/22/2022 Sarah Beth Bailey, PT GP 1                  Sarah Beth Bailey, PT  8/22/2022

## 2022-08-25 ENCOUNTER — TRANSCRIBE ORDERS (OUTPATIENT)
Dept: LAB | Facility: HOSPITAL | Age: 87
End: 2022-08-25

## 2022-08-25 ENCOUNTER — HOSPITAL ENCOUNTER (OUTPATIENT)
Dept: PHYSICAL THERAPY | Facility: HOSPITAL | Age: 87
Setting detail: THERAPIES SERIES
Discharge: HOME | End: 2022-08-25

## 2022-08-25 ENCOUNTER — LAB (OUTPATIENT)
Dept: LAB | Facility: HOSPITAL | Age: 87
End: 2022-08-25

## 2022-08-25 DIAGNOSIS — L97.513 FOOT ULCERATION, RIGHT, WITH NECROSIS OF MUSCLE: ICD-10-CM

## 2022-08-25 DIAGNOSIS — L03.115 CELLULITIS OF RIGHT FOOT: ICD-10-CM

## 2022-08-25 DIAGNOSIS — Z89.421 STATUS POST AMPUTATION OF TOE OF RIGHT FOOT: Primary | ICD-10-CM

## 2022-08-25 DIAGNOSIS — L03.115 CELLULITIS OF RIGHT FOOT: Primary | ICD-10-CM

## 2022-08-25 LAB
ALBUMIN SERPL-MCNC: 3.8 G/DL (ref 3.5–5.2)
ALBUMIN/GLOB SERPL: 1 G/DL
ALP SERPL-CCNC: 98 U/L (ref 39–117)
ALT SERPL W P-5'-P-CCNC: 8 U/L (ref 1–41)
ANION GAP SERPL CALCULATED.3IONS-SCNC: 13 MMOL/L (ref 5–15)
AST SERPL-CCNC: 14 U/L (ref 1–40)
BASOPHILS # BLD AUTO: 0.07 10*3/MM3 (ref 0–0.2)
BASOPHILS NFR BLD AUTO: 0.6 % (ref 0–1.5)
BILIRUB SERPL-MCNC: 0.2 MG/DL (ref 0–1.2)
BUN SERPL-MCNC: 23 MG/DL (ref 8–23)
BUN/CREAT SERPL: 21.9 (ref 7–25)
CALCIUM SPEC-SCNC: 10.2 MG/DL (ref 8.6–10.5)
CHLORIDE SERPL-SCNC: 102 MMOL/L (ref 98–107)
CO2 SERPL-SCNC: 25 MMOL/L (ref 22–29)
CREAT SERPL-MCNC: 1.05 MG/DL (ref 0.76–1.27)
CRP SERPL-MCNC: 1.86 MG/DL (ref 0–0.5)
DEPRECATED RDW RBC AUTO: 51.3 FL (ref 37–54)
EGFRCR SERPLBLD CKD-EPI 2021: 68.7 ML/MIN/1.73
EOSINOPHIL # BLD AUTO: 0.06 10*3/MM3 (ref 0–0.4)
EOSINOPHIL NFR BLD AUTO: 0.5 % (ref 0.3–6.2)
ERYTHROCYTE [DISTWIDTH] IN BLOOD BY AUTOMATED COUNT: 15.3 % (ref 12.3–15.4)
ERYTHROCYTE [SEDIMENTATION RATE] IN BLOOD: 60 MM/HR (ref 0–20)
GLOBULIN UR ELPH-MCNC: 3.9 GM/DL
GLUCOSE SERPL-MCNC: 218 MG/DL (ref 65–99)
HCT VFR BLD AUTO: 43.3 % (ref 37.5–51)
HGB BLD-MCNC: 13.4 G/DL (ref 13–17.7)
IMM GRANULOCYTES # BLD AUTO: 0.06 10*3/MM3 (ref 0–0.05)
IMM GRANULOCYTES NFR BLD AUTO: 0.5 % (ref 0–0.5)
LYMPHOCYTES # BLD AUTO: 1.4 10*3/MM3 (ref 0.7–3.1)
LYMPHOCYTES NFR BLD AUTO: 12.8 % (ref 19.6–45.3)
MCH RBC QN AUTO: 28.2 PG (ref 26.6–33)
MCHC RBC AUTO-ENTMCNC: 30.9 G/DL (ref 31.5–35.7)
MCV RBC AUTO: 91.2 FL (ref 79–97)
MONOCYTES # BLD AUTO: 0.62 10*3/MM3 (ref 0.1–0.9)
MONOCYTES NFR BLD AUTO: 5.7 % (ref 5–12)
NEUTROPHILS NFR BLD AUTO: 79.9 % (ref 42.7–76)
NEUTROPHILS NFR BLD AUTO: 8.75 10*3/MM3 (ref 1.7–7)
NRBC BLD AUTO-RTO: 0 /100 WBC (ref 0–0.2)
PLATELET # BLD AUTO: 459 10*3/MM3 (ref 140–450)
PMV BLD AUTO: 10.2 FL (ref 6–12)
POTASSIUM SERPL-SCNC: 5.1 MMOL/L (ref 3.5–5.2)
PROT SERPL-MCNC: 7.7 G/DL (ref 6–8.5)
RBC # BLD AUTO: 4.75 10*6/MM3 (ref 4.14–5.8)
SODIUM SERPL-SCNC: 140 MMOL/L (ref 136–145)
WBC NRBC COR # BLD: 10.96 10*3/MM3 (ref 3.4–10.8)

## 2022-08-25 PROCEDURE — 36415 COLL VENOUS BLD VENIPUNCTURE: CPT

## 2022-08-25 PROCEDURE — 85025 COMPLETE CBC W/AUTO DIFF WBC: CPT

## 2022-08-25 PROCEDURE — 97605 NEG PRS WND THER DME<=50SQCM: CPT

## 2022-08-25 PROCEDURE — 85652 RBC SED RATE AUTOMATED: CPT

## 2022-08-25 PROCEDURE — 86140 C-REACTIVE PROTEIN: CPT

## 2022-08-25 PROCEDURE — 80053 COMPREHEN METABOLIC PANEL: CPT

## 2022-08-25 PROCEDURE — 97597 DBRDMT OPN WND 1ST 20 CM/<: CPT

## 2022-08-25 NOTE — THERAPY WOUND CARE TREATMENT
Outpatient Rehabilitation - Wound/Debridement Treatment Note  [facility]     Patient Name: Jesse Siu  : 1935  MRN: [id number]  Today's Date: 2022                 Admit Date: 2022    Visit Dx:    ICD-10-CM ICD-9-CM   1. Status post amputation of toe of right foot ([facility])  Z89.421 V49.72   2. Foot ulceration, right, with necrosis of muscle ([facility])  L97.513 707.15     728.89   3. Cellulitis of right foot  L03.115 682.7         Patient Active Problem List   Diagnosis   • Cellulitis of right foot   • Type 2 diabetes mellitus with hyperglycemia ([facility])   • Renal insufficiency   • Foot ulceration, right, with necrosis of muscle ([facility])        Past Medical History:   Diagnosis Date   • Hyperlipidemia    • Hypertension    • Pre-diabetes         Past Surgical History:   Procedure Laterality Date   • AMPUTATION DIGIT Right 2022    Procedure: GREAT AND SECOND TOE AMPUTATION RIGHT;  Surgeon: Lemuel Angela MD;  Location: [facility];  Service: Vascular;  Laterality: Right;   • FOOT SURGERY Left     CANCER REMOVAL   • TONSILLECTOMY     • TURP / TRANSURETHRAL INCISION / DRAINAGE PROSTATE           EVALUATION   PT Ortho     Row Name 22 0835       Subjective Comments    Subjective Comments No complaints today.  No leaks/alarms from wound vac.  -       Subjective Pain    Able to rate subjective pain? yes  -VANI    Pre-Treatment Pain Level 0  -JM    Post-Treatment Pain Level 0  -       Transfers    Comment, (Transfers) remained seated in w/c for tx  -JM          User Key  (r) = Recorded By, (t) = Taken By, (c) = Cosigned By    Initials Name Provider Type    Nereyda Beck, PT Physical Therapist                 [facility] Wound     Row Name 22 0835             Wound 22 1200 Right medial foot Amputation stump    Wound - Properties Group Placement Date: 22  -MF Placement Time: 1200  -MF Side: Right  -MF Orientation: medial  -MF Location: foot  -MF Primary Wound Type: Amputation s  -MF       Wound Image View All Images View Images  -      Dressing Appearance intact;moist drainage  -      Base granulating;moist;pink;red;exposed structure;slough;subcutaneous;yellow;white  bone palpable in depth, proximal crevice with some fibrous slough  -      Periwound intact;moist;pink;macerated;pale white;redness;warm  increased erythema of medial periwound, spreading up medial foot  -      Periwound Temperature warm  -      Periwound Skin Turgor soft  -      Edges irregular  -      Wound Length (cm) 0.5 cm  -JM      Wound Width (cm) 3.5 cm  -JM      Wound Depth (cm) 1.5 cm  -JM      Wound Surface Area (cm^2) 1.75 cm^2  -JM      Wound Volume (cm^3) 2.625 cm^3  -JM      Drainage Characteristics/Odor serosanguineous  -      Drainage Amount small  -      Care, Wound cleansed with;wound cleanser;debrided;negative pressure wound therapy  -      Dressing Care dressing changed  vac, kerlix, spandage  -      Periwound Care cleansed with pH balanced cleanser;dry periwound area maintained;other (see comments)  nystatin powder/nosting spray x2 layers  -      Wound Output (mL) 150  changed canister  -JM      Retired Wound - Properties Group Placement Date: 05/20/22  - Placement Time: 1200  -MF Side: Right  - Orientation: medial  - Location: foot  -MF Primary Wound Type: Amputation s  -MF      Retired Wound - Properties Group Date first assessed: 05/20/22  - Time first assessed: 1200  -MF Side: Right  -MF Location: foot  -MF Primary Wound Type: Amputation s  -MF              NPWT (Negative Pressure Wound Therapy) 05/20/22 1200 R foot ampuation site    NPWT (Negative Pressure Wound Therapy) - Properties Group Placement Date: 05/20/22  - Placement Time: 1200  -MF Location: R foot ampuation site  -      Therapy Setting continuous therapy  -      Dressing foam, black  -JM      Contact Layer other (see comments)  zuri  -      Pressure Setting 150 mmHg  -      Sponges Inserted 2  2 black  -JM       Sponges Removed 2  2 black  -VANI      Finger sweep complete Yes  -VANI      Retired NPWT (Negative Pressure Wound Therapy) - Properties Group Placement Date: 05/20/22  - Placement Time: 1200  -MF Location: R foot ampuation site  -      Retired NPWT (Negative Pressure Wound Therapy) - Properties Group Placement Date: 05/20/22  - Placement Time: 1200  -MF Location: R foot ampuation site  -            User Key  (r) = Recorded By, (t) = Taken By, (c) = Cosigned By    Initials Name Provider Type    Wesley Vallejo, PT Physical Therapist    Nereyda Beck, PT Physical Therapist                  WOUND DEBRIDEMENT  Total area of Debridement: 3cmsq  Debridement Site 1  Location- Site 1: R toe amputation site  Selective Debridement- Site 1: Wound Surface <20cmsq  Instruments- Site 1: tweezers  Excised Tissue Description- Site 1: minimum, slough  Bleeding- Site 1: scant              Therapy Education     Row Name 08/25/22 0835             Therapy Education    Education Details Pt to go to lab, then appt with Dr. Crenshaw at 10:45 AM.  Call PT if MD removes wound vac dressing.  -VANI      Given Bandaging/dressing change  -      Program Reinforced;Modified  -VANI      How Provided Verbal;Demonstration  -VANI      Provided to Patient;Caregiver  -VANI      Level of Understanding Verbalized  -            User Key  (r) = Recorded By, (t) = Taken By, (c) = Cosigned By    Initials Name Provider Type    Nereyda Beck, PT Physical Therapist                Recommendation and Plan   PT Assessment/Plan     Row Name 08/25/22 0835          PT Assessment    Functional Limitations Performance in self-care ADL;Limitations in functional capacity and performance;Limitations in community activities  -     Impairments Integumentary integrity  -     Assessment Comments Pt with significant increase in erythema, spreading to medial ankle today, still has exposed bone in wound depth, has been off abx for 2-3 weeks.  PT  contacted [facility] office about increased S&S of infection, pt to see Dr. Crenshaw this AM at10:45 (Dr. Serrano out of the office).  Pt continues to benefit from wound vac and debridement.  Instructed pt to contact PT if MD removes vac dressing during appt today.  -            PT Plan    PT Frequency 2x/week  -VANI     Physical Therapy Interventions (Optional Details) patient/family education;wound care  -     PT Plan Comments vac, debridement  -           User Key  (r) = Recorded By, (t) = Taken By, (c) = Cosigned By    Initials Name Provider Type    Nereyda Beck, PT Physical Therapist                Goals   PT OP Goals     Row Name 08/25/22 0835          Time Calculation    PT Goal Re-Cert Due Date 11/09/22  -           User Key  (r) = Recorded By, (t) = Taken By, (c) = Cosigned By    Initials Name Provider Type    Nereyda Beck, PT Physical Therapist                PT Goal Re-Cert Due Date: 11/09/22            Time Calculation: Start Time: 0835  Untimed Charges  26563-Cnkdjxvfu debridement: 10  61403-Fwp Pressure wound to 50 sqcm: 25  Total Minutes  Untimed Charges Total Minutes: 35   Total Minutes: 35  Therapy Charges for Today     Code Description Service Date Service Provider Modifiers Qty    74077813383 HC PT NEG PRESS WOUND TO 50SQCM DME2 8/25/2022 Nereyda Bello, PT GP 1    96831613783 HC JOSE DEBRIDE OPEN WOUND UP TO 20CM 8/25/2022 Nereyda Bello, PT GP 1                  Nereyda Bello PT  8/25/2022

## 2022-08-29 ENCOUNTER — TRANSCRIBE ORDERS (OUTPATIENT)
Dept: LAB | Facility: HOSPITAL | Age: 87
End: 2022-08-29

## 2022-08-29 ENCOUNTER — LAB (OUTPATIENT)
Dept: LAB | Facility: HOSPITAL | Age: 87
End: 2022-08-29

## 2022-08-29 ENCOUNTER — HOSPITAL ENCOUNTER (OUTPATIENT)
Dept: PHYSICAL THERAPY | Facility: HOSPITAL | Age: 87
Setting detail: THERAPIES SERIES
Discharge: HOME | End: 2022-08-29

## 2022-08-29 DIAGNOSIS — K83.09 BACTERIAL CHOLANGITIS: ICD-10-CM

## 2022-08-29 DIAGNOSIS — E11.52 DIABETIC GANGRENE: ICD-10-CM

## 2022-08-29 DIAGNOSIS — E11.51 TYPE II DIABETES MELLITUS WITH PERIPHERAL CIRCULATORY DISORDER: ICD-10-CM

## 2022-08-29 DIAGNOSIS — Z89.421 STATUS POST AMPUTATION OF TOE OF RIGHT FOOT: Primary | ICD-10-CM

## 2022-08-29 DIAGNOSIS — L97.513 FOOT ULCERATION, RIGHT, WITH NECROSIS OF MUSCLE: ICD-10-CM

## 2022-08-29 DIAGNOSIS — N17.9 ACUTE RENAL FAILURE, UNSPECIFIED ACUTE RENAL FAILURE TYPE: Primary | ICD-10-CM

## 2022-08-29 DIAGNOSIS — B96.89 BACTERIAL CHOLANGITIS: ICD-10-CM

## 2022-08-29 DIAGNOSIS — N17.9 ACUTE RENAL FAILURE, UNSPECIFIED ACUTE RENAL FAILURE TYPE: ICD-10-CM

## 2022-08-29 DIAGNOSIS — L03.115 CELLULITIS OF RIGHT FOOT: ICD-10-CM

## 2022-08-29 LAB
ANION GAP SERPL CALCULATED.3IONS-SCNC: 11 MMOL/L (ref 5–15)
BASOPHILS # BLD AUTO: 0.06 10*3/MM3 (ref 0–0.2)
BASOPHILS NFR BLD AUTO: 0.6 % (ref 0–1.5)
BUN SERPL-MCNC: 26 MG/DL (ref 8–23)
BUN/CREAT SERPL: 20 (ref 7–25)
CALCIUM SPEC-SCNC: 9.7 MG/DL (ref 8.6–10.5)
CHLORIDE SERPL-SCNC: 105 MMOL/L (ref 98–107)
CO2 SERPL-SCNC: 24 MMOL/L (ref 22–29)
CREAT SERPL-MCNC: 1.3 MG/DL (ref 0.76–1.27)
CRP SERPL-MCNC: 0.6 MG/DL (ref 0–0.5)
DEPRECATED RDW RBC AUTO: 48.4 FL (ref 37–54)
EGFRCR SERPLBLD CKD-EPI 2021: 53.2 ML/MIN/1.73
EOSINOPHIL # BLD AUTO: 0.02 10*3/MM3 (ref 0–0.4)
EOSINOPHIL NFR BLD AUTO: 0.2 % (ref 0.3–6.2)
ERYTHROCYTE [DISTWIDTH] IN BLOOD BY AUTOMATED COUNT: 14.8 % (ref 12.3–15.4)
GLUCOSE SERPL-MCNC: 189 MG/DL (ref 65–99)
HCT VFR BLD AUTO: 39.8 % (ref 37.5–51)
HGB BLD-MCNC: 12.5 G/DL (ref 13–17.7)
IMM GRANULOCYTES # BLD AUTO: 0.04 10*3/MM3 (ref 0–0.05)
IMM GRANULOCYTES NFR BLD AUTO: 0.4 % (ref 0–0.5)
LYMPHOCYTES # BLD AUTO: 1.33 10*3/MM3 (ref 0.7–3.1)
LYMPHOCYTES NFR BLD AUTO: 13.9 % (ref 19.6–45.3)
MCH RBC QN AUTO: 28 PG (ref 26.6–33)
MCHC RBC AUTO-ENTMCNC: 31.4 G/DL (ref 31.5–35.7)
MCV RBC AUTO: 89.2 FL (ref 79–97)
MONOCYTES # BLD AUTO: 0.5 10*3/MM3 (ref 0.1–0.9)
MONOCYTES NFR BLD AUTO: 5.2 % (ref 5–12)
NEUTROPHILS NFR BLD AUTO: 7.64 10*3/MM3 (ref 1.7–7)
NEUTROPHILS NFR BLD AUTO: 79.7 % (ref 42.7–76)
NRBC BLD AUTO-RTO: 0 /100 WBC (ref 0–0.2)
PLATELET # BLD AUTO: 456 10*3/MM3 (ref 140–450)
PMV BLD AUTO: 10.5 FL (ref 6–12)
POTASSIUM SERPL-SCNC: 4.9 MMOL/L (ref 3.5–5.2)
RBC # BLD AUTO: 4.46 10*6/MM3 (ref 4.14–5.8)
SODIUM SERPL-SCNC: 140 MMOL/L (ref 136–145)
WBC NRBC COR # BLD: 9.59 10*3/MM3 (ref 3.4–10.8)

## 2022-08-29 PROCEDURE — 97605 NEG PRS WND THER DME<=50SQCM: CPT

## 2022-08-29 PROCEDURE — 97597 DBRDMT OPN WND 1ST 20 CM/<: CPT

## 2022-08-29 PROCEDURE — 80048 BASIC METABOLIC PNL TOTAL CA: CPT

## 2022-08-29 PROCEDURE — 36415 COLL VENOUS BLD VENIPUNCTURE: CPT

## 2022-08-29 PROCEDURE — 86140 C-REACTIVE PROTEIN: CPT

## 2022-08-29 PROCEDURE — 85025 COMPLETE CBC W/AUTO DIFF WBC: CPT

## 2022-08-29 NOTE — THERAPY WOUND CARE TREATMENT
Outpatient Rehabilitation - Wound/Debridement Treatment Note  [facility]     Patient Name: Jesse Siu  : 1935  MRN: [id number]  Today's Date: 2022                 Admit Date: 2022    Visit Dx:    ICD-10-CM ICD-9-CM   1. Status post amputation of toe of right foot ([facility])  Z89.421 V49.72   2. Foot ulceration, right, with necrosis of muscle ([facility])  L97.513 707.15     728.89   3. Cellulitis of right foot  L03.115 682.7         Patient Active Problem List   Diagnosis   • Cellulitis of right foot   • Type 2 diabetes mellitus with hyperglycemia ([facility])   • Renal insufficiency   • Foot ulceration, right, with necrosis of muscle ([facility])        Past Medical History:   Diagnosis Date   • Hyperlipidemia    • Hypertension    • Pre-diabetes         Past Surgical History:   Procedure Laterality Date   • AMPUTATION DIGIT Right 2022    Procedure: GREAT AND SECOND TOE AMPUTATION RIGHT;  Surgeon: Lemuel Angela MD;  Location: [facility];  Service: Vascular;  Laterality: Right;   • FOOT SURGERY Left     CANCER REMOVAL   • TONSILLECTOMY     • TURP / TRANSURETHRAL INCISION / DRAINAGE PROSTATE           EVALUATION   PT Ortho     Row Name 22 0845       Subjective Comments    Subjective Comments Pt states he was given a dose of IV abx on Thursday, and started on a 10-day round of oral abx.  Has repeat bloodwork today for follow-up next week.  -JM       Subjective Pain    Able to rate subjective pain? yes  -JM    Pre-Treatment Pain Level 0  -JM    Post-Treatment Pain Level 0  -JM       Transfers    Comment, (Transfers) remained seated in w/c for tx  -JM          User Key  (r) = Recorded By, (t) = Taken By, (c) = Cosigned By    Initials Name Provider Type    Nereyda Beck, PT Physical Therapist                 [facility] Wound     Row Name 22 0845             Wound 22 1200 Right medial foot Amputation stump    Wound - Properties Group Placement Date: 22  - Placement Time: 1200  -MF Side:  Right  - Orientation: medial  - Location: foot  -MF Primary Wound Type: Amputation s  -MF      Wound Image View All Images View Images  -      Dressing Appearance intact;moist drainage  -      Base granulating;moist;pink;red;exposed structure;slough;subcutaneous;yellow;white  thin layer of soft tissue covering bone  -      Periwound intact;moist;pink;macerated;pale white;redness;warm  min erythema, slightly improved, increased maceration/excoriation of periwound  -      Periwound Temperature warm  -      Periwound Skin Turgor soft  -      Edges irregular  -      Wound Length (cm) 0.5 cm  -      Wound Width (cm) 4 cm  -JM      Wound Depth (cm) 1.5 cm  -JM      Wound Surface Area (cm^2) 2 cm^2  -JM      Wound Volume (cm^3) 3 cm^3  -JM      Drainage Characteristics/Odor serosanguineous  -      Drainage Amount small  -JM      Care, Wound cleansed with;wound cleanser;debrided;negative pressure wound therapy  -      Dressing Care dressing changed  vac, kerlix, spandage  -      Periwound Care cleansed with pH balanced cleanser;dry periwound area maintained;other (see comments)  nystatin powder/nosting spray x2 layers  -      Wound Output (mL) 10  -JM      Retired Wound - Properties Group Placement Date: 05/20/22  - Placement Time: 1200  -MF Side: Right  - Orientation: medial  - Location: foot  -MF Primary Wound Type: Amputation s  -MF      Retired Wound - Properties Group Date first assessed: 05/20/22  - Time first assessed: 1200  -MF Side: Right  - Location: foot  -MF Primary Wound Type: Amputation s  -MF              NPWT (Negative Pressure Wound Therapy) 05/20/22 1200 R foot ampuation site    NPWT (Negative Pressure Wound Therapy) - Properties Group Placement Date: 05/20/22  - Placement Time: 1200  -MF Location: R foot ampuation site  -      Therapy Setting continuous therapy  -      Dressing foam, black  -      Contact Layer other (see comments)  zuri  -      Pressure  Setting 150 mmHg  -JM      Sponges Inserted 2  2 black  -JM      Sponges Removed 2  2 black  -JM      Finger sweep complete Yes  -JM      Retired NPWT (Negative Pressure Wound Therapy) - Properties Group Placement Date: 05/20/22  - Placement Time: 1200  -MF Location: R foot ampuation site  -      Retired NPWT (Negative Pressure Wound Therapy) - Properties Group Placement Date: 05/20/22  - Placement Time: 1200  -MF Location: R foot ampuation site  -            User Key  (r) = Recorded By, (t) = Taken By, (c) = Cosigned By    Initials Name Provider Type    Wesley Vallejo, PT Physical Therapist    Nereyda Beck, PT Physical Therapist                  WOUND DEBRIDEMENT  Total area of Debridement: 2cmsq  Debridement Site 1  Location- Site 1: R toe amputation site  Selective Debridement- Site 1: Wound Surface <20cmsq  Instruments- Site 1: tweezers  Excised Tissue Description- Site 1: minimum, slough  Bleeding- Site 1: scant              Therapy Education     Row Name 08/29/22 0682             Therapy Education    Education Details Continuation of POC  -JM      Given Bandaging/dressing change  -      Program Reinforced;Modified  -      How Provided Verbal;Demonstration  -VANI      Provided to Patient;Caregiver  -      Level of Understanding Verbalized  -            User Key  (r) = Recorded By, (t) = Taken By, (c) = Cosigned By    Initials Name Provider Type    Nereyda Beck, PT Physical Therapist                Recommendation and Plan   PT Assessment/Plan     Row Name 08/29/22 8981          PT Assessment    Functional Limitations Performance in self-care ADL;Limitations in functional capacity and performance;Limitations in community activities  -     Impairments Integumentary integrity  -     Assessment Comments Medial R foot erythema slightly improved since last tx.  Periwound with increased maceration.  New epithelialization down side walls of wound with previously exposed bone  now covered with thin layer of soft tissue.  Pt may be able to trial moist dressings and place vac on hold in next few txs.  -            PT Plan    PT Frequency 2x/week  -     Physical Therapy Interventions (Optional Details) patient/family education;wound care  -     PT Plan Comments vac, debridement  -           User Key  (r) = Recorded By, (t) = Taken By, (c) = Cosigned By    Initials Name Provider Type    Nereyda Beck, PT Physical Therapist                Goals   PT OP Goals     Row Name 08/29/22 0845          Time Calculation    PT Goal Re-Cert Due Date 11/09/22  -           User Key  (r) = Recorded By, (t) = Taken By, (c) = Cosigned By    Initials Name Provider Type    Nereyda Beck, PT Physical Therapist                PT Goal Re-Cert Due Date: 11/09/22            Time Calculation: Start Time: 0845  Untimed Charges  08528-Hhbvahjka debridement: 10  30702-Gpp Pressure wound to 50 sqcm: 25  Total Minutes  Untimed Charges Total Minutes: 35   Total Minutes: 35  Therapy Charges for Today     Code Description Service Date Service Provider Modifiers Qty    30085578932 HC JOSE DEBRIDE OPEN WOUND UP TO 20CM 8/29/2022 Nereyda Bello, PT GP 1    09256110029 HC PT NEG PRESS WOUND TO 50SQCM DME2 8/29/2022 Nereyda Bello, PT GP 1                  Nereyda Bello, PT  8/29/2022

## 2022-08-30 ENCOUNTER — TELEPHONE (OUTPATIENT)
Dept: CARDIAC SURGERY | Facility: CLINIC | Age: 87
End: 2022-08-30

## 2022-08-30 NOTE — TELEPHONE ENCOUNTER
Caller: Jesse Siu    Relationship: Self    Do you know the name of the person who called: MARÍA ELENA [id number] EXT [id number] NORMA REECE [phone]      What was the call regarding: MARÍA ELENA IS FAXING A PRESCRIPTION FOR WOUND VAC.

## 2022-09-01 ENCOUNTER — HOSPITAL ENCOUNTER (OUTPATIENT)
Dept: PHYSICAL THERAPY | Facility: HOSPITAL | Age: 87
Setting detail: THERAPIES SERIES
Discharge: HOME | End: 2022-09-01

## 2022-09-01 DIAGNOSIS — L03.115 CELLULITIS OF RIGHT FOOT: ICD-10-CM

## 2022-09-01 DIAGNOSIS — L97.513 FOOT ULCERATION, RIGHT, WITH NECROSIS OF MUSCLE: ICD-10-CM

## 2022-09-01 DIAGNOSIS — Z89.421 STATUS POST AMPUTATION OF TOE OF RIGHT FOOT: Primary | ICD-10-CM

## 2022-09-01 PROCEDURE — 97605 NEG PRS WND THER DME<=50SQCM: CPT

## 2022-09-01 PROCEDURE — 97597 DBRDMT OPN WND 1ST 20 CM/<: CPT

## 2022-09-01 NOTE — THERAPY WOUND CARE TREATMENT
Outpatient Rehabilitation - Wound/Debridement Treatment Note  [facility]     Patient Name: Jesse Siu  : 1935  MRN: [id number]  Today's Date: 2022                 Admit Date: 2022    Visit Dx:    ICD-10-CM ICD-9-CM   1. Status post amputation of toe of right foot ([facility])  Z89.421 V49.72   2. Foot ulceration, right, with necrosis of muscle ([facility])  L97.513 707.15     728.89   3. Cellulitis of right foot  L03.115 682.7     R medial foot          Patient Active Problem List   Diagnosis   • Cellulitis of right foot   • Type 2 diabetes mellitus with hyperglycemia ([facility])   • Renal insufficiency   • Foot ulceration, right, with necrosis of muscle ([facility])        Past Medical History:   Diagnosis Date   • Hyperlipidemia    • Hypertension    • Pre-diabetes         Past Surgical History:   Procedure Laterality Date   • AMPUTATION DIGIT Right 2022    Procedure: GREAT AND SECOND TOE AMPUTATION RIGHT;  Surgeon: Lemuel Angela MD;  Location: [facility];  Service: Vascular;  Laterality: Right;   • FOOT SURGERY Left     CANCER REMOVAL   • TONSILLECTOMY     • TURP / TRANSURETHRAL INCISION / DRAINAGE PROSTATE           EVALUATION   PT Ortho     Row Name 22 09       Subjective Comments    Subjective Comments Pt reports he still has a few days of antibiotics left. No new issues or complaints.  -       Subjective Pain    Able to rate subjective pain? yes  -    Pre-Treatment Pain Level 0  -    Post-Treatment Pain Level 0  -       Transfers    Comment, (Transfers) remained seated in w/c for tx  -          User Key  (r) = Recorded By, (t) = Taken By, (c) = Cosigned By    Initials Name Provider Type    Nimesh Obrien, PT Physical Therapist                 LDA Wound     Row Name 22             Wound 22 1200 Right medial foot Amputation stump    Wound - Properties Group Placement Date: 22  -MF Placement Time: 1200  -MF Side: Right  -MF Orientation: medial  -MF Location: foot   -MF Primary Wound Type: Amputation s  -MF      Wound Image View All Images View Images  -      Dressing Appearance intact;moist drainage  -      Base granulating;moist;pink;red;exposed structure;slough;subcutaneous;yellow;white  thin layer of soft tissue covering bone  -      Periwound intact;moist;pink;macerated;pale white;redness;warm  min erythema, slightly improved, mild maceration of periwound  -      Periwound Temperature warm  -      Periwound Skin Turgor soft  -      Edges irregular  -      Wound Length (cm) 0.5 cm  -      Wound Width (cm) 3.9 cm  -      Wound Depth (cm) 1.5 cm  -      Wound Surface Area (cm^2) 1.95 cm^2  -      Wound Volume (cm^3) 2.925 cm^3  -      Drainage Characteristics/Odor serosanguineous  -      Drainage Amount small  -      Care, Wound cleansed with;wound cleanser;debrided;negative pressure wound therapy  -      Dressing Care dressing changed  vac, kerlix, spandage  -      Periwound Care cleansed with pH balanced cleanser;dry periwound area maintained;other (see comments)  nystatin powder/nosting spray x2 layers  -      Wound Output (mL) 10  -LH      Retired Wound - Properties Group Placement Date: 05/20/22  - Placement Time: 1200  -MF Side: Right  - Orientation: medial  - Location: foot  -MF Primary Wound Type: Amputation s  -MF      Retired Wound - Properties Group Date first assessed: 05/20/22  - Time first assessed: 1200  -MF Side: Right  - Location: foot  -MF Primary Wound Type: Amputation s  -MF              NPWT (Negative Pressure Wound Therapy) 05/20/22 1200 R foot ampuation site    NPWT (Negative Pressure Wound Therapy) - Properties Group Placement Date: 05/20/22  - Placement Time: 1200  -MF Location: R foot ampuation site  -      Therapy Setting continuous therapy  -      Dressing foam, black  -      Contact Layer other (see comments)  Dayna Ag  -      Pressure Setting 150 mmHg  -      Sponges Inserted 2  2 black   -      Sponges Removed 2  2 black  -      Finger sweep complete Yes  -      Retired NPWT (Negative Pressure Wound Therapy) - Properties Group Placement Date: 05/20/22  - Placement Time: 1200  -MF Location: R foot ampuation site  -      Retired NPWT (Negative Pressure Wound Therapy) - Properties Group Placement Date: 05/20/22  - Placement Time: 1200  -MF Location: R foot ampuation site  -            User Key  (r) = Recorded By, (t) = Taken By, (c) = Cosigned By    Initials Name Provider Type     Wesley Morgan, PT Physical Therapist     Nimesh España, PT Physical Therapist                  WOUND DEBRIDEMENT  Total area of Debridement: 2cm2  Debridement Site 1  Location- Site 1: R toe amputation site  Selective Debridement- Site 1: Wound Surface <20cmsq  Instruments- Site 1: tweezers  Excised Tissue Description- Site 1: minimum, slough  Bleeding- Site 1: none              Therapy Education     Row Name 09/01/22 0900             Therapy Education    Education Details Continue current POC. May potentially HOLD wound vac in next couple of treatments and trial normal dressings.  -      Given Bandaging/dressing change  -      Program Reinforced;Modified  -      How Provided Verbal;Demonstration  -      Provided to Patient;Caregiver  -      Level of Understanding Verbalized  -            User Key  (r) = Recorded By, (t) = Taken By, (c) = Cosigned By    Initials Name Provider Type     Nimesh España, PT Physical Therapist                Recommendation and Plan   PT Assessment/Plan     Row Name 09/01/22 0900          PT Assessment    Functional Limitations Performance in self-care ADL;Limitations in functional capacity and performance;Limitations in community activities  -     Impairments Integumentary integrity  -     Assessment Comments Pt continuing to demonstrate improvements in R medial foot erythema and increased granulation of walls of wound base. Continues with no bone  palpable at wound base. PT allowing for thickening of tissue covering bone prior to potential trial of holding wound vac in next couple of treatements.  -     Rehab Potential Good  -     Patient/caregiver participated in establishment of treatment plan and goals Yes  -     Patient would benefit from skilled therapy intervention Yes  -            PT Plan    PT Frequency 2x/week  -     Physical Therapy Interventions (Optional Details) wound care;patient/family education  -     PT Plan Comments vac, debridement  -           User Key  (r) = Recorded By, (t) = Taken By, (c) = Cosigned By    Initials Name Provider Type     Nimesh España, PT Physical Therapist                Goals   PT OP Goals     Row Name 09/01/22 1000          Time Calculation    PT Goal Re-Cert Due Date 11/09/22  -           User Key  (r) = Recorded By, (t) = Taken By, (c) = Cosigned By    Initials Name Provider Type     Nimesh España, PT Physical Therapist                PT Goal Re-Cert Due Date: 11/09/22            Time Calculation: Start Time: 0845  Untimed Charges  59150-Bffhymanj debridement: 10  53481-Rcx Pressure wound to 50 sqcm: 25  Total Minutes  Untimed Charges Total Minutes: 35   Total Minutes: 35  Therapy Charges for Today     Code Description Service Date Service Provider Modifiers Qty    19107017933 HC JOSE DEBRIDE OPEN WOUND UP TO 20CM 9/1/2022 Nimesh España, PT GP 1    39190750807 HC PT NEG PRESS WOUND TO 50SQCM DME2 9/1/2022 Nimesh España, PT GP 1                  Nimesh España PT  9/1/2022

## 2022-09-05 ENCOUNTER — HOSPITAL ENCOUNTER (OUTPATIENT)
Dept: PHYSICAL THERAPY | Facility: HOSPITAL | Age: 87
Setting detail: THERAPIES SERIES
Discharge: HOME | End: 2022-09-05

## 2022-09-05 DIAGNOSIS — Z89.421 STATUS POST AMPUTATION OF TOE OF RIGHT FOOT: Primary | ICD-10-CM

## 2022-09-05 DIAGNOSIS — L97.513 FOOT ULCERATION, RIGHT, WITH NECROSIS OF MUSCLE: ICD-10-CM

## 2022-09-05 DIAGNOSIS — L03.115 CELLULITIS OF RIGHT FOOT: ICD-10-CM

## 2022-09-05 PROCEDURE — 97597 DBRDMT OPN WND 1ST 20 CM/<: CPT

## 2022-09-05 NOTE — THERAPY WOUND CARE TREATMENT
Outpatient Rehabilitation - Wound/Debridement Treatment Note  [facility]     Patient Name: Jesse Siu  : 1935  MRN: [id number]  Today's Date: 2022                 Admit Date: 2022    Visit Dx:    ICD-10-CM ICD-9-CM   1. Status post amputation of toe of right foot ([facility])  Z89.421 V49.72   2. Foot ulceration, right, with necrosis of muscle ([facility])  L97.513 707.15     728.89   3. Cellulitis of right foot  L03.115 682.7       Patient Active Problem List   Diagnosis   • Cellulitis of right foot   • Type 2 diabetes mellitus with hyperglycemia ([facility])   • Renal insufficiency   • Foot ulceration, right, with necrosis of muscle ([facility])        Past Medical History:   Diagnosis Date   • Hyperlipidemia    • Hypertension    • Pre-diabetes         Past Surgical History:   Procedure Laterality Date   • AMPUTATION DIGIT Right 2022    Procedure: GREAT AND SECOND TOE AMPUTATION RIGHT;  Surgeon: Lemuel Angela MD;  Location: [facility];  Service: Vascular;  Laterality: Right;   • FOOT SURGERY Left     CANCER REMOVAL   • TONSILLECTOMY     • TURP / TRANSURETHRAL INCISION / DRAINAGE PROSTATE           EVALUATION   PT Ortho     Row Name 22 0845       Subjective Comments    Subjective Comments Pt just finished his abx, sees both Dr. Angela and Dr. Millan on Wednesday prior to PT appt.  -VANI       Subjective Pain    Able to rate subjective pain? yes  -VANI    Pre-Treatment Pain Level 0  -JM    Post-Treatment Pain Level 0  -JM       Transfers    Comment, (Transfers) seated in w/c for tx  -JM          User Key  (r) = Recorded By, (t) = Taken By, (c) = Cosigned By    Initials Name Provider Type    Nereyda Beck PT Physical Therapist                 LDA Wound     Row Name 22 0845             Wound 22 1200 Right medial foot Amputation stump    Wound - Properties Group Placement Date: 22  -MF Placement Time: 1200  -MF Side: Right  -MF Orientation: medial  -MF Location: foot  -MF Primary Wound  Type: Amputation s  -MF      Wound Image View All Images View Images  -      Dressing Appearance intact;moist drainage  -      Base granulating;moist;pink;red;exposed structure;slough;subcutaneous;yellow;white  thin layer of soft tissue covering bone  -      Periwound intact;moist;pink;macerated;pale white;redness;warm  min erythema improved, mild maceration of periwound  -      Periwound Temperature warm  -      Periwound Skin Turgor soft  -      Edges irregular  -      Wound Length (cm) 0.5 cm  -JM      Wound Width (cm) 4 cm  -JM      Wound Depth (cm) 1.5 cm  -JM      Wound Surface Area (cm^2) 2 cm^2  -JM      Wound Volume (cm^3) 3 cm^3  -JM      Drainage Characteristics/Odor serosanguineous  -      Drainage Amount small  -      Care, Wound cleansed with;wound cleanser;debrided  -      Dressing Care dressing applied;collagen;antimicrobial agent applied;foam;gauze  zuri, HFBt to pack, primafix to secure packing, 4x4 gauze, kerlix, size 6 spandage  -      Periwound Care cleansed with pH balanced cleanser;dry periwound area maintained;other (see comments)  nystatin powder/nosting crust x1 layer  -      Wound Output (mL) 15  canister discarded due to vac on HOLD  -      Retired Wound - Properties Group Placement Date: 05/20/22  - Placement Time: 1200  -MF Side: Right  - Orientation: medial  - Location: foot  -MF Primary Wound Type: Amputation s  -      Retired Wound - Properties Group Date first assessed: 05/20/22  - Time first assessed: 1200  -MF Side: Right  - Location: foot  -MF Primary Wound Type: Amputation s  -MF              NPWT (Negative Pressure Wound Therapy) 05/20/22 1200 R foot ampuation site    NPWT (Negative Pressure Wound Therapy) - Properties Group Placement Date: 05/20/22  - Placement Time: 1200  -MF Location: R foot ampuation site  -      Therapy Setting vacuum off  on HOLD to assess further need for NPWT  -      Sponges Removed 2  2 black  -       Finger sweep complete Yes  -VANI      Retired NPWT (Negative Pressure Wound Therapy) - Properties Group Placement Date: 05/20/22  - Placement Time: 1200  - Location: R foot ampuation site  -      Retired NPWT (Negative Pressure Wound Therapy) - Properties Group Placement Date: 05/20/22  - Placement Time: 1200  -MF Location: R foot ampuation site  -            User Key  (r) = Recorded By, (t) = Taken By, (c) = Cosigned By    Initials Name Provider Type    Wesley Vallejo, PT Physical Therapist    Nereyda Beck, PT Physical Therapist                  WOUND DEBRIDEMENT  Total area of Debridement: 2cmsq  Debridement Site 1  Location- Site 1: R toe amputation site  Selective Debridement- Site 1: Wound Surface <20cmsq  Instruments- Site 1: tweezers  Excised Tissue Description- Site 1: moderate, slough  Bleeding- Site 1: none              Therapy Education     Row Name 09/05/22 0845             Therapy Education    Education Details Keep packing intact, change gauze layers if wet/disrupted prior to next tx.  -JM      Given Bandaging/dressing change  -      Program Reinforced;Modified  -      How Provided Verbal;Demonstration  -VANI      Provided to Patient;Caregiver  -      Level of Understanding Verbalized  -            User Key  (r) = Recorded By, (t) = Taken By, (c) = Cosigned By    Initials Name Provider Type    Nereyda Beck, PT Physical Therapist                Recommendation and Plan   PT Assessment/Plan     Row Name 09/05/22 0845          PT Assessment    Functional Limitations Performance in self-care ADL;Limitations in functional capacity and performance;Limitations in community activities  -     Impairments Integumentary integrity  -     Assessment Comments Pt's wound with continued epithelialization down side walls, with bone in depth now covered with soft tissue/granulation.  Periwound erythema improving s/p abx.  PT placed wound vac on HOLD to assess response to moist  dressings, and to improve maceration.  Likely will d/c vac after 1-2 txs if improving.  -            PT Plan    PT Frequency 2x/week  -     Physical Therapy Interventions (Optional Details) patient/family education;wound care  -     PT Plan Comments vac on HOLD, debridement  -           User Key  (r) = Recorded By, (t) = Taken By, (c) = Cosigned By    Initials Name Provider Type    Nereyda Beck, PT Physical Therapist                Goals   PT OP Goals     Row Name 09/05/22 0845          Time Calculation    PT Goal Re-Cert Due Date 11/09/22  -           User Key  (r) = Recorded By, (t) = Taken By, (c) = Cosigned By    Initials Name Provider Type    Nereyda Beck, PT Physical Therapist                PT Goal Re-Cert Due Date: 11/09/22            Time Calculation: Start Time: 0845  Untimed Charges  64860-Bueckuykx debridement: 25  Total Minutes  Untimed Charges Total Minutes: 25   Total Minutes: 25  Therapy Charges for Today     Code Description Service Date Service Provider Modifiers Qty    43251979000 HC JOSE DEBRIDE OPEN WOUND UP TO 20CM 9/5/2022 Nereyda Bello, PT GP 1                  Nereyda Bello, PT  9/5/2022

## 2022-09-07 ENCOUNTER — OFFICE VISIT (OUTPATIENT)
Dept: CARDIAC SURGERY | Facility: CLINIC | Age: 87
End: 2022-09-07

## 2022-09-07 ENCOUNTER — HOSPITAL ENCOUNTER (OUTPATIENT)
Dept: PHYSICAL THERAPY | Facility: HOSPITAL | Age: 87
Setting detail: THERAPIES SERIES
Discharge: HOME | End: 2022-09-07

## 2022-09-07 VITALS
SYSTOLIC BLOOD PRESSURE: 130 MMHG | DIASTOLIC BLOOD PRESSURE: 70 MMHG | HEART RATE: 74 BPM | OXYGEN SATURATION: 98 % | TEMPERATURE: 96.9 F | WEIGHT: 155 LBS | BODY MASS INDEX: 22.19 KG/M2 | HEIGHT: 70 IN

## 2022-09-07 DIAGNOSIS — L97.513 FOOT ULCERATION, RIGHT, WITH NECROSIS OF MUSCLE: ICD-10-CM

## 2022-09-07 DIAGNOSIS — Z89.421 STATUS POST AMPUTATION OF TOE OF RIGHT FOOT: Primary | ICD-10-CM

## 2022-09-07 DIAGNOSIS — L03.115 CELLULITIS OF RIGHT FOOT: ICD-10-CM

## 2022-09-07 DIAGNOSIS — I96 GANGRENE OF FOOT: Primary | ICD-10-CM

## 2022-09-07 PROCEDURE — 97597 DBRDMT OPN WND 1ST 20 CM/<: CPT

## 2022-09-07 PROCEDURE — 99024 POSTOP FOLLOW-UP VISIT: CPT | Performed by: THORACIC SURGERY (CARDIOTHORACIC VASCULAR SURGERY)

## 2022-09-07 NOTE — PROGRESS NOTES
"Patient Information  Jesse Siu                                                                                          [address]  [place] [address]      1935  [unfilled]  [unfilled]    Chief Complaint   Patient presents with   • Wound Check     6 week follow-up for evaluation of right great toe, 2nd toe, 3rd toe amputation site.          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  Wound VAC was placed.  Last seen on July 27, 2022 which time he was healing remarkably well with epithelization edges and almost complete closure of this open wound.    Vitals:    09/07/22 1103   BP: 130/70   BP Location: Right arm   Patient Position: Sitting   Pulse: 74   Temp: 96.9 °F (36.1 °C)   SpO2: 98%   Weight: 70.3 kg (155 lb)   Height: 177.8 cm (70\")        Physical Exam continue improvement of epithelization of this previous large open debrided surgical site.  Only very small amount of granulation tissue remains.  Saline soaked 4 x 4's applied to the granulation site covered then by Kerlix wrap    Lab/other results:    Assessment: #1.  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    Plan: Continue wet to damp dressing to this small granulation area of this previous large surgical wound.  We will plan to see the patient back in 2 months for follow-up at which time probably the entire wound will epithelialize.    Lemuel Angela M.D.   "

## 2022-09-07 NOTE — THERAPY WOUND CARE TREATMENT
Outpatient Rehabilitation - Wound/Debridement Treatment Note  [facility]     Patient Name: Jesse Siu  : 1935  MRN: [id number]  Today's Date: 2022                 Admit Date: 2022    Visit Dx:    ICD-10-CM ICD-9-CM   1. Status post amputation of toe of right foot ([facility])  Z89.421 V49.72   2. Foot ulceration, right, with necrosis of muscle ([facility])  L97.513 707.15     728.89   3. Cellulitis of right foot  L03.115 682.7       Patient Active Problem List   Diagnosis   • Cellulitis of right foot   • Type 2 diabetes mellitus with hyperglycemia ([facility])   • Renal insufficiency   • Foot ulceration, right, with necrosis of muscle ([facility])        Past Medical History:   Diagnosis Date   • Hyperlipidemia    • Hypertension    • Pre-diabetes         Past Surgical History:   Procedure Laterality Date   • AMPUTATION DIGIT Right 2022    Procedure: GREAT AND SECOND TOE AMPUTATION RIGHT;  Surgeon: Lemuel Angela MD;  Location: [facility];  Service: Vascular;  Laterality: Right;   • FOOT SURGERY Left     CANCER REMOVAL   • TONSILLECTOMY     • TURP / TRANSURETHRAL INCISION / DRAINAGE PROSTATE           EVALUATION   PT Ortho     Row Name 22 1300       Subjective Comments    Subjective Comments Pt had appts with Dr. Millan and Dr. Angela today, states he is continuing on oral abx for another 10 days.  Has follow-up with Dr. Angela in 2 months.  -JM       Subjective Pain    Able to rate subjective pain? yes  -JM    Pre-Treatment Pain Level 0  -JM    Post-Treatment Pain Level 0  -JM       Transfers    Comment, (Transfers) seated in w/c for tx  -JM          User Key  (r) = Recorded By, (t) = Taken By, (c) = Cosigned By    Initials Name Provider Type    Nereyda Beck, PT Physical Therapist                 LDA Wound     Row Name 22 1300             Wound 22 1200 Right medial foot Amputation stump    Wound - Properties Group Placement Date: 22  - Placement Time: 1200  -MF Side: Right  -  Orientation: medial  - Location: foot  -MF Primary Wound Type: Amputation s  -MF      Wound Image View All Images View Images  -      Dressing Appearance intact;moist drainage;other (see comments)  temporary dressing placed at MD office  -      Base granulating;moist;pink;red;exposed structure;slough;subcutaneous;yellow;white  thin layer of soft tissue covering bone in depth  -      Periwound intact;moist;pink;macerated;pale white;redness;warm  min erythema improved, mild maceration of periwound  -      Periwound Temperature warm  -      Periwound Skin Turgor soft  -      Edges irregular  -      Drainage Characteristics/Odor serosanguineous  -      Drainage Amount small  -      Care, Wound cleansed with;wound cleanser;debrided  -      Dressing Care dressing applied;collagen;foam;gauze  zuri, HFBt to pack with primafix to secure, 4x4 gauze, kerlix, size 6 spandage  -      Periwound Care cleansed with pH balanced cleanser;dry periwound area maintained;topical treatment applied  nystatin powder/nosting crusting x1 layer  -      Retired Wound - Properties Group Placement Date: 05/20/22  - Placement Time: 1200  -MF Side: Right  -MF Orientation: medial  - Location: foot  -MF Primary Wound Type: Amputation s  -      Retired Wound - Properties Group Date first assessed: 05/20/22  - Time first assessed: 1200  -MF Side: Right  -MF Location: foot  -MF Primary Wound Type: Amputation s  -MF              NPWT (Negative Pressure Wound Therapy) 05/20/22 1200 R foot ampuation site    NPWT (Negative Pressure Wound Therapy) - Properties Group Placement Date: 05/20/22  - Placement Time: 1200  -MF Location: R foot ampuation site  -MF      Therapy Setting vacuum off  on HOLD  -      Retired NPWT (Negative Pressure Wound Therapy) - Properties Group Placement Date: 05/20/22  - Placement Time: 1200  -MF Location: R foot ampuation site  -      Retired NPWT (Negative Pressure Wound Therapy) -  Properties Group Placement Date: 05/20/22  - Placement Time: 1200  - Location: R foot ampuation site  -            User Key  (r) = Recorded By, (t) = Taken By, (c) = Cosigned By    Initials Name Provider Type    Wesley Vallejo, PT Physical Therapist    Nereyda Beck, PT Physical Therapist                  WOUND DEBRIDEMENT  Total area of Debridement: 2cmsq  Debridement Site 1  Location- Site 1: R toe amputation site  Selective Debridement- Site 1: Wound Surface <20cmsq  Instruments- Site 1: tweezers  Excised Tissue Description- Site 1: moderate, slough  Bleeding- Site 1: scant              Therapy Education     Row Name 09/07/22 1300             Therapy Education    Education Details Keep packing in place, change gauze and kerlix layers in 2-3 days.  Likely resuming of VAC next tx  -      Given Bandaging/dressing change  -      Program Reinforced;Modified  -VANI      How Provided Verbal;Demonstration  -VANI      Provided to Patient;Caregiver  -VANI      Level of Understanding Verbalized  -            User Key  (r) = Recorded By, (t) = Taken By, (c) = Cosigned By    Initials Name Provider Type    Nereyda Beck, PT Physical Therapist                Recommendation and Plan   PT Assessment/Plan     Row Name 09/07/22 1300          PT Assessment    Functional Limitations Performance in self-care ADL;Limitations in functional capacity and performance;Limitations in community activities  -VANI     Impairments Integumentary integrity  -VANI     Assessment Comments Periwound erythema and maceration improved.  Depth of wound with thin layer of yellow soft tissue over exposed bone, may still benefit from NPWT for another 1-2 weeks to help fully granulate in depth.  PT kept vac on HOLD to assess response to moist dressings, family to change dressing PRN until next tx.  May resume VAC next tx to assist with granulation formation in depth of wound.  -            PT Plan    PT Frequency 2x/week  -VANI      Physical Therapy Interventions (Optional Details) patient/family education;wound care  -     PT Plan Comments ?resume vac, debridement, dressings  -           User Key  (r) = Recorded By, (t) = Taken By, (c) = Cosigned By    Initials Name Provider Type    Nereyda Beck, PT Physical Therapist                Goals   PT OP Goals     Row Name 09/07/22 1300          Time Calculation    PT Goal Re-Cert Due Date 11/09/22  -           User Key  (r) = Recorded By, (t) = Taken By, (c) = Cosigned By    Initials Name Provider Type    Nereyda Beck, PT Physical Therapist                PT Goal Re-Cert Due Date: 11/09/22            Time Calculation: Start Time: 1300  Untimed Charges  77934-Vgxnpwjkr debridement: 25  Total Minutes  Untimed Charges Total Minutes: 25   Total Minutes: 25  Therapy Charges for Today     Code Description Service Date Service Provider Modifiers Qty    45190320058 HC JOSE DEBRIDE OPEN WOUND UP TO 20CM 9/7/2022 Nereyda Bello, PT GP 1                  Nereyda Bello, PT  9/7/2022

## 2022-09-12 ENCOUNTER — HOSPITAL ENCOUNTER (OUTPATIENT)
Dept: PHYSICAL THERAPY | Facility: HOSPITAL | Age: 87
Setting detail: THERAPIES SERIES
Discharge: HOME | End: 2022-09-12

## 2022-09-12 DIAGNOSIS — Z89.421 STATUS POST AMPUTATION OF TOE OF RIGHT FOOT: Primary | ICD-10-CM

## 2022-09-12 DIAGNOSIS — L97.513 FOOT ULCERATION, RIGHT, WITH NECROSIS OF MUSCLE: ICD-10-CM

## 2022-09-12 DIAGNOSIS — L03.115 CELLULITIS OF RIGHT FOOT: ICD-10-CM

## 2022-09-12 PROCEDURE — 97605 NEG PRS WND THER DME<=50SQCM: CPT

## 2022-09-12 PROCEDURE — 97597 DBRDMT OPN WND 1ST 20 CM/<: CPT

## 2022-09-12 NOTE — THERAPY PROGRESS REPORT/RE-CERT
Outpatient Rehabilitation - Wound/Debridement Progress Note  [facility]     Patient Name: Jesse Siu  : 1935  MRN: [id number]  Today's Date: 2022                 Admit Date: 2022    Visit Dx:    ICD-10-CM ICD-9-CM   1. Status post amputation of toe of right foot ([facility])  Z89.421 V49.72   2. Foot ulceration, right, with necrosis of muscle ([facility])  L97.513 707.15     728.89   3. Cellulitis of right foot  L03.115 682.7       Patient Active Problem List   Diagnosis   • Cellulitis of right foot   • Type 2 diabetes mellitus with hyperglycemia ([facility])   • Renal insufficiency   • Foot ulceration, right, with necrosis of muscle ([facility])        Past Medical History:   Diagnosis Date   • Hyperlipidemia    • Hypertension    • Pre-diabetes         Past Surgical History:   Procedure Laterality Date   • AMPUTATION DIGIT Right 2022    Procedure: GREAT AND SECOND TOE AMPUTATION RIGHT;  Surgeon: Lemuel Angela MD;  Location: [facility];  Service: Vascular;  Laterality: Right;   • FOOT SURGERY Left     CANCER REMOVAL   • TONSILLECTOMY     • TURP / TRANSURETHRAL INCISION / DRAINAGE PROSTATE           EVALUATION   PT Ortho     Row Name 2245       Subjective Comments    Subjective Comments No complaints, daughter changed dressings twice, left packing in place.  -       Subjective Pain    Able to rate subjective pain? yes  -    Pre-Treatment Pain Level 0  -    Post-Treatment Pain Level 0  -       Transfers    Comment, (Transfers) seated in w/c for tx  -JM          User Key  (r) = Recorded By, (t) = Taken By, (c) = Cosigned By    Initials Name Provider Type    Nereyda Beck, PT Physical Therapist                 [facility] Wound     Row Name 22 0845             Wound 22 1200 Right medial foot Amputation stump    Wound - Properties Group Placement Date: 22  -MF Placement Time: 1200  -MF Side: Right  -MF Orientation: medial  -MF Location: foot  -MF Primary Wound Type: Amputation s  -MF       Wound Image View All Images View Images  -      Dressing Appearance intact;moist drainage  -      Base granulating;moist;pink;red;exposed structure;slough;subcutaneous;yellow;white  thin layer of soft tissue covering bone in depth  -      Periwound intact;moist;pink;macerated;pale white;redness;warm  min erythema improved, mild maceration of periwound  -      Periwound Temperature warm  -      Periwound Skin Turgor soft  -      Edges irregular  -      Wound Length (cm) 0.6 cm  -JM      Wound Width (cm) 4 cm  -JM      Wound Depth (cm) 1.1 cm  -JM      Wound Surface Area (cm^2) 2.4 cm^2  -JM      Wound Volume (cm^3) 2.64 cm^3  -JM      Drainage Characteristics/Odor serosanguineous  -      Drainage Amount small  -JM      Care, Wound cleansed with;wound cleanser;debrided;negative pressure wound therapy  -      Dressing Care dressing applied  vac, kerlix, size 6 spandage  -      Periwound Care cleansed with pH balanced cleanser;dry periwound area maintained;other (see comments)  nystatin powder/nosting spray crusting, paste/drape border  -      Retired Wound - Properties Group Placement Date: 05/20/22  - Placement Time: 1200  -MF Side: Right  -MF Orientation: medial  -MF Location: foot  -MF Primary Wound Type: Amputation s  -MF      Retired Wound - Properties Group Date first assessed: 05/20/22  - Time first assessed: 1200  -MF Side: Right  -MF Location: foot  -MF Primary Wound Type: Amputation s  -MF              NPWT (Negative Pressure Wound Therapy) 05/20/22 1200 R foot ampuation site    NPWT (Negative Pressure Wound Therapy) - Properties Group Placement Date: 05/20/22  - Placement Time: 1200  -MF Location: R foot ampuation site  -      Therapy Setting continuous therapy  -      Dressing foam, black  -JM      Contact Layer other (see comments)  zuri  -      Pressure Setting 150 mmHg  -      Sponges Inserted 2  2 black  -JM      Finger sweep complete Yes  -      Retired NPWT  (Negative Pressure Wound Therapy) - Properties Group Placement Date: 05/20/22  - Placement Time: 1200  -MF Location: R foot ampuation site  -      Retired NPWT (Negative Pressure Wound Therapy) - Properties Group Placement Date: 05/20/22  - Placement Time: 1200  - Location: R foot ampuation site  -            User Key  (r) = Recorded By, (t) = Taken By, (c) = Cosigned By    Initials Name Provider Type    Wesley Vallejo, PT Physical Therapist    Nereyda Beck, RENE Physical Therapist                  WOUND DEBRIDEMENT  Total area of Debridement: 2cmsq  Debridement Site 1  Location- Site 1: R toe amputation site  Selective Debridement- Site 1: Wound Surface <20cmsq  Instruments- Site 1: tweezers  Excised Tissue Description- Site 1: minimum, slough  Bleeding- Site 1: scant              Therapy Education     Row Name 09/12/22 0900             Therapy Education    Education Details Continuation of vac until base of wound fully granulated or decrease in wound depth to less than 1cm.  -VANI      Given Bandaging/dressing change  -      Program Reinforced;Modified  -      How Provided Verbal;Demonstration  -      Provided to Patient;Caregiver  -      Level of Understanding Verbalized  -            User Key  (r) = Recorded By, (t) = Taken By, (c) = Cosigned By    Initials Name Provider Type    Nereyda Beck PT Physical Therapist                Recommendation and Plan   PT Assessment/Plan     Row Name 09/12/22 0845          PT Assessment    Functional Limitations Performance in self-care ADL;Limitations in functional capacity and performance;Limitations in community activities  -     Impairments Integumentary integrity  -     Assessment Comments Pt has met STGs with decreasing wound dimensions.  Base of wound still with thin layer of yellow soft tissue over exposed bone, not fully granulated over bone, so PT resumed NPWT this tx. Pt will continue to benefit from skilled PT for wound  debridement and NPWT.  Continue current goals/POC.  -     Rehab Potential Good  -     Patient/caregiver participated in establishment of treatment plan and goals Yes  -     Patient would benefit from skilled therapy intervention Yes  -            PT Plan    PT Frequency 2x/week  -     Predicted Duration of Therapy Intervention (PT) 12 visits  -     Planned CPT's? PT JOSE DEBRIDE OPEN WOUND UP TO 20 CM: 49721;PT NEG PRESS WOUND TO 50 SQCM 3: 45617;PT SELF CARE/MGMT/TRAIN 15 MIN: 45989;PT NONSELECT DEBRIDE 15 MIN: 35853  -     Physical Therapy Interventions (Optional Details) patient/family education;wound care  -     PT Plan Comments vac, debridement  -           User Key  (r) = Recorded By, (t) = Taken By, (c) = Cosigned By    Initials Name Provider Type    Nereyda Beck, PT Physical Therapist                Goals   PT OP Goals     Row Name 09/12/22 0845          PT Short Term Goals    STG Date to Achieve 08/18/22  -     STG 1 Decrease wound size by 25% as evidence of wound healing.  -     STG 1 Progress Met  -     STG 2 increase granulation to >75% to improve wound healing.  -     STG 2 Progress Met  -            Long Term Goals    LTG Date to Achieve 11/09/22  -     LTG 1 Decrease wound size by 75% as evidence of wound healing.  -     LTG 1 Progress Progressing  -     LTG 2 Increase granulation to >90% to improve wound healing.  -     LTG 2 Progress Progressing  -     LTG 3 Pt and family able to demonstrate home dressing management to allow for transition to home management.  -     LTG 3 Progress Ongoing  -            Time Calculation    PT Goal Re-Cert Due Date 11/09/22  -           User Key  (r) = Recorded By, (t) = Taken By, (c) = Cosigned By    Initials Name Provider Type    Nereyda Beck, PT Physical Therapist                  Time Calculation: Start Time: 0845  Untimed Charges  08593-Dhdszqaqr debridement: 5  02304-Nzx Pressure wound to 50 sqcm:  25  Total Minutes  Untimed Charges Total Minutes: 30   Total Minutes: 30  Therapy Charges for Today     Code Description Service Date Service Provider Modifiers Qty    22155222038 HC JOSE DEBRIDE OPEN WOUND UP TO 20CM 9/12/2022 Nereyda Bello, PT GP 1    95365469421 HC PT NEG PRESS WOUND TO 50SQCM DME2 9/12/2022 Nereyda Bello, PT GP 1                  Nereyda Bello, PT  9/12/2022

## 2022-09-15 ENCOUNTER — HOSPITAL ENCOUNTER (OUTPATIENT)
Dept: PHYSICAL THERAPY | Facility: HOSPITAL | Age: 87
Setting detail: THERAPIES SERIES
Discharge: HOME | End: 2022-09-15

## 2022-09-15 DIAGNOSIS — Z89.421 STATUS POST AMPUTATION OF TOE OF RIGHT FOOT: Primary | ICD-10-CM

## 2022-09-15 DIAGNOSIS — L97.513 FOOT ULCERATION, RIGHT, WITH NECROSIS OF MUSCLE: ICD-10-CM

## 2022-09-15 DIAGNOSIS — L03.115 CELLULITIS OF RIGHT FOOT: ICD-10-CM

## 2022-09-15 PROCEDURE — 97606 NEG PRS WND THER DME>50 SQCM: CPT

## 2022-09-15 PROCEDURE — 97597 DBRDMT OPN WND 1ST 20 CM/<: CPT

## 2022-09-15 NOTE — THERAPY WOUND CARE TREATMENT
Outpatient Rehabilitation - Wound/Debridement Treatment Note  [facility]     Patient Name: Jesse Siu  : 1935  MRN: [id number]  Today's Date: 9/15/2022                 Admit Date: 9/15/2022    Visit Dx:    ICD-10-CM ICD-9-CM   1. Status post amputation of toe of right foot ([facility])  Z89.421 V49.72   2. Foot ulceration, right, with necrosis of muscle ([facility])  L97.513 707.15     728.89   3. Cellulitis of right foot  L03.115 682.7       Patient Active Problem List   Diagnosis   • Cellulitis of right foot   • Type 2 diabetes mellitus with hyperglycemia ([facility])   • Renal insufficiency   • Foot ulceration, right, with necrosis of muscle ([facility])        Past Medical History:   Diagnosis Date   • Hyperlipidemia    • Hypertension    • Pre-diabetes         Past Surgical History:   Procedure Laterality Date   • AMPUTATION DIGIT Right 2022    Procedure: GREAT AND SECOND TOE AMPUTATION RIGHT;  Surgeon: Lemuel Angela MD;  Location: [facility];  Service: Vascular;  Laterality: Right;   • FOOT SURGERY Left     CANCER REMOVAL   • TONSILLECTOMY     • TURP / TRANSURETHRAL INCISION / DRAINAGE PROSTATE           EVALUATION   PT Ortho     Row Name 09/15/22 09       Subjective Comments    Subjective Comments No changes related to the wound. Pt reports GI upset possibly related to the abx, so they're asking if they should continue with the abx course.  -MC       Subjective Pain    Able to rate subjective pain? yes  -MC    Pre-Treatment Pain Level 0  -MC    Post-Treatment Pain Level 0  -MC       Transfers    Comment, (Transfers) seated in w/c for tx  -          User Key  (r) = Recorded By, (t) = Taken By, (c) = Cosigned By    Initials Name Provider Type    Sarah Beth Jordan PT Physical Therapist                 [facility] Wound     Row Name 09/15/22 0900             Wound 22 1200 Right medial foot Amputation stump    Wound - Properties Group Placement Date: 22  - Placement Time: 1200  -MF Side: Right  -MF  Orientation: medial  - Location: foot  -MF Primary Wound Type: Amputation s  -      Dressing Appearance intact;moist drainage  -      Base granulating;moist;pink;red;exposed structure;slough;subcutaneous;yellow;white  thin layer of soft tissue covering bone in depth  -      Periwound intact;moist;pink;macerated;pale white;redness;warm  no maceration, mild erythema remains  -      Periwound Temperature warm  -      Periwound Skin Turgor soft  -      Edges irregular  -      Drainage Characteristics/Odor serosanguineous  -      Drainage Amount small  -      Care, Wound cleansed with;wound cleanser;debrided;negative pressure wound therapy  -      Dressing Care dressing changed  vac, kerlix, spandage  -      Periwound Care cleansed with pH balanced cleanser;topical treatment applied;barrier film applied  NoSting/Nystatin crust x2  -      Retired Wound - Properties Group Placement Date: 05/20/22  - Placement Time: 1200  -MF Side: Right  - Orientation: medial  - Location: foot  -MF Primary Wound Type: Amputation s  -      Retired Wound - Properties Group Date first assessed: 05/20/22  - Time first assessed: 1200  - Side: Right  - Location: foot  -MF Primary Wound Type: Amputation s  -MF              NPWT (Negative Pressure Wound Therapy) 05/20/22 1200 R foot ampuation site    NPWT (Negative Pressure Wound Therapy) - Properties Group Placement Date: 05/20/22  - Placement Time: 1200  -MF Location: R foot ampuation site  -      Therapy Setting continuous therapy  -      Dressing foam, black  -MC      Contact Layer other (see comments)  zuri Ag collagen  -      Pressure Setting 150 mmHg  -MC      Sponges Inserted 2  zuri, 2 black  -MC      Sponges Removed other (see comments)  2 black  -MC      Finger sweep complete Yes  -      Retired NPWT (Negative Pressure Wound Therapy) - Properties Group Placement Date: 05/20/22  - Placement Time: 1200  -MF Location: R foot ampuation  site  -      Retired NPWT (Negative Pressure Wound Therapy) - Properties Group Placement Date: 05/20/22  - Placement Time: 1200  - Location: R foot ampuation site  -            User Key  (r) = Recorded By, (t) = Taken By, (c) = Cosigned By    Initials Name Provider Type    Wesley Vallejo, PT Physical Therapist    Sarah Beth Jordan, RENE Physical Therapist                  WOUND DEBRIDEMENT  Total area of Debridement: 2 cm2  Debridement Site 1  Location- Site 1: R toe amputation site  Selective Debridement- Site 1: Wound Surface <20cmsq  Instruments- Site 1: tweezers  Excised Tissue Description- Site 1: minimum, slough, other (comment) (macerated skin debris)  Bleeding- Site 1: none              Therapy Education     Row Name 09/15/22 0900             Therapy Education    Education Details Continue current POC. Ask MD about abx before stopping.  -      Given Bandaging/dressing change  -      Program Reinforced;Modified  -      How Provided Verbal;Demonstration  -      Provided to Patient;Caregiver  -      Level of Understanding Verbalized  -            User Key  (r) = Recorded By, (t) = Taken By, (c) = Cosigned By    Initials Name Provider Type    Sarah Beth Jordan, PT Physical Therapist                Recommendation and Plan   PT Assessment/Plan     Row Name 09/15/22 0900          PT Assessment    Functional Limitations Performance in self-care ADL;Limitations in functional capacity and performance;Limitations in community activities  -     Impairments Integumentary integrity  -     Assessment Comments Pt continues to improve with NPWT and debridement prn. Pt with additional granulation tissue and no periwound maceration noted today. Pt will continue to benefit from skilled PT wound care to promote healing.  -     Rehab Potential Good  -     Patient/caregiver participated in establishment of treatment plan and goals Yes  -     Patient would benefit from skilled therapy  intervention Yes  -            PT Plan    PT Frequency 2x/week  -     Physical Therapy Interventions (Optional Details) wound care;patient/family education  -     PT Plan Comments debridement, vac  -           User Key  (r) = Recorded By, (t) = Taken By, (c) = Cosigned By    Initials Name Provider Type    Sarah Beth Jordan, PT Physical Therapist                Goals   PT OP Goals     Row Name 09/15/22 0924          Time Calculation    PT Goal Re-Cert Due Date 11/09/22  -           User Key  (r) = Recorded By, (t) = Taken By, (c) = Cosigned By    Initials Name Provider Type    Sarah Beth Jordan, PT Physical Therapist                PT Goal Re-Cert Due Date: 11/09/22            Time Calculation: Start Time: 0845  Untimed Charges  20896-Zrmhevlav debridement: 10  36200-Ylq Pressure wound to 50 sqcm: 25  Total Minutes  Untimed Charges Total Minutes: 35   Total Minutes: 35  Therapy Charges for Today     Code Description Service Date Service Provider Modifiers Qty    16428197136 HC JOSE DEBRIDE OPEN WOUND UP TO 20CM 9/15/2022 Sarah Beth Bailey, PT GP 1    90353360805 HC PT NEG PRESS WOUND OVER 50SQCM DME2 9/15/2022 Sarah Beth Bailey, PT GP 1                  Sarah Beth Bailey, PT  9/15/2022

## 2022-09-19 ENCOUNTER — HOSPITAL ENCOUNTER (OUTPATIENT)
Dept: PHYSICAL THERAPY | Facility: HOSPITAL | Age: 87
Setting detail: THERAPIES SERIES
Discharge: HOME | End: 2022-09-19

## 2022-09-19 DIAGNOSIS — L97.513 FOOT ULCERATION, RIGHT, WITH NECROSIS OF MUSCLE: ICD-10-CM

## 2022-09-19 DIAGNOSIS — Z89.421 STATUS POST AMPUTATION OF TOE OF RIGHT FOOT: Primary | ICD-10-CM

## 2022-09-19 PROCEDURE — 97597 DBRDMT OPN WND 1ST 20 CM/<: CPT

## 2022-09-19 PROCEDURE — 97605 NEG PRS WND THER DME<=50SQCM: CPT

## 2022-09-19 NOTE — THERAPY WOUND CARE TREATMENT
Outpatient Rehabilitation - Wound/Debridement Treatment Note  [facility]     Patient Name: Jesse Siu  : 1935  MRN: [id number]  Today's Date: 2022                    Admit Date: 2022    Visit Dx:    ICD-10-CM ICD-9-CM   1. Status post amputation of toe of right foot ([facility])  Z89.421 V49.72   2. Foot ulceration, right, with necrosis of muscle ([facility])  L97.513 707.15     728.89       Patient Active Problem List   Diagnosis   • Cellulitis of right foot   • Type 2 diabetes mellitus with hyperglycemia ([facility])   • Renal insufficiency   • Foot ulceration, right, with necrosis of muscle ([facility])        Past Medical History:   Diagnosis Date   • Hyperlipidemia    • Hypertension    • Pre-diabetes         Past Surgical History:   Procedure Laterality Date   • AMPUTATION DIGIT Right 2022    Procedure: GREAT AND SECOND TOE AMPUTATION RIGHT;  Surgeon: Lemuel Angela MD;  Location: [facility];  Service: Vascular;  Laterality: Right;   • FOOT SURGERY Left     CANCER REMOVAL   • TONSILLECTOMY     • TURP / TRANSURETHRAL INCISION / DRAINAGE PROSTATE           EVALUATION   PT Ortho     Row Name 22 1000       Subjective Comments    Subjective Comments No complaints or changes since last tx  -MC       Subjective Pain    Able to rate subjective pain? yes  -MC    Pre-Treatment Pain Level 0  -MC    Post-Treatment Pain Level 0  -MC       Transfers    Comment, (Transfers) seated in w/c for tx  -MC          User Key  (r) = Recorded By, (t) = Taken By, (c) = Cosigned By    Initials Name Provider Type    Sarah Beth Jordan PT Physical Therapist                 [facility] Wound     Row Name 22 1000             Wound 22 1200 Right medial foot Amputation stump    Wound - Properties Group Placement Date: 22  -MF Placement Time: 1200  -MF Side: Right  -MF Orientation: medial  -MF Location: foot  -MF Primary Wound Type: Amputation s  -MF      Wound Image View All Images View Images  -      Dressing  Appearance intact;moist drainage  -      Base granulating;moist;pink;red;exposed structure;slough;subcutaneous;yellow;white  thin layer of soft tissue covering bone in depth, epithelializing down wound walls  -      Periwound intact;moist;pink;macerated;pale white;redness;warm  no maceration, mild erythema remains  -      Periwound Temperature warm  -      Periwound Skin Turgor soft  -      Edges irregular  -      Wound Length (cm) 0.4 cm  -      Wound Width (cm) 3.2 cm  -      Wound Depth (cm) 1 cm  -      Wound Surface Area (cm^2) 1.28 cm^2  -MC      Wound Volume (cm^3) 1.28 cm^3  -      Drainage Characteristics/Odor serosanguineous  -      Drainage Amount small  -      Care, Wound cleansed with;wound cleanser;debrided;negative pressure wound therapy  -      Dressing Care dressing changed  vac, kerlix, spandage  -      Periwound Care cleansed with pH balanced cleanser;barrier film applied;topical treatment applied  NoSting/Nystatin crusting x2, drape border  -      Wound Output (mL) 10  -      Retired Wound - Properties Group Placement Date: 05/20/22  - Placement Time: 1200  -MF Side: Right  - Orientation: medial  -MF Location: foot  -MF Primary Wound Type: Amputation s  -      Retired Wound - Properties Group Date first assessed: 05/20/22  - Time first assessed: 1200  -MF Side: Right  -MF Location: foot  - Primary Wound Type: Amputation s  -MF              NPWT (Negative Pressure Wound Therapy) 05/20/22 1200 R foot ampuation site    NPWT (Negative Pressure Wound Therapy) - Properties Group Placement Date: 05/20/22  - Placement Time: 1200  -MF Location: R foot ampuation site  -      Therapy Setting continuous therapy  -      Dressing foam, black  -MC      Contact Layer other (see comments)  zuri Ag collagen  -      Pressure Setting 150 mmHg  -      Sponges Inserted 2  zuri, 2 black  -MC      Sponges Removed 2  2 black  -MC      Finger sweep complete Yes  -       Retired NPWT (Negative Pressure Wound Therapy) - Properties Group Placement Date: 05/20/22  - Placement Time: 1200  -MF Location: R foot ampuation site  -      Retired NPWT (Negative Pressure Wound Therapy) - Properties Group Placement Date: 05/20/22  - Placement Time: 1200  -MF Location: R foot ampuation site  -            User Key  (r) = Recorded By, (t) = Taken By, (c) = Cosigned By    Initials Name Provider Type    Wesley Vallejo, PT Physical Therapist    Sarah Beth Jordan PT Physical Therapist                  WOUND DEBRIDEMENT  Total area of Debridement: 2 cm2  Debridement Site 1  Location- Site 1: R toe amputation site  Selective Debridement- Site 1: Wound Surface <20cmsq  Instruments- Site 1: tweezers  Excised Tissue Description- Site 1: minimum, slough  Bleeding- Site 1: none              Therapy Education     Row Name 09/19/22 1000             Therapy Education    Education Details Continue current POC. Likely to trial advanced dressings vs wound vac in the next 1-2 tx  -MC      Given Bandaging/dressing change  -      Program Reinforced;Modified  -      How Provided Verbal;Demonstration  -MC      Provided to Patient;Caregiver  -MC      Level of Understanding Verbalized  -            User Key  (r) = Recorded By, (t) = Taken By, (c) = Cosigned By    Initials Name Provider Type    Sarah Beth Jordan PT Physical Therapist                Recommendation and Plan   PT Assessment/Plan     Row Name 09/19/22 1000          PT Assessment    Functional Limitations Performance in self-care ADL;Limitations in functional capacity and performance;Limitations in community activities  -     Impairments Integumentary integrity  -MC     Assessment Comments Pt with slight reduction in wound dimensions since last assessment. Pt with new epithelialization expanding down the wound walls, which will eventually make it difficult to determine when the depth is fully epithelialized. Pt will likely  benefit from trial of advanced dressings vs wound vac in the next 1-2 visits.  -     Rehab Potential Good  -     Patient/caregiver participated in establishment of treatment plan and goals Yes  -     Patient would benefit from skilled therapy intervention Yes  -            PT Plan    PT Frequency 2x/week  -     Physical Therapy Interventions (Optional Details) wound care;patient/family education  -     PT Plan Comments debridement, vac vs advanced dressings  -           User Key  (r) = Recorded By, (t) = Taken By, (c) = Cosigned By    Initials Name Provider Type    Sarah Beth Jordan, PT Physical Therapist                Goals   PT OP Goals     Row Name 09/19/22 1012          Time Calculation    PT Goal Re-Cert Due Date 11/09/22  -           User Key  (r) = Recorded By, (t) = Taken By, (c) = Cosigned By    Initials Name Provider Type    Sarah Beth Jordan, PT Physical Therapist                PT Goal Re-Cert Due Date: 11/09/22            Time Calculation: Start Time: 0845  Untimed Charges  69127-Odixmfbkm debridement: 10  20100-Mpx Pressure wound to 50 sqcm: 25  Total Minutes  Untimed Charges Total Minutes: 35   Total Minutes: 35  Therapy Charges for Today     Code Description Service Date Service Provider Modifiers Qty    70743948773 HC JOSE DEBRIDE OPEN WOUND UP TO 20CM 9/19/2022 Sarah Beth Bailey, PT GP 1    [id number] HC PT NEG PRESS WOUND TO 50SQCM DME2 9/19/2022 Sarah Beth Bailey, PT GP 1                  Sarah Beth Bailey, PT  9/19/2022

## 2022-09-22 ENCOUNTER — HOSPITAL ENCOUNTER (OUTPATIENT)
Dept: PHYSICAL THERAPY | Facility: HOSPITAL | Age: 87
Setting detail: THERAPIES SERIES
Discharge: HOME | End: 2022-09-22

## 2022-09-22 DIAGNOSIS — L97.513 FOOT ULCERATION, RIGHT, WITH NECROSIS OF MUSCLE: ICD-10-CM

## 2022-09-22 DIAGNOSIS — L03.115 CELLULITIS OF RIGHT FOOT: ICD-10-CM

## 2022-09-22 DIAGNOSIS — Z89.421 STATUS POST AMPUTATION OF TOE OF RIGHT FOOT: Primary | ICD-10-CM

## 2022-09-22 PROCEDURE — 97597 DBRDMT OPN WND 1ST 20 CM/<: CPT

## 2022-09-27 ENCOUNTER — HOSPITAL ENCOUNTER (OUTPATIENT)
Dept: PHYSICAL THERAPY | Facility: HOSPITAL | Age: 87
Setting detail: THERAPIES SERIES
Discharge: HOME | End: 2022-09-27

## 2022-09-27 DIAGNOSIS — Z89.421 STATUS POST AMPUTATION OF TOE OF RIGHT FOOT: Primary | ICD-10-CM

## 2022-09-27 DIAGNOSIS — L97.513 FOOT ULCERATION, RIGHT, WITH NECROSIS OF MUSCLE: ICD-10-CM

## 2022-09-27 PROCEDURE — 97597 DBRDMT OPN WND 1ST 20 CM/<: CPT

## 2022-09-27 NOTE — THERAPY WOUND CARE TREATMENT
Outpatient Rehabilitation - Wound/Debridement Treatment Note  [facility]     Patient Name: Jesse Siu  : 1935  MRN: [id number]  Today's Date: 2022                 Admit Date: 2022    Visit Dx:    ICD-10-CM ICD-9-CM   1. Status post amputation of toe of right foot ([facility])  Z89.421 V49.72   2. Foot ulceration, right, with necrosis of muscle ([facility])  L97.513 707.15     728.89       Patient Active Problem List   Diagnosis   • Cellulitis of right foot   • Type 2 diabetes mellitus with hyperglycemia ([facility])   • Renal insufficiency   • Foot ulceration, right, with necrosis of muscle ([facility])        Past Medical History:   Diagnosis Date   • Hyperlipidemia    • Hypertension    • Pre-diabetes         Past Surgical History:   Procedure Laterality Date   • AMPUTATION DIGIT Right 2022    Procedure: GREAT AND SECOND TOE AMPUTATION RIGHT;  Surgeon: Lemuel Angela MD;  Location: [facility];  Service: Vascular;  Laterality: Right;   • FOOT SURGERY Left     CANCER REMOVAL   • TONSILLECTOMY     • TURP / TRANSURETHRAL INCISION / DRAINAGE PROSTATE           EVALUATION   PT Ortho     Row Name 22 1300       Subjective Comments    Subjective Comments No complaints or changes since last tx. Dtr checked the dressing and didn't see a lot of drainage. He got good reports from all the providers he's seen since last assessment.  -MC       Subjective Pain    Able to rate subjective pain? yes  -MC    Pre-Treatment Pain Level 0  -MC    Post-Treatment Pain Level 0  -MC       Transfers    Comment, (Transfers) seated in w/c for tx  -          User Key  (r) = Recorded By, (t) = Taken By, (c) = Cosigned By    Initials Name Provider Type    Sarah Beth Jordan, PT Physical Therapist                 [facility] Wound     Row Name 22 1300             Wound 22 1200 Right medial foot Amputation stump    Wound - Properties Group Placement Date: 22  -MF Placement Time: 1200  -MF Side: Right  -MF Orientation: medial   - Location: foot  -MF Primary Wound Type: Amputation s  -      Wound Image View All Images View Images  -      Dressing Appearance moist drainage;intact  -      Base granulating;moist;pink;red;exposed structure;slough;subcutaneous;yellow;white  continued epithelialization down the wound walls, no exposed bone visible or palpable, narrow area of open pink/red tissue visible in base  -      Periwound intact;moist;pink;macerated;pale white;redness;warm  slight maceration along wound walls, no yeast or erythema  -      Periwound Temperature warm  -      Periwound Skin Turgor soft  -      Edges irregular  -      Wound Length (cm) 0.3 cm  -      Wound Width (cm) 2 cm  -      Wound Depth (cm) --  obscured, difficult to measure, likely less than 0.5 from base to epithelialized area  -      Wound Surface Area (cm^2) 0.6 cm^2  -      Drainage Characteristics/Odor serosanguineous  -      Drainage Amount small  -      Care, Wound cleansed with;wound cleanser;debrided  -      Dressing Care dressing applied;silver impregnated;collagen;antimicrobial agent applied;foam;low-adherent;gauze  zuri, HFBt, mepilex Ag, kerlix, spandage  -      Periwound Care cleansed with pH balanced cleanser;dry periwound area maintained  -      Retired Wound - Properties Group Placement Date: 05/20/22  - Placement Time: 1200  -MF Side: Right  - Orientation: medial  - Location: foot  -MF Primary Wound Type: Amputation s  -      Retired Wound - Properties Group Date first assessed: 05/20/22  - Time first assessed: 1200  -MF Side: Right  - Location: foot  -MF Primary Wound Type: Amputation s  -              NPWT (Negative Pressure Wound Therapy) 05/20/22 1200 R foot ampuation site    NPWT (Negative Pressure Wound Therapy) - Properties Group Placement Date: 05/20/22  - Placement Time: 1200  -MF Location: R foot ampuation site  -      Therapy Setting vacuum off  on HOLD  -      Retired NPWT (Negative  Pressure Wound Therapy) - Properties Group Placement Date: 05/20/22  - Placement Time: 1200  -MF Location: R foot ampuation site  -      Retired NPWT (Negative Pressure Wound Therapy) - Properties Group Placement Date: 05/20/22  - Placement Time: 1200  -MF Location: R foot ampuation site  -            User Key  (r) = Recorded By, (t) = Taken By, (c) = Cosigned By    Initials Name Provider Type    Wesley Vallejo, PT Physical Therapist    Sarah Beth Jordan PT Physical Therapist                  WOUND DEBRIDEMENT  Total area of Debridement: 3 cm2  Debridement Site 1  Location- Site 1: R toe amputation site  Selective Debridement- Site 1: Wound Surface <20cmsq  Instruments- Site 1: tweezers  Excised Tissue Description- Site 1: minimum, slough, other (comment) (macerated skin debris)  Bleeding- Site 1: none              Therapy Education     Row Name 09/27/22 1300             Therapy Education    Education Details Continue current POC  -MC      Given Bandaging/dressing change  -MC      Program Reinforced  -MC      How Provided Verbal;Demonstration  -MC      Provided to Patient;Caregiver  -MC      Level of Understanding Verbalized  -            User Key  (r) = Recorded By, (t) = Taken By, (c) = Cosigned By    Initials Name Provider Type    Sarah Beth Jordan PT Physical Therapist                Recommendation and Plan   PT Assessment/Plan     Row Name 09/27/22 1300          PT Assessment    Functional Limitations Performance in self-care ADL;Limitations in functional capacity and performance;Limitations in community activities  -     Impairments Integumentary integrity  -MC     Assessment Comments Pt continues to demonstrate improvements, with very narrow strip of open area remaining in the base of the wound. The walls have completely epithelialized and are lex, making visualization of the remaining wound bed difficult. Pt has likely reached his max benefit from NPWT, but PT will  continue to assess for 1-2 additional treatments.  -     Rehab Potential Good  -     Patient/caregiver participated in establishment of treatment plan and goals Yes  -     Patient would benefit from skilled therapy intervention Yes  -            PT Plan    PT Frequency 2x/week  -     Physical Therapy Interventions (Optional Details) wound care;patient/family education  -     PT Plan Comments debridement, vac on HOLD  -           User Key  (r) = Recorded By, (t) = Taken By, (c) = Cosigned By    Initials Name Provider Type    Sarah Beth Jordan, PT Physical Therapist                Goals   PT OP Goals     Row Name 09/27/22 1318          Time Calculation    PT Goal Re-Cert Due Date 11/09/22  -           User Key  (r) = Recorded By, (t) = Taken By, (c) = Cosigned By    Initials Name Provider Type     Sarah Beth Bailey, PT Physical Therapist                PT Goal Re-Cert Due Date: 11/09/22            Time Calculation: Start Time: 1125  Untimed Charges  35669-[id number] debridement: 20  Total Minutes  Untimed Charges Total Minutes: 20   Total Minutes: 20  Therapy Charges for Today     Code Description Service Date Service Provider Modifiers Qty    [id number] [facility] JOSE DEBRIDE OPEN WOUND UP TO 20CM 9/27/2022 Sarah Beth Bailey, PT GP 1                  Sarah Beth Bailey PT  9/27/2022

## 2022-09-30 ENCOUNTER — HOSPITAL ENCOUNTER (OUTPATIENT)
Dept: PHYSICAL THERAPY | Facility: HOSPITAL | Age: 87
Setting detail: THERAPIES SERIES
Discharge: HOME | End: 2022-09-30

## 2022-09-30 DIAGNOSIS — Z89.421 STATUS POST AMPUTATION OF TOE OF RIGHT FOOT: Primary | ICD-10-CM

## 2022-09-30 DIAGNOSIS — L03.115 CELLULITIS OF RIGHT FOOT: ICD-10-CM

## 2022-09-30 DIAGNOSIS — L97.513 FOOT ULCERATION, RIGHT, WITH NECROSIS OF MUSCLE: ICD-10-CM

## 2022-09-30 PROCEDURE — 97597 DBRDMT OPN WND 1ST 20 CM/<: CPT

## 2022-10-03 ENCOUNTER — APPOINTMENT (OUTPATIENT)
Dept: PHYSICAL THERAPY | Facility: HOSPITAL | Age: 87
End: 2022-10-03

## 2022-10-06 ENCOUNTER — HOSPITAL ENCOUNTER (OUTPATIENT)
Dept: PHYSICAL THERAPY | Facility: HOSPITAL | Age: 87
Setting detail: THERAPIES SERIES
Discharge: HOME | End: 2022-10-06

## 2022-10-06 DIAGNOSIS — Z89.421 STATUS POST AMPUTATION OF TOE OF RIGHT FOOT: Primary | ICD-10-CM

## 2022-10-06 DIAGNOSIS — L97.513 FOOT ULCERATION, RIGHT, WITH NECROSIS OF MUSCLE: ICD-10-CM

## 2022-10-06 DIAGNOSIS — L03.115 CELLULITIS OF RIGHT FOOT: ICD-10-CM

## 2022-10-06 PROCEDURE — 97597 DBRDMT OPN WND 1ST 20 CM/<: CPT

## 2022-10-06 NOTE — THERAPY WOUND CARE TREATMENT
Outpatient Rehabilitation - Wound/Debridement Treatment Note  [facility]     Patient Name: Jesse Siu  : 1935  MRN: [id number]  Today's Date: 10/6/2022                 Admit Date: 10/6/2022    Visit Dx:    ICD-10-CM ICD-9-CM   1. Status post amputation of toe of right foot ([facility])  Z89.421 V49.72   2. Foot ulceration, right, with necrosis of muscle ([facility])  L97.513 707.15     728.89   3. Cellulitis of right foot  L03.115 682.7       Patient Active Problem List   Diagnosis   • Cellulitis of right foot   • Type 2 diabetes mellitus with hyperglycemia ([facility])   • Renal insufficiency   • Foot ulceration, right, with necrosis of muscle ([facility])        Past Medical History:   Diagnosis Date   • Hyperlipidemia    • Hypertension    • Pre-diabetes         Past Surgical History:   Procedure Laterality Date   • AMPUTATION DIGIT Right 2022    Procedure: GREAT AND SECOND TOE AMPUTATION RIGHT;  Surgeon: Lemuel Angela MD;  Location: [facility];  Service: Vascular;  Laterality: Right;   • FOOT SURGERY Left     CANCER REMOVAL   • TONSILLECTOMY     • TURP / TRANSURETHRAL INCISION / DRAINAGE PROSTATE           EVALUATION   PT Ortho     Row Name 10/06/22 09       Subjective Comments    Subjective Comments No complaints.  Daughter states the bandage fell off the first time, but she thinks she wrapped it better the second time.  -VANI       Subjective Pain    Able to rate subjective pain? yes  -VANI    Pre-Treatment Pain Level 0  -JM    Post-Treatment Pain Level 0  -JM       Transfers    Comment, (Transfers) seated in w/c for tx  -JM          User Key  (r) = Recorded By, (t) = Taken By, (c) = Cosigned By    Initials Name Provider Type    Nereyda Beck, PT Physical Therapist                 [facility] Wound     Row Name 10/06/22 0900             Wound 22 1200 Right medial foot Amputation stump    Wound - Properties Group Placement Date: 22  -MF Placement Time: 1200  -MF Side: Right  -MF Orientation: medial  -MF  Location: foot  -MF Primary Wound Type: Amputation s  -MF      Wound Image View All Images View Images  -      Dressing Appearance intact;moist drainage  -      Base granulating;moist;pink;red;slough;yellow;white  narrow open area in depth, visible base moist and granulating  -      Periwound intact;moist;pink;macerated;pale white;redness;warm  min maceration, red blanchable area at medial stump of 1st MH/midfoot improved  -      Periwound Temperature warm  -      Periwound Skin Turgor soft  -      Edges irregular  -      Wound Length (cm) 0.2 cm  estimated, difficult to measure due to epithelialization down side walls  -      Wound Width (cm) 1.5 cm  -      Wound Surface Area (cm^2) 0.3 cm^2  -      Drainage Characteristics/Odor serosanguineous  -      Drainage Amount small  -      Care, Wound cleansed with;wound cleanser;debrided  -      Dressing Care dressing applied;collagen;silver impregnated;foam;gauze, antimicrobial  -      Periwound Care cleansed with pH balanced cleanser;dry periwound area maintained  -      Retired Wound - Properties Group Placement Date: 05/20/22  - Placement Time: 1200  - Side: Right  - Orientation: medial  - Location: foot  -MF Primary Wound Type: Amputation s  -MF      Retired Wound - Properties Group Date first assessed: 05/20/22  - Time first assessed: 1200  -MF Side: Right  - Location: foot  -MF Primary Wound Type: Amputation s  -            User Key  (r) = Recorded By, (t) = Taken By, (c) = Cosigned By    Initials Name Provider Type    Wesley Vallejo, PT Physical Therapist    Nereyda Beck, RENE Physical Therapist                  WOUND DEBRIDEMENT  Total area of Debridement: 2cmsq  Debridement Site 1  Location- Site 1: R toe amputation site  Selective Debridement- Site 1: Wound Surface <20cmsq  Instruments- Site 1: tweezers, scissors  Excised Tissue Description- Site 1: minimum, slough, other (comment) (hypertrophic  crusts)  Bleeding- Site 1: none              Therapy Education     Row Name 10/06/22 0900             Therapy Education    Education Details Reviewed home dressing changes with family, reinforced NWB until wound fully healed  -VANI      Given Bandaging/dressing change  -      Program Reinforced;Progressed  -VAIN      How Provided Verbal;Demonstration  -      Provided to Patient;Caregiver  -      Level of Understanding Verbalized  -            User Key  (r) = Recorded By, (t) = Taken By, (c) = Cosigned By    Initials Name Provider Type    Nereyda Beck, PT Physical Therapist                Recommendation and Plan   PT Assessment/Plan     Row Name 10/06/22 0900          PT Assessment    Functional Limitations Performance in self-care ADL;Limitations in functional capacity and performance;Limitations in community activities  -VANI     Impairments Integumentary integrity  -     Assessment Comments Pt's R foot wound continuing to reepithelialize down side walls with residual depth moist and granulating, min slough buildup and hypertrophic crusting PT debrided.  Family attempted dressing changes with minimal difficulty.  Continue with once/week tx for debridement.  -            PT Plan    PT Frequency 1x/week  -     Physical Therapy Interventions (Optional Details) patient/family education;wound care  -     PT Plan Comments debridement/dressings  -           User Key  (r) = Recorded By, (t) = Taken By, (c) = Cosigned By    Initials Name Provider Type    Nereyda Beck, PT Physical Therapist                Goals   PT OP Goals     Row Name 10/06/22 0900          Time Calculation    PT Goal Re-Cert Due Date 11/09/22  -           User Key  (r) = Recorded By, (t) = Taken By, (c) = Cosigned By    Initials Name Provider Type    Nereyda Beck, PT Physical Therapist                PT Goal Re-Cert Due Date: 11/09/22            Time Calculation: Start Time: 0900  Untimed Charges  74769-[id number]  debridement: 15  Total Minutes  Untimed Charges Total Minutes: 15   Total Minutes: 15  Therapy Charges for Today     Code Description Service Date Service Provider Modifiers Qty    [id number]  JOSE DEBRIDE OPEN WOUND UP TO 20CM 10/6/2022 Nereyda Bello, PT GP 1                  Nereyda Bello, PT  10/6/2022

## 2022-10-13 ENCOUNTER — HOSPITAL ENCOUNTER (OUTPATIENT)
Dept: PHYSICAL THERAPY | Facility: HOSPITAL | Age: 87
Setting detail: THERAPIES SERIES
Discharge: HOME | End: 2022-10-13

## 2022-10-13 DIAGNOSIS — L03.115 CELLULITIS OF RIGHT FOOT: ICD-10-CM

## 2022-10-13 DIAGNOSIS — Z89.421 STATUS POST AMPUTATION OF TOE OF RIGHT FOOT: Primary | ICD-10-CM

## 2022-10-13 DIAGNOSIS — L97.513 FOOT ULCERATION, RIGHT, WITH NECROSIS OF MUSCLE: ICD-10-CM

## 2022-10-13 PROCEDURE — 97597 DBRDMT OPN WND 1ST 20 CM/<: CPT

## 2022-10-13 NOTE — THERAPY PROGRESS REPORT/RE-CERT
Outpatient Rehabilitation - Wound/Debridement Progress Note  [facility]     Patient Name: Jesse Siu  : 1935  MRN: [id number]  Today's Date: 10/13/2022                 Admit Date: 10/13/2022    Visit Dx:    ICD-10-CM ICD-9-CM   1. Status post amputation of toe of right foot ([facility])  Z89.421 V49.72   2. Foot ulceration, right, with necrosis of muscle ([facility])  L97.513 707.15     728.89   3. Cellulitis of right foot  L03.115 682.7         Patient Active Problem List   Diagnosis   • Cellulitis of right foot   • Type 2 diabetes mellitus with hyperglycemia ([facility])   • Renal insufficiency   • Foot ulceration, right, with necrosis of muscle ([facility])        Past Medical History:   Diagnosis Date   • Hyperlipidemia    • Hypertension    • Pre-diabetes         Past Surgical History:   Procedure Laterality Date   • AMPUTATION DIGIT Right 2022    Procedure: GREAT AND SECOND TOE AMPUTATION RIGHT;  Surgeon: Lemuel Angela MD;  Location: [facility];  Service: Vascular;  Laterality: Right;   • FOOT SURGERY Left     CANCER REMOVAL   • TONSILLECTOMY     • TURP / TRANSURETHRAL INCISION / DRAINAGE PROSTATE           EVALUATION   PT Ortho     Row Name 10/13/22 0845       Subjective Comments    Subjective Comments No complaints, was able to change dressing without issues.  -       Subjective Pain    Able to rate subjective pain? yes  -VANI    Pre-Treatment Pain Level 0  -JM    Post-Treatment Pain Level 0  -JM       Transfers    Comment, (Transfers) seated in w/c for tx  -JM          User Key  (r) = Recorded By, (t) = Taken By, (c) = Cosigned By    Initials Name Provider Type    Nereyda Beck PT Physical Therapist                 [facility] Wound     Row Name 10/13/22 0845             Wound 22 1200 Right medial foot Amputation stump    Wound - Properties Group Placement Date: 22  -MF Placement Time: 1200  -MF Side: Right  -MF Orientation: medial  -MF Location: foot  -MF Primary Wound Type: Amputation s  -MF    Wound  Image Images linked: 2  -JM      Dressing Appearance intact;moist drainage  -      Base granulating;moist;pink;red;slough;yellow;white  narrow open area in depth, visible base moist and granulating  -      Periwound intact;moist;pink;macerated;pale white;redness;warm  min maceration, red blanchable area at medial stump of 1st MH/midfoot improved  -      Periwound Temperature warm  -      Periwound Skin Turgor soft  -      Edges irregular  -      Wound Length (cm) 0.2 cm  -JM      Wound Width (cm) 1 cm  -JM      Wound Depth (cm) 0.3 cm  -JM      Wound Surface Area (cm^2) 0.2 cm^2  -JM      Wound Volume (cm^3) 0.06 cm^3  -JM      Drainage Characteristics/Odor serosanguineous  -      Drainage Amount small  -      Care, Wound cleansed with;wound cleanser;debrided  -      Dressing Care dressing applied;collagen;silver impregnated;foam;gauze  zuri, mepilex ag, kerlix, spandage  -      Periwound Care cleansed with pH balanced cleanser;dry periwound area maintained  -      Retired Wound - Properties Group Placement Date: 05/20/22  - Placement Time: 1200  - Side: Right  - Orientation: medial  - Location: foot  - Primary Wound Type: Amputation s  -    Retired Wound - Properties Group Date first assessed: 05/20/22  - Time first assessed: 1200  - Side: Right  - Location: foot  - Primary Wound Type: Amputation s  -          User Key  (r) = Recorded By, (t) = Taken By, (c) = Cosigned By    Initials Name Provider Type    Wesley Vallejo, PT Physical Therapist    Nereyda Beck PT Physical Therapist                  WOUND DEBRIDEMENT  Total area of Debridement: 2cmsq  Debridement Site 1  Location- Site 1: R toe amputation site  Selective Debridement- Site 1: Wound Surface <20cmsq  Instruments- Site 1: tweezers, scissors  Excised Tissue Description- Site 1: minimum, slough, other (comment) (macerated skin debris, hypertrophic crust)  Bleeding- Site 1: none               Therapy Education     Row Name 10/13/22 0900             Therapy Education    Given Bandaging/dressing change  -      Program Reinforced;Progressed  -      How Provided Verbal;Demonstration  -      Provided to Patient;Caregiver  -      Level of Understanding Verbalized  -            User Key  (r) = Recorded By, (t) = Taken By, (c) = Cosigned By    Initials Name Provider Type    Nereyda Beck, PT Physical Therapist                Recommendation and Plan   PT Assessment/Plan     Row Name 10/13/22 0887          PT Assessment    Functional Limitations Performance in self-care ADL;Limitations in functional capacity and performance;Limitations in community activities  -     Impairments Integumentary integrity  -     Assessment Comments Very narrow opening remaining of residual R medial foot wound, with base difficult to visualize due to small opening and epithelialization down side walls of wound.  Pt's family performing dressing changes with min reinforcement.  Pt's wound continues to have slough and hypertrophic crusting requiring skilled PT for debridement.  Pt will continue to benefit from current POC, goals remain appropriate.  Anticipate wound closure in next few visits.  -     Rehab Potential Good  -     Patient/caregiver participated in establishment of treatment plan and goals Yes  -     Patient would benefit from skilled therapy intervention Yes  -        PT Plan    PT Frequency 1x/week  -     Predicted Duration of Therapy Intervention (PT) 4-6 visits  -     Planned CPT's? PT JOSE DEBRIDE OPEN WOUND UP TO 20 CM: 58393;PT NONSELECT DEBRIDE 15 MIN: 46115;PT SELF CARE/MGMT/TRAIN 15 MIN: 38551  -     Physical Therapy Interventions (Optional Details) patient/family education;wound care  -     PT Plan Comments debridement, dressings  -           User Key  (r) = Recorded By, (t) = Taken By, (c) = Cosigned By    Initials Name Provider Type    Nereyda Beck, PT Physical  Therapist                Goals   PT OP Goals     Row Name 10/13/22 0845          PT Short Term Goals    STG Date to Achieve 08/18/22  -     STG 1 Decrease wound size by 25% as evidence of wound healing.  -     STG 1 Progress Met  -     STG 2 increase granulation to >75% to improve wound healing.  -     STG 2 Progress Met  -        Long Term Goals    LTG Date to Achieve 11/09/22  -     LTG 1 Decrease wound size by 75% as evidence of wound healing.  -     LTG 1 Progress Met  -     LTG 2 Increase granulation to >90% to improve wound healing.  -     LTG 2 Progress Met  -     LTG 3 Pt and family able to demonstrate home dressing management to allow for transition to home management.  -     LTG 3 Progress Ongoing;Progressing  -        Time Calculation    PT Goal Re-Cert Due Date 11/09/22  -           User Key  (r) = Recorded By, (t) = Taken By, (c) = Cosigned By    Initials Name Provider Type    Nereyda Beck, PT Physical Therapist                PT Goal Re-Cert Due Date: 11/09/22  PT Short Term Goals  STG Date to Achieve: 08/18/22  STG 1: Decrease wound size by 25% as evidence of wound healing.  STG 1 Progress: Met  STG 2: increase granulation to >75% to improve wound healing.  STG 2 Progress: Met  Long Term Goals  LTG Date to Achieve: 11/09/22  LTG 1: Decrease wound size by 75% as evidence of wound healing.  LTG 1 Progress: Met  LTG 2: Increase granulation to >90% to improve wound healing.  LTG 2 Progress: Met  LTG 3: Pt and family able to demonstrate home dressing management to allow for transition to home management.  LTG 3 Progress: Ongoing, Progressing      Time Calculation: Start Time: 0845  Untimed Charges  44086-[id number] debridement: 20  Total Minutes  Untimed Charges Total Minutes: 20   Total Minutes: 20  Therapy Charges for Today     Code Description Service Date Service Provider Modifiers Qty    [id number] [facility] DEBRIDE OPEN WOUND UP TO 20CM 10/13/2022 Nereyda Bello,  PT GP 1                  Nereyda Bello, PT  10/13/2022

## 2022-10-20 ENCOUNTER — HOSPITAL ENCOUNTER (OUTPATIENT)
Dept: PHYSICAL THERAPY | Facility: HOSPITAL | Age: 87
Setting detail: THERAPIES SERIES
Discharge: HOME | End: 2022-10-20

## 2022-10-20 DIAGNOSIS — L03.115 CELLULITIS OF RIGHT FOOT: ICD-10-CM

## 2022-10-20 DIAGNOSIS — L97.513 FOOT ULCERATION, RIGHT, WITH NECROSIS OF MUSCLE: ICD-10-CM

## 2022-10-20 DIAGNOSIS — Z89.421 STATUS POST AMPUTATION OF TOE OF RIGHT FOOT: Primary | ICD-10-CM

## 2022-10-20 PROCEDURE — 97597 DBRDMT OPN WND 1ST 20 CM/<: CPT

## 2022-10-20 NOTE — THERAPY WOUND CARE TREATMENT
Outpatient Rehabilitation - Wound/Debridement Treatment Note  [facility]     Patient Name: Jesse Siu  : 1935  MRN: [id number]  Today's Date: 10/20/2022                 Admit Date: 10/20/2022    Visit Dx:    ICD-10-CM ICD-9-CM   1. Status post amputation of toe of right foot ([facility])  Z89.421 V49.72   2. Foot ulceration, right, with necrosis of muscle ([facility])  L97.513 707.15     728.89   3. Cellulitis of right foot  L03.115 682.7       Patient Active Problem List   Diagnosis   • Cellulitis of right foot   • Type 2 diabetes mellitus with hyperglycemia ([facility])   • Renal insufficiency   • Foot ulceration, right, with necrosis of muscle ([facility])        Past Medical History:   Diagnosis Date   • Hyperlipidemia    • Hypertension    • Pre-diabetes         Past Surgical History:   Procedure Laterality Date   • AMPUTATION DIGIT Right 2022    Procedure: GREAT AND SECOND TOE AMPUTATION RIGHT;  Surgeon: Lemuel Angela MD;  Location: [facility];  Service: Vascular;  Laterality: Right;   • FOOT SURGERY Left     CANCER REMOVAL   • TONSILLECTOMY     • TURP / TRANSURETHRAL INCISION / DRAINAGE PROSTATE           EVALUATION   PT Ortho     Row Name 10/20/22 09       Subjective Comments    Subjective Comments No new complaints, changed the dressing on Tuesday with two pieces of Dayna to base of wound.  -       Subjective Pain    Able to rate subjective pain? yes  -    Pre-Treatment Pain Level 0  -    Post-Treatment Pain Level 0  -       Transfers    Comment, (Transfers) seated in w/c for tx  -          User Key  (r) = Recorded By, (t) = Taken By, (c) = Cosigned By    Initials Name Provider Type     Nimesh España, PT Physical Therapist                 LDA Wound     Row Name 10/20/22 0900             Wound 22 1200 Right medial foot Amputation stump    Wound - Properties Group Placement Date: 22  -MF Placement Time: 1200  -MF Side: Right  -MF Orientation: medial  -MF Location: foot  -MF Primary Wound  Type: Amputation s  -    Dressing Appearance intact;moist drainage;other (see comments)  Dayna partially non-dissolved  -      Base granulating;moist;pink;red;slough;yellow;white  narrow open area in depth, visible base moist and granulating  -      Periwound intact;moist;pink;macerated;pale white;redness;warm  min maceration, red blanchable area at medial stump of 1st MH/midfoot improved  -      Periwound Temperature warm  -      Periwound Skin Turgor soft  -      Edges irregular  -      Wound Length (cm) 0.1 cm  pinpoint open area at depth  -      Wound Width (cm) 0.1 cm  -      Wound Depth (cm) 0.1 cm  -      Wound Surface Area (cm^2) 0.01 cm^2  -      Wound Volume (cm^3) 0.001 cm^3  -      Drainage Characteristics/Odor serosanguineous  -      Drainage Amount small  -      Care, Wound cleansed with;wound cleanser;debrided  -      Dressing Care dressing applied;collagen;silver impregnated;foam;gauze  dayna, mepilex ag, kerlix, spandage  -      Periwound Care cleansed with pH balanced cleanser;dry periwound area maintained  -      Retired Wound - Properties Group Placement Date: 05/20/22  - Placement Time: 1200  -MF Side: Right  - Orientation: medial  - Location: foot  - Primary Wound Type: Amputation s  -    Retired Wound - Properties Group Date first assessed: 05/20/22  - Time first assessed: 1200  -MF Side: Right  - Location: foot  - Primary Wound Type: Amputation s  -          User Key  (r) = Recorded By, (t) = Taken By, (c) = Cosigned By    Initials Name Provider Type    Wesley Vallejo, PT Physical Therapist     Nimesh España, PT Physical Therapist                  WOUND DEBRIDEMENT  Total area of Debridement: ~1cm2  Debridement Site 1  Location- Site 1: R toe amputation site  Selective Debridement- Site 1: Wound Surface <20cmsq  Instruments- Site 1: tweezers  Excised Tissue Description- Site 1: minimum, other (comment) (macerated debris,  periwound crusts)  Bleeding- Site 1: scant              Therapy Education     Row Name 10/20/22 0900             Therapy Education    Education Details Only apply small amount of zuri to pinpoint open area to depth of wound. Only cover opening of wound with Mepilex. Anticipate full closure of wound in 1-2 weeks.  -      Given Bandaging/dressing change  -      Program Reinforced;Progressed  -      How Provided Verbal;Demonstration  -      Provided to Patient;Caregiver  -      Level of Understanding Verbalized  -            User Key  (r) = Recorded By, (t) = Taken By, (c) = Cosigned By    Initials Name Provider Type     Nimesh España, PT Physical Therapist                Recommendation and Plan   PT Assessment/Plan     Row Name 10/20/22 0900          PT Assessment    Functional Limitations Performance in self-care ADL;Limitations in functional capacity and performance;Limitations in community activities  -     Impairments Integumentary integrity  -     Assessment Comments Pt's R foot wound now with only a pinpoint open area remaining at depth of wound. Pt's wound base is clean and granulating. PT anticipating wound to be fully resurfaced in the next 1-2 weeks. Pt would continue to benefit form skilled wound care to promote wound healing.  -     Rehab Potential Good  -     Patient/caregiver participated in establishment of treatment plan and goals Yes  -     Patient would benefit from skilled therapy intervention Yes  -        PT Plan    PT Frequency 1x/week  -     Physical Therapy Interventions (Optional Details) wound care;patient/family education  -     PT Plan Comments debridement, dressings  -           User Key  (r) = Recorded By, (t) = Taken By, (c) = Cosigned By    Initials Name Provider Type     Nimesh España, PT Physical Therapist                Goals   PT OP Goals     Row Name 10/20/22 0925          Time Calculation    PT Goal Re-Cert Due Date 11/09/22  -            User Key  (r) = Recorded By, (t) = Taken By, (c) = Cosigned By    Initials Name Provider Type     Nimesh España, PT Physical Therapist                PT Goal Re-Cert Due Date: 11/09/22            Time Calculation: Start Time: 0900  Untimed Charges  33119-Uqbiinald debridement: 15  Total Minutes  Untimed Charges Total Minutes: 15   Total Minutes: 15  Therapy Charges for Today     Code Description Service Date Service Provider Modifiers Qty    04702744103 HC JOSE DEBRIDE OPEN WOUND UP TO 20CM 10/20/2022 Nimesh España, PT GP 1                  Nimesh España PT  10/20/2022

## 2022-10-27 ENCOUNTER — HOSPITAL ENCOUNTER (OUTPATIENT)
Dept: PHYSICAL THERAPY | Facility: HOSPITAL | Age: 87
Setting detail: THERAPIES SERIES
Discharge: HOME | End: 2022-10-27

## 2022-10-27 DIAGNOSIS — Z89.421 STATUS POST AMPUTATION OF TOE OF RIGHT FOOT: Primary | ICD-10-CM

## 2022-10-27 DIAGNOSIS — L97.513 FOOT ULCERATION, RIGHT, WITH NECROSIS OF MUSCLE: ICD-10-CM

## 2022-10-27 PROCEDURE — 97597 DBRDMT OPN WND 1ST 20 CM/<: CPT

## 2022-10-27 NOTE — THERAPY DISCHARGE NOTE
Outpatient Rehabilitation - Wound/Debridement Treatment Note &  Discharge Summary  [facility]     Patient Name: Jesse Siu  : 1935  MRN: [id number]  Today's Date: 10/27/2022                  Admit Date: 10/27/2022    Visit Dx:    ICD-10-CM ICD-9-CM   1. Status post amputation of toe of right foot ([facility])  Z89.421 V49.72   2. Foot ulceration, right, with necrosis of muscle ([facility])  L97.513 707.15     728.89     R medial foot amputation stump      Patient Active Problem List   Diagnosis   • Cellulitis of right foot   • Type 2 diabetes mellitus with hyperglycemia ([facility])   • Renal insufficiency   • Foot ulceration, right, with necrosis of muscle ([facility])        Past Medical History:   Diagnosis Date   • Hyperlipidemia    • Hypertension    • Pre-diabetes         Past Surgical History:   Procedure Laterality Date   • AMPUTATION DIGIT Right 2022    Procedure: GREAT AND SECOND TOE AMPUTATION RIGHT;  Surgeon: Lemuel Angela MD;  Location: [facility];  Service: Vascular;  Laterality: Right;   • FOOT SURGERY Left     CANCER REMOVAL   • TONSILLECTOMY     • TURP / TRANSURETHRAL INCISION / DRAINAGE PROSTATE           EVALUATION   PT Ortho     Row Name 10/27/22 1400       Subjective Comments    Subjective Comments Reports Dr. Serrano discharged pt from ID. Daughter reports she had difficulty packing wound with zuri d/t inability to find base of wound.  -       Subjective Pain    Able to rate subjective pain? yes  -    Pre-Treatment Pain Level 0  -    Post-Treatment Pain Level 0  -       Transfers    Comment, (Transfers) seated in w/c for tx  -          User Key  (r) = Recorded By, (t) = Taken By, (c) = Cosigned By    Initials Name Provider Type     Nimesh España, PT Physical Therapist                     LDA Wound     Row Name 10/27/22 1400             Wound 22 1200 Right medial foot Amputation stump    Wound - Properties Group Placement Date: 22  -MF Placement Time: 1200  -MF Side:  "Right  - Orientation: medial  - Location: foot  -MF Primary Wound Type: Amputation s  -MF    Wound Image Images linked: 1  -LH      Dressing Appearance intact;other (see comments);dry  Non-dissolved zuri  -      Base dry;pink;closed/resurfaced  fully epithelialized  -      Periwound intact;pink;warm  -      Periwound Temperature warm  -      Periwound Skin Turgor soft  -      Wound Length (cm) 0 cm  -      Wound Width (cm) 0 cm  -      Wound Depth (cm) 0 cm  -      Wound Surface Area (cm^2) 0 cm^2  -LH      Wound Volume (cm^3) 0 cm^3  -      Drainage Amount none  -      Care, Wound cleansed with;wound cleanser;debrided  -      Dressing Care dressing applied;dressing changed;low-adherent;foam;border dressing  4\" optifoam only  -      Periwound Care cleansed with pH balanced cleanser;dry periwound area maintained  -      Retired Wound - Properties Group Placement Date: 05/20/22  - Placement Time: 1200  -MF Side: Right  - Orientation: medial  - Location: foot  -MF Primary Wound Type: Amputation s  -MF    Retired Wound - Properties Group Date first assessed: 05/20/22  - Time first assessed: 1200  -MF Side: Right  - Location: foot  -MF Primary Wound Type: Amputation s  -          User Key  (r) = Recorded By, (t) = Taken By, (c) = Cosigned By    Initials Name Provider Type    Wesley Vallejo, PT Physical Therapist     Nimesh España, PT Physical Therapist                  WOUND DEBRIDEMENT  Total area of Debridement: ~1cm2  Debridement Site 1  Location- Site 1: R toe amputation site  Selective Debridement- Site 1: Wound Surface <20cmsq  Instruments- Site 1: tweezers, other (comment) (cotton swab)  Excised Tissue Description- Site 1: minimum, other (comment) (hypertrophic crust)  Bleeding- Site 1: none             Therapy Education  Education Details: Keep area covered with 4x4 optifoam for additional protection to allow fragile new skin to mature and strengthen. " Cleanse depth of crevasse with theraworx and cotton swab to reduce buildup of hyptertrophic crust and debris. Will need new referral if further wound care follow up is requested.  Given: Bandaging/dressing change  Program: Reinforced, Progressed  How Provided: Verbal, Demonstration  Provided to: Patient, Caregiver  Level of Understanding: Verbalized     Therapy Education     Row Name 10/27/22 1400             Therapy Education    Education Details Keep area covered with 4x4 optifoam for additional protection to allow fragile new skin to mature and strengthen. Cleanse depth of crevasse with theraworx and cotton swab to reduce buildup of hyptertrophic crust and debris. Will need new referral if further wound care follow up is requested.  -      Given Bandaging/dressing change  -      Program Reinforced;Progressed  -      How Provided Verbal;Demonstration  -      Provided to Patient;Caregiver  -      Level of Understanding Verbalized  -            User Key  (r) = Recorded By, (t) = Taken By, (c) = Cosigned By    Initials Name Provider Type     Nimesh España, PT Physical Therapist                Recommendation and Plan   PT Assessment/Plan     Row Name 10/27/22 1400          PT Assessment    Functional Limitations Performance in self-care ADL;Limitations in functional capacity and performance;Limitations in community activities  -     Impairments Integumentary integrity  -     Assessment Comments Pt has met all wound care STGs and LTGs. Pt with full re-epithelialization of wound base this session with no remaining open areas of ulceration. PT educated pt/daughter on timeline for maturation of fragile new epithelialization. Pt appropriate for DC to home management. Pt will need new OP wound care referral if futher follow up requested.  -     Rehab Potential Good  -     Patient/caregiver participated in establishment of treatment plan and goals Yes  -        PT Plan    PT Frequency Other  (comment)  DC  -     Physical Therapy Interventions (Optional Details) patient/family education  -     PT Plan Comments [facility]           User Key  (r) = Recorded By, (t) = Taken By, (c) = Cosigned By    Initials Name Provider Type     Nimesh España, PT Physical Therapist                Goals   PT OP Goals     Row Name 10/27/22 1449 10/27/22 1400       PT Short Term Goals    STG 1 -- Decrease wound size by 25% as evidence of wound healing.  -    STG 1 Progress -- Met  -    STG 2 -- increase granulation to >75% to improve wound healing.  -    STG 2 Progress -- Met  -       Long Term Goals    LTG 1 -- Decrease wound size by 75% as evidence of wound healing.  -    LTG 1 Progress -- Met  -    LTG 2 -- Increase granulation to >90% to improve wound healing.  -    LTG 2 Progress -- Met  -    LTG 3 -- Pt and family able to demonstrate home dressing management to allow for transition to home management.  -    LTG 3 Progress -- Met  -       Time Calculation    PT Goal Re-Cert Due Date 11/09/22  - 11/09/22  -          User Key  (r) = Recorded By, (t) = Taken By, (c) = Cosigned By    Initials Name Provider Type     Nimesh España, PT Physical Therapist                Time Calculation: Start Time: 1430  Untimed Charges  88115-Zbkznrlkv debridement: 15  Total Minutes  Untimed Charges Total Minutes: 15   Total Minutes: 15    Therapy Charges for Today     Code Description Service Date Service Provider Modifiers Qty    07018390495 HC JOSE DEBRIDE OPEN WOUND UP TO 20CM 10/27/2022 Nimesh España, PT GP 1               OP Discharge Summary     Row Name 10/27/22 1450             OP PT Discharge Summary    Date of Discharge 10/27/22  [facility]      Reason for Discharge All goals achieved;other (comment)  No remaining open areas of ulceration.  -      Outcomes Achieved Able to achieve all goals within established timeline  -            User Key  (r) = Recorded By, (t) = Taken By, (c) = Cosigned By     Initials Name Provider Type     Nimesh España, PT Physical Therapist                Nimesh España, PT  10/27/2022

## 2022-11-23 ENCOUNTER — OFFICE VISIT (OUTPATIENT)
Dept: CARDIAC SURGERY | Facility: CLINIC | Age: 87
End: 2022-11-23

## 2022-11-23 VITALS
DIASTOLIC BLOOD PRESSURE: 74 MMHG | HEIGHT: 70 IN | BODY MASS INDEX: 17.9 KG/M2 | OXYGEN SATURATION: 98 % | TEMPERATURE: 97.8 F | HEART RATE: 72 BPM | SYSTOLIC BLOOD PRESSURE: 136 MMHG | WEIGHT: 125 LBS

## 2022-11-23 DIAGNOSIS — I96 GANGRENE OF FOOT: Primary | ICD-10-CM

## 2022-11-23 PROCEDURE — 99212 OFFICE O/P EST SF 10 MIN: CPT | Performed by: THORACIC SURGERY (CARDIOTHORACIC VASCULAR SURGERY)

## 2022-11-23 NOTE — PROGRESS NOTES
"Patient Information  Jesse Siu                                                                                          [address] [place] [address]      1935  [unfilled]  [unfilled]    Chief Complaint   Patient presents with   • Follow-up     2 month follow tp eval right grewat toe, 3rd metetarsal amp 5/12/22- Gangrene. Pt states that he is doing good and he is beginning to eat better. Denies any pain or swelling.        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  Patient last seen on September 7, 2022 which time his foot was completely epithelialized.    Vitals:    11/23/22 1039   BP: 136/74   Pulse: 72   Temp: 97.8 °F (36.6 °C)   SpO2: 98%   Weight: 56.7 kg (125 lb)   Height: 177.8 cm (70\")        Physical Exam amputation site is completely healed remarkably well.    Lab/other results:    Assessment: 1.  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  Wound heal by wound VAC and secondary intent.    Plan: We will refer the patient to [facility] for possible shoe filler.  We will see the patient back in 3 months for follow-up visit.    Lemuel Angela M.D.   "

## 2023-02-03 ENCOUNTER — TELEPHONE (OUTPATIENT)
Dept: CARDIAC SURGERY | Facility: CLINIC | Age: 88
End: 2023-02-03
Payer: MEDICARE

## 2023-02-22 ENCOUNTER — OFFICE VISIT (OUTPATIENT)
Dept: CARDIAC SURGERY | Facility: CLINIC | Age: 88
End: 2023-02-22
Payer: MEDICARE

## 2023-02-22 VITALS
DIASTOLIC BLOOD PRESSURE: 72 MMHG | BODY MASS INDEX: 18.65 KG/M2 | SYSTOLIC BLOOD PRESSURE: 138 MMHG | HEART RATE: 69 BPM | TEMPERATURE: 98.4 F | OXYGEN SATURATION: 98 % | WEIGHT: 130 LBS

## 2023-02-22 DIAGNOSIS — L97.513 FOOT ULCERATION, RIGHT, WITH NECROSIS OF MUSCLE: Primary | ICD-10-CM

## 2023-02-22 PROCEDURE — 99212 OFFICE O/P EST SF 10 MIN: CPT | Performed by: REGISTERED NURSE

## 2023-02-22 RX ORDER — CARVEDILOL 25 MG/1
TABLET, FILM COATED ORAL DAILY
COMMUNITY
Start: 2022-12-01

## 2023-02-22 NOTE — PROGRESS NOTES
[facility] Office Follow Up Note    Date of Encounter: 2023     Name: Jesse Siu  : 1935     Referred By: No ref. provider found  PCP: Soheila Martinez MD    Chief Complaint:    Chief Complaint   Patient presents with   • Follow-up     Patient presents for wound check.  He is s/p right great toe and 2nd toe amputation 2022.  He feels he is doing well.        Subjective      History of Present Illness:    It was nice to see Jesse Siu in follow up after surgery.  He is a very pleasant 88 y.o. male with PMH significant for non-insulin dependent DM II, cellulitis of the right foot, and foot ulceration with necrosis of muscle.      Patient is s/p right great toe, right second toe, right third toe transmetatarsal amputation with extensive soft tissue debridement for gas gangrene by Dr. Angela on 2022.  See op report for full details.      Mr. Siu was last seen in office on 2022 at which time he was doing well.  His amputation had completely healed.  Dr. Angela referred patient to [facility] orth[facility] for possible shoe filler and follow-up in 3 months.     Patient presents to office today for follow-up.  He is doing well.  Patient is going today to meet with [facility].    Review of Systems:  Review of Systems   Constitutional: Positive for weight loss (pt trying to gain weight back ). Negative for chills, decreased appetite, diaphoresis, fever, malaise/fatigue, night sweats and weight gain.   HENT: Negative.  Negative for hoarse voice.    Eyes: Negative.  Negative for blurred vision, double vision and visual disturbance.   Cardiovascular: Positive for leg swelling (minimal in right lower extremity). Negative for chest pain, claudication, dyspnea on exertion, irregular heartbeat, near-syncope, orthopnea, palpitations, paroxysmal nocturnal dyspnea and syncope.   Respiratory: Negative.  Negative for cough, hemoptysis, shortness of breath, sputum  production and wheezing.    Endocrine: Negative.    Hematologic/Lymphatic: Negative.  Negative for adenopathy and bleeding problem. Does not bruise/bleed easily.   Skin: Negative.  Negative for color change, nail changes, poor wound healing and rash.   Musculoskeletal: Negative.  Negative for back pain, falls and muscle cramps.   Gastrointestinal: Negative.  Negative for abdominal pain, dysphagia and heartburn.   Genitourinary: Negative.  Negative for flank pain.   Neurological: Negative.  Negative for brief paralysis, disturbances in coordination, dizziness, focal weakness, headaches, light-headedness, loss of balance, numbness, paresthesias, sensory change, vertigo and weakness.   Psychiatric/Behavioral: Negative.  Negative for depression and suicidal ideas.   Allergic/Immunologic: Negative for persistent infections.       I have reviewed the following portions of the patient's history: allergies, current medications, past family history, past medical history, past social history, past surgical history and problem list and confirm it's accurate.    Allergies:  No Known Allergies    Medications:      Current Outpatient Medications:   •  amLODIPine (NORVASC) 10 MG tablet, Take 10 mg by mouth Daily., Disp: , Rfl:   •  lisinopril (PRINIVIL,ZESTRIL) 5 MG tablet, Take 1 tablet by mouth Daily., Disp: 30 tablet, Rfl: 0  •  metFORMIN ER (GLUCOPHAGE-XR) 500 MG 24 hr tablet, Take 1,000 mg by mouth 2 (Two) Times a Day., Disp: , Rfl:   •  pravastatin (PRAVACHOL) 20 MG tablet, Take 20 mg by mouth Every Night., Disp: , Rfl:   •  Contour Test test strip, Daily. USE TO TEST BLOOD GLUCOSE, Disp: , Rfl:     History:   Past Medical History:   Diagnosis Date   • Hyperlipidemia    • Hypertension    • Pre-diabetes        Past Surgical History:   Procedure Laterality Date   • AMPUTATION DIGIT Right 5/12/2022    Procedure: GREAT AND SECOND TOE AMPUTATION RIGHT;  Surgeon: Lemuel Angela MD;  Location: [facility];  Service: Vascular;   Laterality: Right;   • FOOT SURGERY Left     CANCER REMOVAL   • TONSILLECTOMY     • TURP / TRANSURETHRAL INCISION / DRAINAGE PROSTATE         Social History     Socioeconomic History   • Marital status:    • Number of children: 3   Tobacco Use   • Smoking status: Never   • Smokeless tobacco: Never   Vaping Use   • Vaping Use: Never used   Substance and Sexual Activity   • Alcohol use: Never   • Drug use: Never   • Sexual activity: Never        Family History   Problem Relation Age of Onset   • Diabetes Mother    • Aneurysm Father        Objective     Physical Exam:  Vitals:    02/22/23 1021 02/22/23 1022   BP: 140/80 138/72   BP Location: Right arm Left arm   Patient Position: Sitting Sitting   Pulse: 69    Temp: 98.4 °F (36.9 °C)    SpO2: 98%    Weight: 59 kg (130 lb)       Body mass index is 18.65 kg/m².    Physical Exam  Vitals reviewed.   Constitutional:       General: He is not in acute distress.     Appearance: He is not ill-appearing.   Pulmonary:      Effort: Pulmonary effort is normal.   Neurological:      Mental Status: He is alert and oriented to person, place, and time.   Psychiatric:         Mood and Affect: Mood normal.         Behavior: Behavior normal.         Thought Content: Thought content normal.         Judgment: Judgment normal.         Imaging/Labs:             Assessment / Plan      Assessment / Plan:  PMH significant for non-insulin dependent DM II, cellulitis of the right foot, and foot ulceration with necrosis of muscle.      1.  Foot Ulceration with Necrosis of Muscle  · S/p right great toe, right second toe, right third toe transmetatarsal amputation with extensive soft tissue debridement for gas gangrene by Dr. Angela on 5/12/2022.  See op report for full details.    · Last seen in office on 11/23/2022 at which time he was doing well.  Amputation had completely healed.    · Dr. Angela referred patient to [facility] for possible shoe filler and follow-up in 3 months.    · Presents to office today for follow-up.  He is doing well.    · Amputation site remains completely healed without issue.   · Patient is going today to meet with [facility].      Follow Up:   We will plan for follow-up in ~4 months   Or sooner for any further concerns or worsening sign and symptoms.  Patient encouraged to call the office with any questions or concerns.     Thank you for allowing me to participate in your care.  Best Regards,    Isabel Olivares [facility]  02/22/23  10:23 EST    Addendum: Esha is indicated for a right partial foot insert necessary to manage the amputation of the right phalangeal's and metatarsals 1 2 and 3.  Insert will protect skin integrity and improve gait mechanics.  Diagnostic code:  Z89.  421[  Electronically signed by Lemuel Angela MD, 03/15/23, 9:00 AM EDT.   acquired absence of other right toes]

## 2023-07-12 PROBLEM — I10 HYPERTENSION: Status: ACTIVE | Noted: 2023-07-12

## 2023-07-28 ENCOUNTER — APPOINTMENT (OUTPATIENT)
Dept: MRI IMAGING | Facility: HOSPITAL | Age: 88
End: 2023-07-28
Payer: MEDICARE

## 2023-07-28 ENCOUNTER — HOSPITAL ENCOUNTER (OUTPATIENT)
Facility: HOSPITAL | Age: 88
Setting detail: OBSERVATION
Discharge: HOME | End: 2023-07-31
Attending: EMERGENCY MEDICINE | Admitting: INTERNAL MEDICINE
Payer: MEDICARE

## 2023-07-28 ENCOUNTER — APPOINTMENT (OUTPATIENT)
Dept: CT IMAGING | Facility: HOSPITAL | Age: 88
End: 2023-07-28
Payer: MEDICARE

## 2023-07-28 DIAGNOSIS — I63.9 ACUTE ISCHEMIC STROKE: Primary | ICD-10-CM

## 2023-07-28 PROBLEM — R47.01 EXPRESSIVE APHASIA: Status: ACTIVE | Noted: 2023-07-28

## 2023-07-28 PROBLEM — R47.01 APHASIA: Status: ACTIVE | Noted: 2023-07-28

## 2023-07-28 LAB
ALBUMIN SERPL-MCNC: 3.8 G/DL (ref 3.5–5.2)
ALBUMIN SERPL-MCNC: 4 G/DL (ref 3.5–5.2)
ALBUMIN/GLOB SERPL: 1.2 G/DL
ALBUMIN/GLOB SERPL: 1.2 G/DL
ALP SERPL-CCNC: 92 U/L (ref 39–117)
ALP SERPL-CCNC: 99 U/L (ref 39–117)
ALT SERPL W P-5'-P-CCNC: 11 U/L (ref 1–41)
ALT SERPL W P-5'-P-CCNC: 12 U/L (ref 1–41)
ANION GAP SERPL CALCULATED.3IONS-SCNC: 13 MMOL/L (ref 5–15)
ANION GAP SERPL CALCULATED.3IONS-SCNC: 15 MMOL/L (ref 5–15)
APTT PPP: 29.4 SECONDS (ref 60–90)
AST SERPL-CCNC: 17 U/L (ref 1–40)
AST SERPL-CCNC: 21 U/L (ref 1–40)
BACTERIA UR QL AUTO: NORMAL /HPF
BASOPHILS # BLD AUTO: 0.05 10*3/MM3 (ref 0–0.2)
BASOPHILS NFR BLD AUTO: 0.5 % (ref 0–1.5)
BILIRUB SERPL-MCNC: 0.3 MG/DL (ref 0–1.2)
BILIRUB SERPL-MCNC: 0.3 MG/DL (ref 0–1.2)
BILIRUB UR QL STRIP: NEGATIVE
BUN BLDA-MCNC: 27 MG/DL
BUN BLDA-MCNC: 27 MG/DL (ref 8–26)
BUN SERPL-MCNC: 24 MG/DL (ref 8–23)
BUN SERPL-MCNC: 25 MG/DL (ref 8–23)
BUN/CREAT SERPL: 17.1 (ref 7–25)
BUN/CREAT SERPL: 17.7 (ref 7–25)
CA-I BLDA-SCNC: 1.17 MMOL/L (ref 1.2–1.32)
CALCIUM BLD QL: 1.17 MG/DL
CALCIUM SPEC-SCNC: 9.1 MG/DL (ref 8.6–10.5)
CALCIUM SPEC-SCNC: 9.3 MG/DL (ref 8.6–10.5)
CHLORIDE BLDA-SCNC: 104 MMOL/L (ref 98–109)
CHLORIDE BLDA-SCNC: 104 MMOL/L (ref 98–109)
CHLORIDE SERPL-SCNC: 103 MMOL/L (ref 98–107)
CHLORIDE SERPL-SCNC: 105 MMOL/L (ref 98–107)
CHOLEST SERPL-MCNC: 131 MG/DL (ref 0–200)
CLARITY UR: CLEAR
CO2 BLDA-SCNC: 23 MMOL/L (ref 24–29)
CO2 SERPL-SCNC: 23 MMOL/L (ref 22–29)
CO2 SERPL-SCNC: 23 MMOL/L (ref 22–29)
COLOR UR: YELLOW
CREAT BLDA-MCNC: 1.6 MG/DL
CREAT BLDA-MCNC: 1.6 MG/DL (ref 0.6–1.3)
CREAT SERPL-MCNC: 1.4 MG/DL (ref 0.76–1.27)
CREAT SERPL-MCNC: 1.41 MG/DL (ref 0.76–1.27)
DEPRECATED RDW RBC AUTO: 45.7 FL (ref 37–54)
EGFRCR SERPLBLD CKD-EPI 2021: 41.2 ML/MIN/1.73
EGFRCR SERPLBLD CKD-EPI 2021: 47.9 ML/MIN/1.73
EGFRCR SERPLBLD CKD-EPI 2021: 48.3 ML/MIN/1.73
EOSINOPHIL # BLD AUTO: 0.26 10*3/MM3 (ref 0–0.4)
EOSINOPHIL NFR BLD AUTO: 2.9 % (ref 0.3–6.2)
ERYTHROCYTE [DISTWIDTH] IN BLOOD BY AUTOMATED COUNT: 13.3 % (ref 12.3–15.4)
GEN 5 2HR TROPONIN T REFLEX: 60 NG/L
GLOBULIN UR ELPH-MCNC: 3.1 GM/DL
GLOBULIN UR ELPH-MCNC: 3.4 GM/DL
GLUCOSE BLDC GLUCOMTR-MCNC: 165 MG/DL (ref 70–130)
GLUCOSE BLDC GLUCOMTR-MCNC: 181 MG/DL (ref 70–130)
GLUCOSE BLDC GLUCOMTR-MCNC: 181 MG/DL (ref 70–130)
GLUCOSE SERPL-MCNC: 185 MG/DL (ref 65–99)
GLUCOSE SERPL-MCNC: 188 MG/DL (ref 65–99)
GLUCOSE UR STRIP-MCNC: ABNORMAL MG/DL
HCT VFR BLD AUTO: 41.4 % (ref 37.5–51)
HCT VFR BLDA CALC: 41 % (ref 38–51)
HCT VFR BLDA CALC: 41 % (ref 38–51)
HDLC SERPL-MCNC: 58 MG/DL (ref 40–60)
HGB BLD-MCNC: 13.1 G/DL (ref 13–17.7)
HGB BLDA-MCNC: 13.9 G/DL (ref 12–17)
HGB BLDA-MCNC: 13.9 G/DL (ref 12–17)
HGB UR QL STRIP.AUTO: NEGATIVE
HYALINE CASTS UR QL AUTO: NORMAL /LPF
IMM GRANULOCYTES # BLD AUTO: 0.03 10*3/MM3 (ref 0–0.05)
IMM GRANULOCYTES NFR BLD AUTO: 0.3 % (ref 0–0.5)
INR PPP: 1.03 (ref 0.89–1.12)
INR PPP: 1.3 (ref 0.8–1.2)
KETONES UR QL STRIP: NEGATIVE
LDLC SERPL CALC-MCNC: 57 MG/DL (ref 0–100)
LDLC/HDLC SERPL: 0.97 {RATIO}
LEUKOCYTE ESTERASE UR QL STRIP.AUTO: NEGATIVE
LYMPHOCYTES # BLD AUTO: 1.9 10*3/MM3 (ref 0.7–3.1)
LYMPHOCYTES NFR BLD AUTO: 20.8 % (ref 19.6–45.3)
MAGNESIUM SERPL-MCNC: 1.8 MG/DL (ref 1.6–2.4)
MAGNESIUM SERPL-MCNC: 2.1 MG/DL (ref 1.6–2.4)
MCH RBC QN AUTO: 29.6 PG (ref 26.6–33)
MCHC RBC AUTO-ENTMCNC: 31.6 G/DL (ref 31.5–35.7)
MCV RBC AUTO: 93.7 FL (ref 79–97)
MONOCYTES # BLD AUTO: 0.61 10*3/MM3 (ref 0.1–0.9)
MONOCYTES NFR BLD AUTO: 6.7 % (ref 5–12)
NEUTROPHILS NFR BLD AUTO: 6.27 10*3/MM3 (ref 1.7–7)
NEUTROPHILS NFR BLD AUTO: 68.8 % (ref 42.7–76)
NITRITE UR QL STRIP: NEGATIVE
NRBC BLD AUTO-RTO: 0 /100 WBC (ref 0–0.2)
PH UR STRIP.AUTO: 7.5 [PH] (ref 5–8)
PLATELET # BLD AUTO: 332 10*3/MM3 (ref 140–450)
PMV BLD AUTO: 10.4 FL (ref 6–12)
POTASSIUM BLDA-SCNC: 4.3 MMOL/L (ref 3.5–4.9)
POTASSIUM BLDA-SCNC: 4.3 MMOL/L (ref 3.5–4.9)
POTASSIUM SERPL-SCNC: 4.4 MMOL/L (ref 3.5–5.2)
POTASSIUM SERPL-SCNC: 4.5 MMOL/L (ref 3.5–5.2)
PROT SERPL-MCNC: 6.9 G/DL (ref 6–8.5)
PROT SERPL-MCNC: 7.4 G/DL (ref 6–8.5)
PROT UR QL STRIP: ABNORMAL
PROTHROMBIN TIME: 13.6 SECONDS (ref 12.2–14.5)
PROTHROMBIN TIME: 15 SECONDS (ref 12.8–15.2)
QT INTERVAL: 388 MS
QTC INTERVAL: 450 MS
RBC # BLD AUTO: 4.42 10*6/MM3 (ref 4.14–5.8)
RBC # UR STRIP: NORMAL /HPF
REF LAB TEST METHOD: NORMAL
SODIUM BLD-SCNC: 142 MMOL/L (ref 138–146)
SODIUM BLD-SCNC: 142 MMOL/L (ref 138–146)
SODIUM SERPL-SCNC: 141 MMOL/L (ref 136–145)
SODIUM SERPL-SCNC: 141 MMOL/L (ref 136–145)
SP GR UR STRIP: 1.02 (ref 1–1.03)
SQUAMOUS #/AREA URNS HPF: NORMAL /HPF
TRIGL SERPL-MCNC: 84 MG/DL (ref 0–150)
TROPONIN T DELTA: -3 NG/L
TROPONIN T SERPL HS-MCNC: 63 NG/L
TSH SERPL DL<=0.05 MIU/L-ACNC: 2.5 UIU/ML (ref 0.27–4.2)
UROBILINOGEN UR QL STRIP: ABNORMAL
VLDLC SERPL-MCNC: 16 MG/DL (ref 5–40)
WBC # UR STRIP: NORMAL /HPF
WBC NRBC COR # BLD: 9.12 10*3/MM3 (ref 3.4–10.8)

## 2023-07-28 PROCEDURE — 84443 ASSAY THYROID STIM HORMONE: CPT | Performed by: INTERNAL MEDICINE

## 2023-07-28 PROCEDURE — 80053 COMPREHEN METABOLIC PANEL: CPT | Performed by: EMERGENCY MEDICINE

## 2023-07-28 PROCEDURE — 81001 URINALYSIS AUTO W/SCOPE: CPT | Performed by: EMERGENCY MEDICINE

## 2023-07-28 PROCEDURE — 85730 THROMBOPLASTIN TIME PARTIAL: CPT | Performed by: EMERGENCY MEDICINE

## 2023-07-28 PROCEDURE — 99223 1ST HOSP IP/OBS HIGH 75: CPT | Performed by: INTERNAL MEDICINE

## 2023-07-28 PROCEDURE — 25510000001 IOPAMIDOL PER 1 ML: Performed by: EMERGENCY MEDICINE

## 2023-07-28 PROCEDURE — G0378 HOSPITAL OBSERVATION PER HR: HCPCS

## 2023-07-28 PROCEDURE — P9612 CATHETERIZE FOR URINE SPEC: HCPCS

## 2023-07-28 PROCEDURE — 70450 CT HEAD/BRAIN W/O DYE: CPT

## 2023-07-28 PROCEDURE — 85610 PROTHROMBIN TIME: CPT

## 2023-07-28 PROCEDURE — 82948 REAGENT STRIP/BLOOD GLUCOSE: CPT

## 2023-07-28 PROCEDURE — 83735 ASSAY OF MAGNESIUM: CPT | Performed by: EMERGENCY MEDICINE

## 2023-07-28 PROCEDURE — 85610 PROTHROMBIN TIME: CPT | Performed by: EMERGENCY MEDICINE

## 2023-07-28 PROCEDURE — 85014 HEMATOCRIT: CPT

## 2023-07-28 PROCEDURE — 99285 EMERGENCY DEPT VISIT HI MDM: CPT

## 2023-07-28 PROCEDURE — 70551 MRI BRAIN STEM W/O DYE: CPT

## 2023-07-28 PROCEDURE — 85025 COMPLETE CBC W/AUTO DIFF WBC: CPT | Performed by: EMERGENCY MEDICINE

## 2023-07-28 PROCEDURE — 70496 CT ANGIOGRAPHY HEAD: CPT

## 2023-07-28 PROCEDURE — 70498 CT ANGIOGRAPHY NECK: CPT

## 2023-07-28 PROCEDURE — 80053 COMPREHEN METABOLIC PANEL: CPT | Performed by: INTERNAL MEDICINE

## 2023-07-28 PROCEDURE — 83735 ASSAY OF MAGNESIUM: CPT | Performed by: INTERNAL MEDICINE

## 2023-07-28 PROCEDURE — 84484 ASSAY OF TROPONIN QUANT: CPT | Performed by: EMERGENCY MEDICINE

## 2023-07-28 PROCEDURE — 96374 THER/PROPH/DIAG INJ IV PUSH: CPT

## 2023-07-28 PROCEDURE — 80047 BASIC METABLC PNL IONIZED CA: CPT

## 2023-07-28 PROCEDURE — 0042T HC CT CEREBRAL PERFUSION W/WO CONTRAST: CPT

## 2023-07-28 PROCEDURE — 80061 LIPID PANEL: CPT

## 2023-07-28 PROCEDURE — 36415 COLL VENOUS BLD VENIPUNCTURE: CPT

## 2023-07-28 PROCEDURE — 93005 ELECTROCARDIOGRAM TRACING: CPT | Performed by: EMERGENCY MEDICINE

## 2023-07-28 PROCEDURE — 99214 OFFICE O/P EST MOD 30 MIN: CPT

## 2023-07-28 RX ORDER — NICOTINE POLACRILEX 4 MG
15 LOZENGE BUCCAL
Status: DISCONTINUED | OUTPATIENT
Start: 2023-07-28 | End: 2023-07-31 | Stop reason: HOSPADM

## 2023-07-28 RX ORDER — SODIUM CHLORIDE 0.9 % (FLUSH) 0.9 %
10 SYRINGE (ML) INJECTION EVERY 12 HOURS SCHEDULED
Status: DISCONTINUED | OUTPATIENT
Start: 2023-07-28 | End: 2023-07-31 | Stop reason: HOSPADM

## 2023-07-28 RX ORDER — NICARDIPINE HYDROCHLORIDE 2.5 MG/ML
INJECTION INTRAVENOUS
Status: ACTIVE
Start: 2023-07-28 | End: 2023-07-29

## 2023-07-28 RX ORDER — ASPIRIN 81 MG/1
324 TABLET, CHEWABLE ORAL ONCE
Status: DISCONTINUED | OUTPATIENT
Start: 2023-07-28 | End: 2023-07-29 | Stop reason: SDUPTHER

## 2023-07-28 RX ORDER — LABETALOL HYDROCHLORIDE 5 MG/ML
10 INJECTION, SOLUTION INTRAVENOUS EVERY 4 HOURS PRN
Status: DISCONTINUED | OUTPATIENT
Start: 2023-07-28 | End: 2023-07-31 | Stop reason: HOSPADM

## 2023-07-28 RX ORDER — CLOPIDOGREL BISULFATE 75 MG/1
300 TABLET ORAL ONCE
Status: DISCONTINUED | OUTPATIENT
Start: 2023-07-28 | End: 2023-07-30

## 2023-07-28 RX ORDER — SODIUM CHLORIDE 9 MG/ML
75 INJECTION, SOLUTION INTRAVENOUS CONTINUOUS
Status: DISCONTINUED | OUTPATIENT
Start: 2023-07-28 | End: 2023-07-30

## 2023-07-28 RX ORDER — NITROGLYCERIN 0.4 MG/1
0.4 TABLET SUBLINGUAL
Status: DISCONTINUED | OUTPATIENT
Start: 2023-07-28 | End: 2023-07-31 | Stop reason: HOSPADM

## 2023-07-28 RX ORDER — SODIUM CHLORIDE 0.9 % (FLUSH) 0.9 %
10 SYRINGE (ML) INJECTION AS NEEDED
Status: DISCONTINUED | OUTPATIENT
Start: 2023-07-28 | End: 2023-07-31 | Stop reason: HOSPADM

## 2023-07-28 RX ORDER — ACETAMINOPHEN 325 MG/1
650 TABLET ORAL EVERY 4 HOURS PRN
Status: DISCONTINUED | OUTPATIENT
Start: 2023-07-28 | End: 2023-07-31 | Stop reason: HOSPADM

## 2023-07-28 RX ORDER — SODIUM CHLORIDE 9 MG/ML
40 INJECTION, SOLUTION INTRAVENOUS AS NEEDED
Status: DISCONTINUED | OUTPATIENT
Start: 2023-07-28 | End: 2023-07-31 | Stop reason: HOSPADM

## 2023-07-28 RX ORDER — IBUPROFEN 600 MG/1
1 TABLET ORAL
Status: DISCONTINUED | OUTPATIENT
Start: 2023-07-28 | End: 2023-07-31 | Stop reason: HOSPADM

## 2023-07-28 RX ORDER — LABETALOL HYDROCHLORIDE 5 MG/ML
INJECTION, SOLUTION INTRAVENOUS
Status: COMPLETED | OUTPATIENT
Start: 2023-07-28 | End: 2023-07-28

## 2023-07-28 RX ORDER — ONDANSETRON 2 MG/ML
4 INJECTION INTRAMUSCULAR; INTRAVENOUS EVERY 6 HOURS PRN
Status: DISCONTINUED | OUTPATIENT
Start: 2023-07-28 | End: 2023-07-31 | Stop reason: HOSPADM

## 2023-07-28 RX ORDER — ATORVASTATIN CALCIUM 40 MG/1
80 TABLET, FILM COATED ORAL NIGHTLY
Status: DISCONTINUED | OUTPATIENT
Start: 2023-07-28 | End: 2023-07-31 | Stop reason: HOSPADM

## 2023-07-28 RX ORDER — DEXTROSE MONOHYDRATE 25 G/50ML
25 INJECTION, SOLUTION INTRAVENOUS
Status: DISCONTINUED | OUTPATIENT
Start: 2023-07-28 | End: 2023-07-31 | Stop reason: HOSPADM

## 2023-07-28 RX ADMIN — IOPAMIDOL 115 ML: 755 INJECTION, SOLUTION INTRAVENOUS at 20:15

## 2023-07-28 RX ADMIN — Medication 20 MG: at 21:06

## 2023-07-29 ENCOUNTER — APPOINTMENT (OUTPATIENT)
Dept: CARDIOLOGY | Facility: HOSPITAL | Age: 88
End: 2023-07-29
Payer: MEDICARE

## 2023-07-29 LAB
BASOPHILS # BLD AUTO: 0.06 10*3/MM3 (ref 0–0.2)
BASOPHILS NFR BLD AUTO: 0.5 % (ref 0–1.5)
CREAT UR-MCNC: 45.3 MG/DL
DEPRECATED RDW RBC AUTO: 45.2 FL (ref 37–54)
EOSINOPHIL # BLD AUTO: 0.16 10*3/MM3 (ref 0–0.4)
EOSINOPHIL NFR BLD AUTO: 1.4 % (ref 0.3–6.2)
ERYTHROCYTE [DISTWIDTH] IN BLOOD BY AUTOMATED COUNT: 13.3 % (ref 12.3–15.4)
GLUCOSE BLDC GLUCOMTR-MCNC: 145 MG/DL (ref 70–130)
GLUCOSE BLDC GLUCOMTR-MCNC: 160 MG/DL (ref 70–130)
GLUCOSE BLDC GLUCOMTR-MCNC: 163 MG/DL (ref 70–130)
GLUCOSE BLDC GLUCOMTR-MCNC: 182 MG/DL (ref 70–130)
GLUCOSE BLDC GLUCOMTR-MCNC: 199 MG/DL (ref 70–130)
HBA1C MFR BLD: 7.2 % (ref 4.8–5.6)
HCT VFR BLD AUTO: 37.6 % (ref 37.5–51)
HGB BLD-MCNC: 12 G/DL (ref 13–17.7)
IMM GRANULOCYTES # BLD AUTO: 0.05 10*3/MM3 (ref 0–0.05)
IMM GRANULOCYTES NFR BLD AUTO: 0.4 % (ref 0–0.5)
LYMPHOCYTES # BLD AUTO: 1.16 10*3/MM3 (ref 0.7–3.1)
LYMPHOCYTES NFR BLD AUTO: 10.3 % (ref 19.6–45.3)
MCH RBC QN AUTO: 29.6 PG (ref 26.6–33)
MCHC RBC AUTO-ENTMCNC: 31.9 G/DL (ref 31.5–35.7)
MCV RBC AUTO: 92.6 FL (ref 79–97)
MONOCYTES # BLD AUTO: 0.75 10*3/MM3 (ref 0.1–0.9)
MONOCYTES NFR BLD AUTO: 6.7 % (ref 5–12)
NEUTROPHILS NFR BLD AUTO: 80.7 % (ref 42.7–76)
NEUTROPHILS NFR BLD AUTO: 9.06 10*3/MM3 (ref 1.7–7)
NRBC BLD AUTO-RTO: 0 /100 WBC (ref 0–0.2)
PLATELET # BLD AUTO: 296 10*3/MM3 (ref 140–450)
PMV BLD AUTO: 10.8 FL (ref 6–12)
RBC # BLD AUTO: 4.06 10*6/MM3 (ref 4.14–5.8)
SODIUM UR-SCNC: 134 MMOL/L
WBC NRBC COR # BLD: 11.24 10*3/MM3 (ref 3.4–10.8)

## 2023-07-29 PROCEDURE — 96361 HYDRATE IV INFUSION ADD-ON: CPT

## 2023-07-29 PROCEDURE — 92523 SPEECH SOUND LANG COMPREHEN: CPT

## 2023-07-29 PROCEDURE — 82948 REAGENT STRIP/BLOOD GLUCOSE: CPT

## 2023-07-29 PROCEDURE — 97116 GAIT TRAINING THERAPY: CPT

## 2023-07-29 PROCEDURE — G0378 HOSPITAL OBSERVATION PER HR: HCPCS

## 2023-07-29 PROCEDURE — 82570 ASSAY OF URINE CREATININE: CPT | Performed by: INTERNAL MEDICINE

## 2023-07-29 PROCEDURE — 97166 OT EVAL MOD COMPLEX 45 MIN: CPT

## 2023-07-29 PROCEDURE — 85025 COMPLETE CBC W/AUTO DIFF WBC: CPT | Performed by: INTERNAL MEDICINE

## 2023-07-29 PROCEDURE — 83036 HEMOGLOBIN GLYCOSYLATED A1C: CPT | Performed by: INTERNAL MEDICINE

## 2023-07-29 PROCEDURE — 84300 ASSAY OF URINE SODIUM: CPT | Performed by: INTERNAL MEDICINE

## 2023-07-29 PROCEDURE — 92610 EVALUATE SWALLOWING FUNCTION: CPT

## 2023-07-29 PROCEDURE — 99232 SBSQ HOSP IP/OBS MODERATE 35: CPT | Performed by: INTERNAL MEDICINE

## 2023-07-29 PROCEDURE — 97161 PT EVAL LOW COMPLEX 20 MIN: CPT

## 2023-07-29 PROCEDURE — 63710000001 INSULIN LISPRO (HUMAN) PER 5 UNITS: Performed by: INTERNAL MEDICINE

## 2023-07-29 RX ORDER — NICOTINE POLACRILEX 4 MG
15 LOZENGE BUCCAL
Status: DISCONTINUED | OUTPATIENT
Start: 2023-07-29 | End: 2023-07-31 | Stop reason: HOSPADM

## 2023-07-29 RX ORDER — SODIUM CHLORIDE 0.9 % (FLUSH) 0.9 %
10 SYRINGE (ML) INJECTION EVERY 12 HOURS SCHEDULED
Status: DISCONTINUED | OUTPATIENT
Start: 2023-07-29 | End: 2023-07-31 | Stop reason: HOSPADM

## 2023-07-29 RX ORDER — INSULIN LISPRO 100 [IU]/ML
2-7 INJECTION, SOLUTION INTRAVENOUS; SUBCUTANEOUS
Status: DISCONTINUED | OUTPATIENT
Start: 2023-07-29 | End: 2023-07-31 | Stop reason: HOSPADM

## 2023-07-29 RX ORDER — ASPIRIN 325 MG
325 TABLET ORAL DAILY
Status: DISCONTINUED | OUTPATIENT
Start: 2023-07-29 | End: 2023-07-30

## 2023-07-29 RX ORDER — SODIUM CHLORIDE 9 MG/ML
40 INJECTION, SOLUTION INTRAVENOUS AS NEEDED
Status: DISCONTINUED | OUTPATIENT
Start: 2023-07-29 | End: 2023-07-31 | Stop reason: HOSPADM

## 2023-07-29 RX ORDER — DEXTROSE MONOHYDRATE 25 G/50ML
25 INJECTION, SOLUTION INTRAVENOUS
Status: DISCONTINUED | OUTPATIENT
Start: 2023-07-29 | End: 2023-07-31 | Stop reason: HOSPADM

## 2023-07-29 RX ORDER — SODIUM CHLORIDE 0.9 % (FLUSH) 0.9 %
10 SYRINGE (ML) INJECTION AS NEEDED
Status: DISCONTINUED | OUTPATIENT
Start: 2023-07-29 | End: 2023-07-31 | Stop reason: HOSPADM

## 2023-07-29 RX ORDER — ASPIRIN 300 MG/1
300 SUPPOSITORY RECTAL DAILY
Status: DISCONTINUED | OUTPATIENT
Start: 2023-07-29 | End: 2023-07-30

## 2023-07-29 RX ORDER — CLOPIDOGREL BISULFATE 75 MG/1
75 TABLET ORAL DAILY
Status: DISCONTINUED | OUTPATIENT
Start: 2023-07-29 | End: 2023-07-30

## 2023-07-29 RX ADMIN — ASPIRIN 300 MG: 300 SUPPOSITORY RECTAL at 06:08

## 2023-07-29 RX ADMIN — CLOPIDOGREL BISULFATE 75 MG: 75 TABLET ORAL at 16:34

## 2023-07-29 RX ADMIN — INSULIN LISPRO 2 UNITS: 100 INJECTION, SOLUTION INTRAVENOUS; SUBCUTANEOUS at 18:18

## 2023-07-29 RX ADMIN — ATORVASTATIN CALCIUM 80 MG: 40 TABLET, FILM COATED ORAL at 20:43

## 2023-07-29 RX ADMIN — Medication 10 ML: at 06:51

## 2023-07-29 RX ADMIN — SODIUM CHLORIDE 75 ML/HR: 9 INJECTION, SOLUTION INTRAVENOUS at 13:18

## 2023-07-29 RX ADMIN — Medication 10 ML: at 20:47

## 2023-07-29 RX ADMIN — SODIUM CHLORIDE 75 ML/HR: 9 INJECTION, SOLUTION INTRAVENOUS at 01:42

## 2023-07-29 RX ADMIN — Medication 10 ML: at 20:48

## 2023-07-29 RX ADMIN — INSULIN LISPRO 2 UNITS: 100 INJECTION, SOLUTION INTRAVENOUS; SUBCUTANEOUS at 13:11

## 2023-07-29 NOTE — H&P
"    [facility] Medicine Services  HISTORY AND PHYSICAL    Patient Name: Jesse Siu  : 1935  MRN: [id number]  Primary Care Physician: Soheila Martinez MD  Date of admission: 2023      Subjective   Subjective     Chief Complaint:  Aphasia    HPI:  Jesse Siu is a 88 y.o. male who is accompanied by family members who assist with history, the patient is able to contribute a very small amount.  He currently is suffering from expressive aphasia and requires family to fill in some blanks regarding history/ROS.  Family states that around 6 PM near dinnertime the patient began having \"this weird difficulty speaking.\"  They state that he began speaking more slowly as if he had difficulty \"finding the correct words,\" and they note that it \"was like he knew what he wanted to say but could not say it.\"  He is able to write some answers.  Family denies any slurred speech or facial droop.  The patient is able to deny any headache, lightheadedness/dizziness, or visual changes.  No focal weakness.  Family states he was able to eat his dinner in full with no swallowing difficulty.  Patient and family also denies chest pain, shortness of breath, fever/chills, abdominal pain, bowel habit change, recent head trauma, dietary or medication changes of late, or syncope.  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      Review of Systems   Neurological:  Positive for speech difficulty.   Psychiatric/Behavioral:  Positive for confusion.    All other systems reviewed and are negative.       Personal History     Past Medical History:   Diagnosis Date    Hyperlipidemia     Hypertension     Pre-diabetes              Past Surgical History:   Procedure Laterality Date    AMPUTATION DIGIT Right 2022    Procedure: GREAT AND " SECOND TOE AMPUTATION RIGHT;  Surgeon: Lemuel Angela MD;  Location: [facility];  Service: Vascular;  Laterality: Right;    FOOT SURGERY Left     CANCER REMOVAL    TONSILLECTOMY      TURP / TRANSURETHRAL INCISION / DRAINAGE PROSTATE         Family History: family history includes Aneurysm in his father; Diabetes in his mother.     Social History:  reports that he has never smoked. He has never used smokeless tobacco. He reports that he does not drink alcohol and does not use drugs.  Social History     Social History Narrative    Lives in [facility]       Medications:  Available home medication information reviewed.  (Not in a hospital admission)      No Known Allergies    Objective   Objective     Vital Signs:   Temp:  [97.8 øF (36.6 øC)] 97.8 øF (36.6 øC)  Heart Rate:  [73-92] 78  Resp:  [18-20] 20  BP: (114-210)/() 200/100  Total (NIH Stroke Scale): 5    Physical Exam   Constitutional: Awake, alert, NAD.  Eyes: PERRLA, sclerae anicteric, no conjunctival injection  HENT: NCAT, mucous membranes dry.  Neck: Supple, no thyromegaly, no lymphadenopathy, trachea midline  Respiratory: Clear to auscultation bilaterally, nonlabored respirations   Cardiovascular: On auscultation, rhythm appears bigeminal; no murmurs, rubs, or gallops, palpable pedal pulses bilaterally  Gastrointestinal: Positive bowel sounds, soft, nontender, nondistended  Musculoskeletal: No bilateral ankle edema, no clubbing or cyanosis to extremities  Psychiatric: Appropriate affect, cooperative  Neurologic: Is able to answer orientation questions of facility and city correctly, does not answer correctly as far as what year it is; when asked to write down answers, this is very slow and not all answers are correct; sometimes mumbles to himself during my conversation with other family members; strength symmetric in all extremities, Cranial Nerves grossly intact to confrontation, speech clear  Skin: No rashes, poor turgor.    Result Review:  I have  personally reviewed the results from the time of this admission to 7/28/2023 22:53 EDT and agree with these findings:  [x]  Laboratory list / accordion  []  Microbiology  [x]  Radiology  [x]  EKG/Telemetry   []  Cardiology/Vascular   []  Pathology  []  Old records  []  Other:  Most notable findings include: I reviewed EKG which by my read shows sinus rhythm but in bigeminal pattern, every other beat is PAC, approximately 80 bpm, normal axis, nonspecific ST/T wave changes but no acute appearing ST elevation.        LAB RESULTS:      Lab 07/28/23 2014 07/28/23 2012 07/28/23 2010 07/28/23 2009   WBC  --   --  9.12  --    HEMOGLOBIN  --   --  13.1  --    HEMOGLOBIN, POC 13.9  --   --  13.9   HEMATOCRIT  --   --  41.4  --    HEMATOCRIT POC 41  --   --  41   PLATELETS  --   --  332  --    NEUTROS ABS  --   --  6.27  --    IMMATURE GRANS (ABS)  --   --  0.03  --    LYMPHS ABS  --   --  1.90  --    MONOS ABS  --   --  0.61  --    EOS ABS  --   --  0.26  --    MCV  --   --  93.7  --    PROTIME  --  15.0 13.6  --    INR  --  1.3* 1.03  --    APTT  --   --  29.4*  --          Lab 07/28/23 2014 07/28/23 2010 07/28/23 2009   SODIUM  --  141  --    POTASSIUM  --  4.5  --    CHLORIDE  --  103  --    CO2  --  23.0  --    ANION GAP  --  15.0  --    BUN  --  25*  --    CREATININE 1.60 1.41* 1.60*   EGFR  --  47.9* 41.2*   GLUCOSE  --  185*  --    CALCIUM  --  9.3  --    MAGNESIUM  --  2.1  --    TSH  --  2.500  --          Lab 07/28/23 2010   TOTAL PROTEIN 7.4   ALBUMIN 4.0   GLOBULIN 3.4   ALT (SGPT) 12   AST (SGOT) 21   BILIRUBIN 0.3   ALK PHOS 99         Lab 07/28/23 2010   HSTROP T 63*                 UA          7/28/2023    21:10   Urinalysis   Squamous Epithelial Cells, UA None Seen    Specific [place], UA 1.017    Ketones, UA Negative    Blood, UA Negative    Leukocytes, UA Negative    Nitrite, UA Negative    RBC, UA 0-2    WBC, UA 0-2    Bacteria, UA None Seen        Microbiology Results (last 10 days)       ** No  results found for the last 240 hours. **            CT Angiogram Neck    Result Date: 7/28/2023  CT ANGIOGRAM HEAD W AI ANALYSIS OF LVO, CT ANGIOGRAM NECK Date of Exam: 7/28/2023 8:04 PM EDT Indication: Stroke, follow up. Comparison: CT head without contrast 7/28/2023 Technique: CTA of the head was performed after the uneventful intravenous administration of 115 mL Isovue 370 . Reconstructed coronal and sagittal images were also obtained. In addition, a 3-D volume rendered image was created for interpretation. Automated exposure control and iterative reconstruction methods were used. Findings: CTA NECK: *Aortic arch: No aneurysm. No significant stenosis or occlusion of the major arch vessels. *Left carotid system: No aneurysm, significant stenosis or occlusion. *Right carotid system: No aneurysm, significant stenosis or occlusion. 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 Left vertebral artery is patent. Basilar artery is patent. CTA HEAD: *Anterior circulation: No aneurysm, significant stenosis or occlusion. *Posterior circulation: No aneurysm, significant stenosis or occlusion. *Anatomic variants: None of significance. *Venous sinuses: Patent.     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 Left vertebral artery is patent. Basilar artery is patent Case discussed with Dr. DUONG SULLIVAN @ 7/28/2023 9:38 PM EDT. Electronically Signed: Gene Chandler  7/28/2023 9:38 PM EDT  Workstation ID: [id number]    CT Head Without Contrast Stroke Protocol    Result Date: 7/28/2023  CT HEAD WO CONTRAST STROKE PROTOCOL Date of Exam: 7/28/2023 7:59 PM EDT Indication: Stroke, follow up. Comparison: None available.  Technique: Axial CT images were obtained of the head without contrast administration.  Reconstructed coronal images were also obtained. Automated exposure control and iterative construction methods were used. Scan Time: 1957 hours Results discussed with Raman Duffy at 8:05 p.m.. Findings: *No acute intracranial hemorrhage. *No masses, mass effect, midline shift or hydrocephalus. *Chronic small vessel ischemic disease. Generalized brain atrophy. *Calvarium is intact. *Visualized orbits and globes are unremarkable without radiopaque foreign bodies. *Visualized paranasal sinuses are clear. *Visualized mastoid air cells are clear.     Impression: 1.No acute intracranial hemorrhage. Calvarium is intact. 2.Chronic findings as detailed above. Electronically Signed: Gene Chandler  7/28/2023 8:08 PM EDT  Workstation ID: [id number]    CT Angiogram Head w AI Analysis of LVO    Result Date: 7/28/2023  CT ANGIOGRAM HEAD W AI ANALYSIS OF LVO, CT ANGIOGRAM NECK Date of Exam: 7/28/2023 8:04 PM EDT Indication: Stroke, follow up. Comparison: CT head without contrast 7/28/2023 Technique: CTA of the head was performed after the uneventful intravenous administration of 115 mL Isovue 370 . Reconstructed coronal and sagittal images were also obtained. In addition, a 3-D volume rendered image was created for interpretation. Automated exposure control and iterative reconstruction methods were used. Findings: CTA NECK: *Aortic arch: No aneurysm. No significant stenosis or occlusion of the major arch vessels. *Left carotid system: No aneurysm, significant stenosis or occlusion. *Right carotid system: No aneurysm, significant stenosis or occlusion. 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 Left vertebral artery is patent. Basilar artery is patent. CTA HEAD: *Anterior circulation: No aneurysm, significant stenosis or occlusion.  *Posterior circulation: No aneurysm, significant stenosis or occlusion. *Anatomic variants: None of significance. *Venous sinuses: Patent.     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 Left vertebral artery is patent. Basilar artery is patent Case discussed with Dr. DUONG SULLIVAN @ 7/28/2023 9:38 PM EDT. Electronically Signed: Gene Ali  7/28/2023 9:38 PM EDT  Workstation ID: [id number]    CT CEREBRAL PERFUSION WITH & WITHOUT CONTRAST    Result Date: 7/28/2023  CT CEREBRAL PERFUSION W WO CONTRAST Date of Exam: 7/28/2023 8:04 PM EDT Indication: Neuro deficit, acute, stroke suspected.  Comparison: CT head and CTA head and neck 7/28/2023 Technique: Axial CT images of the brain were obtained prior to and after the administration of 115 mL Isovue 370 . Core blood volume, core blood flow, mean transit time, and Tmax images were obtained utilizing the Rapid software protocol. A limited CT angiogram of the head was also performed to measure the blood vessel density. The radiation dose reduction device was turned on for each scan per the ALARA (As Low as Reasonably Achievable) protocol. Findings: *CBF less than 30%: 0 mL. *Tmax greater than 6 seconds: 0 mL. *Mismatch volume 0 mL. *Mismatch ratio none.     Impression: Impression: No ischemia or core infarct is identified. Electronically Signed: Gene Chandler  7/28/2023 9:27 PM EDT  Workstation ID: [id number]         Assessment & Plan   Assessment & Plan     Active Hospital Problems    Diagnosis  POA    **Aphasia [R47.01]  Yes    Expressive aphasia [R47.01]  Yes       88M with expressive aphasia    Expressive aphasia  CVA rule out  - Daily aspirin, statin, Plavix.  - PT/OT/SLP.  - N.p.o. until bedside swallow evaluation passed.  - Check 2D echo.  - Check HbA1c, lipid  panel, TSH.  - Neuro checks.  - MRI pending.  - Continue telemetry.  - We will await neurology service recommendations, will follow up.    Acute renal insufficiency  - Clinically he is quite dry.  - Light IVF hydration with 0.9 NS.  - Check spot urine sodium, spot urine creatinine.  - Metabolic panel with a.m. labs to monitor BUN/creatinine.    Hypertension  - We will hold home medications while n.p.o.  - He did have systolic pressures in the 200s during my exam.  - We will add as needed IV labetalol for systolic pressure >170.  - Hold lisinopril for now while n.p.o., as well as for ARMANDO.    Hyperlipidemia  - He will be on daily statin.  - Check lipid panel.    Type 2 diabetes  - Hold home metformin while NPO, also due to acute renal insufficiency.  - SSI coverage with hypoglycemia coverage, fingersticks every 6 hours.  - Check HbA1c.        Total time spent: 80 minutes  Time spent includes time reviewing chart, face-to-face time, counseling patient/family/caregiver, ordering medications/tests/procedures, communicating with other health care professionals, documenting clinical information in the electronic health record, and coordination of care.     DVT prophylaxis:  scds      CODE STATUS:  full  Code Status and Medical Interventions:   Ordered at: 07/28/23 6784     Level Of Support Discussed With:    Patient     Code Status (Patient has no pulse and is not breathing):    CPR (Attempt to Resuscitate)     Medical Interventions (Patient has pulse or is breathing):    Full Support     Release to patient:    Routine Release       Expected Discharge  tbflynn Islas III, DO  07/28/23

## 2023-07-29 NOTE — THERAPY EVALUATION
Patient Name: Jesse Siu  : 1935    MRN: [id number]                              Today's Date: 2023       Admit Date: 2023    Visit Dx:     ICD-10-CM ICD-9-CM   1. Acute ischemic stroke  I63.9 434.91     Patient Active Problem List   Diagnosis    Cellulitis of right foot    Type 2 diabetes mellitus with hyperglycemia    Renal insufficiency    Foot ulceration, right, with necrosis of muscle    Hypertension    Aphasia    Expressive aphasia     Past Medical History:   Diagnosis Date    Hyperlipidemia     Hypertension     Pre-diabetes      Past Surgical History:   Procedure Laterality Date    AMPUTATION DIGIT Right 2022    Procedure: GREAT AND SECOND TOE AMPUTATION RIGHT;  Surgeon: Lemuel Angela MD;  Location: Replaced by [facility];  Service: Vascular;  Laterality: Right;    FOOT SURGERY Left     CANCER REMOVAL    TONSILLECTOMY      TURP / TRANSURETHRAL INCISION / DRAINAGE PROSTATE        General Information       Row Name 23 1356          OT Time and Intention    Document Type evaluation  -KF     Mode of Treatment occupational therapy  -KF       Row Name 23 1350          General Information    Patient Profile Reviewed yes  -KF     Prior Level of Function independent:;transfer;bed mobility;ADL's;all household mobility;max assist:;home management  Pt still manages own finances and uses FWW at baseline  -KF     Existing Precautions/Restrictions fall  h/o R toe amputations  -KF     Barriers to Rehab medically complex;previous functional deficit  -KF       Row Name 23 1351          Occupational Profile    Environmental Supports and Barriers (Occupational Profile) has wc available; tub shower with shower stool, grab bars for commode  -KF       Row Name 23 1353          Living Environment    People in Home child(ashli), adult  -       Row Name 23 1356          Home Main Entrance    Number of Stairs, Main Entrance one  -KF       Row Name 23 1356          Stairs Within Home,  Primary    Number of Stairs, Within Home, Primary none  -KF       Row Name 07/29/23 1356          Cognition    Orientation Status (Cognition) oriented x 4  -KF       Row Name 07/29/23 1356          Safety Issues, Functional Mobility    Safety Issues Affecting Function (Mobility) insight into deficits/self-awareness;at risk behavior observed;safety precaution awareness  -KF     Impairments Affecting Function (Mobility) balance;endurance/activity tolerance;strength  -KF               User Key  (r) = Recorded By, (t) = Taken By, (c) = Cosigned By      Initials Name Provider Type    KF May Kapadia, OT Occupational Therapist                     Mobility/ADL's       Row Name 07/29/23 1357          Bed Mobility    Bed Mobility scooting/bridging;supine-sit  -KF     Scooting/Bridging Sebastian (Bed Mobility) contact guard;verbal cues  -KF     Supine-Sit Sebastian (Bed Mobility) contact guard;verbal cues  -KF     Bed Mobility, Safety Issues decreased use of legs for bridging/pushing  -KF     Assistive Device (Bed Mobility) bed rails;head of bed elevated  -KF     Comment, (Bed Mobility) no assist and cues for sequencing only  -KF       Row Name 07/29/23 1357          Transfers    Transfers sit-stand transfer;stand-sit transfer;bed-chair transfer  -KF     Comment, (Transfers) cues for sequencing and hand placement  -KF       Row Name 07/29/23 1357          Bed-Chair Transfer    Bed-Chair Sebastian (Transfers) contact guard;verbal cues  -KF     Assistive Device (Bed-Chair Transfers) walker, front-wheeled  -KF       Row Name 07/29/23 1357          Sit-Stand Transfer    Sit-Stand Sebastian (Transfers) contact guard;verbal cues  -KF     Assistive Device (Sit-Stand Transfers) walker, front-wheeled  -KF       Row Name 07/29/23 1357          Stand-Sit Transfer    Stand-Sit Sebastian (Transfers) contact guard;verbal cues  -KF     Assistive Device (Stand-Sit Transfers) walker, front-wheeled  -KF       Row Name  07/29/23 [facility]7          Functional Mobility    Functional Mobility- Comment deferred to PT  -KF       Row Name 07/29/23 1357          Activities of Daily Living    BADL Assessment/Intervention lower body dressing;toileting;feeding;upper body dressing  -KF       Row Name 07/29/23 [facility]7          Lower Body Dressing Assessment/Training    [place] Level (Lower Body Dressing) don;shoes/slippers;socks;minimum assist (75% patient effort)  -KF     Position (Lower Body Dressing) edge of bed sitting  -KF       Row Name 07/29/23 1357          Toileting Assessment/Training    [place] Level (Toileting) moderate assist (50% patient effort)  -KF     Assistive Devices (Toileting) urinal  -KF     Position (Toileting) supported standing  -KF       Row Name 07/29/23 1357          Self-Feeding Assessment/Training    [place] Level (Feeding) scoop food and bring to mouth;prepare tray/open items;set up  -KF     Position (Self-Feeding) supported sitting  -KF       Row Name 07/29/23 1357          Upper Body Dressing Assessment/Training    [place] Level (Upper Body Dressing) don;front opening garment;minimum assist (75% patient effort)  -KF     Position (Upper Body Dressing) edge of bed sitting  -KF     Comment, (Upper Body Dressing) Mookie for line management  -               User Key  (r) = Recorded By, (t) = Taken By, (c) = Cosigned By      Initials Name Provider Type    KF May Kapadia, OT Occupational Therapist                   Obj/Interventions       Row Name 07/29/23 1407          Sensory Assessment (Somatosensory)    Sensory Assessment (Somatosensory) UE sensation intact  Simultaneous filing. User may be unaware of other data.  -KF       Row Name 07/29/23 1407          Vision Assessment/Intervention    Visual Impairment/Limitations WFL  -KF       Row Name 07/29/23 1407          Range of Motion Comprehensive    General Range of Motion bilateral upper extremity ROM WFL  Simultaneous filing. User may be unaware  of other data.  -       Row Name 07/29/23 1407          Strength Comprehensive (MMT)    General Manual Muscle Testing (MMT) Assessment upper extremity strength deficits identified  Simultaneous filing. User may be unaware of other data.  -KF     Comment, General Manual Muscle Testing (MMT) Assessment BUE grossly 4/5  -KF       Row Name 07/29/23 1407          Motor Skills    Motor Skills coordination  -     Coordination WFL;bilateral;upper extremity;bimanual skills  -       Row Name 07/29/23 1407          Balance    Balance Assessment sitting static balance;sitting dynamic balance;standing static balance;standing dynamic balance  Simultaneous filing. User may be unaware of other data.  -KF     Static Sitting Balance standby assist  Simultaneous filing. User may be unaware of other data.  -KF     Dynamic Sitting Balance standby assist  Simultaneous filing. User may be unaware of other data.  -KF     Position, Sitting Balance unsupported;sitting edge of bed  Simultaneous filing. User may be unaware of other data.  -KF     Static Standing Balance contact guard  Simultaneous filing. User may be unaware of other data.  -KF     Dynamic Standing Balance contact guard  Simultaneous filing. User may be unaware of other data.  -KF     Position/Device Used, Standing Balance supported;walker, rolling  Simultaneous filing. User may be unaware of other data.  -KF     Balance Interventions supported;sit to stand;standing;minimal challenge;occupation based/functional task;UE activity with balance activity  Simultaneous filing. User may be unaware of other data.  -KF     Comment, Balance no LOB overall  Simultaneous filing. User may be unaware of other data.  -               User Key  (r) = Recorded By, (t) = Taken By, (c) = Cosigned By      Initials Name Provider Type    KF May Kapadia, OT Occupational Therapist                   Goals/Plan       Row Name 07/29/23 1409          Transfer Goal 1 (OT)     Activity/Assistive Device (Transfer Goal 1, OT) toilet;walker, rolling  -KF     [place] Level/Cues Needed (Transfer Goal 1, OT) standby assist  -KF     Time Frame (Transfer Goal 1, OT) long term goal (LTG);10 days  -KF     Progress/Outcome (Transfer Goal 1, OT) new goal;goal ongoing  -KF       Row Name 07/29/23 8795          Dressing Goal 1 (OT)    Activity/Device (Dressing Goal 1, OT) lower body dressing  pants/brief/socks/shoes  -KF     [place]/Cues Needed (Dressing Goal 1, OT) standby assist  -KF     Time Frame (Dressing Goal 1, OT) long term goal (LTG);10 days  -KF     Progress/Outcome (Dressing Goal 1, OT) new goal;goal ongoing  -KF       Row Name 07/29/23 6724          Toileting Goal 1 (OT)    Activity/Device (Toileting Goal 1, OT) adjust/manage clothing;perform perineal hygiene;grab bar/safety frame;commode  -KF     [place] Level/Cues Needed (Toileting Goal 1, OT) contact guard required  -KF     Time Frame (Toileting Goal 1, OT) long term goal (LTG);10 days  -KF     Progress/Outcome (Toileting Goal 1, OT) new goal;goal ongoing  -KF       Row Name 07/29/23 8820          Grooming Goal 1 (OT)    Activity/Device (Grooming Goal 1, OT) oral care;wash face, hands  -KF     [place] (Grooming Goal 1, OT) contact guard required  -KF     Time Frame (Grooming Goal 1, OT) long term goal (LTG);10 days  -KF     Strategies/Barriers (Grooming Goal 1, OT) supported standing  -KF     Progress/Outcome (Grooming Goal 1, OT) new goal;goal ongoing  -KF       Row Name 07/29/23 0295          Therapy Assessment/Plan (OT)    Planned Therapy Interventions (OT) activity tolerance training;adaptive equipment training;BADL retraining;occupation/activity based interventions;transfer/mobility retraining;functional balance retraining;patient/caregiver education/training;strengthening exercise;ROM/therapeutic exercise  -KF               User Key  (r) = Recorded By, (t) = Taken By, (c) = Cosigned By      Initials Name  Provider Type    KF May Kapadia, OT Occupational Therapist                   Clinical Impression       Row Name 07/29/23 1400          Pain Assessment    Pretreatment Pain Rating 0/10 - no pain  -KF     Posttreatment Pain Rating 0/10 - no pain  -KF     Pre/Posttreatment Pain Comment tolerated  -KF     Pain Intervention(s) Repositioned;Ambulation/increased activity  -KF       Row Name 07/29/23 1400          Plan of Care Review    Plan of Care Reviewed With patient;daughter;family  -KF     Progress improving  -KF     Outcome Evaluation OT eval completed, Pt demonstrates mild deficits in sequencing, strength, and balance compared to baseline for ADL and functional transfer completion, CGA STS and steps to chair with FWW, Mookie LBD, setup self-feeding, recom IPOT DC home with 24/7 assist during recovery and HHOT  -KF       Row Name 07/29/23 1400          Therapy Assessment/Plan (OT)    Rehab Potential (OT) good, to achieve stated therapy goals  -KF     Criteria for Skilled Therapeutic Interventions Met (OT) yes;meets criteria;skilled treatment is necessary  -KF     Therapy Frequency (OT) daily  -KF       Row Name 07/29/23 1400          Therapy Plan Review/Discharge Plan (OT)    Equipment Needs Upon Discharge (OT) --  TBD  -KF     Anticipated Discharge Disposition (OT) home with 24/7 care;home with assist;home with home health  -KF       Row Name 07/29/23 1400          Vital Signs    Pre Systolic BP Rehab 150  RN cleared VSS  -KF     Pre Treatment Diastolic BP 70  -KF     Post Systolic BP Rehab 126  -KF     Post Treatment Diastolic BP 86  -KF     Pre SpO2 (%) 96  -KF     O2 Delivery Pre Treatment room air  -KF     Post SpO2 (%) 98  -KF     O2 Delivery Post Treatment room air  -KF     Pre Patient Position Supine  -KF     Intra Patient Position Standing  -KF     Post Patient Position Sitting  -KF       Row Name 07/29/23 1400          Positioning and Restraints    Pre-Treatment Position in bed  -KF     Post Treatment  Position chair  -KF     In Chair notified nsg;reclined;sitting;call light within reach;encouraged to call for assist;exit alarm on;waffle cushion;on mechanical lift sling;with family/caregiver;legs elevated  -KF               User Key  (r) = Recorded By, (t) = Taken By, (c) = Cosigned By      Initials Name Provider Type    KF May Kapadia, OT Occupational Therapist                   Outcome Measures       Row Name 07/29/23 1406          How much help from another is currently needed...    Putting on and taking off regular lower body clothing? 3  -KF     Bathing (including washing, rinsing, and drying) 2  -KF     Toileting (which includes using toilet bed pan or urinal) 3  -KF     Putting on and taking off regular upper body clothing 3  -KF     Taking care of personal grooming (such as brushing teeth) 3  -KF     Eating meals 4  -KF     AM-PAC 6 Clicks Score (OT) 18  -KF       Row Name 07/29/23 0805          How much help from another person do you currently need...    Turning from your back to your side while in flat bed without using bedrails? 2  -EO     Moving from lying on back to sitting on the side of a flat bed without bedrails? 2  -EO     Moving to and from a bed to a chair (including a wheelchair)? 2  -EO     Standing up from a chair using your arms (e.g., wheelchair, bedside chair)? 2  -EO     Climbing 3-5 steps with a railing? 2  -EO     To walk in hospital room? 2  -EO     AM-PAC 6 Clicks Score (PT) 12  -EO     Highest level of mobility 4 --> Transferred to chair/commode  -EO       Row Name 07/29/23 1406          Modified [place] Scale    Modified [place] Scale 2 - Slight disability.  Unable to carry out all previous activities but able to look after own affairs without assistance.  -KF       Row Name 07/29/23 1406          Functional Assessment    Outcome Measure Options AM-PAC 6 Clicks Daily Activity (OT);Modified [place]  -KF               User Key  (r) = Recorded By, (t) = Taken By, (c) = Cosigned  By      Initials Name Provider Type    Lauren Meadows RN Registered Nurse    May Walker OT Occupational Therapist                    Occupational Therapy Education       Title: PT OT SLP Therapies (In Progress)       Topic: Occupational Therapy (In Progress)       Point: ADL training (Done)       Description:   Instruct learner(s) on proper safety adaptation and remediation techniques during self care or transfers.   Instruct in proper use of assistive devices.                  Learning Progress Summary             Patient Acceptance, E,D,TB, VU,DU,NR by  at 7/29/2023 1407   Family Acceptance, E,D,TB, VU,DU,NR by  at 7/29/2023 1407                         Point: Home exercise program (Not Started)       Description:   Instruct learner(s) on appropriate technique for monitoring, assisting and/or progressing therapeutic exercises/activities.                  Learner Progress:  Not documented in this visit.              Point: Precautions (Done)       Description:   Instruct learner(s) on prescribed precautions during self-care and functional transfers.                  Learning Progress Summary             Patient Acceptance, E,D,TB, VU,DU,NR by  at 7/29/2023 1407   Family Acceptance, E,D,TB, VU,DU,NR by  at 7/29/2023 1407                         Point: Body mechanics (Done)       Description:   Instruct learner(s) on proper positioning and spine alignment during self-care, functional mobility activities and/or exercises.                  Learning Progress Summary             Patient Acceptance, E,D,TB, VU,DU,NR by  at 7/29/2023 1407   Family Acceptance, E,D,TB, VU,DU,NR by  at 7/29/2023 1407                                         User Key       Initials Effective Dates Name Provider Type Discipline     06/16/21 -  May Kapadia OT Occupational Therapist OT                  OT Recommendation and Plan  Planned Therapy Interventions (OT): activity tolerance training, adaptive  equipment training, BADL retraining, occupation/activity based interventions, transfer/mobility retraining, functional balance retraining, patient/caregiver education/training, strengthening exercise, ROM/therapeutic exercise  Therapy Frequency (OT): daily  Plan of Care Review  Plan of Care Reviewed With: patient, daughter, family  Progress: improving  Outcome Evaluation: OT eval completed, Pt demonstrates mild deficits in sequencing, strength, and balance compared to baseline for ADL and functional transfer completion, CGA STS and steps to chair with FWW, Mookie LBD, setup self-feeding, recom IPOT DC home with 24/7 assist during recovery and HHOT     Time Calculation:   Evaluation Complexity (OT)  Review Occupational Profile/Medical/Therapy History Complexity: expanded/moderate complexity  Assessment, Occupational Performance/Identification of Deficit Complexity: 3-5 performance deficits  Clinical Decision Making Complexity (OT): detailed assessment/moderate complexity  Overall Complexity of Evaluation (OT): moderate complexity     Time Calculation- OT       Row Name 07/29/23 1305             Time Calculation- OT    OT Start Time 1305  -KF      OT Received On 07/29/23  -KF      OT Goal Re-Cert Due Date 08/08/23  -KF         Untimed Charges    OT Eval/Re-eval Minutes 50  -KF         Total Minutes    Untimed Charges Total Minutes 50  -KF       Total Minutes 50  -KF                User Key  (r) = Recorded By, (t) = Taken By, (c) = Cosigned By      Initials Name Provider Type    KF May Kapadia OT Occupational Therapist                  Therapy Charges for Today       Code Description Service Date Service Provider Modifiers Qty    [id number]  OT EVAL MOD COMPLEXITY 4 7/29/2023 May Kapadia OT GO 1                 May Kapadia OT  7/29/2023

## 2023-07-29 NOTE — PLAN OF CARE
Goal Outcome Evaluation:  Plan of Care Reviewed With: patient, family        Progress: no change (PT IE)  Outcome Evaluation: PT eval complete. Pt presents with generalized weakness, mild balance deficits, and mild aphasia (expressive>receptive) warranting skilled IPPT services. Pt able to complete functional mobility with CGA and FWW but was limited by fatigue. PT rec home with initial 24/7 assist and HHPT at d/c.      Anticipated Discharge Disposition (PT): home with 24/7 care, home with home health

## 2023-07-29 NOTE — PROGRESS NOTES
[facility] Medicine Services  PROGRESS NOTE    Patient Name: Jesse Siu  : 1935  MRN: [id number]    Date of Admission: 2023  Primary Care Physician: Soheila Martinez MD    Subjective   Subjective     CC:  Aphasia    HPI:  Patient resting in bed in no acute distress and her daughter is also present at the bedside.  Patient has no specific complaint and tells me that he feels comfortable.  No fever or chills.  No chest pain or palpitation or shortness of breath.  No nausea vomiting or diarrhea or abdominal pain.  Patient is close to baseline.    ROS:  As above    Objective   Objective     Vital Signs:   Temp:  [97.8 øF (36.6 øC)-98.4 øF (36.9 øC)] 97.8 øF (36.6 øC)  Heart Rate:  [] 66  Resp:  [18-20] 18  BP: (114-218)/() 150/70     Physical Exam:  Constitutional: No acute distress, awake, alert  HENT: NCAT, mucous membranes moist  Respiratory: Clear to auscultation bilaterally, respiratory effort normal   Cardiovascular: RRR, no murmurs, rubs, or gallops  Gastrointestinal: Positive bowel sounds, soft, nontender, nondistended  Musculoskeletal: No bilateral ankle edema  Psychiatric: Appropriate affect, cooperative  Neurologic: Awake, alert, follows commands, no focality appreciated, speech clear  Skin: No rashes     Results Reviewed:  LAB RESULTS:      Lab 23  0422 23   WBC 11.24*  --   --  9.12  --    HEMOGLOBIN 12.0*  --   --  13.1  --    HEMOGLOBIN, POC  --  13.9  --   --  13.9   HEMATOCRIT 37.6  --   --  41.4  --    HEMATOCRIT POC  --  41  --   --  41   PLATELETS 296  --   --  332  --    NEUTROS ABS 9.06*  --   --  6.27  --    IMMATURE GRANS (ABS) 0.05  --   --  0.03  --    LYMPHS ABS 1.16  --   --  1.90  --    MONOS ABS 0.75  --   --  0.61  --    EOS ABS 0.16  --   --  0.26  --    MCV 92.6  --   --  93.7  --    PROTIME  --   --  15.0 13.6  --    APTT  --   --   --  29.4*  --          Lab  07/29/23  0422 07/28/23 2309 07/28/23 2014 07/28/23 2010 07/28/23 2009   SODIUM  --  141  --  141  --    POTASSIUM  --  4.4  --  4.5  --    CHLORIDE  --  105  --  103  --    CO2  --  23.0  --  23.0  --    ANION GAP  --  13.0  --  15.0  --    BUN  --  24*  --  25*  --    CREATININE  --  1.40* 1.60 1.41* 1.60*   EGFR  --  48.3*  --  47.9* 41.2*   GLUCOSE  --  188*  --  185*  --    CALCIUM  --  9.1  --  9.3  --    MAGNESIUM  --  1.8  --  2.1  --    HEMOGLOBIN A1C 7.20*  --   --   --   --    TSH  --   --   --  2.500  --          Lab 07/28/23 2309 07/28/23 2010   TOTAL PROTEIN 6.9 7.4   ALBUMIN 3.8 4.0   GLOBULIN 3.1 3.4   ALT (SGPT) 11 12   AST (SGOT) 17 21   BILIRUBIN 0.3 0.3   ALK PHOS 92 99         Lab 07/28/23 2309 07/28/23 2012 07/28/23 2010   HSTROP T 60*  --  63*   PROTIME  --  15.0 13.6   INR  --  1.3* 1.03         Lab 07/28/23 2309   CHOLESTEROL 131   LDL CHOL 57   HDL CHOL 58   TRIGLYCERIDES 84             Brief Urine Lab Results  (Last result in the past 365 days)        Color   Clarity   Blood   Leuk Est   Nitrite   Protein   CREAT   Urine HCG        07/28/23 2110 Yellow   Clear   Negative   Negative   Negative   30 mg/dL (1+)                   Microbiology Results Abnormal       None            CT Angiogram Neck    Result Date: 7/28/2023  CT ANGIOGRAM HEAD W AI ANALYSIS OF LVO, CT ANGIOGRAM NECK Date of Exam: 7/28/2023 8:04 PM EDT Indication: Stroke, follow up. Comparison: CT head without contrast 7/28/2023 Technique: CTA of the head was performed after the uneventful intravenous administration of 115 mL Isovue 370 . Reconstructed coronal and sagittal images were also obtained. In addition, a 3-D volume rendered image was created for interpretation. Automated exposure control and iterative reconstruction methods were used. Findings: CTA NECK: *Aortic arch: No aneurysm. No significant stenosis or occlusion of the major arch vessels. *Left carotid system: No aneurysm, significant stenosis or  occlusion. *Right carotid system: No aneurysm, significant stenosis or occlusion. 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 Left vertebral artery is patent. Basilar artery is patent. CTA HEAD: *Anterior circulation: No aneurysm, significant stenosis or occlusion. *Posterior circulation: No aneurysm, significant stenosis or occlusion. *Anatomic variants: None of significance. *Venous sinuses: Patent.     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 Left vertebral artery is patent. Basilar artery is patent Case discussed with Dr. DUONG SULLIVAN @ 7/28/2023 9:38 PM EDT. Electronically Signed: Gene Chandler  7/28/2023 9:38 PM EDT  Workstation ID: [id number]    MRI Brain Without Contrast    Result Date: 7/29/2023  MRI BRAIN WO CONTRAST Date of Exam: 7/29/2023 12:28 AM EDT Indication: Stroke, follow up.  Comparison: CT scan of the head from 7/28/2023 at 2006 hours Technique:  Routine multiplanar/multisequence sequence images of the brain were obtained without contrast administration. Findings: There is mild diffuse generalized atrophy. There are areas of increased T2 and FLAIR signal in the periventricular and subcortical white matter consistent with chronic microvascular ischemia. There is no mass, mass effect or midline shift. There are no areas of acute hemorrhage. There are no abnormal extra-axial fluid collections. The major intracranial flow voids are preserved. The diffusion weighted sequences are normal.     Impression: Impression: Atrophy and chronic microvascular ischemia. No acute intracranial process. Electronically Signed: Daljit Gurrola  7/29/2023 1:07 AM EDT  Workstation ID: [id number]    CT Head  Without Contrast Stroke Protocol    Result Date: 7/28/2023  CT HEAD WO CONTRAST STROKE PROTOCOL Date of Exam: 7/28/2023 7:59 PM EDT Indication: Stroke, follow up. Comparison: None available. Technique: Axial CT images were obtained of the head without contrast administration.  Reconstructed coronal images were also obtained. Automated exposure control and iterative construction methods were used. Scan Time: 1957 hours Results discussed with Raman Duffy at 8:05 p.m.. Findings: *No acute intracranial hemorrhage. *No masses, mass effect, midline shift or hydrocephalus. *Chronic small vessel ischemic disease. Generalized brain atrophy. *Calvarium is intact. *Visualized orbits and globes are unremarkable without radiopaque foreign bodies. *Visualized paranasal sinuses are clear. *Visualized mastoid air cells are clear.     Impression: 1.No acute intracranial hemorrhage. Calvarium is intact. 2.Chronic findings as detailed above. Electronically Signed: Gene Chandler  7/28/2023 8:08 PM EDT  Workstation ID: [id number]    CT Angiogram Head w AI Analysis of LVO    Result Date: 7/28/2023  CT ANGIOGRAM HEAD W AI ANALYSIS OF LVO, CT ANGIOGRAM NECK Date of Exam: 7/28/2023 8:04 PM EDT Indication: Stroke, follow up. Comparison: CT head without contrast 7/28/2023 Technique: CTA of the head was performed after the uneventful intravenous administration of 115 mL Isovue 370 . Reconstructed coronal and sagittal images were also obtained. In addition, a 3-D volume rendered image was created for interpretation. Automated exposure control and iterative reconstruction methods were used. Findings: CTA NECK: *Aortic arch: No aneurysm. No significant stenosis or occlusion of the major arch vessels. *Left carotid system: No aneurysm, significant stenosis or occlusion. *Right carotid system: No aneurysm, significant stenosis or occlusion. 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 Left vertebral artery is patent. Basilar artery is patent. CTA HEAD: *Anterior circulation: No aneurysm, significant stenosis or occlusion. *Posterior circulation: No aneurysm, significant stenosis or occlusion. *Anatomic variants: None of significance. *Venous sinuses: Patent.     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 Left vertebral artery is patent. Basilar artery is patent Case discussed with Dr. DUONG SULLIVAN @ 7/28/2023 9:38 PM EDT. Electronically Signed: Gene Chandler  7/28/2023 9:38 PM EDT  Workstation ID: [id number]    CT CEREBRAL PERFUSION WITH & WITHOUT CONTRAST    Result Date: 7/28/2023  CT CEREBRAL PERFUSION W WO CONTRAST Date of Exam: 7/28/2023 8:04 PM EDT Indication: Neuro deficit, acute, stroke suspected.  Comparison: CT head and CTA head and neck 7/28/2023 Technique: Axial CT images of the brain were obtained prior to and after the administration of 115 mL Isovue 370 . Core blood volume, core blood flow, mean transit time, and Tmax images were obtained utilizing the Rapid software protocol. A limited CT angiogram of the head was also performed to measure the blood vessel density. The radiation dose reduction device was turned on for each scan per the ALARA (As Low as Reasonably Achievable) protocol. Findings: *CBF less than 30%: 0 mL. *Tmax greater than 6 seconds: 0 mL. *Mismatch volume 0 mL. *Mismatch ratio none.     Impression: Impression: No ischemia or core infarct is identified. Electronically Signed: Gene Chandler  7/28/2023 9:27 PM EDT  Workstation ID: [id number]         Current medications:  Scheduled Meds:aspirin, 325 mg, Oral, Daily   Or  aspirin, 300 mg, Rectal, Daily  atorvastatin, 80 mg, Oral, Nightly  clopidogrel, 300 mg, Oral,  Once  clopidogrel, 75 mg, Oral, Daily  insulin lispro, 2-7 Units, Subcutaneous, 4x Daily AC & at Bedtime  sodium chloride, 10 mL, Intravenous, Q12H  sodium chloride, 10 mL, Intravenous, Q12H      Continuous Infusions:sodium chloride, 75 mL/hr, Last Rate: 75 mL/hr (07/29/23 1518)      PRN Meds:.  acetaminophen    Calcium Replacement - Follow Nurse / BPA Driven Protocol    dextrose    dextrose    dextrose    dextrose    glucagon (human recombinant)    labetalol    Magnesium Standard Dose Replacement - Follow Nurse / BPA Driven Protocol    nitroglycerin    ondansetron    Phosphorus Replacement - Follow Nurse / BPA Driven Protocol    Potassium Replacement - Follow Nurse / BPA Driven Protocol    [COMPLETED] Insert Peripheral IV **AND** sodium chloride    sodium chloride    sodium chloride    sodium chloride    sodium chloride    Assessment & Plan   Assessment & Plan     Active Hospital Problems    Diagnosis  POA    **Aphasia [R47.01]  Yes    Expressive aphasia [R47.01]  Yes      Resolved Hospital Problems   No resolved problems to display.        Brief Hospital Course to date:  Jesse Siu is a 88 y.o. male with past medical history significant for hypertension, hyperlipidemia, type 2 diabetes mellitus.  Patient was admitted for an episode of expressive aphasia.    *Expressive aphasia, significantly improved.  Imaging including MRI is negative for any acute process.    *Diabetes mellitus, A1c is 7.2.  Patient is on metformin at home which is on hold here and currently is on sliding scale insulin.    *Elevated creatinine at 1.4.  Patient has chronic kidney disease and baseline creatinine is around 1.1.  So patient does not qualify for acute kidney injury.    *Hypertension, currently acceptable and will monitor.        Expected Discharge Location and Transportation: Home  Expected Discharge in a day or 2  Expected discharge date/ time has not been documented.     DVT prophylaxis:  Mechanical DVT prophylaxis orders are  present.     AM-PAC 6 Clicks Score (PT): 17 (07/29/23 1393)    CODE STATUS:   Code Status and Medical Interventions:   Ordered at: 07/28/23 1302     Level Of Support Discussed With:    Patient     Code Status (Patient has no pulse and is not breathing):    CPR (Attempt to Resuscitate)     Medical Interventions (Patient has pulse or is breathing):    Full Support     Release to patient:    Routine Release       Merritt Jacobs MD  07/29/23

## 2023-07-29 NOTE — PLAN OF CARE
Problem: Adult Inpatient Plan of Care  Goal: Plan of Care Review  Outcome: Ongoing, Progressing  Flowsheets (Taken 7/29/2023 1008)  Progress: improving  Plan of Care Reviewed With:   patient   daughter   Goal Outcome Evaluation:  Plan of Care Reviewed With: patient, daughter        Progress: improving       SLP evaluation completed. Will address communication defictis. Please see note for further details and recommendations.

## 2023-07-29 NOTE — PLAN OF CARE
Goal Outcome Evaluation:  Plan of Care Reviewed With: patient, daughter, family        Progress: improving  Outcome Evaluation: OT eval completed, Pt demonstrates mild deficits in sequencing, strength, and balance compared to baseline for ADL and functional transfer completion, CGA STS and steps to chair with FWW, Mookie LBD, setup self-feeding, recom IPOT DC home with 24/7 assist during recovery and HHOT      Anticipated Discharge Disposition (OT): home with 24/7 care, home with assist, home with home health

## 2023-07-29 NOTE — ED PROVIDER NOTES
Subjective   History of Present Illness    Pt presents for speech problems.  He is aphasic.  He denies headache.  He is having trouble talking which is limiting history.    Pt was made a stroke alert and seen in CT.  See details in course of care.    Family arrived and provided history.  Pt was getting ready for dinner with family and somewhere between 6 and 6:30p he had acute onset of expressive aphasia.  They were worried his sugar was low and so they fed him.  When he did not improve they brought him in for evaluation.    At baseline he is reportedly independent, lives with his daughter, likes to work in his garden.    Per records PMH HTN, DM    History provided by:  Patient, relative and medical records  History limited by:  Mental status change    Review of Systems   Constitutional:  Negative for fever.   Gastrointestinal:  Negative for vomiting.   Neurological:  Positive for speech difficulty. Negative for weakness and headaches.   All other systems reviewed and are negative.    Past Medical History:   Diagnosis Date    Hyperlipidemia     Hypertension     Pre-diabetes        No Known Allergies    Past Surgical History:   Procedure Laterality Date    AMPUTATION DIGIT Right 5/12/2022    Procedure: GREAT AND SECOND TOE AMPUTATION RIGHT;  Surgeon: Lemuel Angela MD;  [facility];  Service: Vascular;  Laterality: Right;    FOOT SURGERY Left     CANCER REMOVAL    TONSILLECTOMY      TURP / TRANSURETHRAL INCISION / DRAINAGE PROSTATE         Family History   Problem Relation Age of Onset    Diabetes Mother     Aneurysm Father        Social History     Socioeconomic History    Marital status:     Number of children: 3   Tobacco Use    Smoking status: Never    Smokeless tobacco: Never   Vaping Use    Vaping Use: Never used   Substance and Sexual Activity    Alcohol use: Never    Drug use: Never    Sexual activity: Never           Objective   Physical Exam  Vitals and nursing note reviewed.    Constitutional:       General: He is not in acute distress.     Appearance: Normal appearance. He is not ill-appearing.   HENT:      Head: Normocephalic and atraumatic.      Mouth/Throat:      Mouth: Mucous membranes are moist.   Eyes:      General: No scleral icterus.        Right eye: No discharge.         Left eye: No discharge.      Conjunctiva/sclera: Conjunctivae normal.   Cardiovascular:      Rate and Rhythm: Normal rate and regular rhythm.      Heart sounds: No murmur heard.  Pulmonary:      Effort: Pulmonary effort is normal. No respiratory distress.      Breath sounds: Normal breath sounds. No wheezing.   Abdominal:      General: Bowel sounds are normal. There is no distension.      Palpations: Abdomen is soft.      Tenderness: There is no abdominal tenderness. There is no guarding or rebound.   Musculoskeletal:         General: No swelling. Normal range of motion.      Cervical back: Normal range of motion and neck supple.   Skin:     General: Skin is warm and dry.      Findings: No rash.   Neurological:      Mental Status: He is alert.      Motor: No weakness.      Comments: No facial droop.  Mild expressive aphasia but able to communicate somewhat.  No weakness to arms or legs. Normal .   Psychiatric:         Mood and Affect: Mood normal.         Behavior: Behavior normal.         Thought Content: Thought content normal.       Critical Care  Performed by: Olivier Gloria MD  Authorized by: Olivier Gloria MD     Critical care provider statement:     Critical care time (minutes):  45    Critical care time was exclusive of:  Separately billable procedures and treating other patients    Critical care was necessary to treat or prevent imminent or life-threatening deterioration of the following conditions:  CNS failure or compromise    Critical care was time spent personally by me on the following activities:  Ordering and review of laboratory studies, ordering and review of radiographic  studies, development of treatment plan with patient or surrogate, discussions with consultants, evaluation of patient's response to treatment, examination of patient and re-evaluation of patient's condition    I assumed direction of critical care for this patient from another provider in my specialty: no      Care discussed with: admitting provider             ED Course  ED Course as of 07/28/23 2343 Fri Jul 28, 2023 2107 CT negative.  CTA/CTP negative for LVO.  Discussed with Almaz, TNK risk/benefit vs antiplatelet therapy in 87 yo patient with low NIHSS overall but speech deficit potentially disabling.  I had a very long discussion with family at the bedside about risks and benefits.  They had a lot of questions and we had extended discussion of risk/benefit, improvement of symptoms vs ICH risk with potentially no change whatsoever.  Daughter that was with I'm said sometime between 6 and 6:30 this evening so we are right against the 3 hour window.  Ultimately they decided that patient would want TNK if he could express himself and asked to proceed.  Manual BP is high so labetalol is being given.  Pharmacy present and mixing drug. [BW]      ED Course User Index  [BW] Olivier Gloria MD         Pt seen in the CT scanner as a stroke alert.  Fingerstick sugar ok.  CT head negative.  CTA/CTP ordered.  I discussed with Dr Muse as above.      EKG sinus rhythm with PACs.  Labs benign.     Pt's blood pressure was too high for TNK.  Labetalol given but there was not improvement within the 3 hour window.  I discussed with family, the combination of age 88 and 3+ hours of symptoms and severe HTN makes the administration of TNK at this point dangerous, in my opinion.  They voiced understanding and agreement.    Per earlier discussion with Almaz, since no TNK was given we are giving ASA and Plavix.    Multiple rechecks.  Extensive time at the bedside.     Hospitalist contacted for admission.                              Medical Decision Making  Problems Addressed:  Acute ischemic stroke: complicated acute illness or injury that poses a threat to life or bodily functions    Amount and/or Complexity of Data Reviewed  Independent Historian: caregiver     Details: relative  Labs: ordered. Decision-making details documented in ED Course.  Radiology: ordered and independent interpretation performed. Decision-making details documented in ED Course.     Details: CT head my read: no acute process  ECG/medicine tests: ordered and independent interpretation performed. Decision-making details documented in ED Course.     Details: EKG my read: sinus rhythm with PACs    Risk  Prescription drug management.  Decision regarding hospitalization.    Critical Care  Total time providing critical care: 45 minutes      Final diagnoses:   Acute ischemic stroke       ED Disposition  ED Disposition       ED Disposition   Decision to Admit    Condition   --    Comment   Level of Care: Telemetry [5]   Diagnosis: Expressive aphasia [045135]   Admitting Physician: MARYANN AMARO III [[id number]]   Attending Physician: MARYANN AMARO III [[id number]]   Bed Request Comments: tele obs                 No follow-up provider specified.       Medication List      No changes were made to your prescriptions during this visit.            Olivier Gloria MD  07/29/23 0101

## 2023-07-29 NOTE — THERAPY EVALUATION
Patient Name: Jesse Siu  : 1935    MRN: [id number]                              Today's Date: 2023       Admit Date: 2023    Visit Dx:     ICD-10-CM ICD-9-CM   1. Acute ischemic stroke  I63.9 434.91     Patient Active Problem List   Diagnosis    Cellulitis of right foot    Type 2 diabetes mellitus with hyperglycemia    Renal insufficiency    Foot ulceration, right, with necrosis of muscle    Hypertension    Aphasia    Expressive aphasia     Past Medical History:   Diagnosis Date    Hyperlipidemia     Hypertension     Pre-diabetes      Past Surgical History:   Procedure Laterality Date    AMPUTATION DIGIT Right 2022    Procedure: GREAT AND SECOND TOE AMPUTATION RIGHT;  Surgeon: Lemuel Angela MD;  Location: [facility];  Service: Vascular;  Laterality: Right;    FOOT SURGERY Left     CANCER REMOVAL    TONSILLECTOMY      TURP / TRANSURETHRAL INCISION / DRAINAGE PROSTATE        General Information       Row Name 23 1400          Physical Therapy Time and Intention    Document Type other (see comments)  -ES     Mode of Treatment physical therapy  -ES       Row Name 23 1400          General Information    Patient Profile Reviewed yes  -ES     Prior Level of Function independent:;all household mobility;community mobility;transfer;bed mobility;ADL's;max assist:;home management  Uses FWW at baseline. Lives with dtr who assists as needed but primarily independent with ADLs.  -ES     Existing Precautions/Restrictions fall  Hx R toe amputations. Mild aphasia (expressive>receptive)  -ES     Barriers to Rehab medically complex;previous functional deficit  -ES       Row Name 23 1400          Living Environment    People in Home child(ashli), adult  -ES       Row Name 23 1400          Home Main Entrance    Number of Stairs, Main Entrance one  -ES       Row Name 23 1400          Stairs Within Home, Primary    Number of Stairs, Within Home, Primary none  -ES       Row Name 23  1400          Cognition    Orientation Status (Cognition) oriented x 4  -ES       Row Name 07/29/23 1400          Safety Issues, Functional Mobility    Safety Issues Affecting Function (Mobility) awareness of need for assistance;insight into deficits/self-awareness;safety precaution awareness;safety precautions follow-through/compliance  -ES     Impairments Affecting Function (Mobility) balance;endurance/activity tolerance;strength  -ES     Comment, Safety Issues/Impairments (Mobility) up x1 assist with FWW  -ES               User Key  (r) = Recorded By, (t) = Taken By, (c) = Cosigned By      Initials Name Provider Type    ES Shelley Hayes PT Physical Therapist                   Mobility       Row Name 07/29/23 1404          Bed Mobility    Comment, (Bed Mobility) Pt received EOB with OT  -ES       Row Name 07/29/23 1404          Sit-Stand Transfer    Sit-Stand Barren (Transfers) contact guard;verbal cues  -ES     Assistive Device (Sit-Stand Transfers) walker, front-wheeled  -ES     Comment, (Sit-Stand Transfer) v/c for hand placement. Demo'd forward flexed posture in standing.  -ES       Row Name 07/29/23 1404          Gait/Stairs (Locomotion)    [place] Level (Gait) contact guard;verbal cues  -ES     Assistive Device (Gait) walker, front-wheeled  -ES     Distance in Feet (Gait) 100  -ES     Deviations/Abnormal Patterns (Gait) bilateral deviations;gait speed decreased;stride length decreased  -ES     Bilateral Gait Deviations forward flexed posture  -ES     Comment, (Gait/Stairs) Pt amb 100' with CGA and FWW. Demo'd forward flexed posture with decreased stride length and step-through gait pattern. v/c for positioning of AD and to promote upright posture. No LOB. Further mobility limited by fatigue.  -ES               User Key  (r) = Recorded By, (t) = Taken By, (c) = Cosigned By      Initials Name Provider Type    Shelley Guerrero PT Physical Therapist                   Obj/Interventions        Row Name 07/29/23 1409          Range of Motion Comprehensive    General Range of Motion bilateral lower extremity ROM WFL  -ES       Row Name 07/29/23 1409          Strength Comprehensive (MMT)    General Manual Muscle Testing (MMT) Assessment lower extremity strength deficits identified  -ES     Comment, General Manual Muscle Testing (MMT) Assessment BLE grossly 4/5  -ES       Row Name 07/29/23 1409 07/29/23 1407       Balance    Balance Assessment sitting static balance;sitting dynamic balance;sit to stand dynamic balance;standing static balance;standing dynamic balance  -ES --    Static Sitting Balance standby assist  -ES --    Dynamic Sitting Balance contact guard  -ES --    Position, Sitting Balance supported;sitting in chair;sitting edge of bed  -ES --    Sit to Stand Dynamic Balance contact guard;verbal cues  -ES contact guard;verbal cues  -ES    Static Standing Balance contact guard;verbal cues  -ES --    Dynamic Standing Balance contact guard;verbal cues  -ES --    Position/Device Used, Standing Balance supported;walker, front-wheeled  -ES --    Balance Interventions sitting;standing;sit to stand;supported;static;dynamic;occupation based/functional task  -ES --    Comment, Balance Forward flexed posture in standing. No LOB.  -ES --      Row Name 07/29/23 1409          Sensory Assessment (Somatosensory)    Sensory Assessment (Somatosensory) LE sensation intact  -ES               User Key  (r) = Recorded By, (t) = Taken By, (c) = Cosigned By      Initials Name Provider Type    ES Shelley Hayes, PT Physical Therapist                   Goals/Plan       Row Name 07/29/23 1412          Bed Mobility Goal 1 (PT)    Activity/Assistive Device (Bed Mobility Goal 1, PT) sit to supine/supine to sit  -ES     [place] Level/Cues Needed (Bed Mobility Goal 1, PT) supervision required  -ES     Time Frame (Bed Mobility Goal 1, PT) long term goal (LTG);10 days  -ES       Row Name 07/29/23 1412          Transfer Goal 1  (PT)    Activity/Assistive Device (Transfer Goal 1, PT) sit-to-stand/stand-to-sit;bed-to-chair/chair-to-bed;walker, rolling  -ES     [place] Level/Cues Needed (Transfer Goal 1, PT) supervision required  -ES     Time Frame (Transfer Goal 1, PT) long term goal (LTG);10 days  -ES       Row Name 07/29/23 1412          Gait Training Goal 1 (PT)    Activity/Assistive Device (Gait Training Goal 1, PT) gait (walking locomotion);assistive device use;decrease fall risk;increase endurance/gait distance  -ES     [place] Level (Gait Training Goal 1, PT) supervision required  -ES     Distance (Gait Training Goal 1, PT) 150  -ES       Row Name 07/29/23 1412          Therapy Assessment/Plan (PT)    Planned Therapy Interventions (PT) balance training;bed mobility training;gait training;patient/family education;transfer training;strengthening;stair training  -ES               User Key  (r) = Recorded By, (t) = Taken By, (c) = Cosigned By      Initials Name Provider Type    ES Shelley Hayes, PT Physical Therapist                   Clinical Impression       Row Name 07/29/23 1410          Pain    Pretreatment Pain Rating 0/10 - no pain  -ES     Posttreatment Pain Rating 0/10 - no pain  -ES     Pre/Posttreatment Pain Comment tolerated  -ES     Pain Intervention(s) Repositioned;Ambulation/increased activity  -ES       Row Name 07/29/23 1410          Plan of Care Review    Plan of Care Reviewed With patient;family  -ES     Progress no change  PT IE  -ES     Outcome Evaluation PT eval complete. Pt presents with generalized weakness, mild balance deficits, and mild aphasia (expressive>receptive) warranting skilled IPPT services. Pt able to complete functional mobility with CGA and FWW but was limited by fatigue. PT rec home with initial 24/7 assist and HHPT at d/c.  -ES       Row Name 07/29/23 1410          Therapy Assessment/Plan (PT)    Rehab Potential (PT) good, to achieve stated therapy goals  -ES     Criteria for Skilled  Interventions Met (PT) yes;meets criteria;skilled treatment is necessary  -ES     Therapy Frequency (PT) daily  -ES       Row Name 07/29/23 1410          Vital Signs    Pre Systolic BP Rehab --  with OT  -ES     Post Systolic BP Rehab 126  -ES     Post Treatment Diastolic BP 86  -ES     Posttreatment Heart Rate (beats/min) 71  -ES     O2 Delivery Pre Treatment room air  -ES     O2 Delivery Intra Treatment room air  -ES     Post SpO2 (%) 96  -ES     O2 Delivery Post Treatment room air  -ES     Pre Patient Position Sitting  -ES     Intra Patient Position Standing  -ES     Post Patient Position Sitting  -ES       Row Name 07/29/23 1410          Positioning and Restraints    Pre-Treatment Position in bed  -ES     Post Treatment Position chair  -ES     In Chair notified nsg;reclined;sitting;call light within reach;encouraged to call for assist;with family/caregiver;exit alarm on;waffle cushion;on mechanical lift sling;legs elevated  -ES               User Key  (r) = Recorded By, (t) = Taken By, (c) = Cosigned By      Initials Name Provider Type    Shelley Guerrero, PT Physical Therapist                   Outcome Measures       Row Name 07/29/23 1413 07/29/23 0805       How much help from another person do you currently need...    Turning from your back to your side while in flat bed without using bedrails? 3  -ES 2  -EO    Moving from lying on back to sitting on the side of a flat bed without bedrails? 3  -ES 2  -EO    Moving to and from a bed to a chair (including a wheelchair)? 3  -ES 2  -EO    Standing up from a chair using your arms (e.g., wheelchair, bedside chair)? 3  -ES 2  -EO    Climbing 3-5 steps with a railing? 2  -ES 2  -EO    To walk in hospital room? 3  -ES 2  -EO    AM-PAC 6 Clicks Score (PT) 17  -ES 12  -EO    Highest level of mobility 5 --> Static standing  -ES 4 --> Transferred to chair/commode  -EO      Row Name 07/29/23 1413 07/29/23 1406       Modified [place] Scale    Pre-Stroke Modified [place]  Scale 6 - Unable to determine (UTD) from the medical record documentation  -ES --    Modified [place] Scale 2 - Slight disability.  Unable to carry out all previous activities but able to look after own affairs without assistance.  -ES 2 - Slight disability.  Unable to carry out all previous activities but able to look after own affairs without assistance.  -KF      Row Name 07/29/23 1413 07/29/23 1406       Functional Assessment    Outcome Measure Options AM-PAC 6 Clicks Basic Mobility (PT);Modified [place]  -ES AM-PAC 6 Clicks Daily Activity (OT);Modified [place]  -KF              User Key  (r) = Recorded By, (t) = Taken By, (c) = Cosigned By      Initials Name Provider Type    EO Lauren Araujo, RN Registered Nurse    May Walker, OT Occupational Therapist    Shelley Guerrero, PT Physical Therapist                                 Physical Therapy Education       Title: PT OT SLP Therapies (In Progress)       Topic: Physical Therapy (In Progress)       Point: Mobility training (Done)       Learning Progress Summary             Patient Acceptance, E,TB, VU by  at 7/29/2023 1413   Family Acceptance, E,TB, VU by ES at 7/29/2023 1413                         Point: Home exercise program (Not Started)       Learner Progress:  Not documented in this visit.              Point: Body mechanics (Done)       Learning Progress Summary             Patient Acceptance, E,TB, VU by ES at 7/29/2023 1413   Family Acceptance, E,TB, VU by ES at 7/29/2023 1413                         Point: Precautions (Done)       Learning Progress Summary             Patient Acceptance, E,TB, VU by ES at 7/29/2023 1413   Family Acceptance, E,TB, VU by ES at 7/29/2023 1413                                         User Key       Initials Effective Dates Name Provider Type Discipline     08/11/22 -  Shelley Hayes, PT Physical Therapist PT                  PT Recommendation and Plan  Planned Therapy Interventions (PT): balance training,  bed mobility training, gait training, patient/family education, transfer training, strengthening, stair training  Plan of Care Reviewed With: patient, family  Progress: no change (PT IE)  Outcome Evaluation: PT eval complete. Pt presents with generalized weakness, mild balance deficits, and mild aphasia (expressive>receptive) warranting skilled IPPT services. Pt able to complete functional mobility with CGA and FWW but was limited by fatigue. PT rec home with initial 24/7 assist and HHPT at d/c.     Time Calculation:   PT Evaluation Complexity  History, PT Evaluation Complexity: 1-2 personal factors and/or comorbidities  Examination of Body Systems (PT Eval Complexity): 1-2 elements  Clinical Presentation (PT Evaluation Complexity): stable  Clinical Decision Making (PT Evaluation Complexity): low complexity  Overall Complexity (PT Evaluation Complexity): low complexity     PT Charges       Row Name 07/29/23 1414             Time Calculation    Start Time 1320  -ES      PT Received On 07/29/23  -ES      PT Goal Re-Cert Due Date 08/08/23  -ES         Time Calculation- PT    Total Timed Code Minutes- PT 11 minute(s)  -ES         Timed Charges    34732 - Gait Training Minutes  11  -ES         Untimed Charges    PT Eval/Re-eval Minutes 40  -ES         Total Minutes    Timed Charges Total Minutes 11  -ES      Untimed Charges Total Minutes 40  -ES       Total Minutes 51  -ES                User Key  (r) = Recorded By, (t) = Taken By, (c) = Cosigned By      Initials Name Provider Type    ES Shelley Hayes, PT Physical Therapist                  Therapy Charges for Today       Code Description Service Date Service Provider Modifiers Qty    79647356981 HC GAIT TRAINING EA 15 MIN 7/29/2023 Shelley Hayes, PT GP 1    [id number] HC PT EVAL LOW COMPLEXITY 3 7/29/2023 Shelley Hayes, PT GP 1            PT G-Codes  Outcome Measure Options: AM-PAC 6 Clicks Basic Mobility (PT), Modified [place]  AM-PAC 6 Clicks Score (PT):  17  AM-PAC 6 Clicks Score (OT): 18  Modified [place] Scale: 2 - Slight disability.  Unable to carry out all previous activities but able to look after own affairs without assistance.  PT Discharge Summary  Anticipated Discharge Disposition (PT): home with 24/7 care, home with home health    Shelley Hayes, PT  7/29/2023

## 2023-07-29 NOTE — THERAPY EVALUATION
Acute Care - Speech Language Pathology Initial Evaluation  [facility]     Patient Name: Jesse Siu  : 1935  MRN: [id number]  Today's Date: 2023               Admit Date: 2023     Visit Dx:    ICD-10-CM ICD-9-CM   1. Acute ischemic stroke  I63.9 434.91     Patient Active Problem List   Diagnosis    Cellulitis of right foot    Type 2 diabetes mellitus with hyperglycemia    Renal insufficiency    Foot ulceration, right, with necrosis of muscle    Hypertension    Aphasia    Expressive aphasia     Past Medical History:   Diagnosis Date    Hyperlipidemia     Hypertension     Pre-diabetes      Past Surgical History:   Procedure Laterality Date    AMPUTATION DIGIT Right 2022    Procedure: GREAT AND SECOND TOE AMPUTATION RIGHT;  Surgeon: Lemuel Angela MD;  Location: [facility];  Service: Vascular;  Laterality: Right;    FOOT SURGERY Left     CANCER REMOVAL    TONSILLECTOMY      TURP / TRANSURETHRAL INCISION / DRAINAGE PROSTATE         SLP Recommendation and Plan  SLP Diagnosis: moderate, aphasia, cognitive-linguistic disorder (mild cognitive component) (23 1000)           SLC Criteria for Skilled Therapy Interventions Met: yes (23 1000)  Anticipated Discharge Disposition (SLP): inpatient rehabilitation facility (23 1000)     Therapy Frequency (Swallow): evaluation only (23 1000)  Predicted Duration Therapy Intervention (Days): until discharge (23 1000)  Oral Care Recommendations: Oral Care BID/PRN (23 1000)                          Plan of Care Reviewed With: patient, daughter (23 1008)  Progress: improving (23 1008)      SLP EVALUATION (last 72 hours)       SLP SLC Evaluation       Row Name 23 1000                   Communication Assessment/Intervention    Document Type evaluation  -AW        Subjective Information no complaints  -AW        Patient Observations alert;cooperative  -AW        Patient/Family/Caregiver Comments/Observations dtr at  bedside  -AW        Patient Effort good  -AW        Symptoms Noted During/After Treatment none  -AW           General Information    Patient Profile Reviewed yes  -AW        Pertinent History Of Current Problem exp aphasia  -AW        Precautions/Limitations, Vision WFL  -AW        Precautions/Limitations, Hearing WFL  -AW        Prior Level of Function-Communication WFL  -AW        Plans/Goals Discussed with patient and family  -AW        Barriers to Rehab none identified  -AW        Patient's Goals for Discharge return to home  -AW           Pain    Additional Documentation Pain Scale: FACES Pre/Post-Treatment (Group)  -AW           Pain Scale: FACES Pre/Post-Treatment    Pain: FACES Scale, Pretreatment 0-->no hurt  -AW        Posttreatment Pain Rating 0-->no hurt  -AW           Comprehension Assessment/Intervention    Comprehension Assessment/Intervention Auditory Comprehension  -AW           Auditory Comprehension Assessment/Intervention    Auditory Comprehension (Communication) mild impairment  -AW        Able to Identify Objects/Pictures (Communication) body part;WFL  -AW        Answers Questions (Communication) yes/no;wh questions;personal;simple;mild impairment  -AW        Able to Follow Commands (Communication) 2-step;WFL  -AW           Expression Assessment/Intervention    Expression Assessment/Intervention verbal expression  -AW           Verbal Expression Assessment/Intervention    Verbal Expression moderate impairment  -AW        Automatic Speech (Communication) response to greeting;counting 1-20;WFL  -AW        Repetition WFL  -AW        Responsive Naming simple;WFL  -AW        Confrontational Naming high frequency;moderate impairment  -AW        Spontaneous/Functional Words simple;moderate impairment  -AW        Sentence Formulation moderate impairment  -AW        Conversational Discourse/Fluency moderate impairment;circumlocution;delayed responses  -AW        Verbal Expression, Comment pt with good  awareness of difficulty, pausing to try to think of response. Better with spontaneous language.  -AW           Oral Motor Structure and Function    Oral Motor Structure and Function WFL  -AW        Dentition Assessment natural, present and adequate  -AW           Oral Musculature and Cranial Nerve Assessment    Oral Motor General Assessment WFL  -AW           Motor Speech Assessment/Intervention    Motor Speech Function WFL  -AW        Characteristics Consistent with Apraxia other (see comments)  pt able to mimic all oral motor movements and no groping bx noted  -AW           Cognitive Assessment Intervention- SLP    Cognitive Function (Cognition) mild impairment  -AW        Orientation Status (Cognition) awareness of basic personal information;person;place;time;situation;mild impairment  -AW        Attention (Cognitive) sustained;mild impairment  -AW           SLP Evaluation Clinical Impressions    SLP Diagnosis moderate;aphasia;cognitive-linguistic disorder  mild cognitive component  -AW        Rehab Potential/Prognosis good  -AW        SLC Criteria for Skilled Therapy Interventions Met yes  -AW        Functional Impact functional impact in social situations;functional impact in ADLs  -AW           Recommendations    Therapy Frequency (SLP SLC) at least;5 days per week  -AW        Predicted Duration Therapy Intervention (Days) until discharge  -AW        Anticipated Discharge Disposition (SLP) inpatient rehabilitation facility  -AW           Communication Treatment Objective and Progress Goals (SLP)    SLC LTGs Patient will demonstrate functional language skills for return to discharge environment  -AW        Diagnostic Treatment Objective Diagnostic Treatment Objectives (Group)  -AW        Auditory Comprehension Treatment Objectives Auditory Comprehension Treatment Objectives (Group)  -AW        Verbal Expression Treatment Objectives Verbal Expression Treatment Objectives (Group)  -AW        Cognitive Linguistic  Treatment Objectives Cognitive Linguistic Treatment Objectives (Group)  -AW           Patient will demonstrate functional language skills for return to discharge environment     [place] with minimal cues  -AW        Time frame by discharge  -AW           Auditory Comprehension Treatment Objectives    Comprehend Questions Selection comprehend questions, SLP goal 1  -AW           Comprehend Questions Goal 1 (SLP)    Improve Ability to Comprehend Questions Goal 1 (SLP) questions about personal information;simple yes/no questions;simple wh questions;simple general questions;90%;with minimal cues (75-90%)  -AW        Time Frame (Comprehend Questions Goal 1, SLP) by discharge  -AW           Verbal Expression Treatment Objectives    Word Retrieval Skills Selection word retrieval, SLP goal 1  -AW        Connected Speech Selection connected speech, SLP goal 1  -AW           Word Retrieval Skills Goal 1 (SLP)    Improve Word Retrieval Skills By Goal 1 (SLP) confrontational naming task;high frequency;completing open ended structured sentence  -AW        Time Frame (Word Retrieval Goal 1, SLP) by discharge  -AW           Connected Speech to Express Thoughts Goal 1 (SLP)    Improve Narrative Discourse to Express Thoughts By Goal 1 (SLP) describing a picture;90%;conversational task, self-directed;with minimal cues (75-90%)  -AW        Time Frame (Connected Speech Goal 1, SLP) by discharge  -AW           Cognitive Linguistic Treatment Objectives    Attention Selection attention, SLP goal 1  -AW        Problem Solving Selection problem solving, SLP goal 1  -AW           Attention Goal 1 (SLP)    Improve Attention by Goal 1 (SLP) looking at speaker;complete sustained attention task;90%;with minimal cues (75-90%)  -AW        Time Frame (Attention Goal 1, SLP) short term goal (STG)  -AW           Functional Problem Solving Skills Goal 1 (SLP)    Improve Problem Solving Through Goal 1 (SLP) determine solutions to simple ADL/safety  problems  -AW        Time Frame (Problem Solving Goal 1, SLP) by discharge  -AW                  User Key  (r) = Recorded By, (t) = Taken By, (c) = Cosigned By      Initials Name Effective Dates    AW Leyla Mcclelland, MS CCC-SLP 02/03/23 -                        EDUCATION  The patient has been educated in the following areas:     Communication Impairment.           SLP GOALS       Row Name 07/29/23 1000             Patient will demonstrate functional language skills for return to discharge environment     [place] with minimal cues  -AW      Time frame by discharge  -AW         Comprehend Questions Goal 1 (SLP)    Improve Ability to Comprehend Questions Goal 1 (SLP) questions about personal information;simple yes/no questions;simple wh questions;simple general questions;90%;with minimal cues (75-90%)  -AW      Time Frame (Comprehend Questions Goal 1, SLP) by discharge  -AW         Word Retrieval Skills Goal 1 (SLP)    Improve Word Retrieval Skills By Goal 1 (SLP) confrontational naming task;high frequency;completing open ended structured sentence  -AW      Time Frame (Word Retrieval Goal 1, SLP) by discharge  -AW         Connected Speech to Express Thoughts Goal 1 (SLP)    Improve Narrative Discourse to Express Thoughts By Goal 1 (SLP) describing a picture;90%;conversational task, self-directed;with minimal cues (75-90%)  -AW      Time Frame (Connected Speech Goal 1, SLP) by discharge  -AW         Attention Goal 1 (SLP)    Improve Attention by Goal 1 (SLP) looking at speaker;complete sustained attention task;90%;with minimal cues (75-90%)  -AW      Time Frame (Attention Goal 1, SLP) short term goal (STG)  -AW         Functional Problem Solving Skills Goal 1 (SLP)    Improve Problem Solving Through Goal 1 (SLP) determine solutions to simple ADL/safety problems  -AW      Time Frame (Problem Solving Goal 1, SLP) by discharge  -AW                User Key  (r) = Recorded By, (t) = Taken By, (c) = Cosigned By       Initials Name Provider Type    RAHUL Leyla Mcclelland MS CCC-SLP Speech and Language Pathologist                            Time Calculation:      Time Calculation- SLP       Row Name 23 1009             Time Calculation- SLP    SLP Start Time 08  -AW      SLP Stop Time 945  -AW      SLP Time Calculation (min) 60 min  -AW      SLP Received On 23  -                User Key  (r) = Recorded By, (t) = Taken By, (c) = Cosigned By      Initials Name Provider Type    RAHUL Leyla Mcclelland MS CCC-SLP Speech and Language Pathologist                                   Leyla Mcclelland MS CCC-SLP  2023   and Acute Care - Speech Language Pathology   Swallow Initial Evaluation [facility]     Patient Name: Jesse Siu  : 1935  MRN: [id number]  Today's Date: 2023               Admit Date: 2023    Visit Dx:     ICD-10-CM ICD-9-CM   1. Acute ischemic stroke  I63.9 434.91     Patient Active Problem List   Diagnosis    Cellulitis of right foot    Type 2 diabetes mellitus with hyperglycemia    Renal insufficiency    Foot ulceration, right, with necrosis of muscle    Hypertension    Aphasia    Expressive aphasia     Past Medical History:   Diagnosis Date    Hyperlipidemia     Hypertension     Pre-diabetes      Past Surgical History:   Procedure Laterality Date    AMPUTATION DIGIT Right 2022    Procedure: GREAT AND SECOND TOE AMPUTATION RIGHT;  Surgeon: Lemuel Angela MD;  Location: [facility];  Service: Vascular;  Laterality: Right;    FOOT SURGERY Left     CANCER REMOVAL    TONSILLECTOMY      TURP / TRANSURETHRAL INCISION / DRAINAGE PROSTATE         SLP Recommendation and Plan     SLP Diet Recommendation: regular textures, thin liquids (23 1000)  Recommended Precautions and Strategies: upright posture during/after eating (23 1000)  SLP Rec. for Method of Medication Administration: meds whole, with thin liquids, as tolerated (23 1000)     Monitor for Signs of Aspiration:  notify SLP if any concerns (07/29/23 1000)        Anticipated Discharge Disposition (SLP): inpatient rehabilitation facility (07/29/23 1000)     Therapy Frequency (Swallow): evaluation only (07/29/23 1000)  Predicted Duration Therapy Intervention (Days): until discharge (07/29/23 1000)  Oral Care Recommendations: Oral Care BID/PRN (07/29/23 1000)                                      Oral Care Recommendations: Oral Care BID/PRN (07/29/23 1000)    Plan of Care Reviewed With: patient, daughter  Progress: improving      SWALLOW EVALUATION (last 72 hours)       SLP Adult Swallow Evaluation       Row Name 07/29/23 1000                   General Information    Current Method of Nutrition NPO  -AW        Prior Level of Function-Communication WFL  -AW        Prior Level of Function-Swallowing no diet consistency restrictions  -AW           Oral Motor Structure and Function    Secretion Management WNL/WFL  -AW        Volitional Swallow WFL  -AW        Volitional Cough WFL  -AW           General Eating/Swallowing Observations    Eating/Swallowing Skills self-fed  -AW        Positioning During Eating upright in bed  -AW        Utensils Used spoon;straw  -AW        Consistencies Trialed regular textures;thin liquids  -AW           Respiratory    Respiratory Status WFL  -AW           Clinical Swallow Eval    Oral Prep Phase WFL  -AW        Oral Transit WFL  -AW        Oral Residue WFL  -AW        Pharyngeal Phase WFL  -AW        Esophageal Phase unremarkable  -AW        Clinical Swallow Evaluation Summary Pt shows no s/s dysphagia with food or liquids. May resume regular diet.  -AW           Recommendations    Therapy Frequency (Swallow) evaluation only  -AW        SLP Diet Recommendation regular textures;thin liquids  -AW        Recommended Precautions and Strategies upright posture during/after eating  -AW        Oral Care Recommendations Oral Care BID/PRN  -AW        SLP Rec. for Method of Medication Administration meds  whole;with thin liquids;as tolerated  -AW        Monitor for Signs of Aspiration notify SLP if any concerns  -AW                  User Key  (r) = Recorded By, (t) = Taken By, (c) = Cosigned By      Initials Name Effective Dates    AW Leyla Mcclelland, MS CCC-SLP 02/03/23 -                     EDUCATION  The patient has been educated in the following areas:   Dysphagia (Swallowing Impairment).        SLP GOALS       Row Name 07/29/23 1000             Patient will demonstrate functional language skills for return to discharge environment     [place] with minimal cues  -AW      Time frame by discharge  -AW         Comprehend Questions Goal 1 (SLP)    Improve Ability to Comprehend Questions Goal 1 (SLP) questions about personal information;simple yes/no questions;simple wh questions;simple general questions;90%;with minimal cues (75-90%)  -AW      Time Frame (Comprehend Questions Goal 1, SLP) by discharge  -AW         Word Retrieval Skills Goal 1 (SLP)    Improve Word Retrieval Skills By Goal 1 (SLP) confrontational naming task;high frequency;completing open ended structured sentence  -AW      Time Frame (Word Retrieval Goal 1, SLP) by discharge  -AW         Connected Speech to Express Thoughts Goal 1 (SLP)    Improve Narrative Discourse to Express Thoughts By Goal 1 (SLP) describing a picture;90%;conversational task, self-directed;with minimal cues (75-90%)  -AW      Time Frame (Connected Speech Goal 1, SLP) by discharge  -AW         Attention Goal 1 (SLP)    Improve Attention by Goal 1 (SLP) looking at speaker;complete sustained attention task;90%;with minimal cues (75-90%)  -AW      Time Frame (Attention Goal 1, SLP) short term goal (STG)  -AW         Functional Problem Solving Skills Goal 1 (SLP)    Improve Problem Solving Through Goal 1 (SLP) determine solutions to simple ADL/safety problems  -AW      Time Frame (Problem Solving Goal 1, SLP) by discharge  -AW                User Key  (r) = Recorded By, (t) =  Taken By, (c) = Cosigned By      Initials Name Provider Type    Leyla Schmitz MS CCC-SLP Speech and Language Pathologist                       Time Calculation:    Time Calculation- SLP       Row Name 07/29/23 1009             Time Calculation- SLP    SLP Start Time 0845  -      SLP Stop Time 0945  -      SLP Time Calculation (min) 60 min  -      SLP Received On 07/29/23  -                User Key  (r) = Recorded By, (t) = Taken By, (c) = Cosigned By      Initials Name Provider Type    Leyla Schmitz, MS CCC-SLP Speech and Language Pathologist                             Leyla Mcclelland MS CCC-SLP  7/29/2023

## 2023-07-29 NOTE — CONSULTS
Stroke Consult Note    Patient Name: Jesse Siu   MRN: [id number]  Age: 88 y.o.  Sex: male  : 1935    Primary Care Physician: Soheila Martinez MD  Referring Physician:  Dr. Gloria    TIME STROKE TEAM CALLED:  EST     TIME PATIENT SEEN:  EST    Handedness: Right  Race: White    Chief Complaint/Reason for Consultation: Aphasia    HPI: Mr. Siu is a 88-year-old male with a PMH of HLD, HTN and T2DM presents to [facility] ED with new onset of aphasia and slurred speech.  Patient is a poor historian.  Information was obtained by daughters at bedside.  Per family report, patient began to have speech difficulties at approximately (1800) during dinner.  Family thought he might be experiencing symptoms involving low blood sugar, so they made sure he ate dinner. Symptoms persisted and patient was brought to [facility] ED for further evaluation and stroke work-up.  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    On arrival to [facility] ED NIH 4.  Blood pressure 210/100.  On exam patient is able to answer questions appropriately and follow one-step commands.  Right facial droop with mild dysarthria and expressive/receptive aphasia noted.  Denies any sensory deficits.  No headaches, nausea, recent falls or trauma.  Strength in left lower extremity 4/5.  Non-smoker and no EtOH use.  Takes prescribed medications routinely.    Last Known Normal Date/Time: 2023 at 1800 EST     Review of Systems   Constitutional:  Negative for fever.   HENT:  Negative for trouble swallowing and voice change.    Eyes:  Negative for visual disturbance.   Respiratory:  Negative for shortness of breath.    Cardiovascular:  Negative for chest pain.   Gastrointestinal:  Negative for abdominal pain and nausea.   Neurological:  Positive for facial asymmetry, speech difficulty and weakness. Negative for dizziness, light-headedness, numbness and headaches.    Psychiatric/Behavioral:  Negative for confusion.     Past Medical History:   Diagnosis Date    Hyperlipidemia     Hypertension     Pre-diabetes      Past Surgical History:   Procedure Laterality Date    AMPUTATION DIGIT Right 5/12/2022    Procedure: GREAT AND SECOND TOE AMPUTATION RIGHT;  Surgeon: Lemuel Angela MD;  Location: [facility];  Service: Vascular;  Laterality: Right;    FOOT SURGERY Left     CANCER REMOVAL    TONSILLECTOMY      TURP / TRANSURETHRAL INCISION / DRAINAGE PROSTATE       Family History   Problem Relation Age of Onset    Diabetes Mother     Aneurysm Father      Social History     Socioeconomic History    Marital status:     Number of children: 3   Tobacco Use    Smoking status: Never    Smokeless tobacco: Never   Vaping Use    Vaping Use: Never used   Substance and Sexual Activity    Alcohol use: Never    Drug use: Never    Sexual activity: Never     No Known Allergies  Prior to Admission medications    Medication Sig Start Date End Date Taking? Authorizing Provider   amLODIPine (NORVASC) 10 MG tablet Take 10 mg by mouth Daily. 1/18/22   Yelena Altman MD   Contour Test test strip Daily. USE TO TEST BLOOD GLUCOSE 12/1/22   Yelena Altman MD   lisinopril (PRINIVIL,ZESTRIL) 5 MG tablet Take 1 tablet by mouth Daily.  Patient taking differently: Take 2 tablets by mouth Daily. 5/19/22   Sarah Beth Caban APRN   metFORMIN ER (GLUCOPHAGE-XR) 500 MG 24 hr tablet Take 2 tablets by mouth 2 (Two) Times a Day. 1/12/22   Yelena Altman MD   pravastatin (PRAVACHOL) 20 MG tablet Take 1 tablet by mouth Every Night. 1/24/22   Yelena Altman MD         Temp:  [97.8 øF (36.6 øC)] 97.8 øF (36.6 øC)  Heart Rate:  [85] 85  Resp:  [18] 18  BP: (114)/(113) 114/113  Neurological Exam  Mental Status  Alert. Oriented to person, place and time. Oriented to person, place, and time. Mild dysarthria present. Expressive aphasia and receptive aphasia present.    Cranial Nerves  CN II:  Visual fields full to confrontation.  CN III, IV, VI: Extraocular movements intact bilaterally. Pupils equal round and reactive to light bilaterally.  CN V: Facial sensation is normal.  CN VII:  Right: There is central facial weakness.  CN VIII: Hard of hearing.  CN XI: Shoulder shrug strength is normal.  CN XII: Tongue midline without atrophy or fasciculations.    Motor  Decreased muscle bulk throughout. Decreased muscle tone. Strength is 5/5 throughout all four extremities.  Strength left lower extremity 4/5..    Sensory  Light touch is normal in upper and lower extremities.     Coordination    Unable to assess.    Gait    Unable to assess.    Physical Exam  Constitutional:       General: He is not in acute distress.     Appearance: Normal appearance. He is not ill-appearing.   HENT:      Head: Normocephalic and atraumatic.      Nose: Nose normal.      Mouth/Throat:      Mouth: Mucous membranes are dry.   Eyes:      Extraocular Movements: Extraocular movements intact.      Pupils: Pupils are equal, round, and reactive to light.   Cardiovascular:      Pulses: Normal pulses.   Pulmonary:      Effort: Pulmonary effort is normal. No respiratory distress.   Abdominal:      General: Abdomen is flat.   Musculoskeletal:         General: Normal range of motion.      Cervical back: Normal range of motion.   Skin:     General: Skin is warm and dry.   Neurological:      Mental Status: He is alert and oriented to person, place, and time.      Cranial Nerves: Cranial nerve deficit and dysarthria present.      Sensory: No sensory deficit.      Motor: Motor strength is normal. Weakness present.   Psychiatric:         Mood and Affect: Mood normal.         Behavior: Behavior normal.       Acute Stroke Data    Thrombolytic Inclusion / Exclusion Criteria    Time: 20:39 EDT  Person Administering Scale: JOHN Krishnamurthy    Inclusion Criteria  [x]   18 years of age or greater   [x]   Onset of symptoms < 4.5 hours before beginning  treatment (stroke onset = time patient was last seen well or without symptoms).   []   Diagnosis of acute ischemic stroke causing measurable disabling deficit (Complete Hemianopia, Any Aphasia, Visual or Sensory Extinction, Any weakness limiting sustained effort against gravity)   []   Any remaining deficit considered potentially disabling in view of patient and practitioner   Exclusion criteria (Do not proceed with Alteplase if any are checked under exclusion criteria)  []   Onset unknown or GREATER than 4.5 hours   []   ICH on CT/MRI   []   CT demonstrates hypodensity representing acute or subacute infarct   []   Significant head trauma or prior stroke in the previous 3 months   []   Symptoms suggestive of subarachnoid hemorrhage   []   History of un-ruptured intracranial aneurysm GREATER than 10 mm   []   Recent intracranial or intraspinal surgery within the last 3 months   []   Arterial puncture at a non-compressible site in the previous 7 days   []   Active internal bleeding   []   Acute bleeding tendency   []   Platelet count LESS than 100,000 for known hematological diseases such as leukemia, thrombocytopenia or chronic cirrhosis   []   Current use of anticoagulant with INR GREATER than 1.7 or PT GREATER than 15 seconds, aPTT GREATER than 40 seconds   []   Heparin received within 48 hours, resulting in abnormally elevated aPTT GREATER than upper limit of normal   []   Current use of direct thrombin inhibitors or direct factor Xa inhibitors in the past 48 hours   []   Elevated blood pressure refractory to treatment (systolic GREATER than 185 mm/Hg or diastolic  GREATER than 110 mm/Hg   []   Suspected infective endocarditis and aortic arch dissection   []   Current use of therapeutic treatment dose of low-molecular-weight heparin (LMWH) within the previous 24 hours   []   Structural GI malignancy or bleed   Relative exclusion for all patients  []   Only minor non-disabling symptoms   []   Pregnancy   []    Seizure at onset with postictal residual neurological impairments   []   Major surgery or previous trauma within past 14 days   []   History of previous spontaneous ICH, intracranial neoplasm, or AV malformation   []   Postpartum (within previous 14 days)   []   Recent GI or urinary tract hemorrhage (within previous 21 days)   []   Recent acute MI (within previous 3 months)   []   History of un-ruptured intracranial aneurysm LESS than 10 mm   []   History of ruptured intracranial aneurysm   []   Blood glucose LESS than 50 mg/dL (2.7 mmol/L)   []   Dural puncture within the last 7 days   []   Known GREATER than 10 cerebral microbleeds   Additional exclusions for patients with symptoms onset between 3 and 4.5 hours.  []   Age > 80.   []   On any anticoagulants regardless of INR  >>> Warfarin (Coumadin), Heparin, Enoxaparin (Lovenox), fondaparinux (Arixtra), bivalirudin (Angiomax), Argatroban, dabigatran (Pradaxa), rivaroxaban (Xarelto), or apixaban (Eliquis)   []   Severe stroke (NIHSS > 25).   []   History of BOTH diabetes and previous ischemic stroke.   []   The risks and benefits have been discussed with the patient or family related to the administration of IV thrombolytic therapy for stroke symptoms.   []   I have discussed and reviewed the patient's case and imaging with the attending prior to IV thrombolytic therapy.   NA Time IV thrombolytic administered   Risks and benefits discussed with family by Dr. Gloria extensively.  Daughters at bedside are having difficulty with decision making.  Family state aphasic symptoms would be disabling to patient and agree to give IV thrombolytic therapy to patient.  Blood pressure not able to be controlled with labetalol IV and patient is now outside of therapeutic window.  TNK administration canceled at this time by ER physician.  Plan of care discussed with Dr. Muse.    [facility] Meds:  Scheduled-    Infusions-     PRNs-   [COMPLETED] Insert Peripheral IV **AND**  sodium chloride    Functional Status Prior to Current Stroke/[place] Score:   MODIFIED ALLISON SCALE (to be assessed for each patient having history of stroke) []Stroke history but not assessed  []0: No symptoms at all  []1: No significant disability despite symptoms  []2: Slight disability  [x]3: Moderate disability  []4: Moderately severe disability  []5: Severe disability  []6: Death        NIH Stroke Scale  Time: 20:39 EDT  Person Administering Scale: JOHN Krishnamurthy    1a  Level of consciousness: 0=alert; keenly responsive   1b. LOC questions:  0=Answers both questions correctly   1c. LOC commands: 0=Performs both tasks correctly   2.  Best Gaze: 0=normal   3.  Visual: 0=No visual loss   4. Facial Palsy: 1=Minor paralysis (flattened nasolabial fold, asymmetric on smiling)   5a.  Motor left arm: 0=No drift, limb holds 90 (or 45) degrees for full 10 seconds   5b.  Motor right arm: 0=No drift, limb holds 90 (or 45) degrees for full 10 seconds   6a. motor left le=Drift, limb holds 90 (or 45) degrees but drifts down before full 10 seconds: does not hit bed   6b  Motor right le=No drift, limb holds 90 (or 45) degrees for full 10 seconds   7. Limb Ataxia: 0=Absent   8.  Sensory: 0=Normal; no sensory loss   9. Best Language:  1=Mild to moderate aphasia; some obvious loss of fluency or facility of comprehension without significant limitation on ideas expressed or form of expression.   10. Dysarthria: 1=Mild Dysarthria   11. Extinction and Inattention: 0=No abnormality    Total:   4       Results Reviewed:  I have personally reviewed current lab, radiology, and data and agree with results.  WBC   Date Value Ref Range Status   2023 9.12 3.40 - 10.80 10*3/mm3 Final     RBC   Date Value Ref Range Status   2023 4.42 4.14 - 5.80 10*6/mm3 Final     Hemoglobin   Date Value Ref Range Status   2023 13.9 12.0 - 17.0 g/dL Final   2023 13.1 13.0 - 17.7 g/dL Final     Hematocrit   Date Value Ref  Range Status   07/28/2023 41 38 - 51 % Final   07/28/2023 41.4 37.5 - 51.0 % Final     MCV   Date Value Ref Range Status   07/28/2023 93.7 79.0 - 97.0 fL Final     MCH   Date Value Ref Range Status   07/28/2023 29.6 26.6 - 33.0 pg Final     MCHC   Date Value Ref Range Status   07/28/2023 31.6 31.5 - 35.7 g/dL Final     RDW   Date Value Ref Range Status   07/28/2023 13.3 12.3 - 15.4 % Final     RDW-SD   Date Value Ref Range Status   07/28/2023 45.7 37.0 - 54.0 fl Final     MPV   Date Value Ref Range Status   07/28/2023 10.4 6.0 - 12.0 fL Final     Platelets   Date Value Ref Range Status   07/28/2023 332 140 - 450 10*3/mm3 Final     Neutrophil %   Date Value Ref Range Status   07/28/2023 68.8 42.7 - 76.0 % Final     Lymphocyte %   Date Value Ref Range Status   07/28/2023 20.8 19.6 - 45.3 % Final     Monocyte %   Date Value Ref Range Status   07/28/2023 6.7 5.0 - 12.0 % Final     Eosinophil %   Date Value Ref Range Status   07/28/2023 2.9 0.3 - 6.2 % Final     Basophil %   Date Value Ref Range Status   07/28/2023 0.5 0.0 - 1.5 % Final     Immature Grans %   Date Value Ref Range Status   07/28/2023 0.3 0.0 - 0.5 % Final     Neutrophils, Absolute   Date Value Ref Range Status   07/28/2023 6.27 1.70 - 7.00 10*3/mm3 Final     Lymphocytes, Absolute   Date Value Ref Range Status   07/28/2023 1.90 0.70 - 3.10 10*3/mm3 Final     Monocytes, Absolute   Date Value Ref Range Status   07/28/2023 0.61 0.10 - 0.90 10*3/mm3 Final     Eosinophils, Absolute   Date Value Ref Range Status   07/28/2023 0.26 0.00 - 0.40 10*3/mm3 Final     Basophils, Absolute   Date Value Ref Range Status   07/28/2023 0.05 0.00 - 0.20 10*3/mm3 Final     Immature Grans, Absolute   Date Value Ref Range Status   07/28/2023 0.03 0.00 - 0.05 10*3/mm3 Final     nRBC   Date Value Ref Range Status   07/28/2023 0.0 0.0 - 0.2 /100 WBC Final      Lab Results   Component Value Date    GLUCOSE 188 (H) 07/28/2023    BUN 24 (H) 07/28/2023    CREATININE 1.40 (H) 07/28/2023     EGFR 48.3 (L) 07/28/2023    BCR 17.1 07/28/2023    K 4.4 07/28/2023    CO2 23.0 07/28/2023    CALCIUM 9.1 07/28/2023    ALBUMIN 3.8 07/28/2023    BILITOT 0.3 07/28/2023    AST 17 07/28/2023    ALT 11 07/28/2023    CT Angiogram Neck    Result Date: 7/28/2023  1.No hemodynamically significant stenosis, large vessel cut or aneurysms in intracranial circulation 2.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 Left vertebral artery is patent. Basilar artery is patent Case discussed with Dr. DUONG SULLIVAN @ 7/28/2023 9:38 PM EDT. Electronically Signed: Gene Chandler  7/28/2023 9:38 PM EDT  Workstation ID: [id number]    MRI Brain Without Contrast    Result Date: 7/29/2023  Impression: Atrophy and chronic microvascular ischemia. No acute intracranial process. Electronically Signed: Daljit Gurrola  7/29/2023 1:07 AM EDT  Workstation ID: [id number]    CT Head Without Contrast Stroke Protocol    Result Date: 7/28/2023  1.No acute intracranial hemorrhage. Calvarium is intact. 2.Chronic findings as detailed above. 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 Left vertebral artery is patent. Basilar artery is patent Case discussed with Dr. DUONG SULLIVAN @ 7/28/2023 9:38 PM EDT. Electronically Signed: Gene Chandler  7/28/2023 9:38 PM EDT  Workstation ID: [id number]    CT CEREBRAL PERFUSION WITH & WITHOUT CONTRAST    Result Date: 7/28/2023  Impression: No ischemia or core infarct is identified. Electronically Signed: Gene Chandler  7/28/2023 9:27 PM EDT  Workstation ID: [id number]         Assessment/Plan:    Salbador is a 88-year-old male with a PMH of HLD, HTN and T2DM presents to [facility] ED with new onset of aphasia and slurred speech.  Patient is a poor historian.  Information was obtained by daughters at bedside.  Per family report, patient began to have speech difficulties at approximately (1800) during dinner.  Family thought he might be experiencing symptoms involving low blood sugar, so they made sure he ate dinner. Symptoms persisted and patient was brought to [facility] ED for further evaluation and stroke work-up.  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 Patient is not a candidate for IV thrombolytic therapy due to uncontrolled blood pressure and now outside of therapeutic window.  Patient is not a candidate for endovascular therapy related to no LVO on CT scans.    Antiplatelet PTA: None  Anticoagulant PTA: None        Aphasia  Differentials to include TIA, CVA, metabolic encephalopathy  Initiate TIA/CVA doubt IV thrombolytic therapy order set  N.P.O. until bedside dysphagia screen completed by RN  Activity as tolerated  Aspirin 325 mg p.o. daily  Plavix 75 mg p.o. daily  Atorvastatin 80 mg p.o. nightly  MRI brain without contrast routine  TTE routine, defer bubble  A1c, lipid panel routine  Blood pressure goals, SBP less than 220  Diabetes educator to see if appropriate  PT/OT/SLP eval and treat  Case management to follow      Plan of care discussed with Dr. Gloria, Dr. Muse, patient, family at bedside and RN.  Stroke neurology continue to follow.  Thank you for this consult.  Call with any questions or concerns.    Raman Duffy, JOHN  July 28, 2023  20:39 EDT

## 2023-07-30 ENCOUNTER — APPOINTMENT (OUTPATIENT)
Dept: CARDIOLOGY | Facility: HOSPITAL | Age: 88
End: 2023-07-30
Payer: MEDICARE

## 2023-07-30 LAB
ANION GAP SERPL CALCULATED.3IONS-SCNC: 12 MMOL/L (ref 5–15)
ASCENDING AORTA: 3.3 CM
BASOPHILS # BLD AUTO: 0.07 10*3/MM3 (ref 0–0.2)
BASOPHILS NFR BLD AUTO: 0.8 % (ref 0–1.5)
BH CV ECHO MEAS - AO MAX PG: 5.5 MMHG
BH CV ECHO MEAS - AO MEAN PG: 3 MMHG
BH CV ECHO MEAS - AO ROOT AREA (BSA CORRECTED): 2.1 CM2
BH CV ECHO MEAS - AO ROOT DIAM: 3.5 CM
BH CV ECHO MEAS - AO V2 MAX: 117 CM/SEC
BH CV ECHO MEAS - AO V2 VTI: 29.3 CM
BH CV ECHO MEAS - AVA(I,D): 2.13 CM2
BH CV ECHO MEAS - EDV(CUBED): 64 ML
BH CV ECHO MEAS - EDV(MOD-SP2): 68.7 ML
BH CV ECHO MEAS - EDV(MOD-SP4): 86.8 ML
BH CV ECHO MEAS - EF(MOD-BP): 56.7 %
BH CV ECHO MEAS - EF(MOD-SP2): 51.2 %
BH CV ECHO MEAS - EF(MOD-SP4): 60.4 %
BH CV ECHO MEAS - ESV(CUBED): 42.9 ML
BH CV ECHO MEAS - ESV(MOD-SP2): 33.5 ML
BH CV ECHO MEAS - ESV(MOD-SP4): 34.4 ML
BH CV ECHO MEAS - FS: 12.5 %
BH CV ECHO MEAS - IVS/LVPW: 1 CM
BH CV ECHO MEAS - IVSD: 1 CM
BH CV ECHO MEAS - LA DIMENSION: 2.8 CM
BH CV ECHO MEAS - LAT PEAK E' VEL: 6.2 CM/SEC
BH CV ECHO MEAS - LV MASS(C)D: 127.1 GRAMS
BH CV ECHO MEAS - LV MAX PG: 2.12 MMHG
BH CV ECHO MEAS - LV MEAN PG: 1 MMHG
BH CV ECHO MEAS - LV V1 MAX: 72.8 CM/SEC
BH CV ECHO MEAS - LV V1 VTI: 18 CM
BH CV ECHO MEAS - LVIDD: 4 CM
BH CV ECHO MEAS - LVIDS: 3.5 CM
BH CV ECHO MEAS - LVOT AREA: 3.5 CM2
BH CV ECHO MEAS - LVOT DIAM: 2.1 CM
BH CV ECHO MEAS - LVPWD: 1 CM
BH CV ECHO MEAS - MED PEAK E' VEL: 4.9 CM/SEC
BH CV ECHO MEAS - MV A MAX VEL: 115 CM/SEC
BH CV ECHO MEAS - MV DEC SLOPE: 225 CM/SEC2
BH CV ECHO MEAS - MV DEC TIME: 0.26 MSEC
BH CV ECHO MEAS - MV E MAX VEL: 81.2 CM/SEC
BH CV ECHO MEAS - MV E/A: 0.71
BH CV ECHO MEAS - MV MAX PG: 5.4 MMHG
BH CV ECHO MEAS - MV MEAN PG: 2 MMHG
BH CV ECHO MEAS - MV P1/2T: 101.5 MSEC
BH CV ECHO MEAS - MV V2 VTI: 35.1 CM
BH CV ECHO MEAS - MVA(P1/2T): 2.17 CM2
BH CV ECHO MEAS - MVA(VTI): 1.78 CM2
BH CV ECHO MEAS - PA ACC TIME: 0.12 SEC
BH CV ECHO MEAS - RAP SYSTOLE: 3 MMHG
BH CV ECHO MEAS - RVSP: 23 MMHG
BH CV ECHO MEAS - SV(LVOT): 62.3 ML
BH CV ECHO MEAS - SV(MOD-SP2): 35.2 ML
BH CV ECHO MEAS - SV(MOD-SP4): 52.4 ML
BH CV ECHO MEAS - TAPSE (>1.6): 2.29 CM
BH CV ECHO MEAS - TR MAX PG: 20.3 MMHG
BH CV ECHO MEAS - TR MAX VEL: 224.7 CM/SEC
BH CV ECHO MEASUREMENTS AVERAGE E/E' RATIO: 14.63
BH CV VAS BP LEFT ARM: NORMAL MMHG
BH CV XLRA - RV BASE: 2.4 CM
BH CV XLRA - RV LENGTH: 5.9 CM
BH CV XLRA - RV MID: 2.26 CM
BH CV XLRA - TDI S': 16.4 CM/SEC
BUN SERPL-MCNC: 28 MG/DL (ref 8–23)
BUN/CREAT SERPL: 20.4 (ref 7–25)
CALCIUM SPEC-SCNC: 9 MG/DL (ref 8.6–10.5)
CHLORIDE SERPL-SCNC: 106 MMOL/L (ref 98–107)
CO2 SERPL-SCNC: 23 MMOL/L (ref 22–29)
CREAT SERPL-MCNC: 1.37 MG/DL (ref 0.76–1.27)
DEPRECATED RDW RBC AUTO: 46.1 FL (ref 37–54)
EGFRCR SERPLBLD CKD-EPI 2021: 49.6 ML/MIN/1.73
EOSINOPHIL # BLD AUTO: 0.49 10*3/MM3 (ref 0–0.4)
EOSINOPHIL NFR BLD AUTO: 5.5 % (ref 0.3–6.2)
ERYTHROCYTE [DISTWIDTH] IN BLOOD BY AUTOMATED COUNT: 13.2 % (ref 12.3–15.4)
GLUCOSE BLDC GLUCOMTR-MCNC: 137 MG/DL (ref 70–130)
GLUCOSE BLDC GLUCOMTR-MCNC: 189 MG/DL (ref 70–130)
GLUCOSE BLDC GLUCOMTR-MCNC: 193 MG/DL (ref 70–130)
GLUCOSE BLDC GLUCOMTR-MCNC: 337 MG/DL (ref 70–130)
GLUCOSE SERPL-MCNC: 144 MG/DL (ref 65–99)
HCT VFR BLD AUTO: 38.7 % (ref 37.5–51)
HGB BLD-MCNC: 12.3 G/DL (ref 13–17.7)
IMM GRANULOCYTES # BLD AUTO: 0.03 10*3/MM3 (ref 0–0.05)
IMM GRANULOCYTES NFR BLD AUTO: 0.3 % (ref 0–0.5)
LEFT ATRIUM VOLUME INDEX: 26.9 ML/M2
LYMPHOCYTES # BLD AUTO: 1.27 10*3/MM3 (ref 0.7–3.1)
LYMPHOCYTES NFR BLD AUTO: 14.2 % (ref 19.6–45.3)
MAGNESIUM SERPL-MCNC: 1.9 MG/DL (ref 1.6–2.4)
MCH RBC QN AUTO: 30 PG (ref 26.6–33)
MCHC RBC AUTO-ENTMCNC: 31.8 G/DL (ref 31.5–35.7)
MCV RBC AUTO: 94.4 FL (ref 79–97)
MONOCYTES # BLD AUTO: 0.63 10*3/MM3 (ref 0.1–0.9)
MONOCYTES NFR BLD AUTO: 7 % (ref 5–12)
NEUTROPHILS NFR BLD AUTO: 6.47 10*3/MM3 (ref 1.7–7)
NEUTROPHILS NFR BLD AUTO: 72.2 % (ref 42.7–76)
NRBC BLD AUTO-RTO: 0 /100 WBC (ref 0–0.2)
PHOSPHATE SERPL-MCNC: 3.3 MG/DL (ref 2.5–4.5)
PLATELET # BLD AUTO: 267 10*3/MM3 (ref 140–450)
PMV BLD AUTO: 10.4 FL (ref 6–12)
POTASSIUM SERPL-SCNC: 4.1 MMOL/L (ref 3.5–5.2)
RBC # BLD AUTO: 4.1 10*6/MM3 (ref 4.14–5.8)
SODIUM SERPL-SCNC: 141 MMOL/L (ref 136–145)
WBC NRBC COR # BLD: 8.96 10*3/MM3 (ref 3.4–10.8)

## 2023-07-30 PROCEDURE — 96376 TX/PRO/DX INJ SAME DRUG ADON: CPT

## 2023-07-30 PROCEDURE — G0378 HOSPITAL OBSERVATION PER HR: HCPCS

## 2023-07-30 PROCEDURE — 25010000002 LABETALOL 5 MG/ML SOLUTION: Performed by: INTERNAL MEDICINE

## 2023-07-30 PROCEDURE — 84100 ASSAY OF PHOSPHORUS: CPT | Performed by: INTERNAL MEDICINE

## 2023-07-30 PROCEDURE — 85025 COMPLETE CBC W/AUTO DIFF WBC: CPT | Performed by: INTERNAL MEDICINE

## 2023-07-30 PROCEDURE — 80048 BASIC METABOLIC PNL TOTAL CA: CPT | Performed by: INTERNAL MEDICINE

## 2023-07-30 PROCEDURE — 99213 OFFICE O/P EST LOW 20 MIN: CPT | Performed by: NURSE PRACTITIONER

## 2023-07-30 PROCEDURE — 83735 ASSAY OF MAGNESIUM: CPT | Performed by: INTERNAL MEDICINE

## 2023-07-30 PROCEDURE — 63710000001 INSULIN LISPRO (HUMAN) PER 5 UNITS: Performed by: INTERNAL MEDICINE

## 2023-07-30 PROCEDURE — 93306 TTE W/DOPPLER COMPLETE: CPT

## 2023-07-30 PROCEDURE — 96361 HYDRATE IV INFUSION ADD-ON: CPT

## 2023-07-30 PROCEDURE — 99233 SBSQ HOSP IP/OBS HIGH 50: CPT | Performed by: INTERNAL MEDICINE

## 2023-07-30 PROCEDURE — 82948 REAGENT STRIP/BLOOD GLUCOSE: CPT

## 2023-07-30 PROCEDURE — 93306 TTE W/DOPPLER COMPLETE: CPT | Performed by: INTERNAL MEDICINE

## 2023-07-30 RX ORDER — ASPIRIN 81 MG/1
81 TABLET, CHEWABLE ORAL DAILY
Status: DISCONTINUED | OUTPATIENT
Start: 2023-07-30 | End: 2023-07-31 | Stop reason: HOSPADM

## 2023-07-30 RX ORDER — AMLODIPINE BESYLATE 5 MG/1
5 TABLET ORAL
Status: DISCONTINUED | OUTPATIENT
Start: 2023-07-30 | End: 2023-07-30

## 2023-07-30 RX ORDER — HYDRALAZINE HYDROCHLORIDE 50 MG/1
50 TABLET, FILM COATED ORAL EVERY 8 HOURS SCHEDULED
Status: DISCONTINUED | OUTPATIENT
Start: 2023-07-30 | End: 2023-07-31 | Stop reason: HOSPADM

## 2023-07-30 RX ADMIN — INSULIN LISPRO 2 UNITS: 100 INJECTION, SOLUTION INTRAVENOUS; SUBCUTANEOUS at 18:12

## 2023-07-30 RX ADMIN — INSULIN LISPRO 5 UNITS: 100 INJECTION, SOLUTION INTRAVENOUS; SUBCUTANEOUS at 22:20

## 2023-07-30 RX ADMIN — AMLODIPINE BESYLATE 5 MG: 5 TABLET ORAL at 11:03

## 2023-07-30 RX ADMIN — HYDRALAZINE HYDROCHLORIDE 50 MG: 50 TABLET, FILM COATED ORAL at 22:20

## 2023-07-30 RX ADMIN — Medication 10 ML: at 22:21

## 2023-07-30 RX ADMIN — HYDRALAZINE HYDROCHLORIDE 50 MG: 50 TABLET, FILM COATED ORAL at 14:01

## 2023-07-30 RX ADMIN — ATORVASTATIN CALCIUM 80 MG: 40 TABLET, FILM COATED ORAL at 22:20

## 2023-07-30 RX ADMIN — INSULIN LISPRO 2 UNITS: 100 INJECTION, SOLUTION INTRAVENOUS; SUBCUTANEOUS at 12:04

## 2023-07-30 RX ADMIN — ASPIRIN 81 MG: 81 TABLET, CHEWABLE ORAL at 12:04

## 2023-07-30 RX ADMIN — SODIUM CHLORIDE 75 ML/HR: 9 INJECTION, SOLUTION INTRAVENOUS at 02:09

## 2023-07-30 RX ADMIN — Medication 10 MG: at 18:31

## 2023-07-30 NOTE — PROGRESS NOTES
Stroke Progress Note       Chief Complaint:  Aphasia    Subjective    Subjective     Subjective:  Sitting up in bed, feels he is back to his baseline.      Review of Systems   Constitutional:  Negative for chills and fever.   HENT:  Negative for trouble swallowing.    Eyes:  Negative for visual disturbance.   Respiratory:  Negative for cough and shortness of breath.    Cardiovascular:  Negative for chest pain and palpitations.   Gastrointestinal:  Negative for nausea and vomiting.   Musculoskeletal: Negative.    Skin: Negative.    Neurological:  Negative for facial asymmetry, speech difficulty, weakness, numbness and headaches.   Psychiatric/Behavioral: Negative.            Objective    Objective      Temp:  [97.5 øF (36.4 øC)-97.8 øF (36.6 øC)] 97.8 øF (36.6 øC)  Heart Rate:  [66-71] 71  Resp:  [18] 18  BP: (150-226)/() 180/67        Neurological Exam  Mental Status  Alert. Oriented to person, place, and time. Speech is normal. Language is fluent with no aphasia. Attention and concentration are normal.    Cranial Nerves  CN II: Visual fields full to confrontation.  CN III, IV, VI: Extraocular movements intact bilaterally. Normal lids and orbits bilaterally. Pupils equal round and reactive to light bilaterally.  CN V: Facial sensation is normal.  CN VII: Full and symmetric facial movement.  CN XI: Shoulder shrug strength is normal.  CN XII: Tongue midline without atrophy or fasciculations.    Motor  Normal muscle bulk throughout. No fasciculations present. Normal muscle tone. No abnormal involuntary movements. Strength is 5/5 throughout all four extremities.    Sensory  Light touch is normal in upper and lower extremities.     Coordination    Finger-to-nose, rapid alternating movements and heel-to-shin normal bilaterally without dysmetria.    Gait    Not tested.    Physical Exam  Vitals and nursing note reviewed.   Constitutional:       Appearance: Normal appearance.   HENT:      Head: Normocephalic and  atraumatic.   Eyes:      General: Lids are normal.      Extraocular Movements: Extraocular movements intact.      Pupils: Pupils are equal, round, and reactive to light.   Cardiovascular:      Rate and Rhythm: Normal rate.   Pulmonary:      Effort: Pulmonary effort is normal. No respiratory distress.   Musculoskeletal:         General: No swelling.      Cervical back: Normal range of motion and neck supple.   Skin:     General: Skin is warm and dry.   Neurological:      General: No focal deficit present.      Mental Status: He is alert and oriented to person, place, and time. Mental status is at baseline.      Cranial Nerves: No cranial nerve deficit.      Sensory: No sensory deficit.      Motor: Motor strength is normal. No weakness.      Coordination: Coordination is intact.   Psychiatric:         Mood and Affect: Mood normal.         Speech: Speech normal.         Behavior: Behavior normal.       Results Review:    I reviewed the patient's new clinical results.  WBC   Date Value Ref Range Status   07/29/2023 11.24 (H) 3.40 - 10.80 10*3/mm3 Final     RBC   Date Value Ref Range Status   07/29/2023 4.06 (L) 4.14 - 5.80 10*6/mm3 Final     Hemoglobin   Date Value Ref Range Status   07/29/2023 12.0 (L) 13.0 - 17.7 g/dL Final     Hematocrit   Date Value Ref Range Status   07/29/2023 37.6 37.5 - 51.0 % Final     MCV   Date Value Ref Range Status   07/29/2023 92.6 79.0 - 97.0 fL Final     MCH   Date Value Ref Range Status   07/29/2023 29.6 26.6 - 33.0 pg Final     MCHC   Date Value Ref Range Status   07/29/2023 31.9 31.5 - 35.7 g/dL Final     RDW   Date Value Ref Range Status   07/29/2023 13.3 12.3 - 15.4 % Final     RDW-SD   Date Value Ref Range Status   07/29/2023 45.2 37.0 - 54.0 fl Final     MPV   Date Value Ref Range Status   07/29/2023 10.8 6.0 - 12.0 fL Final     Platelets   Date Value Ref Range Status   07/29/2023 296 140 - 450 10*3/mm3 Final     Neutrophil %   Date Value Ref Range Status   07/29/2023 80.7 (H)  42.7 - 76.0 % Final     Lymphocyte %   Date Value Ref Range Status   07/29/2023 10.3 (L) 19.6 - 45.3 % Final     Monocyte %   Date Value Ref Range Status   07/29/2023 6.7 5.0 - 12.0 % Final     Eosinophil %   Date Value Ref Range Status   07/29/2023 1.4 0.3 - 6.2 % Final     Basophil %   Date Value Ref Range Status   07/29/2023 0.5 0.0 - 1.5 % Final     Immature Grans %   Date Value Ref Range Status   07/29/2023 0.4 0.0 - 0.5 % Final     Neutrophils, Absolute   Date Value Ref Range Status   07/29/2023 9.06 (H) 1.70 - 7.00 10*3/mm3 Final     Lymphocytes, Absolute   Date Value Ref Range Status   07/29/2023 1.16 0.70 - 3.10 10*3/mm3 Final     Monocytes, Absolute   Date Value Ref Range Status   07/29/2023 0.75 0.10 - 0.90 10*3/mm3 Final     Eosinophils, Absolute   Date Value Ref Range Status   07/29/2023 0.16 0.00 - 0.40 10*3/mm3 Final     Basophils, Absolute   Date Value Ref Range Status   07/29/2023 0.06 0.00 - 0.20 10*3/mm3 Final     Immature Grans, Absolute   Date Value Ref Range Status   07/29/2023 0.05 0.00 - 0.05 10*3/mm3 Final     nRBC   Date Value Ref Range Status   07/29/2023 0.0 0.0 - 0.2 /100 WBC Final     Lab Results   Component Value Date    GLUCOSE 188 (H) 07/28/2023    BUN 24 (H) 07/28/2023    CREATININE 1.40 (H) 07/28/2023    EGFR 48.3 (L) 07/28/2023    BCR 17.1 07/28/2023    K 4.4 07/28/2023    CO2 23.0 07/28/2023    CALCIUM 9.1 07/28/2023    ALBUMIN 3.8 07/28/2023    BILITOT 0.3 07/28/2023    AST 17 07/28/2023    ALT 11 07/28/2023     A1c 7.2  LDL 57    CT Angiogram Neck    Result Date: 7/28/2023  1.No hemodynamically significant stenosis, large vessel cut or aneurysms in intracranial circulation 2.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 Left vertebral artery is patent. Basilar artery is patent Case discussed with Dr. DUONG SULLIVAN @ 7/28/2023 9:38 PM EDT. Electronically Signed: Gene Chandler  7/28/2023  9:38 PM EDT  Workstation ID: [id number]    MRI Brain Without Contrast    Result Date: 7/29/2023  Impression: Atrophy and chronic microvascular ischemia. No acute intracranial process. Electronically Signed: Daljit Gurrola  7/29/2023 1:07 AM EDT  Workstation ID: [id number]    CT Head Without Contrast Stroke Protocol    Result Date: 7/28/2023  1.No acute intracranial hemorrhage. Calvarium is intact. 2.Chronic findings as detailed above. 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 Left vertebral artery is patent. Basilar artery is patent Case discussed with Dr. DUONG SULLIVAN @ 7/28/2023 9:38 PM EDT. Electronically Signed: Gene Chandler  7/28/2023 9:38 PM EDT  Workstation ID: [id number]    CT CEREBRAL PERFUSION WITH & WITHOUT CONTRAST    Result Date: 7/28/2023  Impression: No ischemia or core infarct is identified. Electronically Signed: Gene Chandler  7/28/2023 9:27 PM EDT  Workstation ID: [id number]               Assessment/Plan     Assessment/Plan: 88-year-old, , right-handed male with known diagnosis of T2DM, HTN, and HLD who presented with dysarthria and aphasia.  Initial NIHSS 4, /100.  CT head negative for acute abnormality, CT perfusion negative, CTA head and neck negative for intracranial hemodynamically significant stenosis LVO or aneurysm.  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      Transient neurologic dysfunction secondary to hypertensive urgency.  This is not likely a cerebrovascular event.    -Patient is essentially at his baseline  -MRI brain is negative for stroke  -TTE showed EF of 55-60%, normal left atrium; no bubble  study secondary to age  -Recommend continuing aspirin 81 mg as he does have multiple vascular risk factors; can DC Plavix.  -HTN: suspect his speech difficulties were related to his high pressures.  SBP 150s-180s yesterday.  Ok to slowly lower.  Will need close monitoring at home and follow up with PCP in 1 week  -HLD:  ok to continue home pravastatin at dc  -No additional stroke work up is needed    Plan of care was discussed with patient and Dr. Muse.  Stroke neurology will sign off, please call for questions or concerns, thank you.          JOHN Myers  07/30/23  08:22 EDT

## 2023-07-30 NOTE — PROGRESS NOTES
[facility] Medicine Services  PROGRESS NOTE    Patient Name: Jesse Siu  : 1935  MRN: [id number]    Date of Admission: 2023  Primary Care Physician: Soheila Martinez MD    Subjective   Subjective     CC:  Aphasia    HPI:  Patient resting in bed in no acute distress and her family is also present at the bedside.  Patient has no specific complaint and tells me that he feels comfortable.  No fever or chills.  No chest pain or palpitation or shortness of breath.  No nausea vomiting or diarrhea or abdominal pain.  Patient is close to baseline.    ROS:  As above    Objective   Objective     Vital Signs:   Temp:  [97.5 øF (36.4 øC)-97.8 øF (36.6 øC)] 97.6 øF (36.4 øC)  Heart Rate:  [69-91] 83  Resp:  [18-20] 20  BP: (170-226)/() 170/98     Physical Exam:  Constitutional: No acute distress, awake, alert  HENT: NCAT, mucous membranes moist  Respiratory: Clear to auscultation bilaterally, respiratory effort normal   Cardiovascular: RRR, no murmurs, rubs, or gallops  Gastrointestinal: Positive bowel sounds, soft, nontender, nondistended  Musculoskeletal: No bilateral ankle edema  Psychiatric: Appropriate affect, cooperative  Neurologic: Awake, alert, follows commands, no focality appreciated, speech clear  Skin: No rashes     Results Reviewed:  LAB RESULTS:      Lab 23  0836 23  0422 23   WBC 8.96 11.24*  --   --  9.12  --    HEMOGLOBIN 12.3* 12.0*  --   --  13.1  --    HEMOGLOBIN, POC  --   --  13.9  --   --  13.9   HEMATOCRIT 38.7 37.6  --   --  41.4  --    HEMATOCRIT POC  --   --  41  --   --  41   PLATELETS 267 296  --   --  332  --    NEUTROS ABS 6.47 9.06*  --   --  6.27  --    IMMATURE GRANS (ABS) 0.03 0.05  --   --  0.03  --    LYMPHS ABS 1.27 1.16  --   --  1.90  --    MONOS ABS 0.63 0.75  --   --  0.61  --    EOS ABS 0.49* 0.16  --   --  0.26  --    MCV 94.4 92.6  --   --  93.7  --    PROTIME  --   --    --  15.0 13.6  --    APTT  --   --   --   --  29.4*  --          Lab 07/30/23  0836 07/29/23  0422 07/28/23 2309 07/28/23 2014 07/28/23 2010 07/28/23 2009   SODIUM 141  --  141  --  141  --    POTASSIUM 4.1  --  4.4  --  4.5  --    CHLORIDE 106  --  105  --  103  --    CO2 23.0  --  23.0  --  23.0  --    ANION GAP 12.0  --  13.0  --  15.0  --    BUN 28*  --  24*  --  25*  --    CREATININE 1.37*  --  1.40* 1.60 1.41* 1.60*   EGFR 49.6*  --  48.3*  --  47.9* 41.2*   GLUCOSE 144*  --  188*  --  185*  --    CALCIUM 9.0  --  9.1  --  9.3  --    MAGNESIUM 1.9  --  1.8  --  2.1  --    PHOSPHORUS 3.3  --   --   --   --   --    HEMOGLOBIN A1C  --  7.20*  --   --   --   --    TSH  --   --   --   --  2.500  --          Lab 07/28/23 2309 07/28/23 2010   TOTAL PROTEIN 6.9 7.4   ALBUMIN 3.8 4.0   GLOBULIN 3.1 3.4   ALT (SGPT) 11 12   AST (SGOT) 17 21   BILIRUBIN 0.3 0.3   ALK PHOS 92 99         Lab 07/28/23 2309 07/28/23 2012 07/28/23 2010   HSTROP T 60*  --  63*   PROTIME  --  15.0 13.6   INR  --  1.3* 1.03         Lab 07/28/23 2309   CHOLESTEROL 131   LDL CHOL 57   HDL CHOL 58   TRIGLYCERIDES 84             Brief Urine Lab Results  (Last result in the past 365 days)        Color   Clarity   Blood   Leuk Est   Nitrite   Protein   CREAT   Urine HCG        07/29/23 1404             45.3                 Microbiology Results Abnormal       None            Adult Transthoracic Echo Complete W/ Cont if Necessary Per Protocol (With Agitated Saline)    Result Date: 7/30/2023    Left ventricular ejection fraction appears to be 56 - 60%.   There is calcification of the aortic valve.   Estimated right ventricular systolic pressure from tricuspid regurgitation is normal (<35 mmHg).   Mild dilation of the aortic root is present.     CT Angiogram Neck    Result Date: 7/28/2023  CT ANGIOGRAM HEAD W AI ANALYSIS OF LVO, CT ANGIOGRAM NECK Date of Exam: 7/28/2023 8:04 PM EDT Indication: Stroke, follow up. Comparison: CT head without  contrast 7/28/2023 Technique: CTA of the head was performed after the uneventful intravenous administration of 115 mL Isovue 370 . Reconstructed coronal and sagittal images were also obtained. In addition, a 3-D volume rendered image was created for interpretation. Automated exposure control and iterative reconstruction methods were used. Findings: CTA NECK: *Aortic arch: No aneurysm. No significant stenosis or occlusion of the major arch vessels. *Left carotid system: No aneurysm, significant stenosis or occlusion. *Right carotid system: No aneurysm, significant stenosis or occlusion. 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 Left vertebral artery is patent. Basilar artery is patent. CTA HEAD: *Anterior circulation: No aneurysm, significant stenosis or occlusion. *Posterior circulation: No aneurysm, significant stenosis or occlusion. *Anatomic variants: None of significance. *Venous sinuses: Patent.     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 Left vertebral artery is patent. Basilar artery is patent Case discussed with Dr. DUONG SULLIVAN @ 7/28/2023 9:38 PM EDT. Electronically Signed: Gene Chandler  7/28/2023 9:38 PM EDT  Workstation ID: [id number]    MRI Brain Without Contrast    Result Date: 7/29/2023  MRI BRAIN WO CONTRAST Date of Exam: 7/29/2023 12:28 AM EDT Indication: Stroke, follow up.  Comparison: CT scan of the head from 7/28/2023 at 2006 hours Technique:  Routine multiplanar/multisequence sequence images of the brain were obtained without contrast administration. Findings: There is mild diffuse generalized atrophy. There are areas of increased T2 and FLAIR signal in the periventricular  and subcortical white matter consistent with chronic microvascular ischemia. There is no mass, mass effect or midline shift. There are no areas of acute hemorrhage. There are no abnormal extra-axial fluid collections. The major intracranial flow voids are preserved. The diffusion weighted sequences are normal.     Impression: Impression: Atrophy and chronic microvascular ischemia. No acute intracranial process. Electronically Signed: Daljit Gurrola  7/29/2023 1:07 AM EDT  Workstation ID: [id number]    CT Head Without Contrast Stroke Protocol    Result Date: 7/28/2023  CT HEAD WO CONTRAST STROKE PROTOCOL Date of Exam: 7/28/2023 7:59 PM EDT Indication: Stroke, follow up. Comparison: None available. Technique: Axial CT images were obtained of the head without contrast administration.  Reconstructed coronal images were also obtained. Automated exposure control and iterative construction methods were used. Scan Time: 1957 hours Results discussed with Raman Duffy at 8:05 p.m.. Findings: *No acute intracranial hemorrhage. *No masses, mass effect, midline shift or hydrocephalus. *Chronic small vessel ischemic disease. Generalized brain atrophy. *Calvarium is intact. *Visualized orbits and globes are unremarkable without radiopaque foreign bodies. *Visualized paranasal sinuses are clear. *Visualized mastoid air cells are clear.     Impression: 1.No acute intracranial hemorrhage. Calvarium is intact. 2.Chronic findings as detailed above. Electronically Signed: Gene Chandler  7/28/2023 8:08 PM EDT  Workstation ID: [id number]    CT Angiogram Head w AI Analysis of LVO    Result Date: 7/28/2023  CT ANGIOGRAM HEAD W AI ANALYSIS OF LVO, CT ANGIOGRAM NECK Date of Exam: 7/28/2023 8:04 PM EDT Indication: Stroke, follow up. Comparison: CT head without contrast 7/28/2023 Technique: CTA of the head was performed after the uneventful intravenous administration of 115 mL Isovue 370 . Reconstructed coronal and sagittal images were also obtained.  In addition, a 3-D volume rendered image was created for interpretation. Automated exposure control and iterative reconstruction methods were used. Findings: CTA NECK: *Aortic arch: No aneurysm. No significant stenosis or occlusion of the major arch vessels. *Left carotid system: No aneurysm, significant stenosis or occlusion. *Right carotid system: No aneurysm, significant stenosis or occlusion. 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 Left vertebral artery is patent. Basilar artery is patent. CTA HEAD: *Anterior circulation: No aneurysm, significant stenosis or occlusion. *Posterior circulation: No aneurysm, significant stenosis or occlusion. *Anatomic variants: None of significance. *Venous sinuses: Patent.     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 Left vertebral artery is patent. Basilar artery is patent Case discussed with Dr. DUONG SULLIVAN @ 7/28/2023 9:38 PM EDT. Electronically Signed: Gene Chandler  7/28/2023 9:38 PM EDT  Workstation ID: [id number]    CT CEREBRAL PERFUSION WITH & WITHOUT CONTRAST    Result Date: 7/28/2023  CT CEREBRAL PERFUSION W WO CONTRAST Date of Exam: 7/28/2023 8:04 PM EDT Indication: Neuro deficit, acute, stroke suspected.  Comparison: CT head and CTA head and neck 7/28/2023 Technique: Axial CT images of the brain were obtained prior to and after the administration of 115 mL Isovue 370 . Core blood volume, core blood flow, mean transit time, and Tmax images were obtained utilizing the Rapid software protocol. A limited CT angiogram of the head was also performed to measure the blood vessel density. The radiation dose reduction device was turned on for each scan per  the ALARA (As Low as Reasonably Achievable) protocol. Findings: *CBF less than 30%: 0 mL. *Tmax greater than 6 seconds: 0 mL. *Mismatch volume 0 mL. *Mismatch ratio none.     Impression: Impression: No ischemia or core infarct is identified. Electronically Signed: Gene Ramesh  7/28/2023 9:27 PM EDT  Workstation ID: [id number]     Results for orders placed during the hospital encounter of 07/28/23    Adult Transthoracic Echo Complete W/ Cont if Necessary Per Protocol (With Agitated Saline)    Interpretation Summary    Left ventricular ejection fraction appears to be 56 - 60%.    There is calcification of the aortic valve.    Estimated right ventricular systolic pressure from tricuspid regurgitation is normal (<35 mmHg).    Mild dilation of the aortic root is present.      Current medications:  Scheduled Meds:aspirin, 81 mg, Oral, Daily  atorvastatin, 80 mg, Oral, Nightly  hydrALAZINE, 50 mg, Oral, Q8H  insulin lispro, 2-7 Units, Subcutaneous, 4x Daily AC & at Bedtime  sodium chloride, 10 mL, Intravenous, Q12H  sodium chloride, 10 mL, Intravenous, Q12H      Continuous Infusions:     PRN Meds:.  acetaminophen    Calcium Replacement - Follow Nurse / BPA Driven Protocol    dextrose    dextrose    dextrose    dextrose    glucagon (human recombinant)    labetalol    Magnesium Standard Dose Replacement - Follow Nurse / BPA Driven Protocol    nitroglycerin    ondansetron    Phosphorus Replacement - Follow Nurse / BPA Driven Protocol    Potassium Replacement - Follow Nurse / BPA Driven Protocol    [COMPLETED] Insert Peripheral IV **AND** sodium chloride    sodium chloride    sodium chloride    sodium chloride    sodium chloride    Assessment & Plan   Assessment & Plan     Active Hospital Problems    Diagnosis  POA    **Aphasia [R47.01]  Yes    Expressive aphasia [R47.01]  Yes      Resolved Hospital Problems   No resolved problems to display.        Brief Hospital Course to date:  Jesse Siu is a 88 y.o. male with past  medical history significant for hypertension, hyperlipidemia, type 2 diabetes mellitus.  Patient was admitted for an episode of expressive aphasia.    *Expressive aphasia, significantly improved.  Imaging including MRI is negative for any acute process.  This could possibly be secondary to uncontrolled hypertension    *Diabetes mellitus, A1c is 7.2.  Patient is on metformin at home which is on hold here and currently is on sliding scale insulin.    *Elevated creatinine at 1.4.  Patient has chronic kidney disease and baseline creatinine is around 1.1.  So patient does not qualify for acute kidney injury.    *Hypertension, currently on Norvasc 5 mg p.o. daily and is uncontrolled.  Patient family tells me that her PCP discontinued Norvasc because patient developed lower extremity edema.  Prior to admission patient was on lisinopril, however, patient's creatinine is elevated.  We will try hydralazine 50 mg p.o. every 8 hours.  Will monitor the blood pressure and if it is better controlled patient could be discharged home tomorrow.        Expected Discharge Location and Transportation: Home  Expected Discharge in a day or 2  Expected discharge date/ time has not been documented.     DVT prophylaxis:  Mechanical DVT prophylaxis orders are present.     AM-PAC 6 Clicks Score (PT): 17 (07/29/23 8206)    CODE STATUS:   Code Status and Medical Interventions:   Ordered at: 07/28/23 0668     Level Of Support Discussed With:    Patient     Code Status (Patient has no pulse and is not breathing):    CPR (Attempt to Resuscitate)     Medical Interventions (Patient has pulse or is breathing):    Full Support     Release to patient:    Routine Release       Merritt Jacobs MD  07/30/23

## 2023-07-30 NOTE — CASE MANAGEMENT/SOCIAL WORK
Case Management Discharge Note      Final Note: Received call from attending physician that patient needed Home Health PT and OT.  Order placed and called to [facility].  Awaiting call to see if they can accept patient.  [facility] has accepted patient for PT/OT and speech.                     Final Discharge Disposition Code: 06 - home with home health care

## 2023-07-31 ENCOUNTER — HOME HEALTH ADMISSION (OUTPATIENT)
Dept: HOME HEALTH SERVICES | Facility: HOME HEALTHCARE | Age: 88
End: 2023-07-31
Payer: MEDICARE

## 2023-07-31 VITALS
WEIGHT: 130 LBS | HEIGHT: 64 IN | OXYGEN SATURATION: 98 % | BODY MASS INDEX: 22.2 KG/M2 | DIASTOLIC BLOOD PRESSURE: 61 MMHG | TEMPERATURE: 97.9 F | RESPIRATION RATE: 18 BRPM | SYSTOLIC BLOOD PRESSURE: 124 MMHG | HEART RATE: 75 BPM

## 2023-07-31 LAB
GLUCOSE BLDC GLUCOMTR-MCNC: 179 MG/DL (ref 70–130)
GLUCOSE BLDC GLUCOMTR-MCNC: 225 MG/DL (ref 70–130)

## 2023-07-31 PROCEDURE — 63710000001 INSULIN LISPRO (HUMAN) PER 5 UNITS: Performed by: INTERNAL MEDICINE

## 2023-07-31 PROCEDURE — G0378 HOSPITAL OBSERVATION PER HR: HCPCS

## 2023-07-31 PROCEDURE — 82948 REAGENT STRIP/BLOOD GLUCOSE: CPT

## 2023-07-31 RX ORDER — HYDRALAZINE HYDROCHLORIDE 50 MG/1
50 TABLET, FILM COATED ORAL EVERY 8 HOURS SCHEDULED
Qty: 90 TABLET | Refills: 0 | Status: SHIPPED | OUTPATIENT
Start: 2023-07-31

## 2023-07-31 RX ORDER — CLONIDINE HYDROCHLORIDE 0.1 MG/1
0.1 TABLET ORAL EVERY 8 HOURS SCHEDULED
Status: DISCONTINUED | OUTPATIENT
Start: 2023-07-31 | End: 2023-07-31 | Stop reason: HOSPADM

## 2023-07-31 RX ADMIN — INSULIN LISPRO 2 UNITS: 100 INJECTION, SOLUTION INTRAVENOUS; SUBCUTANEOUS at 08:59

## 2023-07-31 RX ADMIN — HYDRALAZINE HYDROCHLORIDE 50 MG: 50 TABLET, FILM COATED ORAL at 06:01

## 2023-07-31 RX ADMIN — ASPIRIN 81 MG: 81 TABLET, CHEWABLE ORAL at 08:59

## 2023-07-31 RX ADMIN — INSULIN LISPRO 3 UNITS: 100 INJECTION, SOLUTION INTRAVENOUS; SUBCUTANEOUS at 12:47

## 2023-07-31 NOTE — CONSULTS
"Diabetes Education  Assessment/Teaching    Patient Name:  Jesse Siu  YOB: 1935  MRN: [id number]  Admit Date:  7/28/2023      Assessment Date:  7/31/2023  Flowsheet Row Most Recent Value   General Information     Referral From: MD giles   Height 162.6 cm (64\")   Height Method Stated   Weight 59 kg (130 lb)   Weight Method Stated   Are you currently involved in an activity/exercise program?  No   Pregnancy Assessment    Diabetes History    What type of diabetes do you have? Type 2   Current DM knowledge good   Do you test your blood sugar at home? yes   Frequency of checks \"once a week\"   Who performs the test? Self   Have you had low blood sugar? (<70mg/dl) yes   How often do you have low blood sugar? rare   Have you had high blood sugar? (>140mg/dl) no   How would you rate your diabetes control? good   How often do you check your feet? daily   Education Preferences    Nutrition Information    Assessment Topics    Taking Medication - Assessment Competent   Monitoring - Assessment Competent   DM Goals            seen along with family at bedside. Patient and I discussed type 2 diabetes self-management, risk factors, and importance of blood glucose control to reduce complications. Target blood glucose readings and A1c goals per ADA were reviewed. Reviewed with patient current A1c 7.2% and discussed its significance. Signs, symptoms, and treatment of hyperglycemia and hypoglycemia were discussed. Patient reports \"he rarely has low blood sugar but if he does he treats it with orange juice or peanut butter.\" Patient and I reviewed the ADA Rule of 15.  Encouraged pt to monitor blood sugar at home 1 to 2 times per day and to call PCP if blood sugar is trending high. Patient currently checking \"once per week.\"Patient currently taking Metformin twice daily. Encouraged to keep record of blood glucose readings to take to follow up appointment with PCP. Provided patient with copy of [email or web address]'s " "\"What is Diabetes\" handout, \"Blood Glucose Goals\" handout, and \"What is A1c\" handout.     Thank you for the referral,    Grisel Galeano RN,BSN,PCCN            Electronically signed by:  Grisel Galeano RN  07/31/23 13:03 EDT  "

## 2023-07-31 NOTE — PROGRESS NOTES
Met with patient's daughter Geena Crocker (patient sleeping) to discuss home care and she is agreeable to [facility]. Shari LACEY has a call into PCP office. Maco SEGURA([facility])Hospital Liaison

## 2023-08-01 ENCOUNTER — HOME CARE VISIT (OUTPATIENT)
Dept: HOME HEALTH SERVICES | Facility: HOME HEALTHCARE | Age: 88
End: 2023-08-01
Payer: MEDICARE

## 2023-08-01 PROCEDURE — G0151 HHCP-SERV OF PT,EA 15 MIN: HCPCS

## 2023-08-06 VITALS
RESPIRATION RATE: 16 BRPM | HEART RATE: 71 BPM | OXYGEN SATURATION: 97 % | SYSTOLIC BLOOD PRESSURE: 134 MMHG | TEMPERATURE: 98.1 F | DIASTOLIC BLOOD PRESSURE: 77 MMHG

## 2023-08-08 ENCOUNTER — HOME CARE VISIT (OUTPATIENT)
Dept: HOME HEALTH SERVICES | Facility: HOME HEALTHCARE | Age: 88
End: 2023-08-08
Payer: MEDICARE

## 2023-08-13 NOTE — DISCHARGE SUMMARY
[facility]  DISCHARGE SUMMARY    Patient Name: Jesse Siu  : 1935  MRN: [id number]    Date of Admission: 2023  8:07 PM  Date of Discharge:  23  Primary Care Physician: Soheila Martinez MD    Consults       No orders found from 2023 to 2023.            Hospital Course     Presenting Problem: Aphasia    Active Hospital Problems    Diagnosis  POA    **Aphasia [R47.01]  Yes    Expressive aphasia [R47.01]  Yes      Resolved Hospital Problems   No resolved problems to display.          Hospital Course:  Jesse Siu is a 88 y.o. male with past medical history significant for hypertension, hyperlipidemia, type 2 diabetes mellitus.  Patient was admitted for an episode of expressive aphasia.     *Expressive aphasia, significantly improved.  Imaging including MRI is negative for any acute process.  This could possibly be secondary to uncontrolled hypertension     *Diabetes mellitus, A1c is 7.2.  Patient is on metformin at home which is on hold here and currently is on sliding scale insulin.     *Elevated creatinine at 1.4.  Patient has chronic kidney disease and baseline creatinine is around 1.1.  So patient does not qualify for acute kidney injury.     *Hypertension, was uncontrolled on admission and was started on Cardene drip.  Started the patient on hydralazine p.o. yesterday.  This morning patient's blood pressure was much better controlled.  Overall patient is back to his baseline.  Patient is very eager to go home.    Discharge Follow Up Recommendations for outpatient labs/diagnostics:        Day of Discharge     HPI:   Resting in bed in no acute distress and overall comfortable.  Denies any fever or chills.  No chest pain or palpitation or shortness of breath.  No nausea vomiting or diarrhea or abdominal pain.  Very very eager to go home.    Review of Systems  As above    Vital Signs:          Physical Exam:  Constitutional: No acute  distress, awake, alert  HENT: NCAT, mucous membranes moist  Respiratory: Clear to auscultation bilaterally, respiratory effort normal   Cardiovascular: RRR, no murmurs, rubs, or gallops  Gastrointestinal: Positive bowel sounds, soft, nontender, nondistended  Musculoskeletal: No bilateral ankle edema  Psychiatric: Appropriate affect, cooperative  Neurologic: Awake, alert, follows commands, no focality appreciated, speech clear  Skin: No rashes     Pertinent  and/or Most Recent Results     LAB RESULTS:                              Brief Urine Lab Results  (Last result in the past 365 days)        Color   Clarity   Blood   Leuk Est   Nitrite   Protein   CREAT   Urine HCG        07/29/23 1404             45.3               Microbiology Results (last 10 days)       ** No results found for the last 240 hours. **            No radiology results for the last 10 days    Results for orders placed during the hospital encounter of 05/10/22    Duplex Lower Extremity Art / Grafts - Bilateral CAR    Interpretation Summary  ú Monophasic waveforms in the distal right SFA and popliteal arteries.  ú 50 to 75% stenosis in the right anterior tibial artery  ú Suspected short segment occlusion in the right mid posterior tibial artery with collateral flow demonstrated.  ú Collateral filling seen at the level of the right dorsalis pedis artery also.  ú Right HENRIQUE is moderately reduced at 0.78.  ú Biphasic flow in the left, SFA, popliteal, anterior tibial artery.  ú Monophasic flow in the left peroneal artery, no flow detected in the left posterior tibial artery.  ú Flow was demonstrated in the left dorsalis pedis artery  ú Left HENRIQUE calculated at 1.5 this suggest significant vessel calcification.      Results for orders placed during the hospital encounter of 05/10/22    Duplex Lower Extremity Art / Grafts - Bilateral CAR    Interpretation Summary  ú Monophasic waveforms in the distal right SFA and popliteal arteries.  ú 50 to 75% stenosis in  the right anterior tibial artery  ú Suspected short segment occlusion in the right mid posterior tibial artery with collateral flow demonstrated.  ú Collateral filling seen at the level of the right dorsalis pedis artery also.  ú Right HENRIQUE is moderately reduced at 0.78.  ú Biphasic flow in the left, SFA, popliteal, anterior tibial artery.  ú Monophasic flow in the left peroneal artery, no flow detected in the left posterior tibial artery.  ú Flow was demonstrated in the left dorsalis pedis artery  ú Left HENRIQUE calculated at 1.5 this suggest significant vessel calcification.      Results for orders placed during the hospital encounter of 07/28/23    Adult Transthoracic Echo Complete W/ Cont if Necessary Per Protocol (With Agitated Saline)    Interpretation Summary    Left ventricular ejection fraction appears to be 56 - 60%.    There is calcification of the aortic valve.    Estimated right ventricular systolic pressure from tricuspid regurgitation is normal (<35 mmHg).    Mild dilation of the aortic root is present.      Plan for Follow-up of Pending Labs/Results:     Discharge Details        Discharge Medications        New Medications        Instructions Start Date   hydrALAZINE 50 MG tablet  Commonly known as: APRESOLINE   50 mg, Oral, Every 8 Hours Scheduled             Continue These Medications        Instructions Start Date   metFORMIN  MG 24 hr tablet  Commonly known as: GLUCOPHAGE-XR   1,000 mg, Oral, 2 Times Daily, Takes one pill in the morning and one at night       pravastatin 20 MG tablet  Commonly known as: PRAVACHOL   20 mg, Oral, Nightly             Stop These Medications      lisinopril 5 MG tablet  Commonly known as: PRINIVIL,ZESTRIL              No Known Allergies      Discharge Disposition:  Home or Self Care    Diet:  Hospital:No active diet order      Activity:  Activity Instructions       Activity as Tolerated              Restrictions or Other Recommendations:         CODE STATUS:    Code  Status and Medical Interventions:   Ordered at: 07/28/23 2201     Level Of Support Discussed With:    Patient     Code Status (Patient has no pulse and is not breathing):    CPR (Attempt to Resuscitate)     Medical Interventions (Patient has pulse or is breathing):    Full Support     Release to patient:    Routine Release       No future appointments.    Additional Instructions for the Follow-ups that You Need to Schedule       Ambulatory Referral to Home Health   As directed      Face to Face Visit Date: 7/30/2023   Follow-up provider for Plan of Care?: I treated the patient in an acute care facility and will not continue treatment after discharge.   Follow-up provider: SOHEILA MCCORMACK [[id number]]   Reason/Clinical Findings: Aphasia   Describe mobility limitations that make leaving home difficult: impaired physical mobility, gait, and balance   Nursing/Therapeutic Services Requested: Physical Therapy Occupational Therapy   Frequency: 1 Week 1        Ambulatory Referral to Home Health   As directed      Face to Face Visit Date: 7/30/2023   Follow-up provider for Plan of Care?: I treated the patient in an acute care facility and will not continue treatment after discharge.   Follow-up provider: SOHEILA MCCORMACK [[id number]]   Reason/Clinical Findings: aphasia   Describe mobility limitations that make leaving home difficult: impaired physical mobility and gait, balance and endurande   Nursing/Therapeutic Services Requested: Speech Therapy   Frequency: 1 Week 1        Discharge Follow-up with PCP   As directed       Currently Documented PCP:    Soheila Mccormack MD    PCP Phone Number:    [phone]     Follow Up Details: within one week                      Merritt Jacobs MD  08/13/23      Time Spent on Discharge:  I spent  38  minutes on this discharge activity which included: face-to-face encounter with the patient, reviewing the data in the system, coordination of the care with the nursing staff as well as  consultants, documentation, and entering orders.

## 2025-01-29 ENCOUNTER — OFFICE VISIT (OUTPATIENT)
Dept: CARDIOLOGY | Facility: CLINIC | Age: OVER 89
End: 2025-01-29
Payer: MEDICARE

## 2025-01-29 VITALS
DIASTOLIC BLOOD PRESSURE: 82 MMHG | WEIGHT: 149.4 LBS | HEIGHT: 70 IN | OXYGEN SATURATION: 96 % | HEART RATE: 84 BPM | BODY MASS INDEX: 21.39 KG/M2 | SYSTOLIC BLOOD PRESSURE: 148 MMHG

## 2025-01-29 DIAGNOSIS — I47.19 ATRIAL TACHYCARDIA, PAROXYSMAL: ICD-10-CM

## 2025-01-29 DIAGNOSIS — I49.1 PREMATURE ATRIAL COMPLEXES: Primary | ICD-10-CM

## 2025-01-29 PROCEDURE — 99204 OFFICE O/P NEW MOD 45 MIN: CPT | Performed by: INTERNAL MEDICINE

## 2025-01-29 RX ORDER — GLIPIZIDE 2.5 MG/1
2.5 TABLET, EXTENDED RELEASE ORAL DAILY
COMMUNITY
Start: 2024-11-14

## 2025-01-29 RX ORDER — NIFEDIPINE 60 MG/1
1 TABLET, EXTENDED RELEASE ORAL DAILY
COMMUNITY
Start: 2024-11-14

## 2025-01-29 NOTE — PROGRESS NOTES
[facility]  Consultation H&P  Jesse Siu  1935  [phone]  There is no work phone number on file..    VISIT DATE:  01/29/2025    PCP: Soheila Martinez MD  [id number]  [facility] [id number]    CC:  Chief Complaint   Patient presents with    Premature Artial Complex        Previous cardiac studies and procedures:  May 2022 lower extreme arterial duplex  Monophasic waveforms in the distal right SFA and popliteal arteries.  50 to 75% stenosis in the right anterior tibial artery  Suspected short segment occlusion in the right mid posterior tibial artery with collateral flow demonstrated.  Collateral filling seen at the level of the right dorsalis pedis artery also.  Right HENRIQUE is moderately reduced at 0.78.  Biphasic flow in the left, SFA, popliteal, anterior tibial artery.  Monophasic flow in the left peroneal artery, no flow detected in the left posterior tibial artery.  Flow was demonstrated in the left dorsalis pedis artery  Left HENRIQUE calculated at 1.5 this suggest significant vessel calcification.    July 2023 TTE    Left ventricular ejection fraction appears to be 56 - 60%.    There is calcification of the aortic valve.    Estimated right ventricular systolic pressure from tricuspid regurgitation is normal (<35 mmHg).    Mild dilation of the aortic root is present.    December 2024 Holter, 6-day: Rare PVCs, frequent PACs-29%, occasional episodes of asymptomatic nonsustained atrial tachycardia.    ASSESSMENT:   Diagnosis Plan   1. Premature atrial complexes        2. Atrial tachycardia, paroxysmal              PLAN:  Supraventricular ectopy: High burden of ventricular ectopy, asymptomatic.  No significant underlying structural heart disease.  Currently no clinical symptoms concerning for more sustained arrhythmia such as atrial fibrillation or atrial flutter.  Will continue clinical follow-up.  No further evaluation recommended at this time.  No further  "pharmacologic therapy recommended.    Peripheral vascular disease: Currently asymptomatic.  Continue current medical therapy.    History of Present Illness   89-year-old gentleman with diabetes mellitus type 2, hypertension, dyslipidemia and peripheral vascular disease.  Presenting with asymptomatic supraventricular ectopy.  He denies chest pain, palpitations or dyspnea.  Blood pressure tend to run less than 135/80 mmHg.  Compliant with medical therapy.  Uses a walker for ambulation.  Overall feels like he is in good health.  No acute issues.    PHYSICAL EXAMINATION:  Vitals:    01/29/25 1044   BP: 148/82   BP Location: Right arm   Patient Position: Sitting   Cuff Size: Adult   Pulse: 84   SpO2: 96%   Weight: 67.8 kg (149 lb 6.4 oz)   Height: 177.8 cm (70\")     General Appearance:    Alert, cooperative, no distress, appears stated age   Head:    Normocephalic, without obvious abnormality, atraumatic   Eyes:    conjunctiva/corneas clear, EOM's intact, fundi     benign, both eyes   Ears:    Normal TM's and external ear canals, both ears   Nose:   Nares normal, septum midline, mucosa normal, no drainage    or sinus tenderness   Throat:   Lips, mucosa, and tongue normal; teeth and gums normal   Neck:   Supple, symmetrical, trachea midline, no adenopathy;     thyroid:  no enlargement/tenderness/nodules; no carotid    bruit or JVD   Back:     Symmetric, no curvature, ROM normal, no CVA tenderness   Lungs:     Clear to auscultation bilaterally, respirations unlabored   Chest Wall:    No tenderness or deformity    Heart:  regularly irregular, S1 and S2 normal, no murmur, rub   or gallop, normal carotid impulse bilaterally without bruit.   Abdomen:     Soft, non-tender, bowel sounds active all four quadrants,     no masses, no organomegaly   Extremities:   Extremities normal, atraumatic, no cyanosis or edema   Pulses:   2+ and symmetric all extremities   Skin:   Skin color, texture, turgor normal, no rashes or lesions "   Lymph nodes:   Cervical, supraclavicular, and axillary nodes normal   Neurologic:   normal strength, sensation intact     throughout       Diagnostic Data:  Procedures  Lab Results   Component Value Date    TRIG 84 07/28/2023    HDL 58 07/28/2023     Lab Results   Component Value Date    GLUCOSE 144 (H) 07/30/2023    BUN 28 (H) 07/30/2023    CREATININE 1.37 (H) 07/30/2023     07/30/2023    K 4.1 07/30/2023     07/30/2023    CO2 23.0 07/30/2023     Lab Results   Component Value Date    HGBA1C 7.20 (H) 07/29/2023     Lab Results   Component Value Date    WBC 8.96 07/30/2023    HGB 12.3 (L) 07/30/2023    HCT 38.7 07/30/2023     07/30/2023       PROBLEM LIST:  Patient Active Problem List   Diagnosis    Cellulitis of right foot    Type 2 diabetes mellitus with hyperglycemia    Renal insufficiency    Foot ulceration, right, with necrosis of muscle    Hypertension    Aphasia    Expressive aphasia    Premature atrial complexes    Atrial tachycardia, paroxysmal       PAST MEDICAL HX  Past Medical History:   Diagnosis Date    Hyperlipidemia     Hypertension     Pre-diabetes        Allergies  No Known Allergies    Current Medications    Current Outpatient Medications:     glipizide (GLUCOTROL XL) 2.5 MG 24 hr tablet, Take 1 tablet by mouth Daily., Disp: , Rfl:     NIFEdipine XL (PROCARDIA XL) 60 MG 24 hr tablet, Take 1 tablet by mouth Daily., Disp: , Rfl:     pravastatin (PRAVACHOL) 20 MG tablet, Take 1 tablet by mouth Every Night., Disp: , Rfl:          ROS  ROS      SOCIAL HX  Social History     Socioeconomic History    Marital status:     Number of children: 3   Tobacco Use    Smoking status: Never    Smokeless tobacco: Never   Vaping Use    Vaping status: Never Used   Substance and Sexual Activity    Alcohol use: Never    Drug use: Never    Sexual activity: Never       FAMILY HX  Family History   Problem Relation Age of Onset    Diabetes Mother     Aneurysm Father              John Garner III,  MD, FACC

## (undated) DEVICE — TBG PENCL TELESCP MEGADYNE SMOKE EVAC 10FT

## (undated) DEVICE — 3M™ STERI-DRAPE™ ISOLATION BAG, 10 PER CARTON / 4 CARTONS PER CASE, 1003: Brand: 3M™ STERI-DRAPE™

## (undated) DEVICE — PK EXTREM LOWR 10

## (undated) DEVICE — ANTIBACTERIAL UNDYED BRAIDED (POLYGLACTIN 910), SYNTHETIC ABSORBABLE SUTURE: Brand: COATED VICRYL

## (undated) DEVICE — TRAP FLD MINIVAC MEGADYNE 100ML

## (undated) DEVICE — CONTAINER,SPECIMEN,OR STERILE,4OZ: Brand: MEDLINE

## (undated) DEVICE — PATIENT RETURN ELECTRODE, SINGLE-USE, CONTACT QUALITY MONITORING, ADULT, WITH 9FT CORD, FOR PATIENTS WEIGING OVER 33LBS. (15KG): Brand: MEGADYNE

## (undated) DEVICE — SPNG GZ WOVN 4X4IN 12PLY 10/BX STRL

## (undated) DEVICE — 450 ML BOTTLE OF 0.05% CHLORHEXIDINE GLUCONATE IN 99.95% STERILE WATER FOR IRRIGATION, USP AND APPLICATOR.: Brand: IRRISEPT ANTIMICROBIAL WOUND LAVAGE

## (undated) DEVICE — GLV SURG PREMIERPRO MIC LTX PF SZ7 BRN

## (undated) DEVICE — DRSNG GZ PETROLTM XEROFORM CURAD 4X4IN STRL

## (undated) DEVICE — ELECTRD BLD EZ CLN MOD XLNG 2.75IN

## (undated) DEVICE — SUT ETHLN 3/0 FS1 30IN 669H

## (undated) DEVICE — BANDAGE,GAUZE,BULKEE II,4.5"X4.1YD,STRL: Brand: MEDLINE

## (undated) DEVICE — DRAPE,TOP,102X53,STERILE: Brand: MEDLINE